# Patient Record
Sex: MALE | Race: WHITE | NOT HISPANIC OR LATINO | Employment: OTHER | URBAN - METROPOLITAN AREA
[De-identification: names, ages, dates, MRNs, and addresses within clinical notes are randomized per-mention and may not be internally consistent; named-entity substitution may affect disease eponyms.]

---

## 2017-02-08 ENCOUNTER — GENERIC CONVERSION - ENCOUNTER (OUTPATIENT)
Dept: OTHER | Facility: OTHER | Age: 82
End: 2017-02-08

## 2017-02-15 ENCOUNTER — GENERIC CONVERSION - ENCOUNTER (OUTPATIENT)
Dept: OTHER | Facility: OTHER | Age: 82
End: 2017-02-15

## 2017-02-21 ENCOUNTER — ALLSCRIPTS OFFICE VISIT (OUTPATIENT)
Dept: OTHER | Facility: OTHER | Age: 82
End: 2017-02-21

## 2017-02-21 DIAGNOSIS — M62.81 MUSCLE WEAKNESS (GENERALIZED): ICD-10-CM

## 2017-02-22 ENCOUNTER — GENERIC CONVERSION - ENCOUNTER (OUTPATIENT)
Dept: OTHER | Facility: OTHER | Age: 82
End: 2017-02-22

## 2017-02-27 ENCOUNTER — APPOINTMENT (OUTPATIENT)
Dept: PHYSICAL THERAPY | Facility: REHABILITATION | Age: 82
End: 2017-02-27
Payer: MEDICARE

## 2017-02-27 DIAGNOSIS — M62.81 MUSCLE WEAKNESS (GENERALIZED): ICD-10-CM

## 2017-02-27 PROCEDURE — G8990 OTHER PT/OT CURRENT STATUS: HCPCS

## 2017-02-27 PROCEDURE — G8991 OTHER PT/OT GOAL STATUS: HCPCS

## 2017-02-27 PROCEDURE — 97163 PT EVAL HIGH COMPLEX 45 MIN: CPT

## 2017-03-01 ENCOUNTER — APPOINTMENT (OUTPATIENT)
Dept: PHYSICAL THERAPY | Facility: REHABILITATION | Age: 82
End: 2017-03-01
Payer: MEDICARE

## 2017-03-01 PROCEDURE — 97110 THERAPEUTIC EXERCISES: CPT

## 2017-03-02 ENCOUNTER — GENERIC CONVERSION - ENCOUNTER (OUTPATIENT)
Dept: OTHER | Facility: OTHER | Age: 82
End: 2017-03-02

## 2017-03-06 ENCOUNTER — APPOINTMENT (OUTPATIENT)
Dept: PHYSICAL THERAPY | Facility: REHABILITATION | Age: 82
End: 2017-03-06
Payer: MEDICARE

## 2017-03-06 PROCEDURE — 97110 THERAPEUTIC EXERCISES: CPT

## 2017-03-09 ENCOUNTER — APPOINTMENT (OUTPATIENT)
Dept: PHYSICAL THERAPY | Facility: REHABILITATION | Age: 82
End: 2017-03-09
Payer: MEDICARE

## 2017-03-09 PROCEDURE — 97110 THERAPEUTIC EXERCISES: CPT

## 2017-03-13 ENCOUNTER — APPOINTMENT (OUTPATIENT)
Dept: PHYSICAL THERAPY | Facility: REHABILITATION | Age: 82
End: 2017-03-13
Payer: MEDICARE

## 2017-03-13 PROCEDURE — 97110 THERAPEUTIC EXERCISES: CPT

## 2017-03-14 ENCOUNTER — APPOINTMENT (OUTPATIENT)
Dept: PHYSICAL THERAPY | Facility: REHABILITATION | Age: 82
End: 2017-03-14
Payer: MEDICARE

## 2017-03-16 ENCOUNTER — APPOINTMENT (OUTPATIENT)
Dept: PHYSICAL THERAPY | Facility: REHABILITATION | Age: 82
End: 2017-03-16
Payer: MEDICARE

## 2017-03-16 PROCEDURE — 97110 THERAPEUTIC EXERCISES: CPT

## 2017-03-17 ENCOUNTER — GENERIC CONVERSION - ENCOUNTER (OUTPATIENT)
Dept: OTHER | Facility: OTHER | Age: 82
End: 2017-03-17

## 2017-03-20 ENCOUNTER — APPOINTMENT (OUTPATIENT)
Dept: PHYSICAL THERAPY | Facility: REHABILITATION | Age: 82
End: 2017-03-20
Payer: MEDICARE

## 2017-03-20 PROCEDURE — 97110 THERAPEUTIC EXERCISES: CPT

## 2017-03-23 ENCOUNTER — APPOINTMENT (OUTPATIENT)
Dept: PHYSICAL THERAPY | Facility: REHABILITATION | Age: 82
End: 2017-03-23
Payer: MEDICARE

## 2017-03-23 PROCEDURE — 97110 THERAPEUTIC EXERCISES: CPT

## 2017-03-27 ENCOUNTER — APPOINTMENT (OUTPATIENT)
Dept: PHYSICAL THERAPY | Facility: REHABILITATION | Age: 82
End: 2017-03-27
Payer: MEDICARE

## 2017-03-27 ENCOUNTER — GENERIC CONVERSION - ENCOUNTER (OUTPATIENT)
Dept: OTHER | Facility: OTHER | Age: 82
End: 2017-03-27

## 2017-03-27 PROCEDURE — 97140 MANUAL THERAPY 1/> REGIONS: CPT

## 2017-03-27 PROCEDURE — 97112 NEUROMUSCULAR REEDUCATION: CPT

## 2017-03-27 PROCEDURE — G8990 OTHER PT/OT CURRENT STATUS: HCPCS

## 2017-03-27 PROCEDURE — G8991 OTHER PT/OT GOAL STATUS: HCPCS

## 2017-03-27 PROCEDURE — 97110 THERAPEUTIC EXERCISES: CPT

## 2017-03-30 ENCOUNTER — APPOINTMENT (OUTPATIENT)
Dept: PHYSICAL THERAPY | Facility: REHABILITATION | Age: 82
End: 2017-03-30
Payer: MEDICARE

## 2017-03-30 PROCEDURE — 97110 THERAPEUTIC EXERCISES: CPT

## 2017-03-30 PROCEDURE — 97112 NEUROMUSCULAR REEDUCATION: CPT

## 2017-04-03 ENCOUNTER — APPOINTMENT (OUTPATIENT)
Dept: PHYSICAL THERAPY | Facility: REHABILITATION | Age: 82
End: 2017-04-03
Payer: MEDICARE

## 2017-04-03 PROCEDURE — 97112 NEUROMUSCULAR REEDUCATION: CPT

## 2017-04-03 PROCEDURE — 97110 THERAPEUTIC EXERCISES: CPT

## 2017-04-06 ENCOUNTER — APPOINTMENT (OUTPATIENT)
Dept: PHYSICAL THERAPY | Facility: REHABILITATION | Age: 82
End: 2017-04-06
Payer: MEDICARE

## 2017-04-06 PROCEDURE — 97112 NEUROMUSCULAR REEDUCATION: CPT

## 2017-04-06 PROCEDURE — 97110 THERAPEUTIC EXERCISES: CPT

## 2017-04-10 ENCOUNTER — APPOINTMENT (OUTPATIENT)
Dept: PHYSICAL THERAPY | Facility: REHABILITATION | Age: 82
End: 2017-04-10
Payer: MEDICARE

## 2017-04-10 PROCEDURE — 97110 THERAPEUTIC EXERCISES: CPT

## 2017-04-10 PROCEDURE — 97112 NEUROMUSCULAR REEDUCATION: CPT

## 2017-04-13 ENCOUNTER — APPOINTMENT (OUTPATIENT)
Dept: PHYSICAL THERAPY | Facility: REHABILITATION | Age: 82
End: 2017-04-13
Payer: MEDICARE

## 2017-04-13 PROCEDURE — 97110 THERAPEUTIC EXERCISES: CPT

## 2017-04-13 PROCEDURE — 97112 NEUROMUSCULAR REEDUCATION: CPT

## 2017-04-17 ENCOUNTER — APPOINTMENT (OUTPATIENT)
Dept: PHYSICAL THERAPY | Facility: REHABILITATION | Age: 82
End: 2017-04-17
Payer: MEDICARE

## 2017-04-17 PROCEDURE — 97112 NEUROMUSCULAR REEDUCATION: CPT

## 2017-04-17 PROCEDURE — 97110 THERAPEUTIC EXERCISES: CPT

## 2017-04-20 ENCOUNTER — APPOINTMENT (OUTPATIENT)
Dept: PHYSICAL THERAPY | Facility: REHABILITATION | Age: 82
End: 2017-04-20
Payer: MEDICARE

## 2017-04-20 PROCEDURE — 97110 THERAPEUTIC EXERCISES: CPT

## 2017-04-20 PROCEDURE — 97112 NEUROMUSCULAR REEDUCATION: CPT

## 2017-04-25 ENCOUNTER — APPOINTMENT (OUTPATIENT)
Dept: PHYSICAL THERAPY | Facility: REHABILITATION | Age: 82
End: 2017-04-25
Payer: MEDICARE

## 2017-04-25 PROCEDURE — 97140 MANUAL THERAPY 1/> REGIONS: CPT

## 2017-04-25 PROCEDURE — G8992 OTHER PT/OT  D/C STATUS: HCPCS

## 2017-04-25 PROCEDURE — G8991 OTHER PT/OT GOAL STATUS: HCPCS

## 2017-04-25 PROCEDURE — 97110 THERAPEUTIC EXERCISES: CPT

## 2017-04-26 ENCOUNTER — APPOINTMENT (OUTPATIENT)
Dept: PHYSICAL THERAPY | Facility: REHABILITATION | Age: 82
End: 2017-04-26
Payer: MEDICARE

## 2017-05-31 ENCOUNTER — GENERIC CONVERSION - ENCOUNTER (OUTPATIENT)
Dept: OTHER | Facility: OTHER | Age: 82
End: 2017-05-31

## 2017-06-01 ENCOUNTER — ALLSCRIPTS OFFICE VISIT (OUTPATIENT)
Dept: OTHER | Facility: OTHER | Age: 82
End: 2017-06-01

## 2017-06-01 ENCOUNTER — HOSPITAL ENCOUNTER (OUTPATIENT)
Dept: RADIOLOGY | Facility: HOSPITAL | Age: 82
Discharge: HOME | End: 2017-06-01
Payer: MEDICARE

## 2017-06-01 DIAGNOSIS — I10 ESSENTIAL (PRIMARY) HYPERTENSION: ICD-10-CM

## 2017-06-01 DIAGNOSIS — M54.2 CERVICALGIA: ICD-10-CM

## 2017-06-01 DIAGNOSIS — E78.5 HYPERLIPIDEMIA: ICD-10-CM

## 2017-06-01 PROCEDURE — 72052 X-RAY EXAM NECK SPINE 6/>VWS: CPT

## 2017-06-16 ENCOUNTER — GENERIC CONVERSION - ENCOUNTER (OUTPATIENT)
Dept: OTHER | Facility: OTHER | Age: 82
End: 2017-06-16

## 2017-06-19 ENCOUNTER — GENERIC CONVERSION - ENCOUNTER (OUTPATIENT)
Dept: OTHER | Facility: OTHER | Age: 82
End: 2017-06-19

## 2017-08-10 ENCOUNTER — GENERIC CONVERSION - ENCOUNTER (OUTPATIENT)
Dept: OTHER | Facility: OTHER | Age: 82
End: 2017-08-10

## 2017-08-29 ENCOUNTER — GENERIC CONVERSION - ENCOUNTER (OUTPATIENT)
Dept: OTHER | Facility: OTHER | Age: 82
End: 2017-08-29

## 2017-09-01 ENCOUNTER — GENERIC CONVERSION - ENCOUNTER (OUTPATIENT)
Dept: OTHER | Facility: OTHER | Age: 82
End: 2017-09-01

## 2017-09-26 ENCOUNTER — GENERIC CONVERSION - ENCOUNTER (OUTPATIENT)
Dept: OTHER | Facility: OTHER | Age: 82
End: 2017-09-26

## 2017-11-06 ENCOUNTER — GENERIC CONVERSION - ENCOUNTER (OUTPATIENT)
Dept: OTHER | Facility: OTHER | Age: 82
End: 2017-11-06

## 2017-11-07 ENCOUNTER — GENERIC CONVERSION - ENCOUNTER (OUTPATIENT)
Dept: OTHER | Facility: OTHER | Age: 82
End: 2017-11-07

## 2017-11-09 ENCOUNTER — ALLSCRIPTS OFFICE VISIT (OUTPATIENT)
Dept: OTHER | Facility: OTHER | Age: 82
End: 2017-11-09

## 2017-11-09 DIAGNOSIS — M10.9 GOUT: ICD-10-CM

## 2017-11-09 DIAGNOSIS — R29.898 OTHER SYMPTOMS AND SIGNS INVOLVING THE MUSCULOSKELETAL SYSTEM: ICD-10-CM

## 2017-11-09 DIAGNOSIS — I10 ESSENTIAL (PRIMARY) HYPERTENSION: ICD-10-CM

## 2017-11-09 DIAGNOSIS — M25.512 PAIN IN LEFT SHOULDER: ICD-10-CM

## 2017-11-10 ENCOUNTER — GENERIC CONVERSION - ENCOUNTER (OUTPATIENT)
Dept: OTHER | Facility: OTHER | Age: 82
End: 2017-11-10

## 2017-11-10 NOTE — PROGRESS NOTES
Assessment    1  Skin lesion (709 9) (L98 9)   2  Aortic stenosis (424 1) (I35 0)   3  Chronic kidney disease (585 9) (N18 9)   4  CLL (chronic lymphocytic leukemia) (204 10) (C91 10)   5  Squamous cell carcinoma of skin (173 92) (C44 92)    Plan  Gout    · (1) PTH N-TERMINAL (INTACT); Status:Active - Retrospective Authorization; Requestedfor:09Nov2017;    · (1) URIC ACID; Status:Active - Retrospective Authorization; Requested LCM:[id number];   Hypertension    · (1) CBC/PLT/DIFF; Status:Active - Retrospective Authorization; Requested for:09Nov2017;   · (1) COMPREHENSIVE METABOLIC PANEL; Status:Active - Retrospective Authorization; Requested for:09Nov2017;    · (1) PT WITH INR; Status:Active - Retrospective Authorization; Requested UVS:[id number];   Skin lesion    · 2 - Becky ALANIZ, Giuliana Goss  ([facility]) Co-Management  *  Status:Hold For - Scheduling,Retrospective Authorization  Requested for: [id number]  Care Summary provided  : Yes    Discussion/Summary    There is blood pressure is well controlled he has an irregular rhythm there is no murmur that I hear he has got +1 edema of removing a sure he does have a 2 centimeter raised exophytic fissured lesion on his left upper back and a smaller maybe half a centimeter raised lesion on his right upper shoulder  At this point we are going to set about for complete set of lab studies copies of which will be sent to his other physicians will try to refer him to a plastic surgeon as soon as possible for definitive treatment of this I will put arrange to have the plastic surgeon get part in correspondence about the skin lesion for his use  Chief Complaint  Patient has not been feeling well  History of Present Illness  HPI: Dear is here today for visit he has some skin lesions he is concerned about  He continues under the care of his oncologist for CLL, nephrologist for CKD, and Dr Paresh oCrtés his cardiologist for a variety of cardiac illness   He was actually seen in the office of [facility] were 1 of those lesions on his back was biopsied and found to be a squamous cell carcinoma  Unfortunately on appointment was made for him to have excision but this will not be done until apparently mid January the lesion Mr  asked to seems to be enlarging is more uncomfortable and it seems like it would be reasonable to have this taking care of sooner  He is tired does not have the usual energy that he has      Active Problems    1  Anticoagulant long-term use (V58 61) (Z79 01)   2  Aortic stenosis (424 1) (I35 0)   3  Aortic Valve Replacement   4  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 (780 79) (R53 1)   5  Atherosclerotic heart disease of native coronary artery without angina pectoris (414 01) (I25 10)   6  Bilateral leg edema (782 3) (R60 0)   7  Carcinoma of prostate (185) (C61)   8  Chronic kidney disease (585 9) (N18 9)   9  Chronic obstructive pulmonary disease (496) (J44 9)   10  CLL (chronic lymphocytic leukemia) (204 10) (C91 10)   11  Generalized muscle weakness (728 87) (M62 81)   12  Gout (274 9) (M10 9)   13  Hyperlipidemia (272 4) (E78 5)   14  Hypertension (401 9) (I10)   15  Nasal congestion (478 19) (R09 81)   16  Neck pain (723 1) (M54 2)   17  Peripheral vascular disease (443 9) (I73 9)    Social History     · Former smoker (W27 31) (N56 986)    Current Meds   1  Allopurinol 300 MG Oral Tablet; Take 1 tablet by mouth  daily; Therapy: 03UTG0277 to (Evaluate:09Bpx4675)  Requested for: 22Phl6790; Last Rx:67Nog5106 Ordered   2  Amiodarone HCl - 200 MG Oral Tablet; TAKE 1 TABLET BY MOUTH DAILY; Therapy: 29YDS2258 to (Evaluate:01Msm4963)  Requested for: 99Cpl8308; Last Rx:97Ovj0889 Ordered   3  Aspir-81 81 MG Oral Tablet Delayed Release; TAKE 1 TABLET DAILY; Therapy: 09XVL9950 to (Evaluate:17Aug2014) Recorded   4  Atorvastatin Calcium 40 MG Oral Tablet; Therapy: ([id number]) to Recorded   5  Daily Multiple Vitamins Oral Tablet;  Therapy: (Recorded:09Nov2017) to Recorded   6  Furosemide 40 MG Oral Tablet; TAKE ONE TABLET BY MOUTH TWICE A DAY; Therapy: 39NAU8787 to (Evaluate:35Qgr1260)  Requested for: 24Oct2017; Last Rx:24Oct2017 Ordered   7  Metoprolol Tartrate 50 MG Oral Tablet; TAKE ONE tablet(s) twice daily; Therapy: 26PZD4051 to (Evaluate:11Jun2018)  Requested for: 52CRB9844; Last Rx:16Jun2017 Ordered   8  Terazosin HCl - 5 MG Oral Capsule; TAKE ONE CAPSULE BY MOUTH AT BEDTIME; Therapy: 87XZI1813 to (Evaluate:55Hmp4698)  Requested for: 30ENL7834; Last Rx:06Nov2017 Ordered   9  Vitamin D3 5000 UNIT Oral Tablet; Therapy: (75 196 772) to Recorded   10  Warfarin Sodium 5 MG Oral Tablet; TAKE 1 TABLET DAILY; Therapy: 77AYT3214 to (Evaluate:03Vfc8891)  Requested for: 21Nov2016; Last  Rx:21Nov2016 Ordered    Allergies  1  Indocin CAPS    Vitals   Recorded: 94LOR2111 10:51AM Recorded: 25QXV7768 10:09AM   Heart Rate  92   Pulse Quality Regular    Systolic 343    Diastolic 80    Height  5 ft 9 in   Weight  193 lb    BMI Calculated  28 5   BSA Calculated  2 03   O2 Saturation  90       Physical Exam   Constitutional  General appearance: No acute distress, well appearing and well nourished  Eyes  Conjunctiva and lids: No swelling, erythema, or discharge  Pulmonary  Respiratory effort: No increased work of breathing or signs of respiratory distress  Auscultation of lungs: Clear to auscultation, equal breath sounds bilaterally, no wheezes, no rales, no rhonci  Cardiovascular  Auscultation of heart: Normal rate and rhythm, normal S1 and S2, without murmurs  Examination of extremities for edema and/or varicosities: Abnormal    Skin  Skin and subcutaneous tissue: Abnormal    Psychiatric  Orientation to person, place and time: Normal    Mood and affect: Normal          Future Appointments    Date/Time Provider Specialty Site   02/14/2018 09:00 AM CATA Booth   Internal Medicine [facility] IM       Signatures   Electronically signed by : Melodee Gowers, M D ; Nov  9 2017 10:57AM EST                       (Author)

## 2017-11-15 ENCOUNTER — APPOINTMENT (OUTPATIENT)
Dept: PHYSICAL THERAPY | Facility: REHABILITATION | Age: 82
End: 2017-11-15
Payer: MEDICARE

## 2017-11-15 ENCOUNTER — GENERIC CONVERSION - ENCOUNTER (OUTPATIENT)
Dept: OTHER | Facility: OTHER | Age: 82
End: 2017-11-15

## 2017-11-15 DIAGNOSIS — R29.898 OTHER SYMPTOMS AND SIGNS INVOLVING THE MUSCULOSKELETAL SYSTEM: ICD-10-CM

## 2017-11-15 PROCEDURE — 97163 PT EVAL HIGH COMPLEX 45 MIN: CPT

## 2017-11-15 PROCEDURE — G8990 OTHER PT/OT CURRENT STATUS: HCPCS

## 2017-11-15 PROCEDURE — G8991 OTHER PT/OT GOAL STATUS: HCPCS

## 2017-11-17 ENCOUNTER — APPOINTMENT (OUTPATIENT)
Dept: PHYSICAL THERAPY | Facility: REHABILITATION | Age: 82
End: 2017-11-17
Payer: MEDICARE

## 2017-11-17 PROCEDURE — 97112 NEUROMUSCULAR REEDUCATION: CPT

## 2017-11-17 PROCEDURE — 97110 THERAPEUTIC EXERCISES: CPT

## 2017-11-20 ENCOUNTER — APPOINTMENT (OUTPATIENT)
Dept: PHYSICAL THERAPY | Facility: REHABILITATION | Age: 82
End: 2017-11-20
Payer: MEDICARE

## 2017-11-20 PROCEDURE — 97112 NEUROMUSCULAR REEDUCATION: CPT

## 2017-11-20 PROCEDURE — 97110 THERAPEUTIC EXERCISES: CPT

## 2017-11-21 ENCOUNTER — GENERIC CONVERSION - ENCOUNTER (OUTPATIENT)
Dept: INTERNAL MEDICINE CLINIC | Facility: CLINIC | Age: 82
End: 2017-11-21

## 2017-11-21 ENCOUNTER — APPOINTMENT (OUTPATIENT)
Dept: PHYSICAL THERAPY | Facility: REHABILITATION | Age: 82
End: 2017-11-21
Payer: MEDICARE

## 2017-11-22 ENCOUNTER — GENERIC CONVERSION - ENCOUNTER (OUTPATIENT)
Dept: INTERNAL MEDICINE CLINIC | Facility: CLINIC | Age: 82
End: 2017-11-22

## 2017-11-27 ENCOUNTER — APPOINTMENT (OUTPATIENT)
Dept: PHYSICAL THERAPY | Facility: REHABILITATION | Age: 82
End: 2017-11-27
Payer: MEDICARE

## 2017-11-27 PROCEDURE — 97112 NEUROMUSCULAR REEDUCATION: CPT

## 2017-11-27 PROCEDURE — 97110 THERAPEUTIC EXERCISES: CPT

## 2017-11-29 ENCOUNTER — APPOINTMENT (OUTPATIENT)
Dept: PHYSICAL THERAPY | Facility: REHABILITATION | Age: 82
End: 2017-11-29
Payer: MEDICARE

## 2017-11-29 ENCOUNTER — GENERIC CONVERSION - ENCOUNTER (OUTPATIENT)
Dept: INTERNAL MEDICINE CLINIC | Facility: CLINIC | Age: 82
End: 2017-11-29

## 2017-11-29 ENCOUNTER — ALLSCRIPTS OFFICE VISIT (OUTPATIENT)
Dept: OTHER | Facility: OTHER | Age: 82
End: 2017-11-29

## 2017-11-29 ENCOUNTER — APPOINTMENT (OUTPATIENT)
Dept: RADIOLOGY | Facility: CLINIC | Age: 82
End: 2017-11-29
Payer: MEDICARE

## 2017-11-29 DIAGNOSIS — M25.512 PAIN IN LEFT SHOULDER: ICD-10-CM

## 2017-11-29 PROCEDURE — 97110 THERAPEUTIC EXERCISES: CPT

## 2017-11-29 PROCEDURE — 97112 NEUROMUSCULAR REEDUCATION: CPT

## 2017-11-29 PROCEDURE — 73030 X-RAY EXAM OF SHOULDER: CPT

## 2017-12-04 ENCOUNTER — APPOINTMENT (OUTPATIENT)
Dept: PHYSICAL THERAPY | Facility: REHABILITATION | Age: 82
End: 2017-12-04
Payer: MEDICARE

## 2017-12-04 PROCEDURE — 97110 THERAPEUTIC EXERCISES: CPT

## 2017-12-06 ENCOUNTER — GENERIC CONVERSION - ENCOUNTER (OUTPATIENT)
Dept: INTERNAL MEDICINE CLINIC | Facility: CLINIC | Age: 82
End: 2017-12-06

## 2017-12-07 ENCOUNTER — GENERIC CONVERSION - ENCOUNTER (OUTPATIENT)
Dept: INTERNAL MEDICINE CLINIC | Facility: CLINIC | Age: 82
End: 2017-12-07

## 2017-12-07 ENCOUNTER — APPOINTMENT (OUTPATIENT)
Dept: PHYSICAL THERAPY | Facility: REHABILITATION | Age: 82
End: 2017-12-07
Payer: MEDICARE

## 2017-12-07 PROCEDURE — 97110 THERAPEUTIC EXERCISES: CPT

## 2017-12-07 PROCEDURE — G8990 OTHER PT/OT CURRENT STATUS: HCPCS

## 2017-12-07 PROCEDURE — 97164 PT RE-EVAL EST PLAN CARE: CPT

## 2017-12-07 PROCEDURE — G8991 OTHER PT/OT GOAL STATUS: HCPCS

## 2017-12-11 ENCOUNTER — APPOINTMENT (OUTPATIENT)
Dept: PHYSICAL THERAPY | Facility: REHABILITATION | Age: 82
End: 2017-12-11
Payer: MEDICARE

## 2017-12-11 PROCEDURE — 97112 NEUROMUSCULAR REEDUCATION: CPT

## 2017-12-11 PROCEDURE — 97110 THERAPEUTIC EXERCISES: CPT

## 2017-12-14 ENCOUNTER — GENERIC CONVERSION - ENCOUNTER (OUTPATIENT)
Dept: INTERNAL MEDICINE CLINIC | Facility: CLINIC | Age: 82
End: 2017-12-14

## 2017-12-28 ENCOUNTER — GENERIC CONVERSION - ENCOUNTER (OUTPATIENT)
Dept: INTERNAL MEDICINE CLINIC | Facility: CLINIC | Age: 82
End: 2017-12-28

## 2017-12-29 ENCOUNTER — APPOINTMENT (OUTPATIENT)
Dept: PHYSICAL THERAPY | Facility: REHABILITATION | Age: 82
End: 2017-12-29
Payer: MEDICARE

## 2017-12-29 PROCEDURE — 97530 THERAPEUTIC ACTIVITIES: CPT

## 2017-12-29 PROCEDURE — 97110 THERAPEUTIC EXERCISES: CPT

## 2017-12-29 PROCEDURE — 97112 NEUROMUSCULAR REEDUCATION: CPT

## 2018-01-12 ENCOUNTER — GENERIC CONVERSION - ENCOUNTER (OUTPATIENT)
Dept: OTHER | Facility: OTHER | Age: 83
End: 2018-01-12

## 2018-01-12 VITALS
HEIGHT: 69 IN | HEART RATE: 92 BPM | SYSTOLIC BLOOD PRESSURE: 120 MMHG | WEIGHT: 193 LBS | BODY MASS INDEX: 28.58 KG/M2 | DIASTOLIC BLOOD PRESSURE: 80 MMHG | OXYGEN SATURATION: 90 %

## 2018-01-13 VITALS — DIASTOLIC BLOOD PRESSURE: 70 MMHG | SYSTOLIC BLOOD PRESSURE: 118 MMHG

## 2018-01-13 VITALS
HEART RATE: 81 BPM | HEIGHT: 69 IN | DIASTOLIC BLOOD PRESSURE: 45 MMHG | OXYGEN SATURATION: 93 % | WEIGHT: 193 LBS | SYSTOLIC BLOOD PRESSURE: 128 MMHG | BODY MASS INDEX: 28.58 KG/M2

## 2018-01-17 NOTE — PROGRESS NOTES
Assessment    1  Anticoagulant long-term use (V58 61) (Z79 01)   2  Diabetes mellitus (250 00) (E11 9)   3  CLL (chronic lymphocytic leukemia) (204 10) (C91 10)   4  Chronic kidney disease (585 9) (N18 9)   5  Carcinoma of prostate (185) (C61)   6  Gout (274 9) (M10 9)   7  Aortic Valve Replacement    Plan  Health Maintenance    · From  Zolpidem Tartrate 5 MG Oral Tablet TAKE 1 TABLET AT BEDTIME AS NEEDED To  Zolpidem Tartrate 10 MG Oral Tablet TAKE 1 TABLET AT BEDTIME AS NEEDED  Hyperlipidemia    · Simvastatin 10 MG Oral Tablet  Hypertension    · Terazosin HCl - 5 MG Oral Capsule; Take 1 capsule by mouth at  bedtime    Discussion/Summary  Discussion Summary:   Area looks great his blood pressures well controlled as carotids are quiet is a short ejection her pacemaker is palpable left anterior part of the chest no significant edema his affect is good and in his skin is warm and dry  No changes are made in his medication except instead of Lasix 40 twice a day we'll try 60 mg once in the morning  We talked to him about his sleeping medications is not due for lab studies  He did get appropriate immunizations  We'll see him back at his request in 3 months  Chief Complaint  Chief Complaint Free Text Note Form: 3 month follow up  History of Present Illness  HPI: Ubaldo Negrete is here today for a visit he actually looks quite well he has an appointment to see Dr Talisha Villagomez his nephrologist in the near future  He continues see Dr Abimael Guido his cardiologist he was monitoring his Coumadin   He had a recent visit with Dr Dandy Reyes his oncologist for his CLL and everything was stable he's up-to-date with his lab studies his only complaint is insomnia he takes 5 mg of Ambien occasionally at night and sometimes his we'll get him to sleep for a few hours and he awakens again he would like to change his Lasix dose from 40 mg twice a day to a 60 mg dose once a day I think at this point that certainly is permissible denies chest pain shortness of breath and palpitations and actually looks quite well his gout is been quiesced and      Active Problems    1  Anticoagulant long-term use (V58 61) (Z79 01)   2  Aortic stenosis (424 1) (I35 0)   3  Atherosclerotic heart disease of native coronary artery without angina pectoris (414 01) (I25 10)   4  Bilateral leg edema (782 3) (R60 0)   5  Carcinoma of prostate (185) (C61)   6  Chronic kidney disease (585 9) (N18 9)   7  Chronic obstructive pulmonary disease (496) (J44 9)   8  CLL (chronic lymphocytic leukemia) (204 10) (C91 10)   9  Diabetes mellitus (250 00) (E11 9)   10  Flu vaccine need (V04 81) (Z23)   11  Gout (274 9) (M10 9)   12  Hyperlipidemia (272 4) (E78 5)   13  Hypertension (401 9) (I10)   14  Need for vaccination with 13-polyvalent pneumococcal conjugate vaccine (V03 82) (Z23)   15  Peripheral vascular disease (443 9) (I73 9)    Past Medical History    1  History of Abnormal blood chemistry (790 6) (R79 9)   2  History of Diarrhea Chronic   3  History of Proctitis (569 49) (K62 89)   4  History of Prostate Cancer (V10 46)    Surgical History    1  History of Catheter Ablation   2  History of PTCA Prox Left Anterior Descending Artery Assessment    Social History    · Former smoker (R11 77) (S84 510)    Current Meds   1  Advair Diskus 500-50 MCG/DOSE Inhalation Aerosol Powder Breath Activated; INHALE 1 PUFF   TWICE DAILY; Therapy: 84SEE9024 to (Sharon Mendiola)  Requested for: 44QYK7399; Last Rx:28Oct2015   Ordered   2  Allopurinol 300 MG Oral Tablet; Take 1 tablet by mouth  daily; Therapy: 38BPL1248 to (Evaluate:10Mar2016)  Requested for: 65KTT7534; Last Rx:43Aqn5157   Ordered   3  Aspir-81 81 MG Oral Tablet Delayed Release; TAKE 1 TABLET DAILY; Therapy: 93PSZ0588 to (Evaluate:17Aug2014) Recorded   4  Cilostazol 50 MG Oral Tablet; TAKE 1 TABLET TWICE DAILY; Therapy: 23OEA7059 to (Sharon Mendiola)  Requested for: 17NPJ0298; Last Rx:02Mar2015   Ordered   5  Furosemide 40 MG Oral Tablet; TAKE 1 TABLET TWICE DAILY; Therapy: 79URY4752 to (Evaluate:28Jan2016)  Requested for: 60CYH3676; Last Rx:56Emj4490   Ordered   6  Losartan Potassium 25 MG Oral Tablet; TAKE 1 TABLET DAILY AS DIRECTED; Therapy: 20CYO2935 to (Evaluate:59Kgg2564) Recorded   7  Metoprolol Tartrate 50 MG Oral Tablet; TAKE 1 TABLET TWICE DAILY; Therapy: 92GSJ0229 to (Riley Mercer)  Requested for: 14JJF8348; Last Rx:01Jun2015   Ordered   8  Mirtazapine 7 5 MG Oral Tablet; One tablet po daily; Therapy: 43QOV2665 to (Last Rx:28Oct2015)  Requested for: 28Oct2015 Ordered   9  PredniSONE 10 MG Oral Tablet; take 1 tablet by mouth  daily as directed; Therapy: 40OPY7327 to (Evaluate:10Mar2016)  Requested for: 85IID0563; Last Rx:34Ffg4958   Ordered   10  Simvastatin 10 MG Oral Tablet; Take 1 tablet by mouth  daily; Therapy: 70TTR3196 to (Oumou Valera)  Requested for: 90TKH5149; Last Rx:55Wvl8775    Ordered   11  Simvastatin 10 MG Oral Tablet; Take one tablet daily; Therapy: 23QCS1803 to (Evaluate:73Gfr5437)  Requested for: 98RGF2242; Last OF:67YZW6022    Ordered   12  Terazosin HCl - 5 MG Oral Capsule; Take 1 capsule by mouth at  bedtime; Therapy: 00NWC2557 to (Evaluate:10Mar2016)  Requested for: 33PUS6193; Last Rx:22Ebu2685    Ordered   13  Warfarin Sodium 5 MG Oral Tablet; TAKE 1 TABLET DAILY; Therapy: 97RLO5748 to (Evaluate:57Hym0568)  Requested for: 49RYA5049; Last Rx:08Jan2016    Ordered    Allergies    1  Indocin CAPS    Vitals  Vital Signs [Data Includes: Current Encounter]    Recorded: T659900 09:36AM Recorded: 59XAY2889 09:00AM   Heart Rate  89   Pulse Quality Normal    Systolic 579    Diastolic 52    Height  5 ft 9 in   Weight  198 lb    BMI Calculated  29 24   BSA Calculated  2 05   O2 Saturation  98   Pain Scale  0     Physical Exam    Constitutional   General appearance: No acute distress, well appearing and well nourished      Eyes   Conjunctiva and lids: No swelling, erythema, or discharge  Pulmonary   Respiratory effort: No increased work of breathing or signs of respiratory distress  Auscultation of lungs: Clear to auscultation, equal breath sounds bilaterally, no wheezes, no rales, no rhonci  Cardiovascular   Palpation of heart: Abnormal     Auscultation of heart: Abnormal     Examination of extremities for edema and/or varicosities: Normal     Carotid pulses: Normal     Skin   Skin and subcutaneous tissue: Normal without rashes or lesions      Psychiatric   Orientation to person, place and time: Normal     Mood and affect: Normal          Signatures   Electronically signed by : DANAE Rollins D ; Feb 2 2016  9:43AM EST                       (Author)

## 2018-01-17 NOTE — CONSULTS
Therapy  Rehabilitation Services Referral:   Patient Status: routine  Diagnosis: *L RTC arthropathy, please eval and treat and include strengthening, ROM, scapular stabilization, home exercise program    Rehabilitation Services: evaluate and treat patient as needed and initiate a home exercise program    Frequency: 1-3 times per week, for 4-6 weeks  Please send progress report  I hereby certify that the services indicated above are medically necessary        Future Appointments    Signatures   Electronically signed by : Ashley Brewer MD; Nov 29 2017  3:10PM EST                       (Author)    Electronically signed by : CATA Stewart ; Nov 29 2017  3:28PM EST                       (Author)    Electronically signed by : CATA Stewart ; Nov 29 2017  4:12PM EST                       (Author)

## 2018-01-22 VITALS — DIASTOLIC BLOOD PRESSURE: 50 MMHG | SYSTOLIC BLOOD PRESSURE: 118 MMHG

## 2018-01-22 VITALS
WEIGHT: 192 LBS | BODY MASS INDEX: 28.44 KG/M2 | HEART RATE: 88 BPM | DIASTOLIC BLOOD PRESSURE: 65 MMHG | SYSTOLIC BLOOD PRESSURE: 131 MMHG | HEIGHT: 69 IN

## 2018-01-23 DIAGNOSIS — M25.512 PAIN IN LEFT SHOULDER: ICD-10-CM

## 2018-01-23 DIAGNOSIS — R29.898 OTHER SYMPTOMS AND SIGNS INVOLVING THE MUSCULOSKELETAL SYSTEM: ICD-10-CM

## 2018-01-24 VITALS
WEIGHT: 194 LBS | HEART RATE: 88 BPM | HEIGHT: 69 IN | DIASTOLIC BLOOD PRESSURE: 56 MMHG | SYSTOLIC BLOOD PRESSURE: 123 MMHG | BODY MASS INDEX: 28.73 KG/M2

## 2018-01-25 ENCOUNTER — EVALUATION (OUTPATIENT)
Dept: PHYSICAL THERAPY | Facility: REHABILITATION | Age: 83
End: 2018-01-25
Payer: MEDICARE

## 2018-01-25 DIAGNOSIS — M25.512 CHRONIC LEFT SHOULDER PAIN: ICD-10-CM

## 2018-01-25 DIAGNOSIS — R29.898 OTHER SYMPTOMS AND SIGNS INVOLVING THE MUSCULOSKELETAL SYSTEM: Primary | ICD-10-CM

## 2018-01-25 DIAGNOSIS — G89.29 CHRONIC LEFT SHOULDER PAIN: ICD-10-CM

## 2018-01-25 DIAGNOSIS — M25.512 PAIN IN LEFT SHOULDER: ICD-10-CM

## 2018-01-25 PROCEDURE — G8979 MOBILITY GOAL STATUS: HCPCS | Performed by: PHYSICAL THERAPIST

## 2018-01-25 PROCEDURE — 97162 PT EVAL MOD COMPLEX 30 MIN: CPT | Performed by: PHYSICAL THERAPIST

## 2018-01-25 PROCEDURE — G8978 MOBILITY CURRENT STATUS: HCPCS | Performed by: PHYSICAL THERAPIST

## 2018-01-25 PROCEDURE — 97110 THERAPEUTIC EXERCISES: CPT | Performed by: PHYSICAL THERAPIST

## 2018-01-25 RX ORDER — ALLOPURINOL 300 MG/1
300 TABLET ORAL DAILY
COMMUNITY
End: 2018-03-06 | Stop reason: SDUPTHER

## 2018-01-25 RX ORDER — ATORVASTATIN CALCIUM 40 MG/1
40 TABLET, FILM COATED ORAL DAILY
COMMUNITY
End: 2018-08-17 | Stop reason: SDUPTHER

## 2018-01-25 RX ORDER — METOPROLOL TARTRATE 50 MG/1
50 TABLET, FILM COATED ORAL EVERY 12 HOURS SCHEDULED
COMMUNITY
End: 2018-06-19 | Stop reason: SDUPTHER

## 2018-01-25 RX ORDER — FUROSEMIDE 40 MG/1
40 TABLET ORAL 2 TIMES DAILY
COMMUNITY
End: 2018-03-10 | Stop reason: SDUPTHER

## 2018-01-25 RX ORDER — DIPHENOXYLATE HYDROCHLORIDE AND ATROPINE SULFATE 2.5; .025 MG/1; MG/1
1 TABLET ORAL DAILY
COMMUNITY
End: 2019-09-04 | Stop reason: SDUPTHER

## 2018-01-25 RX ORDER — WARFARIN SODIUM 2.5 MG/1
2.5 TABLET ORAL
COMMUNITY
End: 2018-08-17 | Stop reason: SDUPTHER

## 2018-01-25 RX ORDER — TERAZOSIN 5 MG/1
5 CAPSULE ORAL
COMMUNITY
End: 2018-02-14 | Stop reason: SDUPTHER

## 2018-01-25 RX ORDER — MELATONIN
5000 DAILY
COMMUNITY

## 2018-01-25 NOTE — PROGRESS NOTES
PT Evaluation     Today's date: 2018  Patient name: Nancy Gastelum  : 1933  MRN: [id number]  Referring provider: Dann Ulloa MD  Dx:   Encounter Diagnoses   Name Primary?  Other symptoms and signs involving the musculoskeletal system Yes    Pain in left shoulder                   Assessment  Impairments: abnormal gait, abnormal or restricted ROM, impaired balance, impaired physical strength and pain with function  Functional limitations: ambulation, stair climbing, reaching overhead  Assessment details: Patient is a 80year old male that presents for gait/balance training and left shoulder pain  Patient presents with decreased strength, decreased ROM, postural abnormalities, decreased balance, and gait deviations  Patient has difficulty with has difficulty with ambulation, negotiation of stairs, lifting, adls, lifting, and reaching overhead  Patient would benefit from skilled physical therapy services to address impairments to maximize function  Understanding of Dx/Px/POC: good   Prognosis: fair  Prognosis details: Patient's prognosis is fair secondary to co-morbidities that may limit his  Progression in therapy  Goals  Short term:   1  Patient in shoulder is decreased by 50% in 4 weeks to maximize function  2  Patient will achieve shoulder active ROM within functional limits on left to maximize function in 4 weeks  3  Patient will improve 5x sit to stand to 15 seconds to reduce risk of falling in 4 weeks  Long term:  1  Patient will improve FOTO by 12 points to 63/100 for gait in 4 weeks to maximize function  2  Patient will improve FOTO by 20 points to 64/100 in 4 weeks for shoulder to maximize function  3  Patient will report no difficulty with overhead activities in 4 weeks to maximize function  4  Patient will report no difficulty with stair climbing in 4 weeks to maximize function      Plan  Patient would benefit from: skilled PT  Planned modality interventions: cryotherapy  Planned therapy interventions: balance, gait training, home exercise program, neuromuscular re-education, patient education, postural training, strengthening, therapeutic activities and therapeutic exercise  Frequency: 2x week  Duration in visits: 8  Duration in weeks: 4  Treatment plan discussed with: patient  Plan details: Plan to RE patient in 4 weeks  Subjective Evaluation    History of Present Illness  Mechanism of injury: Patient presents with left shoulder pain with insidious onset for about 6 weeks  Patient also presents for gait, balance, and lower extremity strengthening  Patient reports a history of left shoulder pain for many years, but his pain would go away after about 4 days  Patient reports 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  Patient reports difficulty with his balance, negotiation of stairs, prolonged ambulation, squatting, hunting, caring for his home such as cleaning, and upper extremity weakness  Quality of life: good    Pain  Current pain ratin  At best pain ratin  At worst pain ratin  Location: left shoulder  Relieving factors: rest  Aggravating factors: overhead activity and lifting  Progression: improved    Social Support  Lives with: alone    Hand dominance: right      Diagnostic Tests  X-ray: normal  Treatments  Previous treatment: physical therapy  Current treatment: physical therapy  Patient Goals  Patient goals for therapy: increased strength, independence with ADLs/IADLs, return to sport/leisure activities, improved balance, increased motion and decreased pain          Objective     Static Posture     Head  Forward  Shoulders  Rounded      Tenderness     Left Shoulder   No tenderness     Right Shoulder  No tenderness     Neurological Testing     Sensation     Shoulder   Left Shoulder   Intact: light touch    Right Shoulder   Intact: light touch    Active Range of Motion   Left Shoulder   Flexion: 45 degrees with pain  Abduction: 50 degrees with pain    Right Shoulder   Flexion: 135 degrees   Abduction: 125 degrees     Passive Range of Motion   Left Shoulder   Normal passive range of motion    Right Shoulder   Normal passive range of motion    Strength/Myotome Testing     Left Shoulder     Planes of Motion   Flexion: 2+   Abduction: 2+     Right Shoulder     Planes of Motion   Flexion: 3+   Abduction: 3+     Left Hip   Planes of Motion   Flexion: 4    Right Hip   Planes of Motion   Flexion: 4    Left Knee   Flexion: 4  Extension: 4    Right Knee   Flexion: 4-  Extension: 3+    Tests     Left Shoulder   Positive external rotation lag sign, Hawkin's, lift-off and sulcus sign  Negative Neer's  Ambulation     Ambulation: Level Surfaces   Ambulation without assistive device: independent    Observational Gait   Left stance time and right stance time within functional limits  Decreased walking speed and stride length  Left foot contact pattern: heel to toe  Right foot contact pattern: heel to toe  Left arm swing: within functional limits  Right arm swing: within functional limits    Quality of Movement During Gait   Trunk    Trunk (Left): Positive left lateral lean over stance limb  Trunk (Right): Positive lateral lean over stance limb       Additional Quality of Movement During Gait Details  Hip ER leann with gait    Functional Assessment     Single Leg Stance   Left: 3 seconds  Right: 5 seconds    Comments  5x sit to stand: 19 2 seconds  TU seconds  Tandem stance: 10 seconds leann      Precautions: CHF, kidney failure, PVD    Daily Treatment Diary     Manual              none                                                                     Exercise Diary              treadmill 6 min            UBE (for/rev) 8 min            Supine flexion with wand 10x            Sit to stand NV            Shoulder isometrics at wall NV            Shoulder ladder NV            Step up NV            side stepping NV Perturbations (shoulder)  NV                                                                                                                                                               Modalities                                                               Flowsheet Rows    Flowsheet Row Most Recent Value   PT G-Codes   Current Score  51   Projected Score  57   FOTO information reviewed  Yes   Assessment Type  Evaluation   G code set  Mobility: Walking & Moving Around   Mobility: Walking and Moving Around Current Status ()  CK   Mobility: Walking and Moving Around Goal Status ()  CJ

## 2018-01-30 ENCOUNTER — OFFICE VISIT (OUTPATIENT)
Dept: PHYSICAL THERAPY | Facility: REHABILITATION | Age: 83
End: 2018-01-30
Payer: MEDICARE

## 2018-01-30 DIAGNOSIS — G89.29 CHRONIC LEFT SHOULDER PAIN: ICD-10-CM

## 2018-01-30 DIAGNOSIS — M25.512 LEFT SHOULDER PAIN, UNSPECIFIED CHRONICITY: ICD-10-CM

## 2018-01-30 DIAGNOSIS — R29.898 OTHER SYMPTOMS AND SIGNS INVOLVING THE MUSCULOSKELETAL SYSTEM: Primary | ICD-10-CM

## 2018-01-30 DIAGNOSIS — M25.512 CHRONIC LEFT SHOULDER PAIN: ICD-10-CM

## 2018-01-30 PROCEDURE — 97110 THERAPEUTIC EXERCISES: CPT

## 2018-01-30 PROCEDURE — 97112 NEUROMUSCULAR REEDUCATION: CPT

## 2018-01-30 NOTE — PROGRESS NOTES
Daily Note     Today's date: 2018  Patient name: Wade Hardy  : 1933  MRN: [id number]  Referring provider: Gonzalo Blair MD  Dx:   Encounter Diagnoses   Name Primary?  Other symptoms and signs involving the musculoskeletal system Yes    Left shoulder pain, unspecified chronicity     Chronic left shoulder pain                   Subjective: Pt reports "my shoulder gets better every day", notes ability to sleep on shoulder now without problems but is still limited in reaching activities  Pt notes balance is "not good", denies any falls but reports multiple instances of "close calls" in which he has been able to catch himself  Objective: See treatment diary below    Precautions: CHF, kidney failure, PVD     Daily Treatment Diary      Manual                     none                                                                                                                             Exercise Diary                        treadmill 6 min 6 min                   UBE (for/rev) 8 min 8 min                   Supine flexion with wand 10x  x10                   Sit to stand NV x10                   Shoulder isometrics at wall- all direct NV  5"  x10                   Shoulder ladder NV  5"x5                   Step up-fwd NV 0riser x10                   side stepping NV  x3                   Perturbations (shoulder)  NV                                                                                                                                                                                                                                                                                                   Modalities                                                                                                          Assessment: Pt has diff with AROM of L UE secondary to pain and weakness, is able to better tolerate AAROM with less sx's    Pt does require cueing to facilitate proper technique and dosage of TE  Pt denies pain throughout and demo's no instances of LOB  Tolerated treatment well  Patient demonstrated fatigue post treatment and would benefit from continued PT      Plan: Progress treatment as tolerated

## 2018-02-02 ENCOUNTER — OFFICE VISIT (OUTPATIENT)
Dept: PHYSICAL THERAPY | Facility: REHABILITATION | Age: 83
End: 2018-02-02
Payer: MEDICARE

## 2018-02-02 DIAGNOSIS — G89.29 CHRONIC LEFT SHOULDER PAIN: ICD-10-CM

## 2018-02-02 DIAGNOSIS — M25.512 LEFT SHOULDER PAIN, UNSPECIFIED CHRONICITY: ICD-10-CM

## 2018-02-02 DIAGNOSIS — R29.898 OTHER SYMPTOMS AND SIGNS INVOLVING THE MUSCULOSKELETAL SYSTEM: Primary | ICD-10-CM

## 2018-02-02 DIAGNOSIS — M25.512 CHRONIC LEFT SHOULDER PAIN: ICD-10-CM

## 2018-02-02 PROCEDURE — 97112 NEUROMUSCULAR REEDUCATION: CPT

## 2018-02-02 PROCEDURE — 97110 THERAPEUTIC EXERCISES: CPT

## 2018-02-02 NOTE — PROGRESS NOTES
Daily Note     Today's date: 2018  Patient name: Kevin Urias  : 1933  MRN: [id number]  Referring provider: Dorothey Boeck, MD  Dx:   Encounter Diagnoses   Name Primary?  Other symptoms and signs involving the musculoskeletal system Yes    Left shoulder pain, unspecified chronicity     Chronic left shoulder pain                   Subjective: Patient reported his shoulder remains getting better everyday, however it is still not good  "My balance is really not good, but lately I haven't come close to falling  I do a lot of small tripping "      Objective: See treatment diary below  Precautions: CHF, kidney failure, PVD     Daily Treatment Diary      Manual                   none                                                                                                                             Exercise Diary                        treadmill 6 min 6 min  6 min                 UBE (for/rev) 8 min 8 min  8 min                 Supine flexion with wand 10x  x10  x10                 Sit to stand NV x10  x10/x3                 Shoulder isometrics at wall- all direct NV  5"  x10  5" x15                 Shoulder ladder NV  5"x5  5"                 Step up-fwd NV 0riser x10  0 riser 15x b/l                 side stepping NV  x3  x3                 Perturbations (shoulder)  NV    15" x2 w/ cane                                                                                                                                                                                                                                                                                               Modalities                                                                                                    Assessment: Narrow ANDREA when ambulating  Encouraged to keep feet apart to increase stability  Frequent rest needed between sets for LE TE  No increase in pain with most UE TE this visit   Increased reps for chart and added shoulder perturbations w/ cane  Tolerated treatment well  Patient demonstrated fatigue post treatment and would benefit from continued PT      Plan: Continue per plan of care

## 2018-02-05 ENCOUNTER — OFFICE VISIT (OUTPATIENT)
Dept: PHYSICAL THERAPY | Facility: REHABILITATION | Age: 83
End: 2018-02-05
Payer: MEDICARE

## 2018-02-05 DIAGNOSIS — R29.898 OTHER SYMPTOMS AND SIGNS INVOLVING THE MUSCULOSKELETAL SYSTEM: ICD-10-CM

## 2018-02-05 DIAGNOSIS — G89.29 CHRONIC LEFT SHOULDER PAIN: Primary | ICD-10-CM

## 2018-02-05 DIAGNOSIS — M25.512 CHRONIC LEFT SHOULDER PAIN: Primary | ICD-10-CM

## 2018-02-05 PROCEDURE — 97110 THERAPEUTIC EXERCISES: CPT

## 2018-02-05 PROCEDURE — 97112 NEUROMUSCULAR REEDUCATION: CPT

## 2018-02-05 NOTE — PROGRESS NOTES
Daily Note     Today's date: 2018  Patient name: Leandra Reynolds  : 1933  MRN: [id number]  Referring provider: Rebeka Eng MD  Dx:   Encounter Diagnoses   Name Primary?  Chronic left shoulder pain Yes    Other symptoms and signs involving the musculoskeletal system        Start Time: 1115  Stop Time: 1200  Total time in clinic (min): 45 minutes    Subjective: Patient reports no new c/o pain or symptoms  Pt continues to report improvements in balance and ambulation  Objective: See treatment diary below  Precautions: CHF, kidney failure, PVD     Daily Treatment Diary      Manual                 none                                                                                                                             Exercise Diary                        treadmill 6 min 6 min  6 min 6 min                UBE (for/rev) 8 min 8 min  8 min  8 min               Supine flexion with wand 10x  x10  x10  10x               Sit to stand NV x10  x10/x3  10x               Shoulder isometrics at wall- all direct NV  5"  x10  5" x15  15x5"               Shoulder ladder NV  5"x5  5"  5x5"               Step up-fwd NV 0riser x10  0 riser 15x b/l  0 Riser 15x ea               side stepping NV  x3  x3  3x               Perturbations (shoulder)  NV    15" x2 w/ cane  2x15" w/ cane                                                                                                                                                                                                                                                                                             Modalities                                                                                                    Assessment: Pt tolerated treatment well  Patient demonstrated fatigue post treatment and would benefit from continued PT   Pt continues to show moderate fatigue with current exercise program  Pt needed frequent rest breaks during TE  Pt will benefit from further skilled PT to increase strength, flexibility and function  Plan: Continue per plan of care

## 2018-02-08 ENCOUNTER — OFFICE VISIT (OUTPATIENT)
Dept: PHYSICAL THERAPY | Facility: REHABILITATION | Age: 83
End: 2018-02-08
Payer: MEDICARE

## 2018-02-08 DIAGNOSIS — G89.29 CHRONIC LEFT SHOULDER PAIN: Primary | ICD-10-CM

## 2018-02-08 DIAGNOSIS — R29.898 OTHER SYMPTOMS AND SIGNS INVOLVING THE MUSCULOSKELETAL SYSTEM: ICD-10-CM

## 2018-02-08 DIAGNOSIS — M25.512 CHRONIC LEFT SHOULDER PAIN: Primary | ICD-10-CM

## 2018-02-08 DIAGNOSIS — M25.512 LEFT SHOULDER PAIN, UNSPECIFIED CHRONICITY: ICD-10-CM

## 2018-02-08 PROCEDURE — 97112 NEUROMUSCULAR REEDUCATION: CPT

## 2018-02-08 PROCEDURE — 97110 THERAPEUTIC EXERCISES: CPT

## 2018-02-08 NOTE — PROGRESS NOTES
Daily Note     Today's date: 2018  Patient name: Mary Valiente  : 1933  MRN: [id number]  Referring provider: Ramírez Durant MD  Dx:   Encounter Diagnoses   Name Primary?  Chronic left shoulder pain Yes    Other symptoms and signs involving the musculoskeletal system     Left shoulder pain, unspecified chronicity                   Subjective: Patient denies pain at rest, notes pain and difficulty with performing active motion  Objective: See treatment diary below  Precautions: CHF, kidney failure, PVD     Daily Treatment Diary      Manual               none                                                                                                                             Exercise Diary                     treadmill 6 min 6 min  6 min 6 min   6 min             UBE (for/rev) 8 min 8 min  8 min  8 min  8 min             Supine flexion with wand 10x  x10  x10  10x 5"  x15             Sit to stand NV x10  x10/x3  10x x10/x3             Shoulder isometrics at wall- all direct NV  5"  x10  5" x15  15x5" 5"  x15             Shoulder ladder NV  5"x5  5"  5x5" 5"x5             Step up-fwd NV 0riser x10  0 riser 15x b/l  0 Riser 15x ea  1 [id number]              side stepping NV  x3  x3  3x  x3             Perturbations (shoulder)  NV    15" x2 w/ cane  2x15" w/ cane  15"x2                                                                                                                                                                                                                                                                                           Modalities                                                                                                    Assessment: Pt was able to reach finger ladder without assistance from contralateral arm this date  Progressed TE as noted to patient tolerance   Pt demo's improvement in strength overall and would continue to benefit from skilled physical therapy  Plan: Continue per plan of care  Pt was treated via unsupervised time from 2:44 - 2:50 pm and was 1:1 with treating clinician for rest of session

## 2018-02-09 ENCOUNTER — APPOINTMENT (OUTPATIENT)
Dept: PHYSICAL THERAPY | Facility: REHABILITATION | Age: 83
End: 2018-02-09
Payer: MEDICARE

## 2018-02-12 ENCOUNTER — OFFICE VISIT (OUTPATIENT)
Dept: PHYSICAL THERAPY | Facility: REHABILITATION | Age: 83
End: 2018-02-12
Payer: MEDICARE

## 2018-02-12 DIAGNOSIS — G89.29 CHRONIC LEFT SHOULDER PAIN: Primary | ICD-10-CM

## 2018-02-12 DIAGNOSIS — M25.512 LEFT SHOULDER PAIN, UNSPECIFIED CHRONICITY: ICD-10-CM

## 2018-02-12 DIAGNOSIS — M25.512 CHRONIC LEFT SHOULDER PAIN: Primary | ICD-10-CM

## 2018-02-12 DIAGNOSIS — R29.898 OTHER SYMPTOMS AND SIGNS INVOLVING THE MUSCULOSKELETAL SYSTEM: ICD-10-CM

## 2018-02-12 PROCEDURE — 97530 THERAPEUTIC ACTIVITIES: CPT | Performed by: PHYSICAL THERAPIST

## 2018-02-12 PROCEDURE — 97110 THERAPEUTIC EXERCISES: CPT | Performed by: PHYSICAL THERAPIST

## 2018-02-12 NOTE — PROGRESS NOTES
Daily Note     Today's date: 2018  Patient name: Wade Hardy  : 1933  MRN: [id number]  Referring provider: Gonzalo Blair MD  Dx:   Encounter Diagnoses   Name Primary?  Chronic left shoulder pain Yes    Other symptoms and signs involving the musculoskeletal system     Left shoulder pain, unspecified chronicity                   Subjective: Patient notes improvement with shoulder ladder as he can reach it without assist from contralateral arm  Patient reports that he will be cleaning his home this afternoon  Patient notes less pain with abduction isometrics compared to previous sessions         Objective: See treatment diary below  Precautions: CHF, kidney failure, PVD     Daily Treatment Diary      Manual             none                                                                                                                             Exercise Diary                   treadmill 6 min 6 min  6 min 6 min   6 min  8 min 1 3 mph           UBE (for/rev) 8 min 8 min  8 min  8 min  8 min  10 min           Standing flexion with wand 10x  x10  x10  10x 5"  x15  4x, 2x           Sit to stand NV x10  x10/x3  10x x10/x3  10x           Shoulder isometrics at wall- all direct (no add) NV  5"  x10  5" x15  15x5" 5"  x15  5" 15x each           Shoulder ladder NV  5"x5  5"  5x5" 5"x5  5x L           Step up-fwd NV 0riser x10  0 riser 15x b/l  0 Riser 15x ea  1 [id number]   stairs 15 steps 2x           side stepping NV  x3  x3  3x  x3  2 laps           Perturbations (shoulder) 90 deg flex and scapular plane elbow 90 deg NV    15" x2 w/ cane  2x15" w/ cane  15"x2  2x 30" each                                                                                                                                                                                                                                                                                         Modalities                                                                                                        Assessment: Tolerated treatment well  Patient would benefit from continued PT  Patient continues to improve with endurance as seen with increased times on cardiovascular exercise  Patient also improves with left shoulder strength and endurance  He is able to progress supine shoulder flexion with cane to standing  He fatigues quickly  Plan: Progress treatment as tolerated  Continue to promote RTC strengthening and overhead strengthening and endurance         One on one with Juju Oneill PTA from 11:50 am-12:05 pm

## 2018-02-14 ENCOUNTER — OFFICE VISIT (OUTPATIENT)
Dept: INTERNAL MEDICINE CLINIC | Facility: CLINIC | Age: 83
End: 2018-02-14
Payer: MEDICARE

## 2018-02-14 VITALS — BODY MASS INDEX: 29.77 KG/M2 | HEIGHT: 69 IN | OXYGEN SATURATION: 98 % | HEART RATE: 92 BPM | WEIGHT: 201 LBS

## 2018-02-14 DIAGNOSIS — J44.9 CHRONIC OBSTRUCTIVE PULMONARY DISEASE, UNSPECIFIED COPD TYPE (HCC): ICD-10-CM

## 2018-02-14 DIAGNOSIS — N19 RENAL FAILURE, UNSPECIFIED CHRONICITY: ICD-10-CM

## 2018-02-14 DIAGNOSIS — I25.810 CORONARY ARTERY DISEASE INVOLVING CORONARY BYPASS GRAFT OF NATIVE HEART WITHOUT ANGINA PECTORIS: ICD-10-CM

## 2018-02-14 DIAGNOSIS — C61 PROSTATE CA (HCC): Primary | ICD-10-CM

## 2018-02-14 DIAGNOSIS — I73.9 PVD (PERIPHERAL VASCULAR DISEASE) (HCC): ICD-10-CM

## 2018-02-14 PROCEDURE — 99214 OFFICE O/P EST MOD 30 MIN: CPT | Performed by: INTERNAL MEDICINE

## 2018-02-14 RX ORDER — TERAZOSIN 5 MG/1
5 CAPSULE ORAL
Qty: 90 CAPSULE | Refills: 3 | Status: SHIPPED | OUTPATIENT
Start: 2018-02-14 | End: 2018-08-13 | Stop reason: SDUPTHER

## 2018-02-14 NOTE — PROGRESS NOTES
Gisele Acuña is here today for a visit see Cy Bauer had minute was decided that he would not undergo shoulder surgery  He has an appointment to see his nephrologist Dr Renard Avelar in the near future  At this point he has decided against Terryl Sell A ICD  He has had occasional on modest nose bleeds  He also is being been progressively concerned about reduced hearing and his inability to participate in conversations  He has had a little bit of rectal bleeding he did have a colonoscopy in October of 2012 and a polypectomy and he believes that he has seen a colon and rectal person after that for some an erectile problems  Remains under the care of Dr Erlin Wang was following his prostate cancer which I believe was treated with radiation  He has had some other problems with his bowels that he attributes to irritable bowel but it may actually be sees a radiation affect  He has exertional leg pain despite multiple stents in his iliac and popliteal arteries  His appetite is Re is good his breathing is reasonable he has had no chest pain  On physical examination he is pleasant sharp and in no distress his pressure is 120/60 pulse is 70 and quite regular he has a very faint ejection murmur at the cardiac base almost imperceptible his Ball breath sounds are distant there are few scattered rales extremities show trace of edema both tympanic a canals are free of debris and the membranes are intact  At this point we are going to refer him to an otolaryngologist for a hearing evaluation and consideration of hearing amplification  At this point his rectal problems are not sufficiently troubling that he wishes to pursue further evaluation  We are going to order CBC chemistries a TSH and a ferritin with his next lab studies copies will be sent to Dr Nellie Barahona  We also talked about his progressive hand weakness and has difficulty and racking the slides of his 1911    We will see him back in 3 or 4 months for evaluation

## 2018-02-15 ENCOUNTER — OFFICE VISIT (OUTPATIENT)
Dept: PHYSICAL THERAPY | Facility: REHABILITATION | Age: 83
End: 2018-02-15
Payer: MEDICARE

## 2018-02-15 DIAGNOSIS — M25.512 CHRONIC LEFT SHOULDER PAIN: Primary | ICD-10-CM

## 2018-02-15 DIAGNOSIS — G89.29 CHRONIC LEFT SHOULDER PAIN: Primary | ICD-10-CM

## 2018-02-15 DIAGNOSIS — R29.898 OTHER SYMPTOMS AND SIGNS INVOLVING THE MUSCULOSKELETAL SYSTEM: ICD-10-CM

## 2018-02-15 PROCEDURE — 97530 THERAPEUTIC ACTIVITIES: CPT

## 2018-02-15 PROCEDURE — 97112 NEUROMUSCULAR REEDUCATION: CPT

## 2018-02-15 PROCEDURE — 97110 THERAPEUTIC EXERCISES: CPT

## 2018-02-15 NOTE — PROGRESS NOTES
Daily Note     Today's date: 2/15/2018  Patient name: Santhosh Hyde  : 1933  MRN: [id number]  Referring provider: Gisell Davila MD  Dx:   Encounter Diagnoses   Name Primary?  Chronic left shoulder pain Yes    Other symptoms and signs involving the musculoskeletal system        Start Time: 1355  Stop Time: 1450  Total time in clinic (min): 55 minutes    Subjective: Pt presents to PT with no new C/o pain or symptoms  Pt states his shoulder is still giving him trouble         Objective: See treatment diary below  Precautions: CHF, kidney failure, PVD     Daily Treatment Diary      Manual  1/25  1/30  2/2  2/5  2/8  2/12  2/15         none                                                                                                                             Exercise Diary        2/5   2/8  2/12  2/15         treadmill 6 min 6 min  6 min 6 min   6 min  8 min 1 3 mph  6 min 1 3 mph          UBE (for/rev) 8 min 8 min  8 min  8 min  8 min  10 min  12 min         Standing flexion with wand 10x  x10  x10  10x 5"  x15  4x, 2x  8x, 2x         Sit to stand NV x10  x10/x3  10x x10/x3  10x  8x, 9x         Shoulder isometrics at wall- all direct (no add) NV  5"  x10  5" x15  15x5" 5"  x15  5" 15x each  5" 15x each         Shoulder ladder NV  5"x5  5"  5x5" 5"x5  5x L  5x L         Step up-fwd NV 0riser x10  0 riser 15x b/l  0 Riser 15x ea  1 [id number]   stairs 15 steps 2x  stairs 15 steps 2x         side stepping NV  x3  x3  3x  x3  2 laps  x4 laps         Perturbations (shoulder) 90 deg flex and scapular plane elbow 90 deg NV    15" x2 w/ cane  2x15" w/ cane  15"x2  2x 30" each  2x30" ea                                                                                                                                                                                                                                                                                       Modalities                                                                                                   Assessment: Tolerated treatment well  Patient would benefit from continued PT  Pt continues to show moderate fatigue with current exercise program  Pt performed increased reps on select exercises as noted with no signs of increased pain  Pt will benefit from further skilled PT to increase strength, flexibility and function  Continue to progress as able  Plan: Progress treatment as tolerated  Continue to promote RTC strengthening and overhead strengthening and endurance

## 2018-02-20 ENCOUNTER — OFFICE VISIT (OUTPATIENT)
Dept: PHYSICAL THERAPY | Facility: REHABILITATION | Age: 83
End: 2018-02-20
Payer: MEDICARE

## 2018-02-20 DIAGNOSIS — M25.512 LEFT SHOULDER PAIN, UNSPECIFIED CHRONICITY: ICD-10-CM

## 2018-02-20 DIAGNOSIS — G89.29 CHRONIC LEFT SHOULDER PAIN: Primary | ICD-10-CM

## 2018-02-20 DIAGNOSIS — M25.512 CHRONIC LEFT SHOULDER PAIN: Primary | ICD-10-CM

## 2018-02-20 DIAGNOSIS — R29.898 OTHER SYMPTOMS AND SIGNS INVOLVING THE MUSCULOSKELETAL SYSTEM: ICD-10-CM

## 2018-02-20 PROCEDURE — 97530 THERAPEUTIC ACTIVITIES: CPT | Performed by: PHYSICAL THERAPIST

## 2018-02-20 PROCEDURE — 97110 THERAPEUTIC EXERCISES: CPT | Performed by: PHYSICAL THERAPIST

## 2018-02-20 PROCEDURE — 97112 NEUROMUSCULAR REEDUCATION: CPT | Performed by: PHYSICAL THERAPIST

## 2018-02-20 NOTE — PROGRESS NOTES
Daily Note     Today's date: 2018  Patient name: Jenifer Morel  : 1933  MRN: [id number]  Referring provider: Mauri Arguello MD  Dx:   Encounter Diagnosis     ICD-10-CM    1  Chronic left shoulder pain M25 512     G89 [id number]    2  Other symptoms and signs involving the musculoskeletal system R29 898    3  Left shoulder pain, unspecified chronicity M25 512                   Subjective: Patient reports that he is doing okay this visit  He feels his legs have been fatiguing more quickly  Patient reports no significant change in shoulder pain or function this visit        Objective: See treatment diary below  Precautions: CHF, kidney failure, PVD     Daily Treatment Diary      Manual  1/25  1/30  2/2  2/5  2/8  2/12  2/15  2/20       none                                                                                                                             Exercise Diary        2/5   2/8  2/12  2/15  2/20       treadmill 6 min 6 min  6 min 6 min   6 min  8 min 1 3 mph  6 min 1 3 mph   6 min 1 3 mph       UBE (for/rev) 8 min 8 min  8 min  8 min  8 min  10 min  12 min  10 min       Standing flexion with wand 10x  x10  x10  10x 5"  x15  4x, 2x  8x, 2x  20x seated       Sit to stand NV x10  x10/x3  10x x10/x3  10x  8x, 9x  10x 2x 2x       Shoulder isometrics at wall- all direct (no add) NV  5"  x10  5" x15  15x5" 5"  x15  5" 15x each  5" 15x each  np       Shoulder ladder NV  5"x5  5"  5x5" 5"x5  5x L  5x L  5x L       Step up-fwd NV 0riser x10  0 riser 15x b/l  0 Riser 15x ea  1 [id number]   stairs 15 steps 2x  stairs 15 steps 2x  stairs 4x 10 stairs       side stepping NV  x3  x3  3x  x3  2 laps  x4 laps  4 laps       Perturbations (shoulder) 90 deg flex and scapular plane elbow 90 deg NV    15" x2 w/ cane  2x15" w/ cane  15"x2  2x 30" each  2x30" ea  2x 30" each        wall slide shoulder                12x L                                                                                                                                                                                                                                                           Modalities                                                                                                       Assessment: Tolerated treatment well  Patient demonstrated fatigue post treatment  Patient is progressed with good tolerance with AAROM wall slide  Patient is most challenged by IR and ER with perturbations secondary to RTC weakness  Plan: Progress treatment as tolerated  Patient will be re-evaluated NV

## 2018-02-22 ENCOUNTER — EVALUATION (OUTPATIENT)
Dept: PHYSICAL THERAPY | Facility: REHABILITATION | Age: 83
End: 2018-02-22
Payer: MEDICARE

## 2018-02-22 DIAGNOSIS — G89.29 CHRONIC LEFT SHOULDER PAIN: Primary | ICD-10-CM

## 2018-02-22 DIAGNOSIS — R29.898 OTHER SYMPTOMS AND SIGNS INVOLVING THE MUSCULOSKELETAL SYSTEM: ICD-10-CM

## 2018-02-22 DIAGNOSIS — M25.512 CHRONIC LEFT SHOULDER PAIN: Primary | ICD-10-CM

## 2018-02-22 DIAGNOSIS — M25.512 LEFT SHOULDER PAIN, UNSPECIFIED CHRONICITY: ICD-10-CM

## 2018-02-22 PROCEDURE — 97140 MANUAL THERAPY 1/> REGIONS: CPT | Performed by: PHYSICAL THERAPIST

## 2018-02-22 PROCEDURE — 97110 THERAPEUTIC EXERCISES: CPT | Performed by: PHYSICAL THERAPIST

## 2018-02-22 PROCEDURE — G8978 MOBILITY CURRENT STATUS: HCPCS | Performed by: PHYSICAL THERAPIST

## 2018-02-22 PROCEDURE — G8979 MOBILITY GOAL STATUS: HCPCS | Performed by: PHYSICAL THERAPIST

## 2018-02-22 NOTE — PROGRESS NOTES
PT Evaluation     Today's date: 2018  Patient name: Adalgisa Washingotn  : 1933  MRN: [id number]  Referring provider: Aiden Maddox MD  Dx:   Encounter Diagnoses   Name Primary?  Chronic left shoulder pain Yes    Other symptoms and signs involving the musculoskeletal system     Left shoulder pain, unspecified chronicity                   Assessment  Impairments: abnormal gait, abnormal or restricted ROM, impaired balance, impaired physical strength and pain with function  Functional limitations: ambulation, stair climbing, reaching overhead  Assessment details: Patient is a 80y o  year old male that presents for left shoulder pain and gait/balance training  Patient reports 50-60% improvement with skilled physical therapy services for his shoulder  Patient feels he continues to have "good days and bad days" with her walking  Patient's FOTO improved by 5 points to 49/100 for his shoulder and is down 6 points to 45/100 for his walking  Patient reports improvement with his pain, ability to reach overhead, and ability to perform ADLS  Patient reports continued difficulty with ADLS, overhead activities, pain, ambulation, and negotiation of stairs  Patient has made good progress towards goals established for physical therapy including strength, balance, and ROM goals  Patient would benefit from continued skilled physical therapy services for continued gait training, LE strengthening, shoulder and scapular strengthening, and balance training to maximize function  Understanding of Dx/Px/POC: good   Prognosis: fair  Prognosis details: Patient's prognosis is fair secondary to co-morbidities that may limit his  Progression in therapy  Goals  Short term:   1  Patient in shoulder is decreased by 50% in 4 weeks to maximize function - MET  2  Patient will achieve shoulder active ROM within functional limits on left to maximize function in 4 weeks  -PARTIALLY MET  3   Patient will improve 5x sit to stand to 15 seconds to reduce risk of falling in 4 weeks  - PARTIALLY MET    Long term:  1  Patient will improve FOTO by 12 points to 63/100 for gait in 4 weeks to maximize function - NOT MET  2  Patient will improve FOTO by 20 points to 64/100 in 4 weeks for shoulder to maximize function - PARTIALLY MET  3  Patient will report no difficulty with overhead activities in 4 weeks to maximize function - PARTIALLY MET  4  Patient will report no difficulty with stair climbing in 4 weeks to maximize function - NOT MET    Plan  Patient would benefit from: skilled PT  Planned modality interventions: cryotherapy  Planned therapy interventions: balance, gait training, home exercise program, neuromuscular re-education, patient education, postural training, strengthening, therapeutic activities and therapeutic exercise  Frequency: 2x week  Duration in visits: 8  Duration in weeks: 4  Treatment plan discussed with: patient  Plan details: Plan to RE patient in 4 weeks  Subjective Evaluation    History of Present Illness  Mechanism of injury: Patient presents with left shoulder pain with insidious onset for about 6 weeks  Patient also presents for gait, balance, and lower extremity strengthening  Patient reports a history of left shoulder pain for many years, but his pain would go away after about 4 days  Patient reports 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  Patient reports difficulty with his balance, negotiation of stairs, prolonged ambulation, squatting, hunting, caring for his home such as cleaning, and upper extremity weakness      Quality of life: good    Pain  Current pain ratin  At best pain ratin  At worst pain ratin  Location: left shoulder  Relieving factors: rest  Aggravating factors: overhead activity and lifting  Progression: improved    Social Support  Lives with: alone    Hand dominance: right      Diagnostic Tests  X-ray: normal  Treatments  Previous treatment: physical therapy  Current treatment: physical therapy  Patient Goals  Patient goals for therapy: increased strength, independence with ADLs/IADLs, return to sport/leisure activities, improved balance, increased motion and decreased pain          Objective     Static Posture     Head  Forward  Shoulders  Rounded  Tenderness     Left Shoulder   No tenderness     Right Shoulder  No tenderness     Neurological Testing     Sensation     Shoulder   Left Shoulder   Intact: light touch    Right Shoulder   Intact: light touch    Active Range of Motion   Left Shoulder   Flexion: 60 degrees with pain  Abduction: 65 degrees with pain    Right Shoulder   Flexion: 135 degrees   Abduction: 125 degrees     Passive Range of Motion   Left Shoulder   Normal passive range of motion    Right Shoulder   Normal passive range of motion    Strength/Myotome Testing     Left Shoulder     Planes of Motion   Flexion: 2+   Abduction: 2+   External rotation at 90°: 4-   Internal rotation at 90°: 4-     Right Shoulder     Planes of Motion   Flexion: 3+   Abduction: 3+   External rotation at 90°: 4+   Internal rotation at 90°: 4+     Left Elbow   Flexion: 4    Right Elbow   Flexion: 4+    Left Hip   Planes of Motion   Flexion: 4+    Right Hip   Planes of Motion   Flexion: 4    Left Knee   Flexion: 4+  Extension: 4    Right Knee   Flexion: 4  Extension: 4    Tests     Left Shoulder   Positive external rotation lag sign, Hawkin's, lift-off and sulcus sign  Negative Neer's  Ambulation     Ambulation: Level Surfaces   Ambulation without assistive device: independent    Observational Gait   Left stance time and right stance time within functional limits  Decreased walking speed and stride length     Left foot contact pattern: heel to toe  Right foot contact pattern: heel to toe  Left arm swing: within functional limits  Right arm swing: within functional limits    Quality of Movement During Gait   Trunk    Trunk (Left): Positive left lateral lean over stance limb  Trunk (Right): Positive lateral lean over stance limb       Additional Quality of Movement During Gait Details  Hip ER leann with gait    Functional Assessment     Single Leg Stance   Left: 5 seconds  Right: 3 seconds    Comments  5x sit to stand: 16 07 seconds (19 2 seconds IE)  TUG:10 15 seconds  (12 seconds at IE)  Tandem stance: 30 seconds leann      Precautions: CHF, kidney failure, PVD    Daily Treatment Diary      Manual  1/25  1/30  2/2  2/5  2/8  2/12  2/15  2/20  2/22     Measurements taken                  20                                                                                                           Exercise Diary        2/5   2/8  2/12  2/15  2/20  2/22     treadmill 6 min 6 min  6 min 6 min   6 min  8 min 1 3 mph  6 min 1 3 mph   6 min 1 3 mph  6 min 1 3 mph     UBE (for/rev) 8 min 8 min  8 min  8 min  8 min  10 min  12 min  10 min  12 min     Standing flexion with wand 10x  x10  x10  10x 5"  x15  4x, 2x  8x, 2x  20x seated  np     Sit to stand NV x10  x10/x3  10x x10/x3  10x  8x, 9x  10x 2x 2x  10x     Shoulder isometrics at wall- all direct (no add) NV  5"  x10  5" x15  15x5" 5"  x15  5" 15x each  5" 15x each  np  np     Shoulder ladder NV  5"x5  5"  5x5" 5"x5  5x L  5x L  5x L  np     Step up-fwd NV 0riser x10  0 riser 15x b/l  0 Riser 15x ea  1 [id number]   stairs 15 steps 2x  stairs 15 steps 2x  stairs 4x 10 stairs  np     side stepping NV  x3  x3  3x  x3  2 laps  x4 laps  4 laps  np     Perturbations (shoulder) 90 deg flex and scapular plane elbow 90 deg NV    15" x2 w/ cane  2x15" w/ cane  15"x2  2x 30" each  2x30" ea  2x 30" each  np      wall slide shoulder                12x L  np                                                                                                                                                                                                                                                           Modalities                                                                                                           Flowsheet Rows    Flowsheet Row Most Recent Value   PT/OT G-Codes   Current Score  45   Projected Score  63   FOTO information reviewed  Yes   Assessment Type  Re-evaluation   G code set  Mobility: Walking & Moving Around   Mobility: Walking and Moving Around Current Status ()  CK   Mobility: Walking and Moving Around Goal Status ()  CJ

## 2018-02-26 ENCOUNTER — OFFICE VISIT (OUTPATIENT)
Dept: PHYSICAL THERAPY | Facility: REHABILITATION | Age: 83
End: 2018-02-26
Payer: MEDICARE

## 2018-02-26 DIAGNOSIS — G89.29 CHRONIC LEFT SHOULDER PAIN: Primary | ICD-10-CM

## 2018-02-26 DIAGNOSIS — M25.512 CHRONIC LEFT SHOULDER PAIN: Primary | ICD-10-CM

## 2018-02-26 DIAGNOSIS — R29.898 OTHER SYMPTOMS AND SIGNS INVOLVING THE MUSCULOSKELETAL SYSTEM: ICD-10-CM

## 2018-02-26 PROCEDURE — 97110 THERAPEUTIC EXERCISES: CPT

## 2018-02-26 PROCEDURE — 97112 NEUROMUSCULAR REEDUCATION: CPT

## 2018-02-26 PROCEDURE — 97530 THERAPEUTIC ACTIVITIES: CPT

## 2018-02-26 NOTE — PROGRESS NOTES
Daily Note     Today's date: 2018  Patient name: Perla Lin  : 1933  MRN: [id number]  Referring provider: Cira Gagnon MD  Dx:   Encounter Diagnosis     ICD-10-CM    1  Chronic left shoulder pain M25 512     G89 29    2  Other symptoms and signs involving the musculoskeletal system R29 898        Start Time: 1015  Stop Time: 1100  Total time in clinic (min): 45 minutes    Subjective: Patient reports that he is worn out, states he can tell fatigue sets in quickly  Objective: See treatment diary below  Precautions: CHF, kidney failure, PVD    Daily Treatment Diary      Manual   2/5  2/8  2/12  2/15  2/20  2/22  2/26      Measurements taken            20  NP                                                                                                Exercise Diary  2/5   2/8  2/12  2/15  2/20  2/22  2/26      treadmill 6 min   6 min  8 min 1 3 mph  6 min 1 3 mph   6 min 1 3 mph  6 min 1 3 mph  6' 1 3 mph      UBE (for/rev)  8 min  8 min  10 min  12 min  10 min  12 min  8'      Standing flexion with wand  10x 5"  x15  4x, 2x  8x, 2x  20x seated  np  25x seated       Sit to stand  10x x10/x3  10x  8x, 9x  10x 2x 2x  10x  9x, 6x      Shoulder isometrics at wall- all direct (no add)  15x5" 5"  x15  5" 15x each  5" 15x each  np  np  15x5" each      Shoulder ladder  5x5" 5"x5  5x L  5x L  5x L  np  5x5" L      Stairs  0 Riser 15x ea  1 [id number]   stairs 15 steps 2x  stairs 15 steps 2x  stairs 4x 10 stairs  np  stairs 4x 10 stairs      side stepping  3x  x3  2 laps  x4 laps  4 laps  np  5 laps      Perturbations (shoulder) 90 deg flex and scapular plane elbow 90 deg  2x15" w/ cane  15"x2  2x 30" each  2x30" ea  2x 30" each  np  2x 30" each       wall slide shoulder          12x L  np  10x L                                                                                               Assessment: Tolerated treatment well  Patient demonstrated fatigue post treatment    Pt continues to show increased fatigue post exercise  Pt continues to work towards goals of increased LUE and LE strength and function  Pt will benefit from further skilled PT to increase strength, flexibility and function  Plan: Progress treatment as tolerated

## 2018-03-01 ENCOUNTER — OFFICE VISIT (OUTPATIENT)
Dept: PHYSICAL THERAPY | Facility: REHABILITATION | Age: 83
End: 2018-03-01
Payer: MEDICARE

## 2018-03-01 DIAGNOSIS — R29.898 OTHER SYMPTOMS AND SIGNS INVOLVING THE MUSCULOSKELETAL SYSTEM: ICD-10-CM

## 2018-03-01 DIAGNOSIS — G89.29 CHRONIC LEFT SHOULDER PAIN: Primary | ICD-10-CM

## 2018-03-01 DIAGNOSIS — M25.512 CHRONIC LEFT SHOULDER PAIN: Primary | ICD-10-CM

## 2018-03-01 DIAGNOSIS — M25.512 LEFT SHOULDER PAIN, UNSPECIFIED CHRONICITY: ICD-10-CM

## 2018-03-01 PROCEDURE — 97530 THERAPEUTIC ACTIVITIES: CPT | Performed by: PHYSICAL THERAPIST

## 2018-03-01 PROCEDURE — 97110 THERAPEUTIC EXERCISES: CPT | Performed by: PHYSICAL THERAPIST

## 2018-03-01 NOTE — PROGRESS NOTES
Daily Note     Today's date: 3/1/2018  Patient name: Roseann Carroll  : 1933  MRN: [id number]  Referring provider: Damir Lin MD  Dx:   Encounter Diagnosis     ICD-10-CM    1  Chronic left shoulder pain M25 512     G89 29    2  Other symptoms and signs involving the musculoskeletal system R29 898    3  Left shoulder pain, unspecified chronicity M25 512                   Subjective: Patient reports that he is doing okay today  He expressed concern about his  strength  Issued blue putty for HEP for  strengthening  Reviewed some ideas for different           Objective: See treatment diary below  Precautions: CHF, kidney failure, PVD     Daily Treatment Diary      Manual   2/5  2/8  2/12  2/15  2/20  2/22  2/26  3/1       Measurements taken            20  NP                                                                                                               Exercise Diary  2/5   2/8  2/12  2/15  2/20  2/22  2/26  3/1       treadmill 6 min   6 min  8 min 1 3 mph  6 min 1 3 mph   6 min 1 3 mph  6 min 1 3 mph  6' 1 3 mph  7 min 1 3 mph       UBE (for/rev)  8 min  8 min  10 min  12 min  10 min  12 min  8'  12 min       Standing flexion with wand  10x 5"  x15  4x, 2x  8x, 2x  20x seated  np  25x seated   15x seated       Sit to stand  10x x10/x3  10x  8x, 9x  10x 2x 2x  10x  9x, 6x  11x       Shoulder isometrics at wall- all direct (no add)  15x5" 5"  x15  5" 15x each  5" 15x each  np  np  15x5" each  np       Shoulder ladder  5x5" 5"x5  5x L  5x L  5x L  np  5x5" L  5x L       Stairs  0 Riser 15x ea  1 [id number]   stairs 15 steps 2x  stairs 15 steps 2x  stairs 4x 10 stairs  np  stairs 4x 10 stairs  np       side stepping  3x  x3  2 laps  x4 laps  4 laps  np  5 laps  6 laps       Perturbations (shoulder) 90 deg flex and scapular plane elbow 90 deg  2x15" w/ cane  15"x2  2x 30" each  2x30" ea  2x 30" each  np  2x 30" each  np        wall slide shoulder          12x L  np  10x L  14x L                                                                                                                 Assessment: Tolerated treatment well  Patient would benefit from continued PT  Patient tolerates progressions well this visit  Therex not performed secondary to patient fatigue and request to terminate session  Patient would benefit from continued RTC strengthening  Plan: Progress treatment as tolerated  Continue to progress overhead strengthening, RTC strengthening, and endurance

## 2018-03-05 ENCOUNTER — OFFICE VISIT (OUTPATIENT)
Dept: PHYSICAL THERAPY | Facility: REHABILITATION | Age: 83
End: 2018-03-05
Payer: MEDICARE

## 2018-03-05 DIAGNOSIS — G89.29 CHRONIC LEFT SHOULDER PAIN: Primary | ICD-10-CM

## 2018-03-05 DIAGNOSIS — M25.512 CHRONIC LEFT SHOULDER PAIN: Primary | ICD-10-CM

## 2018-03-05 PROCEDURE — 97530 THERAPEUTIC ACTIVITIES: CPT

## 2018-03-05 PROCEDURE — 97110 THERAPEUTIC EXERCISES: CPT

## 2018-03-05 NOTE — PROGRESS NOTES
Daily Note     Today's date: 3/5/2018  Patient name: Jason Stack  : 1933  MRN: [id number]  Referring provider: Cece Lemos MD  Dx:   Encounter Diagnosis     ICD-10-CM    1  Chronic left shoulder pain M25 512     G89 29        Start Time: 1215  Stop Time: 1300  Total time in clinic (min): 45 minutes    Subjective: Patient reports that he is doing okay today  No new C/O pain or symptoms           Objective: See treatment diary below  Precautions: CHF, kidney failure, PVD     Daily Treatment Diary      Manual   2/5  2/8  2/12  2/15  2/20  2/22  2/26  3/1  3     Measurements taken            20  NP                                                                                                               Exercise Diary  2/5   2/8  2/12  2/15  2/20  2/22  2/26  3/1  3     treadmill 6 min   6 min  8 min 1 3 mph  6 min 1 3 mph   6 min 1 3 mph  6 min 1 3 mph  6' 1 3 mph  7 min 1 3 mph 7 min 1 3 mph     UBE (for/rev)  8 min  8 min  10 min  12 min  10 min  12 min  8'  12 min  12'     Standing flexion with wand  10x 5"  x15  4x, 2x  8x, 2x  20x seated  np  25x seated   15x seated  15x seated     Sit to stand  10x x10/x3  10x  8x, 9x  10x 2x 2x  10x  9x, 6x  11x  10x     Shoulder isometrics at wall- all direct (no add)  15x5" 5"  x15  5" 15x each  5" 15x each  np  np  15x5" each  np  NP     Shoulder ladder  5x5" 5"x5  5x L  5x L  5x L  np  5x5" L  5x L  5x L     Stairs  0 Riser 15x ea  1 [id number]   stairs 15 steps 2x  stairs 15 steps 2x  stairs 4x 10 stairs  np  stairs 4x 10 stairs  np  NP     side stepping  3x  x3  2 laps  x4 laps  4 laps  np  5 laps  6 laps  6 laps     Perturbations (shoulder) 90 deg flex and scapular plane elbow 90 deg  2x15" w/ cane  15"x2  2x 30" each  2x30" ea  2x 30" each  np  2x 30" each  np  NP      wall slide shoulder          12x L  np  10x L  14x L  15x L                                                                                                             Assessment: Tolerated treatment well  Patient would benefit from continued PT  Pt continues to show moderate fatigue with current exercise program  Pt unable to perform entire exercise program  Pt shows increased atrophy with Shoulder exercises  Pt will benefit from further skilled PT to increase strength, flexibility and function  Continue to progress as able  Plan: Progress treatment as tolerated  Continue to progress overhead strengthening, RTC strengthening, and endurance

## 2018-03-06 DIAGNOSIS — M10.9 GOUT, UNSPECIFIED CAUSE, UNSPECIFIED CHRONICITY, UNSPECIFIED SITE: Primary | ICD-10-CM

## 2018-03-08 ENCOUNTER — OFFICE VISIT (OUTPATIENT)
Dept: PHYSICAL THERAPY | Facility: REHABILITATION | Age: 83
End: 2018-03-08
Payer: MEDICARE

## 2018-03-08 DIAGNOSIS — G89.29 CHRONIC LEFT SHOULDER PAIN: Primary | ICD-10-CM

## 2018-03-08 DIAGNOSIS — M25.512 LEFT SHOULDER PAIN, UNSPECIFIED CHRONICITY: ICD-10-CM

## 2018-03-08 DIAGNOSIS — M25.512 CHRONIC LEFT SHOULDER PAIN: Primary | ICD-10-CM

## 2018-03-08 DIAGNOSIS — R29.898 OTHER SYMPTOMS AND SIGNS INVOLVING THE MUSCULOSKELETAL SYSTEM: ICD-10-CM

## 2018-03-08 PROCEDURE — 97150 GROUP THERAPEUTIC PROCEDURES: CPT | Performed by: PHYSICAL THERAPIST

## 2018-03-08 PROCEDURE — 97530 THERAPEUTIC ACTIVITIES: CPT | Performed by: PHYSICAL THERAPIST

## 2018-03-08 PROCEDURE — 97110 THERAPEUTIC EXERCISES: CPT | Performed by: PHYSICAL THERAPIST

## 2018-03-08 NOTE — PROGRESS NOTES
Daily Note     Today's date: 3/8/2018  Patient name: Kevin Urias  : 1933  MRN: [id number]  Referring provider: Dorothey Boeck, MD  Dx:   Encounter Diagnosis     ICD-10-CM    1  Chronic left shoulder pain M25 512     G89 29    2  Other symptoms and signs involving the musculoskeletal system R29 898    3  Left shoulder pain, unspecified chronicity M25 512                   Subjective: Patient reports he is doing okay this visit  He says he has been performing stairs as an exercise as part of his home program 5-6x day        Objective: See treatment diary below  Precautions: CHF, kidney failure, PVD     Daily Treatment Diary      Manual   2/5  2/8  2/12  2/15  2/20  2/22  2/26  3/1  3/5     Measurements taken            20  NP                                                                                                               Exercise Diary  2/5   2/8  2/12  2/15  2/20  2/22  2/26  3/1  3/5  3/8   treadmill 6 min   6 min  8 min 1 3 mph  6 min 1 3 mph   6 min 1 3 mph  6 min 1 3 mph  6' 1 3 mph  7 min 1 3 mph 7 min 1 3 mph  4 min 1 3 mph   UBE (for/rev)  8 min  8 min  10 min  12 min  10 min  12 min  8'  12 min  12'  12 min   Standing flexion with wand  10x 5"  x15  4x, 2x  8x, 2x  20x seated  np  25x seated   15x seated  15x seated  20x seated   Sit to stand  10x x10/x3  10x  8x, 9x  10x 2x 2x  10x  9x, 6x  11x  10x  5x   Shoulder isometrics at wall- all direct (no add)  15x5" 5"  x15  5" 15x each  5" 15x each  np  np  15x5" each  np  NP  np   Shoulder ladder  5x5" 5"x5  5x L  5x L  5x L  np  5x5" L  5x L  5x L  6x L   Stairs  0 Riser 15x ea  1 [id number]   stairs 15 steps 2x  stairs 15 steps 2x  stairs 4x 10 stairs  np  stairs 4x 10 stairs  np  NP  np   side stepping  3x  x3  2 laps  x4 laps  4 laps  np  5 laps  6 laps  6 laps  6 laps   Perturbations (shoulder) 90 deg flex and scapular plane elbow 90 deg  2x15" w/ cane  15"x2  2x 30" each  2x30" ea  2x 30" each  np  2x 30" each  np  NP  3x 30" L    wall slide shoulder          12x L  np  10x L  14x L  15x L  np                                                                                                      Assessment: Tolerated treatment fair  Patient would benefit from continued PT  Patient fatigues in his LE rapidly this visit  He is not able to perform sit to stand as many times as last visit, or walk as long  Patient has been improving with his control and strength with IR and ER portion or perturbations compared to pervous       Plan: Progress treatment as tolerated  Add back therex as able      Group therapy from 10:30 am-10:45 am, un-supervised therex from 10:45 am-10:52 am, one on one remainder of session

## 2018-03-10 DIAGNOSIS — I10 ESSENTIAL HYPERTENSION: Primary | ICD-10-CM

## 2018-03-11 RX ORDER — ALLOPURINOL 300 MG/1
TABLET ORAL
Qty: 90 TABLET | Refills: 2 | Status: SHIPPED | OUTPATIENT
Start: 2018-03-11 | End: 2018-03-15 | Stop reason: SDUPTHER

## 2018-03-11 RX ORDER — FUROSEMIDE 40 MG/1
TABLET ORAL
Qty: 60 TABLET | Refills: 3 | Status: SHIPPED | OUTPATIENT
Start: 2018-03-11 | End: 2018-07-08 | Stop reason: SDUPTHER

## 2018-03-12 ENCOUNTER — OFFICE VISIT (OUTPATIENT)
Dept: PHYSICAL THERAPY | Facility: REHABILITATION | Age: 83
End: 2018-03-12
Payer: MEDICARE

## 2018-03-12 DIAGNOSIS — R29.898 OTHER SYMPTOMS AND SIGNS INVOLVING THE MUSCULOSKELETAL SYSTEM: ICD-10-CM

## 2018-03-12 DIAGNOSIS — M25.512 CHRONIC LEFT SHOULDER PAIN: Primary | ICD-10-CM

## 2018-03-12 DIAGNOSIS — G89.29 CHRONIC LEFT SHOULDER PAIN: Primary | ICD-10-CM

## 2018-03-12 PROCEDURE — 97530 THERAPEUTIC ACTIVITIES: CPT

## 2018-03-12 PROCEDURE — 97112 NEUROMUSCULAR REEDUCATION: CPT

## 2018-03-12 PROCEDURE — 97110 THERAPEUTIC EXERCISES: CPT

## 2018-03-12 NOTE — PROGRESS NOTES
Daily Note     Today's date: 3/12/2018  Patient name: Rubia Nielsen  : 1933  MRN: [id number]  Referring provider: Braydon Lanier MD  Dx:   Encounter Diagnosis     ICD-10-CM    1  Chronic left shoulder pain M25 512     G89 29    2  Other symptoms and signs involving the musculoskeletal system R29 898                 Subjective: Pt reports no change in symptoms since LV and has been going up his steps at home 6-7x per day  Objective: See treatment diary below    Exercise Diary  3/12  2/8  2/12  2/15  2/20  2/22  2/26  3/1  3/5  3/8   treadmill 5 min  @ 1 3 mph  6 min  8 min 1 3 mph  6 min 1 3 mph   6 min 1 3 mph  6 min 1 3 mph  6' 1 3 mph  7 min 1 3 mph 7 min 1 3 mph  4 min 1 3 mph   UBE (for/rev) 12 min  6' ea  8 min  10 min  12 min  10 min  12 min  8'  12 min  12'  12 min   Wand flexion 5"x20 5"  x15  4x, 2x  8x, 2x  20x seated  np  25x seated   15x seated  15x seated  20x seated   Sit to stand 5x2 x10/x3  10x  8x, 9x  10x 2x 2x  10x  9x, 6x  11x  10x  5x   Shoulder iso at wall- all direct (no add) 5"x15 ea 5"  x15  5" 15x each  5" 15x each  np  np  15x5" each  np  NP  np   Shoulder ladder 5"x6 L 5"x5  5x L  5x L  5x L  np  5x5" L  5x L  5x L  6x L   Stairs HEP  1 riser  x15   stairs 15 steps 2x  stairs 15 steps 2x  stairs 4x 10 stairs  np  stairs 4x 10 stairs  np  NP  np   side stepping 6 laps  x3  2 laps  x4 laps  4 laps  np  5 laps  6 laps  6 laps  6 laps   Perturbations (shoulder) 90 deg flex and scapular plane elbow 90 deg 30"x2 ea  15"x2  2x 30" each  2x30" ea  2x 30" each  np  2x 30" each  np  NP  3x 30" L    wall slide shoulder -        12x L  np  10x L  14x L  15x L  np                            Assessment: Tolerated treatment well  Patient demonstrated fatigue post treatment and would benefit from continued PT  Pt req verbal cuing and rest breaks for STS exercise  Notable flexion weakness during dynamic stab at 90* flexion  Plan: Continue per plan of care  Progress treatment as tolerated  Mina Ohm, PTA

## 2018-03-15 DIAGNOSIS — M10.9 GOUT, UNSPECIFIED CAUSE, UNSPECIFIED CHRONICITY, UNSPECIFIED SITE: ICD-10-CM

## 2018-03-18 RX ORDER — ALLOPURINOL 300 MG/1
300 TABLET ORAL DAILY
Qty: 90 TABLET | Refills: 0 | Status: SHIPPED | OUTPATIENT
Start: 2018-03-18 | End: 2018-07-11 | Stop reason: SDUPTHER

## 2018-03-19 ENCOUNTER — OFFICE VISIT (OUTPATIENT)
Dept: PHYSICAL THERAPY | Facility: REHABILITATION | Age: 83
End: 2018-03-19
Payer: MEDICARE

## 2018-03-19 DIAGNOSIS — G89.29 CHRONIC LEFT SHOULDER PAIN: Primary | ICD-10-CM

## 2018-03-19 DIAGNOSIS — M25.512 LEFT SHOULDER PAIN, UNSPECIFIED CHRONICITY: ICD-10-CM

## 2018-03-19 DIAGNOSIS — R29.898 OTHER SYMPTOMS AND SIGNS INVOLVING THE MUSCULOSKELETAL SYSTEM: ICD-10-CM

## 2018-03-19 DIAGNOSIS — M25.512 CHRONIC LEFT SHOULDER PAIN: Primary | ICD-10-CM

## 2018-03-19 PROCEDURE — 97112 NEUROMUSCULAR REEDUCATION: CPT | Performed by: PHYSICAL THERAPIST

## 2018-03-19 PROCEDURE — 97110 THERAPEUTIC EXERCISES: CPT | Performed by: PHYSICAL THERAPIST

## 2018-03-19 NOTE — PROGRESS NOTES
Daily Note     Today's date: 3/19/2018  Patient name: Philip Murphy  : 1933  MRN: [id number]  Referring provider: Adrianna Babb MD  Dx:   Encounter Diagnosis     ICD-10-CM    1  Chronic left shoulder pain M25 512     G89 29    2  Other symptoms and signs involving the musculoskeletal system R29 898    3  Left shoulder pain, unspecified chronicity M25 512                   Subjective: Patient reports he is doing okay this visit  He notes he continues to drop small items at home such as pieces of paper  Patient notes improvement with his ability to reach a light switch  He has been performing stairs as part of his HEP      Objective: See treatment diary below    Precautions: CHF, kidney failure, PVD   Objective: See treatment diary below     Exercise Diary  3/12  3/19  2/12  2/15  2/20  2/22  2/26  3/1  3/5  3/8   treadmill 5 min  @ 1 3 mph  5 min  8 min 1 3 mph  6 min 1 3 mph   6 min 1 3 mph  6 min 1 3 mph  6' 1 3 mph  7 min 1 3 mph 7 min 1 3 mph  4 min 1 3 mph   UBE (for/rev) 12 min  6' ea  12 min  10 min  12 min  10 min  12 min  8'  12 min  12'  12 min   Wand flexion 5"x20 5"  x20  4x, 2x  8x, 2x  20x seated  np  25x seated   15x seated  15x seated  20x seated   Sit to stand 5x2 x10/x8  10x  8x, 9x  10x 2x 2x  10x  9x, 6x  11x  10x  5x   Shoulder iso at wall- all direct (no add) 5"x15 ea np  5" 15x each  5" 15x each  np  np  15x5" each  np  NP  np   Shoulder ladder 5"x6 L 5"x8  5x L  5x L  5x L  np  5x5" L  5x L  5x L  6x L   Stairs HEP HEP  stairs 15 steps 2x  stairs 15 steps 2x  stairs 4x 10 stairs  np  stairs 4x 10 stairs  np  NP  np   side stepping 6 laps  6 laps  2 laps  x4 laps  4 laps  np  5 laps  6 laps  6 laps  6 laps   Perturbations (shoulder) 90 deg flex and scapular plane elbow 90 deg 30"x2 ea  3x 30" scapular plane only  2x 30" each  2x30" ea  2x 30" each  np  2x 30" each  np  NP  3x 30" L    wall slide shoulder -  np      12x L  np  10x L  14x L  15x L  np                        Modalities                                                                                                         Assessment: Tolerated treatment well  Patient would benefit from continued PT  Patient continues to improve with his ability to perform perturbations demonstrating improved strength and neuromuscular control  Patient Continue to fatigue with current treatment  Plan: Progress treatment as tolerated          Unsupervised therex from 10:18 am-10:29 am, one on one remainder of session

## 2018-03-22 ENCOUNTER — APPOINTMENT (OUTPATIENT)
Dept: PHYSICAL THERAPY | Facility: REHABILITATION | Age: 83
End: 2018-03-22
Payer: MEDICARE

## 2018-03-23 ENCOUNTER — EVALUATION (OUTPATIENT)
Dept: PHYSICAL THERAPY | Facility: REHABILITATION | Age: 83
End: 2018-03-23
Payer: MEDICARE

## 2018-03-23 ENCOUNTER — APPOINTMENT (OUTPATIENT)
Dept: PHYSICAL THERAPY | Facility: REHABILITATION | Age: 83
End: 2018-03-23
Payer: MEDICARE

## 2018-03-23 DIAGNOSIS — M25.512 LEFT SHOULDER PAIN, UNSPECIFIED CHRONICITY: ICD-10-CM

## 2018-03-23 DIAGNOSIS — M25.512 CHRONIC LEFT SHOULDER PAIN: Primary | ICD-10-CM

## 2018-03-23 DIAGNOSIS — R29.898 OTHER SYMPTOMS AND SIGNS INVOLVING THE MUSCULOSKELETAL SYSTEM: ICD-10-CM

## 2018-03-23 DIAGNOSIS — G89.29 CHRONIC LEFT SHOULDER PAIN: Primary | ICD-10-CM

## 2018-03-23 PROCEDURE — 97140 MANUAL THERAPY 1/> REGIONS: CPT | Performed by: PHYSICAL THERAPIST

## 2018-03-23 PROCEDURE — 97110 THERAPEUTIC EXERCISES: CPT | Performed by: PHYSICAL THERAPIST

## 2018-03-23 PROCEDURE — G8979 MOBILITY GOAL STATUS: HCPCS | Performed by: PHYSICAL THERAPIST

## 2018-03-23 PROCEDURE — G8980 MOBILITY D/C STATUS: HCPCS | Performed by: PHYSICAL THERAPIST

## 2018-03-23 NOTE — PROGRESS NOTES
PT Discharge    Today's date: 3/23/2018  Patient name: Mariam Wallace  : 1933  MRN: [id number]  Referring provider: Isaac Mccormick MD  Dx:   Encounter Diagnoses   Name Primary?  Chronic left shoulder pain Yes    Other symptoms and signs involving the musculoskeletal system     Left shoulder pain, unspecified chronicity        Start Time: 1305  Stop Time: 1345  Total time in clinic (min): 40 minutes    Assessment  Impairments: abnormal gait, abnormal or restricted ROM, impaired balance, impaired physical strength and pain with function  Functional limitations: ambulation, stair climbing, reaching overhead  Assessment details: Patient is a 80y o  year old male that presents for left shoulder pain and gait/balance training  Patient reports 50-60% improvement with skilled physical therapy services for his shoulder  Patient feels he continues to have "good days and bad days" with his walking  Patient's FOTO improved by 4 points to 48/100 for his shoulder and 2 points to 53/100 for his walking  Patient reports improvement with his pain, ability to reach overhead, and ability to perform ADLS  Patient reports continued difficulty with ADLS, overhead activities, pain, ambulation, and negotiation of stairs  Patient has reached maximal level of function with skilled physical therapy services  Patient would benefit from continued gait training, LE strengthening, shoulder and scapular strengthening, and balance training to maximize function  Patient will be discharged to [facility] at this time  He plans to continue therex as part of fitness program at [place]  Understanding of Dx/Px/POC: good   Prognosis: fair  Prognosis details: Patient's prognosis is fair secondary to co-morbidities that may limit his  Progression in therapy  Goals  Short term:   1  Patient in shoulder is decreased by 50% in 4 weeks to maximize function - MET  2   Patient will achieve shoulder active ROM within functional limits on left to maximize function in 4 weeks  -PARTIALLY MET  3  Patient will improve 5x sit to stand to 15 seconds to reduce risk of falling in 4 weeks  - PARTIALLY MET    Long term:  1  Patient will improve FOTO by 12 points to 63/100 for gait in 4 weeks to maximize function - PARTIALLY MET  2  Patient will improve FOTO by 20 points to 64/100 in 4 weeks for shoulder to maximize function - PARTIALLY MET  3  Patient will report no difficulty with overhead activities in 4 weeks to maximize function - PARTIALLY MET  4  Patient will report no difficulty with stair climbing in 4 weeks to maximize function - NOT MET    Plan  Patient would benefit from: skilled PT  Planned modality interventions: cryotherapy  Planned therapy interventions: balance, gait training, home exercise program, neuromuscular re-education, patient education, postural training, strengthening, therapeutic activities and therapeutic exercise  Treatment plan discussed with: patient  Plan details: Plan to discharge to [facility] at this time  Patient plans to continue therex as part of fitness program at [facility]  Subjective Evaluation    History of Present Illness  Mechanism of injury: Patient presents with left shoulder pain with insidious onset for about 6 weeks  Patient also presents for gait, balance, and lower extremity strengthening  Patient reports a history of left shoulder pain for many years, but his pain would go away after about 4 days  Patient reports 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  Patient reports difficulty with his balance, negotiation of stairs, prolonged ambulation, squatting, hunting, caring for his home such as cleaning, and upper extremity weakness      Quality of life: good    Pain  Current pain ratin  At best pain ratin  At worst pain ratin  Location: left shoulder  Relieving factors: rest  Aggravating factors: overhead activity and lifting  Progression: improved    Social Support  Lives with: alone    Hand dominance: right      Diagnostic Tests  X-ray: normal  Treatments  Previous treatment: physical therapy  Current treatment: physical therapy  Patient Goals  Patient goals for therapy: increased strength, independence with ADLs/IADLs, return to sport/leisure activities, improved balance, increased motion and decreased pain          Objective     Static Posture     Head  Forward  Shoulders  Rounded  Tenderness     Left Shoulder   No tenderness     Right Shoulder  No tenderness     Neurological Testing     Sensation     Shoulder   Left Shoulder   Intact: light touch    Right Shoulder   Intact: light touch    Active Range of Motion   Left Shoulder   Flexion: 60 degrees with pain  Abduction: 65 degrees with pain    Right Shoulder   Flexion: 135 degrees   Abduction: 125 degrees     Passive Range of Motion   Left Shoulder   Normal passive range of motion    Right Shoulder   Normal passive range of motion    Strength/Myotome Testing     Left Shoulder     Planes of Motion   Flexion: 2+   Abduction: 2+   External rotation at 90°: 4-   Internal rotation at 90°: 4-     Right Shoulder     Planes of Motion   Flexion: 3+   Abduction: 3+   External rotation at 90°: 4+   Internal rotation at 90°: 4+     Left Elbow   Flexion: 4    Right Elbow   Flexion: 4+    Left Hip   Planes of Motion   Flexion: 4+    Right Hip   Planes of Motion   Flexion: 4    Left Knee   Flexion: 4+  Extension: 4    Right Knee   Flexion: 4  Extension: 4    Tests     Left Shoulder   Positive external rotation lag sign, Hawkin's, lift-off and sulcus sign  Negative Neer's  Ambulation     Ambulation: Level Surfaces   Ambulation without assistive device: independent    Observational Gait   Left stance time and right stance time within functional limits  Decreased walking speed and stride length     Left foot contact pattern: heel to toe  Right foot contact pattern: heel to toe  Left arm swing: within functional limits  Right arm swing: within functional limits    Quality of Movement During Gait   Trunk    Trunk (Left): Positive left lateral lean over stance limb  Trunk (Right): Positive lateral lean over stance limb       Additional Quality of Movement During Gait Details  Hip ER leann with gait    Functional Assessment     Single Leg Stance   Left: 5 seconds  Right: 3 seconds    Comments  5x sit to stand: 17 5 seconds (19 2 seconds IE)  TUG:10 2 seconds  (12 seconds at IE)  Tandem stance: 30 seconds leann      Precautions: CHF, kidney failure, PVD    Objective: See treatment diary below     Manual   2/5  2/8  2/12  2/15  2/20  2/22  2/26  3/1  3/5  3/23   Measurements taken            20  NP      15 min                                                                                                      Exercise Diary  3/12  3/19  3/23  2/15  2/20  2/22  2/26  3/1  3/5  3/8   treadmill 5 min  @ 1 3 mph  5 min  6 min 1 3 mph  6 min 1 3 mph   6 min 1 3 mph  6 min 1 3 mph  6' 1 3 mph  7 min 1 3 mph 7 min 1 3 mph  4 min 1 3 mph   UBE (for/rev) 12 min  6' ea  12 min  6 min  12 min  10 min  12 min  8'  12 min  12'  12 min   Wand flexion 5"x20 5"  x20  4x, 2x np  8x, 2x  20x seated  np  25x seated   15x seated  15x seated  20x seated   Sit to stand 5x2 x10/x8  5x  8x, 9x  10x 2x 2x  10x  9x, 6x  11x  10x  5x   Shoulder iso at wall- all direct (no add) 5"x15 ea np  5" 15x each np  5" 15x each  np  np  15x5" each  np  NP  np   Shoulder ladder 5"x6 L 5"x8  5x Lnp  5x L  5x L  np  5x5" L  5x L  5x L  6x L   Stairs HEP HEP  stairs 15 steps 2x np  stairs 15 steps 2x  stairs 4x 10 stairs  np  stairs 4x 10 stairs  np  NP  np   side stepping 6 laps  6 laps  2 laps np  x4 laps  4 laps  np  5 laps  6 laps  6 laps  6 laps   Perturbations (shoulder) 90 deg flex and scapular plane elbow 90 deg 30"x2 ea  3x 30" scapular plane only  2x 30" each np  2x30" ea  2x 30" each  np  2x 30" each  np  NP  3x 30" L    wall slide shoulder -  np  np    12x L  np  10x L  14x L  15x L  np                                 Modalities                                                                                                         Flowsheet Rows    Flowsheet Row Most Recent Value   PT/OT G-Codes   Current Score  53   Projected Score  63   FOTO information reviewed  Yes   Assessment Type  Discharge   G code set  Mobility: Walking & Moving Around   Mobility: Walking and Moving Around Goal Status ()  CJ   Mobility: Walking and Moving Around Discharge Status ()  CK

## 2018-03-27 ENCOUNTER — APPOINTMENT (OUTPATIENT)
Dept: PHYSICAL THERAPY | Facility: REHABILITATION | Age: 83
End: 2018-03-27
Payer: MEDICARE

## 2018-03-29 ENCOUNTER — APPOINTMENT (OUTPATIENT)
Dept: PHYSICAL THERAPY | Facility: REHABILITATION | Age: 83
End: 2018-03-29
Payer: MEDICARE

## 2018-05-01 ENCOUNTER — OFFICE VISIT (OUTPATIENT)
Dept: UROLOGY | Facility: MEDICAL CENTER | Age: 83
End: 2018-05-01
Payer: MEDICARE

## 2018-05-01 VITALS
DIASTOLIC BLOOD PRESSURE: 82 MMHG | SYSTOLIC BLOOD PRESSURE: 142 MMHG | BODY MASS INDEX: 27.7 KG/M2 | HEIGHT: 69 IN | WEIGHT: 187 LBS

## 2018-05-01 DIAGNOSIS — N18.30 CHRONIC RENAL FAILURE IN PEDIATRIC PATIENT, STAGE 3 (MODERATE) (HCC): ICD-10-CM

## 2018-05-01 DIAGNOSIS — N39.498 OTHER URINARY INCONTINENCE: ICD-10-CM

## 2018-05-01 DIAGNOSIS — Z85.46 HISTORY OF PROSTATE CANCER: Primary | ICD-10-CM

## 2018-05-01 DIAGNOSIS — R39.12 WEAK URINARY STREAM: ICD-10-CM

## 2018-05-01 LAB
SL AMB  POCT GLUCOSE, UA: NEGATIVE
SL AMB LEUKOCYTE ESTERASE,UA: NEGATIVE
SL AMB POCT BILIRUBIN,UA: NEGATIVE
SL AMB POCT BLOOD,UA: ABNORMAL
SL AMB POCT CLARITY,UA: CLEAR
SL AMB POCT COLOR,UA: YELLOW
SL AMB POCT KETONES,UA: NEGATIVE
SL AMB POCT NITRITE,UA: NEGATIVE
SL AMB POCT PH,UA: 7
SL AMB POCT SPECIFIC GRAVITY,UA: 1.01
SL AMB POCT URINE PROTEIN: ABNORMAL
SL AMB POCT UROBILINOGEN: 0.2

## 2018-05-01 PROCEDURE — 81003 URINALYSIS AUTO W/O SCOPE: CPT | Performed by: UROLOGY

## 2018-05-01 PROCEDURE — 99215 OFFICE O/P EST HI 40 MIN: CPT | Performed by: UROLOGY

## 2018-05-01 RX ORDER — AMIODARONE HYDROCHLORIDE 200 MG/1
TABLET ORAL DAILY
COMMUNITY
Start: 2018-03-06 | End: 2018-07-11 | Stop reason: SDUPTHER

## 2018-05-01 RX ORDER — BIOTIN 1 MG
TABLET ORAL DAILY
COMMUNITY
End: 2018-07-27

## 2018-05-01 RX ORDER — METOLAZONE 2.5 MG/1
2.5 TABLET ORAL
COMMUNITY
Start: 2018-03-09 | End: 2018-07-27

## 2018-05-01 NOTE — PROGRESS NOTES
IPSS Questionnaire (AUA-7): Over the past month    1)  How often have you had a sensation of not emptying your bladder completely after you finish urinating? 3 - About half the time   2)  How often have you had to urinate again less than two hours after you finished urinating? 4 - More than half the time   3)  How often have you found you stopped and started again several times when you urinated? 5 - Almost always   4) How difficult have you found it to postpone urination? 1 - Less than 1 time in 5   5) How often have you had a weak urinary stream?  5 - Almost always   6) How often have you had to push or strain to begin urination? 0 - Not at all   7) How many times did you most typically get up to urinate from the time you went to bed until the time you got up in the morning? 2 - 2 times   Total Score:  20     QOL: Mixed

## 2018-05-01 NOTE — LETTER
May 1, 2018     Javid Martinez MD  [id number] [place] [address]    Patient: Mayuri Stovall   YOB: 1933   Date of Visit: 5/1/2018       Dear Dr Ani Spencer: Thank you for referring Roro Hammonds to me for evaluation  Below are my notes for this consultation  If you have questions, please do not hesitate to call me  I look forward to following your patient along with you  Sincerely,        Kavya Phan MD        CC: No Recipients  Kavya Phan MD  5/1/2018  2:48 PM  Sign at close encounter  Assessment/Plan:    Assessment:  1  Weak stream and intermittent flow  2  History of prostate cancer    Plan:  1  The patient will live with his urinary symptoms without a workup right now  2  Return in 1 year, PSA and office visit and    No problem-specific Assessment & Plan notes found for this encounter  Diagnoses and all orders for this visit:    Other urinary incontinence  -     POCT urine dip auto non-scope  -     PSA, Total Screen; Future    Other orders  -     Cholecalciferol (VITAMIN D3) 1000 units CAPS; Take by mouth daily  -     metolazone (ZAROXOLYN) 2 5 mg tablet; 2 5 mg Mondays and Fridays  -     amiodarone 200 mg tablet; daily          Subjective:      Patient ID: Mirian Ricci is a 80 y o  male  HPI  History of malignant neoplasm of the prostate: The patient was diagnosed with prostate cancer in 2003  He was on active surveillance until 2008 when the disease progressed and he received radiation therapy  He notes weak stream and dribbling which are a problem  He denies other significant urinary symptoms  Denies gross hematuria, urinary tract infections or incontinence  He is taking terazosin primarily for BP and for his symptoms  The patient's urinary symptoms may be due to a bulbar stricture from his previous radiation or a hypotonic bladder or some combination of both    We discussed a cystoscopy to make a diagnosis and then potential treatments if he has a stricture  Currently the patient declines workup is content to live with his urinary situation is it is  I concur in this decision  PSA in July 2017 was 0 20  Chronic Reanl Failure:  Managed by Dr Nohelia Russo  Creatinine in the low 2's  A recent right upper quadrant ultrasound to assess the patient's gallbladder showed no hydronephrosis in the patient's right kidney  The left kidney was not imaged  I will leave it to Dr Reji Kelly to decide if renal imaging is called for now  Chronic lymphocytic leukemia:  Managed by Dr Renay Mcgee at [address] [place] [address]  The following portions of the patient's history were reviewed and updated as appropriate: allergies, current medications, past family history, past medical history, past social history, past surgical history and problem list     Review of Systems   Constitutional: Negative for activity change and fatigue  Respiratory: Negative for shortness of breath and wheezing  Cardiovascular: Negative for chest pain  Warfarin for irregular heart beat  Pacemaker  Gastrointestinal: Negative for abdominal pain  Genitourinary: Negative for difficulty urinating, dysuria, frequency, hematuria and urgency  CRF Stage 3   Musculoskeletal: Negative for back pain and gait problem  Skin: Negative  Allergic/Immunologic: Negative  Neurological: Negative  Hematological:        CLL followed but not treated now  Psychiatric/Behavioral: Negative  Objective:      /82 (BP Location: Left arm, Patient Position: Sitting, Cuff Size: Standard)   Ht 5' 9" (1 753 m)   Wt 84 8 kg (187 lb)   BMI 27 62 kg/m²           Physical Exam   Constitutional: He is oriented to person, place, and time  He appears well-developed and well-nourished  HENT:   Head: Normocephalic and atraumatic  Neck: Normal range of motion  Neck supple     Pulmonary/Chest: Effort normal    Genitourinary: Rectum normal    Genitourinary Comments: Atrophic Musculoskeletal: Normal range of motion  Neurological: He is alert and oriented to person, place, and time  He has normal reflexes  Skin: Skin is warm and dry  Psychiatric: He has a normal mood and affect   His behavior is normal  Judgment and thought content normal

## 2018-05-24 ENCOUNTER — OFFICE VISIT (OUTPATIENT)
Dept: INTERNAL MEDICINE CLINIC | Facility: CLINIC | Age: 83
End: 2018-05-24
Payer: MEDICARE

## 2018-05-24 VITALS
OXYGEN SATURATION: 94 % | SYSTOLIC BLOOD PRESSURE: 144 MMHG | HEIGHT: 69 IN | HEART RATE: 70 BPM | WEIGHT: 183 LBS | DIASTOLIC BLOOD PRESSURE: 70 MMHG | BODY MASS INDEX: 27.11 KG/M2

## 2018-05-24 DIAGNOSIS — N40.0 BENIGN PROSTATIC HYPERPLASIA, UNSPECIFIED WHETHER LOWER URINARY TRACT SYMPTOMS PRESENT: ICD-10-CM

## 2018-05-24 DIAGNOSIS — Z11.59 NEED FOR HEPATITIS B SCREENING TEST: ICD-10-CM

## 2018-05-24 DIAGNOSIS — Z23 NEED FOR TDAP VACCINATION: ICD-10-CM

## 2018-05-24 DIAGNOSIS — Z11.59 ENCOUNTER FOR HEPATITIS C SCREENING TEST FOR LOW RISK PATIENT: ICD-10-CM

## 2018-05-24 DIAGNOSIS — E11.10 UNCONTROLLED TYPE 2 DIABETES MELLITUS WITH KETOACIDOSIS WITHOUT COMA, WITHOUT LONG-TERM CURRENT USE OF INSULIN (HCC): ICD-10-CM

## 2018-05-24 DIAGNOSIS — I71.4 ABDOMINAL AORTIC ANEURYSM (AAA) WITHOUT RUPTURE (HCC): ICD-10-CM

## 2018-05-24 DIAGNOSIS — E08.3213 DIABETES MELLITUS DUE TO UNDERLYING CONDITION WITH BOTH EYES AFFECTED BY MILD NONPROLIFERATIVE RETINOPATHY AND MACULAR EDEMA, WITHOUT LONG-TERM CURRENT USE OF INSULIN (HCC): ICD-10-CM

## 2018-05-24 DIAGNOSIS — I10 HYPERTENSION, UNSPECIFIED TYPE: Primary | ICD-10-CM

## 2018-05-24 LAB
SL AMB POCT GLUCOSE BLD: 148
SL AMB POCT HEMOGLOBIN AIC: 6.1

## 2018-05-24 PROCEDURE — 90471 IMMUNIZATION ADMIN: CPT | Performed by: INTERNAL MEDICINE

## 2018-05-24 PROCEDURE — 83036 HEMOGLOBIN GLYCOSYLATED A1C: CPT | Performed by: INTERNAL MEDICINE

## 2018-05-24 PROCEDURE — 82948 REAGENT STRIP/BLOOD GLUCOSE: CPT | Performed by: INTERNAL MEDICINE

## 2018-05-24 PROCEDURE — 90715 TDAP VACCINE 7 YRS/> IM: CPT | Performed by: INTERNAL MEDICINE

## 2018-05-24 PROCEDURE — 99214 OFFICE O/P EST MOD 30 MIN: CPT | Performed by: INTERNAL MEDICINE

## 2018-05-24 NOTE — PROGRESS NOTES
Assessment/Plan:    No problem-specific Assessment & Plan notes found for this encounter  Diagnoses and all orders for this visit:    Hypertension, unspecified type  -     Comprehensive metabolic panel; Future  -     CBC and differential; Future  -     TSH, 3rd generation; Future    Uncontrolled type 2 diabetes mellitus with ketoacidosis without coma, without long-term current use of insulin (HCC)  -     Comprehensive metabolic panel; Future  -     POCT hemoglobin A1c  -     POCT blood glucose    Abdominal aortic aneurysm (AAA) without rupture ([facility] [address] )  -     Protime-INR; Future    Benign prostatic hyperplasia, unspecified whether lower urinary tract symptoms present  -     PSA Total, Diagnostic; Future  -     oxybutynin (DITROPAN-XL) 5 mg 24 hr tablet; Take 1 tablet (5 mg total) by mouth daily    Need for hepatitis B screening test  -     Hepatitis B surface antigen; Future    Encounter for hepatitis C screening test for low risk patient  -     Hepatitis C antibody; Future    Need for Tdap vaccination  -     TDAP VACCINE GREATER THAN OR EQUAL TO 6YO IM    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, without long-term current use of insulin (HCC)   -     POCT blood glucose        Subjective:      Patient ID: Gianni Flores is a 80 y o  male  HPI   Troy Bose returns to the office for a visit  He remains a very complex patient with multiple comorbidities  He remains under the care of [facility] for his cardiac difficulties  He has been under the care of Dr Myrle Gilford for his prostate cancer  This has been treated since 2003 and has been treated with radiation therapy  He is under the cot care also of Dr Jose L Lenz his oncologist who is following his CLL and also Dr Reynold Prado his nephrologist   He has had a colonoscopy in 2012  And also percutaneous interventions for peripheral vascular disease   Since I saw him the last time in February he has noticed some weight loss and a diminution in his appetite and much more fatigued  He has been an avid pistol shoot for many years and has not been able to participate in this activity for many weeks  He saw Dr Jeffrey Gamboa recently because of nocturia Dr Jeffrey Gamboa discussed the various therapeutic and diagnostic possibilities  He was given a prescription apparently for some Ditropan which she is use with some relief he has also noticed an intermittent bulge in the left side of the groin  His gout has been quiescent    The following portions of the patient's history were reviewed and updated as appropriate: past family history, past medical history, past surgical history and problem list     Review of Systems      Objective:      /70   Pulse 70 Comment: regular  Ht 5' 9" (1 753 m)   Wt 83 kg (183 lb)   SpO2 94%   BMI 27 02 kg/m²          Physical Exam  physical examination daniel Reyna is now thin but of appears well  His pressure is 144/70 his pulse was 70 a shin regular  Venous pressure is normal the carotids are unremarkable lungs show brisk distant breath sounds and some scattered rales there is a pacemaker generator palpated in the left anterior part of the chest there is perhaps a very faint ejection murmur at the cardiac base the abdomen is soft no palpable organ enlargement or masses extremities show some venous stasis dermatitis and a trace of edema I palpated both groins carefully there is some small shoddy and lymph glands there do not appear clinically significant with the patient standing there is a slightly dilated right inguinal canal and a very small hernia discernible on the left we reviewed some recent lab studies that show some elevations in the ALT and AST  He also had an ultrasound of the abdomen done a few weeks ago which showed good showed gallstones    The patient will be sent for CBC chemistries TSH protime and hepatitis B and C serologies we will give him a further prescription for Ditropan at his request will see him back in 3 months he is referred back to his other multiple physicians for continued care

## 2018-05-25 RX ORDER — OXYBUTYNIN CHLORIDE 5 MG/1
5 TABLET, EXTENDED RELEASE ORAL DAILY
Qty: 30 TABLET | Refills: 5 | Status: SHIPPED | OUTPATIENT
Start: 2018-05-25 | End: 2018-07-27

## 2018-05-31 DIAGNOSIS — E03.9 HYPOTHYROIDISM, UNSPECIFIED TYPE: Primary | ICD-10-CM

## 2018-05-31 NOTE — PROGRESS NOTES
[facility] takes care of INR, I will mail script for a repeat TSH as advised by Dr Gibson Santos

## 2018-06-11 ENCOUNTER — TELEPHONE (OUTPATIENT)
Dept: UROLOGY | Facility: MEDICAL CENTER | Age: 83
End: 2018-06-11

## 2018-06-19 DIAGNOSIS — I10 HYPERTENSION, UNSPECIFIED TYPE: Primary | ICD-10-CM

## 2018-06-20 RX ORDER — METOPROLOL TARTRATE 50 MG/1
TABLET, FILM COATED ORAL
Qty: 180 TABLET | Refills: 3 | Status: SHIPPED | OUTPATIENT
Start: 2018-06-20 | End: 2018-08-23 | Stop reason: SDUPTHER

## 2018-07-08 DIAGNOSIS — I10 ESSENTIAL HYPERTENSION: ICD-10-CM

## 2018-07-09 RX ORDER — FUROSEMIDE 40 MG/1
TABLET ORAL
Qty: 60 TABLET | Refills: 3 | Status: SHIPPED | OUTPATIENT
Start: 2018-07-09 | End: 2018-07-27

## 2018-07-10 ENCOUNTER — OFFICE VISIT (OUTPATIENT)
Dept: INTERNAL MEDICINE CLINIC | Facility: CLINIC | Age: 83
End: 2018-07-10
Payer: MEDICARE

## 2018-07-10 ENCOUNTER — TELEPHONE (OUTPATIENT)
Dept: INTERNAL MEDICINE CLINIC | Facility: CLINIC | Age: 83
End: 2018-07-10

## 2018-07-10 DIAGNOSIS — S41.102A ARM WOUND, LEFT, INITIAL ENCOUNTER: Primary | ICD-10-CM

## 2018-07-10 DIAGNOSIS — S81.801A WOUND OF RIGHT LOWER EXTREMITY, INITIAL ENCOUNTER: ICD-10-CM

## 2018-07-10 PROCEDURE — 99213 OFFICE O/P EST LOW 20 MIN: CPT | Performed by: INTERNAL MEDICINE

## 2018-07-10 NOTE — PROGRESS NOTES
Assessment/Plan:    No problem-specific Assessment & Plan notes found for this encounter  Problem List Items Addressed This Visit     None      Visit Diagnoses     Arm wound, left, initial encounter    -  Primary    Relevant Orders    Ambulatory referral to Wound Care    Wound of right lower extremity, initial encounter        Relevant Orders    Ambulatory referral to Wound Care            Subjective:      Patient ID: Ceci Rivera is a 80 y o  male  Of lab studies were do and he was ne  HPI  Heidi Raygoza is here today primarily for a dressing change  A few days ago he was getting into his house and he slipped fell backwards scraped a bunch of his body parts against the concrete and of test done eventually went to [facility] where he was evaluated very thoroughly and underwent plain film radiographs of appropriate appendages and also CT scanning of the head variety o a an avulsion of type abrasions were appropriately dressed  He had his son changes dressings at home and is in the office today for re-evaluation f tests done and his abrasions The following portions of the patient's history were reviewed and updated as appropriate: allergies, current medications, past family history, past medical history, past social history, past surgical history and problem list     Review of Systems      Objective: There were no vitals taken for this visit  Physical Exam  Heidi Raygoza is awake alert a sharp and actually very affable today his pressure is 136/54 he has a regular rhythm he has some rhonchi in his lungs cardiac examination reveals short ejection murmur there i clean and unremarkable all of his room wounds were addressed s a trace of pedal edema  We did undress all of the skin lesions and inspect them carefully the main 1 that I could see would be a problem would be an avulsion type skin abrasion of his left hand    He has also an avulsion type of skin injury to his lower [place] right leg which appears clean and unremarkable  All of his wounds were cleansed and redressed will make arrangements for him to be followed at [facility]    I did talk to Carole Dahl about getting a medical alert which I think would be a good thing for him no changes are made in his medications

## 2018-07-10 NOTE — TELEPHONE ENCOUNTER
Pt called asking if you can call him back regarding a nursing service for his wound  He was here this morning and said this was discussed  He can be reached at [phone]

## 2018-07-11 DIAGNOSIS — M10.9 GOUT, UNSPECIFIED CAUSE, UNSPECIFIED CHRONICITY, UNSPECIFIED SITE: ICD-10-CM

## 2018-07-11 DIAGNOSIS — I49.9 CARDIAC ARRHYTHMIA, UNSPECIFIED CARDIAC ARRHYTHMIA TYPE: Primary | ICD-10-CM

## 2018-07-11 RX ORDER — AMIODARONE HYDROCHLORIDE 200 MG/1
TABLET ORAL
Qty: 90 TABLET | Refills: 1 | Status: SHIPPED | OUTPATIENT
Start: 2018-07-11 | End: 2019-01-18 | Stop reason: SDUPTHER

## 2018-07-11 RX ORDER — ALLOPURINOL 300 MG/1
TABLET ORAL
Qty: 90 TABLET | Refills: 90 | Status: SHIPPED | OUTPATIENT
Start: 2018-07-11

## 2018-07-12 ENCOUNTER — OFFICE VISIT (OUTPATIENT)
Dept: INTERNAL MEDICINE CLINIC | Facility: CLINIC | Age: 83
End: 2018-07-12

## 2018-07-12 DIAGNOSIS — S41.102S ARM WOUND, LEFT, SEQUELA: Primary | ICD-10-CM

## 2018-07-12 NOTE — PROGRESS NOTES
Patient is scheduled to see wound care on Monday  The dressing on the left arm needs to be redressed do to oozing wounds  I removed the bandages and cleaned the area with sterile water  I placed a fresh 4x4 xeroform petroleum dressing to the left hand and placed the same type of dressing to the lower arm  The hand and lower arm was wrapped with conforming bandages  The patient does not wish to have his right leg dressing changed  He is ok waiting until Monday for his wound care appt, mentioned his son will be in town over the weekend and if anything he can change the dressings

## 2018-07-16 ENCOUNTER — OFFICE VISIT (OUTPATIENT)
Dept: INTERNAL MEDICINE CLINIC | Facility: CLINIC | Age: 83
End: 2018-07-16
Payer: MEDICARE

## 2018-07-16 VITALS
DIASTOLIC BLOOD PRESSURE: 70 MMHG | WEIGHT: 191 LBS | HEIGHT: 69 IN | BODY MASS INDEX: 28.29 KG/M2 | SYSTOLIC BLOOD PRESSURE: 130 MMHG

## 2018-07-16 DIAGNOSIS — R60.9 EDEMA, UNSPECIFIED TYPE: Primary | ICD-10-CM

## 2018-07-16 PROCEDURE — 99213 OFFICE O/P EST LOW 20 MIN: CPT | Performed by: INTERNAL MEDICINE

## 2018-07-16 RX ORDER — SPIRONOLACTONE 25 MG/1
TABLET ORAL
Qty: 30 TABLET | Refills: 0 | Status: SHIPPED | OUTPATIENT
Start: 2018-07-16 | End: 2018-08-17 | Stop reason: SDUPTHER

## 2018-07-16 NOTE — PROGRESS NOTES
Assessment/Plan:    No problem-specific Assessment & Plan notes found for this encounter  Diagnoses and all orders for this visit:    Edema, unspecified type  -     spironolactone (ALDACTONE) 25 mg tablet; TAKE 0 5 TABLET DAILY  -     Basic metabolic panel; Future        Subjective:      Patient ID: Amber Chamorro is a 80 y o  male  HPI   Marely Way called me at home last night at the tell me that his weight had gone up several lb his legs were more edematous and he had had his wound is redressed at the wound care place  He has been using large amounts of Lasix recently and has some Zaroxolyn 2 5 mg that he takes twice a week as needed but has run out of this  He is not short of breath his no chest pain but is concerned about his weight gain and progressive leg edema  The following portions of the patient's history were reviewed and updated as appropriate: allergies, current medications, past family history, past medical history, past surgical history and problem list     Review of Systems      Objective:      /70   Ht 5' 9" (1 753 m)   Wt 86 6 kg (191 lb)   BMI 28 21 kg/m²          Physical Exam  his pressure is 130/70 his pulse is approximately 70 and regular he has distant breath sounds some faint wheezes but no rales his heart sounds are distant and muffled but I hear no major abnormalities he has got a dressing around the right lower leg and he has got some 2+ edema in that leg  The left leg has +1 edema  At this point I put a call in to Dr Hank jung patient is a nephrologist discuss the situation with the he has had an echocardiogram that shows an ejection fraction of 30% and perhaps we should consider adding a little bit of lid spironolactone to the regimen    I will confer with the patient after I speak with Dr de leon

## 2018-07-26 ENCOUNTER — TELEPHONE (OUTPATIENT)
Dept: INTERNAL MEDICINE CLINIC | Facility: CLINIC | Age: 83
End: 2018-07-26

## 2018-07-26 NOTE — TELEPHONE ENCOUNTER
Pt called asking if you or Dr Laly Haley can please give him a call back but would not tell me why  His phone number is [phone]

## 2018-07-27 ENCOUNTER — TELEPHONE (OUTPATIENT)
Dept: INTERNAL MEDICINE CLINIC | Facility: CLINIC | Age: 83
End: 2018-07-27

## 2018-07-27 NOTE — TELEPHONE ENCOUNTER
[facility] from  [facility] office called  They wanted to check on the pre op clearance and make sure you received labs and EKG paperwork   To reach [facility] by phone [phone] or fax [phone]

## 2018-07-27 NOTE — TELEPHONE ENCOUNTER
I called pramod and made her aware that we do have the forms, Dr Frances Gasca has been out of the office and will return Monday

## 2018-07-31 ENCOUNTER — ANTICOAG VISIT (OUTPATIENT)
Dept: INTERNAL MEDICINE CLINIC | Facility: CLINIC | Age: 83
End: 2018-07-31

## 2018-08-10 ENCOUNTER — LAB REQUISITION (OUTPATIENT)
Dept: LAB | Facility: HOSPITAL | Age: 83
End: 2018-08-10
Payer: MEDICARE

## 2018-08-10 DIAGNOSIS — D49.2 NEOPLASM OF UNSPECIFIED BEHAVIOR OF BONE, SOFT TISSUE, AND SKIN: ICD-10-CM

## 2018-08-10 PROCEDURE — 88305 TISSUE EXAM BY PATHOLOGIST: CPT | Performed by: PATHOLOGY

## 2018-08-13 DIAGNOSIS — C61 PROSTATE CA (HCC): ICD-10-CM

## 2018-08-13 RX ORDER — TERAZOSIN 5 MG/1
5 CAPSULE ORAL
Qty: 90 CAPSULE | Refills: 1 | Status: SHIPPED | OUTPATIENT
Start: 2018-08-13 | End: 2018-11-02

## 2018-08-17 DIAGNOSIS — R60.9 EDEMA, UNSPECIFIED TYPE: ICD-10-CM

## 2018-08-17 DIAGNOSIS — E78.5 HYPERLIPIDEMIA, UNSPECIFIED HYPERLIPIDEMIA TYPE: Primary | ICD-10-CM

## 2018-08-17 DIAGNOSIS — I48.91 ATRIAL FIBRILLATION, UNSPECIFIED TYPE (HCC): ICD-10-CM

## 2018-08-17 RX ORDER — ATORVASTATIN CALCIUM 40 MG/1
40 TABLET, FILM COATED ORAL DAILY
Qty: 90 TABLET | Refills: 3 | Status: SHIPPED | OUTPATIENT
Start: 2018-08-17 | End: 2018-08-20 | Stop reason: SDUPTHER

## 2018-08-17 RX ORDER — FUROSEMIDE 40 MG/1
40 TABLET ORAL DAILY
Qty: 90 TABLET | Refills: 3 | Status: SHIPPED | OUTPATIENT
Start: 2018-08-17 | End: 2018-08-20 | Stop reason: SDUPTHER

## 2018-08-17 RX ORDER — WARFARIN SODIUM 2.5 MG/1
2.5 TABLET ORAL
Qty: 90 TABLET | Refills: 3 | Status: SHIPPED | OUTPATIENT
Start: 2018-08-17 | End: 2018-08-20 | Stop reason: SDUPTHER

## 2018-08-17 RX ORDER — SPIRONOLACTONE 25 MG/1
25 TABLET ORAL DAILY
Qty: 45 TABLET | Refills: 3 | Status: SHIPPED | OUTPATIENT
Start: 2018-08-17 | End: 2018-08-20 | Stop reason: SDUPTHER

## 2018-08-20 ENCOUNTER — TELEPHONE (OUTPATIENT)
Dept: INTERNAL MEDICINE CLINIC | Facility: CLINIC | Age: 83
End: 2018-08-20

## 2018-08-20 DIAGNOSIS — E78.5 HYPERLIPIDEMIA, UNSPECIFIED HYPERLIPIDEMIA TYPE: ICD-10-CM

## 2018-08-20 DIAGNOSIS — R60.9 EDEMA, UNSPECIFIED TYPE: ICD-10-CM

## 2018-08-20 DIAGNOSIS — I48.91 ATRIAL FIBRILLATION, UNSPECIFIED TYPE (HCC): ICD-10-CM

## 2018-08-20 RX ORDER — WARFARIN SODIUM 2.5 MG/1
2.5 TABLET ORAL
Qty: 90 TABLET | Refills: 3 | Status: SHIPPED | OUTPATIENT
Start: 2018-08-20

## 2018-08-20 RX ORDER — FUROSEMIDE 40 MG/1
40 TABLET ORAL DAILY
Qty: 90 TABLET | Refills: 3 | Status: SHIPPED | OUTPATIENT
Start: 2018-08-20 | End: 2018-08-27 | Stop reason: SDUPTHER

## 2018-08-20 RX ORDER — ATORVASTATIN CALCIUM 40 MG/1
40 TABLET, FILM COATED ORAL DAILY
Qty: 90 TABLET | Refills: 3 | Status: SHIPPED | OUTPATIENT
Start: 2018-08-20

## 2018-08-20 RX ORDER — SPIRONOLACTONE 25 MG/1
25 TABLET ORAL DAILY
Qty: 45 TABLET | Refills: 3 | Status: SHIPPED | OUTPATIENT
Start: 2018-08-20

## 2018-08-20 NOTE — TELEPHONE ENCOUNTER
[facility]  REF # [id number]  Possible drug interaction with warfarin (COUMADIN) 2 5 mg and amiodarone 200 mg  RX sent fax to address issue    ty

## 2018-08-23 DIAGNOSIS — I10 HYPERTENSION, UNSPECIFIED TYPE: ICD-10-CM

## 2018-08-23 RX ORDER — METOPROLOL TARTRATE 50 MG/1
50 TABLET, FILM COATED ORAL 2 TIMES DAILY
Qty: 180 TABLET | Refills: 1 | Status: SHIPPED | OUTPATIENT
Start: 2018-08-23 | End: 2018-12-25 | Stop reason: SDUPTHER

## 2018-08-27 DIAGNOSIS — R60.9 EDEMA, UNSPECIFIED TYPE: ICD-10-CM

## 2018-08-27 RX ORDER — FUROSEMIDE 40 MG/1
40 TABLET ORAL DAILY
Qty: 30 TABLET | Refills: 0 | Status: SHIPPED | OUTPATIENT
Start: 2018-08-27 | End: 2018-12-27 | Stop reason: SDUPTHER

## 2018-08-31 DIAGNOSIS — R60.9 EDEMA, UNSPECIFIED TYPE: ICD-10-CM

## 2018-09-11 ENCOUNTER — OFFICE VISIT (OUTPATIENT)
Dept: INTERNAL MEDICINE CLINIC | Facility: CLINIC | Age: 83
End: 2018-09-11
Payer: MEDICARE

## 2018-09-11 VITALS
BODY MASS INDEX: 27.05 KG/M2 | OXYGEN SATURATION: 96 % | HEART RATE: 87 BPM | HEIGHT: 69 IN | WEIGHT: 182.6 LBS | TEMPERATURE: 97.2 F

## 2018-09-11 DIAGNOSIS — J30.89 ALLERGIC RHINITIS DUE TO OTHER ALLERGIC TRIGGER, UNSPECIFIED SEASONALITY: ICD-10-CM

## 2018-09-11 DIAGNOSIS — I10 ESSENTIAL HYPERTENSION: ICD-10-CM

## 2018-09-11 DIAGNOSIS — I48.91 ATRIAL FIBRILLATION, UNSPECIFIED TYPE (HCC): Primary | ICD-10-CM

## 2018-09-11 PROCEDURE — 99213 OFFICE O/P EST LOW 20 MIN: CPT | Performed by: INTERNAL MEDICINE

## 2018-09-11 RX ORDER — FLUTICASONE PROPIONATE 50 MCG
1 SPRAY, SUSPENSION (ML) NASAL DAILY
Qty: 16 G | Refills: 3 | Status: SHIPPED | OUTPATIENT
Start: 2018-09-11 | End: 2019-01-18 | Stop reason: SDUPTHER

## 2018-09-11 RX ORDER — FLUTICASONE PROPIONATE 50 MCG
1 SPRAY, SUSPENSION (ML) NASAL DAILY
COMMUNITY
End: 2018-09-11 | Stop reason: SDUPTHER

## 2018-09-11 NOTE — PROGRESS NOTES
Assessment/Plan:    No problem-specific Assessment & Plan notes found for this encounter  Diagnoses and all orders for this visit:    Atrial fibrillation, unspecified type ([address] )  -     Protime-INR; Future    Essential hypertension  -     CBC and differential; Future  -     Basic metabolic panel; Future    Other orders  -     fluticasone (FLONASE) 50 mcg/act nasal spray; 1 spray into each nostril daily        Subjective:      Patient ID: Claudia Garcia is a 80 y o  male  HPI     Patricia Hall is here today for visit  Since he was here the last time he has noticed that occasionally he gets dizzy and a little bit lightheaded  He also believes that his leg edema is improved  He still has exertional breathlessness his generally tired and run down and feels that over the last year so he has generally lost ground he has an appointment to see his cardiologist at [facility]  He continues under the care of Dr Addi jung his nephrologist and also Dr Panda Prado  his oncologist    He has noted a small lump in the left side of his groin that comes and goes  He had 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  The following portions of the patient's history were reviewed and updated as appropriate: allergies, current medications, past family history, past medical history, past social history, past surgical history and problem list     Review of Systems  and general progressive musculoskeletal debility of system is positive for fatigue ability shortness of breath    Objective:      Pulse 87   Temp (!) 97 2 °F (36 2 °C) (Tympanic)   Ht 5' 9" (1 753 m)   Wt 82 8 kg (182 lb 9 6 oz)   SpO2 96%   BMI 26 97 kg/m²          Physical Exam  very appears well and in no distress his blood pressure today was 118/60 his weight today was 182 lb it had previously been 190    His neck veins are flat breath sounds are distant there are no adventitious sounds cardiac examination reveals muffled heart sounds he appears to be in irregular rhythm and a short ejection murmur at the cardiac base his ache extensive cutaneous purpura he does have some leg edema to the mid calf however I would rate it is +1 only at this point we did examine his groin I think he may have a small asymptomatic left inguinal hernia  Since the patient was started on spironolactone a he has diuresed quite a bit his blood pressure is lower as a consequence  Will send him today for CBC chemistries in a prothrombin time  Depending upon what his labs look like we may decide to reduce his Lasix a bit further    I look forward to having him evaluated by Dr Elodia Holstein his nephrologist and remind him to keep these appointments

## 2018-09-12 ENCOUNTER — TELEPHONE (OUTPATIENT)
Dept: INTERNAL MEDICINE CLINIC | Facility: CLINIC | Age: 83
End: 2018-09-12

## 2018-09-17 ENCOUNTER — TELEPHONE (OUTPATIENT)
Dept: INTERNAL MEDICINE CLINIC | Facility: CLINIC | Age: 83
End: 2018-09-17

## 2018-09-17 NOTE — TELEPHONE ENCOUNTER
Pro time a bit high  Check coumadin dose  Stop coumadin one day    Check with me tomorrow to adjust coumadin dose

## 2018-11-02 ENCOUNTER — OFFICE VISIT (OUTPATIENT)
Dept: UROLOGY | Facility: MEDICAL CENTER | Age: 83
End: 2018-11-02
Payer: MEDICARE

## 2018-11-02 VITALS
SYSTOLIC BLOOD PRESSURE: 122 MMHG | WEIGHT: 179.8 LBS | HEIGHT: 68 IN | BODY MASS INDEX: 27.25 KG/M2 | DIASTOLIC BLOOD PRESSURE: 66 MMHG

## 2018-11-02 DIAGNOSIS — Z85.46 HISTORY OF PROSTATE CANCER: ICD-10-CM

## 2018-11-02 DIAGNOSIS — N18.9 CHRONIC RENAL FAILURE IN PEDIATRIC PATIENT, UNSPECIFIED STAGE: ICD-10-CM

## 2018-11-02 DIAGNOSIS — R35.0 URINARY FREQUENCY: Primary | ICD-10-CM

## 2018-11-02 DIAGNOSIS — N40.0 BENIGN PROSTATIC HYPERPLASIA, UNSPECIFIED WHETHER LOWER URINARY TRACT SYMPTOMS PRESENT: Primary | ICD-10-CM

## 2018-11-02 LAB — POST-VOID RESIDUAL VOLUME, ML POC: 91 ML

## 2018-11-02 PROCEDURE — 81003 URINALYSIS AUTO W/O SCOPE: CPT | Performed by: UROLOGY

## 2018-11-02 PROCEDURE — 99214 OFFICE O/P EST MOD 30 MIN: CPT | Performed by: UROLOGY

## 2018-11-02 PROCEDURE — 51798 US URINE CAPACITY MEASURE: CPT | Performed by: UROLOGY

## 2018-11-02 RX ORDER — TAMSULOSIN HYDROCHLORIDE 0.4 MG/1
0.4 CAPSULE ORAL DAILY
COMMUNITY
Start: 2018-10-17

## 2018-11-02 NOTE — PROGRESS NOTES
Assessment/Plan:    History of prostate cancer   Currently asymptomatic  The patient will return for his annual evaluation in January  Urinary frequency    The patient's symptoms have abated somewhat  He is content to live with the status quo for now  In light of his previous treatment for prostate cancer, he may very well have a stricture as the underlying cause of his problem  Chronic renal failure in pediatric patient    Managed by Nephrology  Diagnoses and all orders for this visit:    Urinary frequency    History of prostate cancer    Chronic renal failure in pediatric patient, unspecified stage    Other orders  -     tamsulosin (FLOMAX) 0 4 mg; daily          Subjective:      Patient ID: Andrew Grissom is a 80 y o  male  HPI   Difficulty Voiding:  He notes nocturia x 4+ and unable to get back to sleep, weak stream   He denies other significant urinary symptoms  He denies gross hematuria, urinary tract infections or incontinence  He is taking tamsulosin for his symptoms  Sx were much worse last week for no apparent reason  He removed proved in the last few days, and consider cancelling this visit  At this point he is content to live with his symptoms as they are  Pt has lost sensation of the passage of urine  Also notes penis withdrawal into prepubic fat  The patient has had radiation therapy for prostate cancer in the past, and may well have a stricture  PVR=91 cc  AUA SYMPTOM SCORE      Most Recent Value   AUA SYMPTOM SCORE   How often have you had a sensation of not emptying your bladder completely after you finished urinating? 3   How often have you had to urinate again less than two hours after you finished urinating? 4   How often have you found you stopped and started again several times when you urinate? 5   How often have you found it difficult to postpone urination?   2   How often have you had a weak urinary stream?  5   How often have you had to push or strain to begin urination? 1   How many times did you most typically get up to urinate from the time you went to bed at night until the time you got up in the morning? 4   Quality of Life: If you were to spend the rest of your life with your urinary condition just the way it is now, how would you feel about that?  3   AUA SYMPTOM SCORE  24            Prostate cancer: The patient was diagnosed with [place] 6 prostate cancer in 9 of 12 cores in 2008  Initial diagnosis in 2003 indicated low volume disease and he was not be at the time  He received radiation therapy  He has no signs or symptoms of recurrent disease  PSAs have been quite low since then  His most recent PSA on May 29, 2018 was 0 18 which is his usual level  The patient will return in January for his regular prostate cancer checkup  Chronic Renal Failure:  Creatinine in 1 9-2 2 range managed by Dr Dariusz Myers  The following portions of the patient's history were reviewed and updated as appropriate: allergies, current medications, past family history, past medical history, past social history, past surgical history and problem list     Review of Systems    Constitutional: Negative for activity change and fatigue  Respiratory: Negative for shortness of breath and wheezing  Cardiovascular: Negative for chest pain  Warfarin for irregular heart beat  Pacemaker  Gastrointestinal: Negative for abdominal pain  Genitourinary: Negative for difficulty urinating, dysuria, frequency, hematuria and urgency  CRF Stage 3   Musculoskeletal: Negative for back pain and gait problem  Skin: Negative  Allergic/Immunologic: Negative  Neurological: Negative  Hematological:        CLL followed but not treated now  Psychiatric/Behavioral: Negative      Objective:      /66 (BP Location: Left arm, Patient Position: Sitting, Cuff Size: Standard)   Ht 5' 8" (1 727 m)   Wt 81 6 kg (179 lb 12 8 oz)   BMI 27 34 kg/m² Physical Exam   Constitutional: He is oriented to person, place, and time  He appears well-developed and well-nourished  HENT:   Head: Normocephalic and atraumatic  Eyes: EOM are normal    Neck: Normal range of motion  Neck supple  Pulmonary/Chest: Effort normal    Abdominal: Soft  Bowel sounds are normal    Bilat inguinal hernias, L>>R  Genitourinary: Rectum normal    Genitourinary Comments: The prostate is atrophic, firm, smooth, non-tender  Penis and testes are palpably in structurally normal    Musculoskeletal: Normal range of motion  Neurological: He is alert and oriented to person, place, and time  Skin: Skin is warm and dry  Psychiatric: He has a normal mood and affect   His behavior is normal  Judgment and thought content normal

## 2018-11-02 NOTE — LETTER
November 3, 2018     Lacy Gutierrez MD  [id number]  [address]    Patient: Jessie Stovall   YOB: 1933   Date of Visit: 11/2/2018       Dear Dr Fay Galindo: Thank you for referring Mariposa Ruthefrord to me for evaluation  Below are my notes for this consultation  If you have questions, please do not hesitate to call me  I look forward to following your patient along with you  Sincerely,        Jeana Hart MD        CC: No Recipients  Jeana Hart MD  11/3/2018 10:02 AM  Sign at close encounter  Assessment/Plan:    History of prostate cancer   Currently asymptomatic  The patient will return for his annual evaluation in January  Urinary frequency    The patient's symptoms have abated somewhat  He is content to live with the status quo for now  In light of his previous treatment for prostate cancer, he may very well have a stricture as the underlying cause of his problem  Chronic renal failure in pediatric patient    Managed by Nephrology  Diagnoses and all orders for this visit:    Urinary frequency    History of prostate cancer    Chronic renal failure in pediatric patient, unspecified stage    Other orders  -     tamsulosin (FLOMAX) 0 4 mg; daily          Subjective:      Patient ID: Amanda Joiner is a 80 y o  male  HPI   Difficulty Voiding:  He notes nocturia x 4+ and unable to get back to sleep, weak stream   He denies other significant urinary symptoms  He denies gross hematuria, urinary tract infections or incontinence  He is taking tamsulosin for his symptoms  Sx were much worse last week for no apparent reason  He removed proved in the last few days, and consider cancelling this visit  At this point he is content to live with his symptoms as they are  Pt has lost sensation of the passage of urine  Also notes penis withdrawal into prepubic fat        The patient has had radiation therapy for prostate cancer in the past, and may well have a stricture  PVR=91 cc  AUA SYMPTOM SCORE      Most Recent Value   AUA SYMPTOM SCORE   How often have you had a sensation of not emptying your bladder completely after you finished urinating? 3   How often have you had to urinate again less than two hours after you finished urinating? 4   How often have you found you stopped and started again several times when you urinate? 5   How often have you found it difficult to postpone urination? 2   How often have you had a weak urinary stream?  5   How often have you had to push or strain to begin urination? 1   How many times did you most typically get up to urinate from the time you went to bed at night until the time you got up in the morning? 4   Quality of Life: If you were to spend the rest of your life with your urinary condition just the way it is now, how would you feel about that?  3   AUA SYMPTOM SCORE  24            Prostate cancer: The patient was diagnosed with Lisa 6 prostate cancer in 9 of 12 cores in 2008  Initial diagnosis in 2003 indicated low volume disease and he was not be at the time  He received radiation therapy  He has no signs or symptoms of recurrent disease  PSAs have been quite low since then  His most recent PSA on May 29, 2018 was 0 18 which is his usual level  The patient will return in January for his regular prostate cancer checkup  Chronic Renal Failure:  Creatinine in 1 9-2 2 range managed by Dr Laura Avalos  The following portions of the patient's history were reviewed and updated as appropriate: allergies, current medications, past family history, past medical history, past social history, past surgical history and problem list     Review of Systems    Constitutional: Negative for activity change and fatigue  Respiratory: Negative for shortness of breath and wheezing  Cardiovascular: Negative for chest pain  Warfarin for irregular heart beat  Pacemaker     Gastrointestinal: Negative for abdominal pain  Genitourinary: Negative for difficulty urinating, dysuria, frequency, hematuria and urgency  CRF Stage 3   Musculoskeletal: Negative for back pain and gait problem  Skin: Negative  Allergic/Immunologic: Negative  Neurological: Negative  Hematological:        CLL followed but not treated now  Psychiatric/Behavioral: Negative  Objective:      /66 (BP Location: Left arm, Patient Position: Sitting, Cuff Size: Standard)   Ht 5' 8" (1 727 m)   Wt 81 6 kg (179 lb 12 8 oz)   BMI 27 34 kg/m²           Physical Exam   Constitutional: He is oriented to person, place, and time  He appears well-developed and well-nourished  HENT:   Head: Normocephalic and atraumatic  Eyes: EOM are normal    Neck: Normal range of motion  Neck supple  Pulmonary/Chest: Effort normal    Abdominal: Soft  Bowel sounds are normal    Bilat inguinal hernias, L>>R  Genitourinary: Rectum normal    Genitourinary Comments: The prostate is atrophic, firm, smooth, non-tender  Penis and testes are palpably in structurally normal    Musculoskeletal: Normal range of motion  Neurological: He is alert and oriented to person, place, and time  Skin: Skin is warm and dry  Psychiatric: He has a normal mood and affect   His behavior is normal  Judgment and thought content normal

## 2018-11-03 PROBLEM — R35.0 URINARY FREQUENCY: Status: ACTIVE | Noted: 2018-11-03

## 2018-11-03 PROBLEM — Z85.46 HISTORY OF PROSTATE CANCER: Status: ACTIVE | Noted: 2018-11-03

## 2018-11-03 PROBLEM — N18.9 CHRONIC RENAL FAILURE IN PEDIATRIC PATIENT: Status: ACTIVE | Noted: 2018-11-03

## 2018-11-03 NOTE — ASSESSMENT & PLAN NOTE
The patient's symptoms have abated somewhat  He is content to live with the status quo for now  In light of his previous treatment for prostate cancer, he may very well have a stricture as the underlying cause of his problem

## 2018-12-10 ENCOUNTER — OFFICE VISIT (OUTPATIENT)
Dept: INTERNAL MEDICINE CLINIC | Facility: CLINIC | Age: 83
End: 2018-12-10
Payer: MEDICARE

## 2018-12-10 VITALS — TEMPERATURE: 97.7 F | DIASTOLIC BLOOD PRESSURE: 70 MMHG | SYSTOLIC BLOOD PRESSURE: 120 MMHG

## 2018-12-10 DIAGNOSIS — S81.811A SKIN TEAR OF LOWER LEG WITHOUT COMPLICATION, RIGHT, INITIAL ENCOUNTER: ICD-10-CM

## 2018-12-10 DIAGNOSIS — Z23 FLU VACCINE NEED: Primary | ICD-10-CM

## 2018-12-10 DIAGNOSIS — R60.9 PERIPHERAL EDEMA: ICD-10-CM

## 2018-12-10 PROCEDURE — 99213 OFFICE O/P EST LOW 20 MIN: CPT | Performed by: INTERNAL MEDICINE

## 2018-12-10 PROCEDURE — G0008 ADMIN INFLUENZA VIRUS VAC: HCPCS

## 2018-12-10 PROCEDURE — 90662 IIV NO PRSV INCREASED AG IM: CPT

## 2018-12-10 NOTE — PROGRESS NOTES
Assessment/Plan:    No problem-specific Assessment & Plan notes found for this encounter  Problem List Items Addressed This Visit     None      Visit Diagnoses     Flu vaccine need    -  Primary    Relevant Orders    PREFERRED: influenza vaccine, [id number], high-dose, PF 0 5 mL, for patients 65 yr+ (FLUZONE HIGH-DOSE)    Skin tear of lower leg without complication, right, initial encounter        Peripheral edema                Subjective:      Patient ID: Antelmo Sandoval is a 80 y o  male  HPI  for evaluationThe following portions of the patient's history were reviewed and updated as appropriate: allergies, current medications, past family history, past medical history, past social history, past surgical history and problem list     Review of Systems      Objective:      /70   Temp 97 7 °F (36 5 °C) (Tympanic)          Physical Exam  his blood pressure is 120/70 he has a trace of peripheral edema which is unchanged he has a perhaps 1/2 by 3 cm of very superficial avulsion type skin tear on his right lower leg with the leg in dependent position is still is using a little bit of a clear material he clearly does not look infected    We will treat this locally I did offer him visit with this wound care nurse and he declined will see him back in the office in 48 hr the patient has been notified to get in touch with me if he develops fever chills more pain redness or pus I will place a Telfa pad on it in a compression dressing and tell him to keep his leg elevated as much as possible flu shot will be given

## 2018-12-13 ENCOUNTER — OFFICE VISIT (OUTPATIENT)
Dept: INTERNAL MEDICINE CLINIC | Facility: CLINIC | Age: 83
End: 2018-12-13
Payer: MEDICARE

## 2018-12-13 VITALS
WEIGHT: 196 LBS | OXYGEN SATURATION: 95 % | TEMPERATURE: 97 F | HEART RATE: 92 BPM | BODY MASS INDEX: 29.7 KG/M2 | HEIGHT: 68 IN

## 2018-12-13 DIAGNOSIS — S81.801S: Primary | ICD-10-CM

## 2018-12-13 DIAGNOSIS — S60.812A ABRASION OF LEFT WRIST, INITIAL ENCOUNTER: ICD-10-CM

## 2018-12-13 PROCEDURE — 99213 OFFICE O/P EST LOW 20 MIN: CPT | Performed by: INTERNAL MEDICINE

## 2018-12-13 RX ORDER — CEPHALEXIN 500 MG/1
500 CAPSULE ORAL EVERY 6 HOURS SCHEDULED
Qty: 20 CAPSULE | Refills: 0 | Status: SHIPPED | OUTPATIENT
Start: 2018-12-13 | End: 2018-12-18

## 2018-12-13 NOTE — PROGRESS NOTES
Assessment/Plan:    No problem-specific Assessment & Plan notes found for this encounter  There are no diagnoses linked to this encounter  Subjective: And also Dr Frederick Chavez who told him that he had a torn rotator cuff   this dictating system is impossible  I did inspect both of his wounds and Mckay Castellano had a general conversation about his overall health  Mckay Castellano feels that over the last several months he has declined and feels that he only has a year to at most to live he saw Dr Alhaji Young who felt that he had hernias   Patient ID: Gela Acosta is a 80 y o  male  HPI    Mckay Castellano returns to the office for reexamination  The following portions of the patient's history were reviewed and updated as appropriate: allergies, current medications, past family history, past medical history, past social history, past surgical history and problem list     Review of Systems      Objective:      Pulse 92   Temp (!) 97 °F (36 1 °C) (Tympanic)   Ht 5' 8" (1 727 m)   Wt 88 9 kg (196 lb)   SpO2 95%   BMI 29 80 kg/m²          Physical Exam  Mckay murillo blo is clean and unremarkable wrist which left od pressure was 140/55 he has an avulsion type of abrasion on the dorsum of his Physical Exam  Mckay Castellano is blo is clean and unremarkable wrist which left od pressure was 140/55 he has an avulsion type of abrasion on the dorsum of his the wound on his right foot is clean and starting to dry up there is a little bit of erythema around the wound    In view of his CLL and general debility were going to empirically skin him a 5 day course of Keflex I he will return to the office in a few days I asked him if he wanted to be referred to wound care and he declined he would like to see an otolaryngologist because of reduced hearing

## 2018-12-18 ENCOUNTER — OFFICE VISIT (OUTPATIENT)
Dept: INTERNAL MEDICINE CLINIC | Facility: CLINIC | Age: 83
End: 2018-12-18
Payer: MEDICARE

## 2018-12-18 VITALS
DIASTOLIC BLOOD PRESSURE: 55 MMHG | BODY MASS INDEX: 30.35 KG/M2 | HEART RATE: 95 BPM | TEMPERATURE: 96.9 F | SYSTOLIC BLOOD PRESSURE: 130 MMHG | WEIGHT: 199.6 LBS | OXYGEN SATURATION: 94 %

## 2018-12-18 DIAGNOSIS — Z00.00 MEDICARE ANNUAL WELLNESS VISIT, SUBSEQUENT: ICD-10-CM

## 2018-12-18 DIAGNOSIS — E08.3213 DIABETES MELLITUS DUE TO UNDERLYING CONDITION WITH BOTH EYES AFFECTED BY MILD NONPROLIFERATIVE RETINOPATHY AND MACULAR EDEMA, WITHOUT LONG-TERM CURRENT USE OF INSULIN (HCC): Primary | ICD-10-CM

## 2018-12-18 LAB — SL AMB POCT HEMOGLOBIN AIC: 5.7

## 2018-12-18 PROCEDURE — 99213 OFFICE O/P EST LOW 20 MIN: CPT | Performed by: INTERNAL MEDICINE

## 2018-12-18 PROCEDURE — G0439 PPPS, SUBSEQ VISIT: HCPCS | Performed by: INTERNAL MEDICINE

## 2018-12-18 PROCEDURE — 83036 HEMOGLOBIN GLYCOSYLATED A1C: CPT | Performed by: INTERNAL MEDICINE

## 2018-12-18 NOTE — PROGRESS NOTES
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, without long-term current use of insulin ([address] )   -     POCT hemoglobin A1c          Subjective:      Patient ID: Patrick No is a 80 y o  male  HPI  Jaziel Renee returns to the office today for check of his wounds  He actually looks much better he avulsion lesion on his left wrist is dry healing over looks unremarkable the other lesion 1 his left lower leg which is probably 3 x 4 cm is now dry of the surrounding erythema is significantly reduced and it looks quite benign continue with the local care and hopefully this should continue to heal uneventfully    I do not think we need to see him back for this but I told Jaziel Renee to call if there are any problems or any concerns    The following portions of the patient's history were reviewed and updated as appropriate: allergies, current medications, past family history, past medical history, past social history, past surgical history and problem list     Review of Systems      Objective:      /55   Pulse 95   Temp (!) 96 9 °F (36 1 °C) (Tympanic)   Wt 90 5 kg (199 lb 9 6 oz)   SpO2 94%   BMI 30 35 kg/m²          Physical Exam  see above  Jaziel Renee is blood pressure was 130/55 he has an irregular rhythm short ejection murmur at the cardiac base and a trace of edema

## 2018-12-18 NOTE — PROGRESS NOTES
Assessment and Plan:    Problem List Items Addressed This Visit     None      Visit Diagnoses     Medicare annual wellness visit, subsequent    -  Primary        Health Maintenance Due   Topic Date Due    Medicare Annual Wellness Visit (AWV)  1933    Pneumococcal PPSV23/PCV13 65+ Years / High and Highest Risk (2 of 2 - PPSV23) 12/14/2016    HEMOGLOBIN A1C  11/24/2018         HPI:  Roseann Carroll is a 80 y o  male here for his Subsequent Wellness Visit      Patient Active Problem List   Diagnosis    Chronic renal failure in pediatric patient    History of prostate cancer    Urinary frequency     Past Medical History:   Diagnosis Date    AAA [facility]ab[facility] aortic aneurysm) [facility]     Abnormal blood chemistry     last assessed 12/12/2013    Allergic rhinitis     last assessed 11/21/2016    Anemia     Ankle edema     Arthritis     Atrophy of prostate     Chronic diarrhea     COPD (chronic obstructive pulmonary disease) ([facility]     Coronary artery disease     stents, pacemaker    Elevated PSA     GERD (gastroesophageal reflux disease)     Gout     [facility] failure ([facility]     Heart murmur     aortic stenosis    Hyperlipidemia     Hypertension     Irregular heart beat     a fib    Joint pain     Kidney failure     stage 3    Leukemia ([facility]     Malignant neoplasm prostate ([facility]     Myocardial infarction ([facility] )     Nocturia     Osteopenia     Pleural effusion     PVD (peripheral vascular disease) ([facility]     Radiation gastroenteritis     Skin cancer     Sore muscles     Type 2 diabetes mellitus ([facility] )      Past Surgical History:   Procedure Laterality Date    CARDIAC PACEMAKER PLACEMENT  2014    CARDIAC VALVE REPLACEMENT      CORONARY ANGIOPLASTY      prox left anterior descending artery assessment    OTHER SURGICAL HISTORY      catheter ablation    PROSTATE BIOPSY Bilateral 2003, 2008    VASCULAR SURGERY      iliac stent     Family History   Problem Relation Age of Onset    Cancer Mother  Cancer Father      History   Smoking Status    Former Smoker   Smokeless Tobacco    Never Used     History   Alcohol Use No      History   Drug Use No       Current Outpatient Prescriptions   Medication Sig Dispense Refill    allopurinol (ZYLOPRIM) 300 mg tablet TAKE 1 TABLET BY MOUTH  DAILY 90 tablet 90    amiodarone 200 mg tablet TAKE 1 TABLET BY MOUTH  DAILY 90 tablet 1    ASPIRIN PO Take 81 mg by mouth daily        atorvastatin (LIPITOR) 40 mg tablet Take 1 tablet (40 mg total) by mouth daily 90 tablet 3    cephalexin (KEFLEX) 500 mg capsule Take 1 capsule (500 mg total) by mouth every 6 (six) hours for 5 days 20 capsule 0    cholecalciferol (VITAMIN D3) 1,000 units tablet Take 5,000 Units by mouth daily      fluticasone (FLONASE) 50 mcg/act nasal spray 1 spray into each nostril daily 16 g 3    furosemide (LASIX) 40 mg tablet Take 1 tablet (40 mg total) by mouth daily 30 tablet 0    metoprolol tartrate (LOPRESSOR) 50 mg tablet Take 1 tablet (50 mg total) by mouth 2 (two) times a day 180 tablet 1    multivitamin (THERAGRAN) TABS Take 1 tablet by mouth daily      spironolactone (ALDACTONE) 25 mg tablet Take 1 tablet (25 mg total) by mouth daily TAKE 0 5 TABLET DAILY 45 tablet 3    tamsulosin (FLOMAX) 0 4 mg daily      warfarin (COUMADIN) 2 5 mg tablet Take 1 tablet (2 5 mg total) by mouth daily 90 tablet 3     No current facility-administered medications for this visit        Allergies   Allergen Reactions    Indomethacin      Immunization History   Administered Date(s) Administered    Influenza 09/30/2014, 12/10/2018    Influenza Split High Dose Preservative Free IM 1933, 09/30/2014, 11/02/2015, 10/19/2016, 09/01/2017    Influenza, high dose seasonal 0 5 mL 12/10/2018    Pneumococcal Conjugate 13-Valent 09/30/2015, 10/19/2016    Tdap 05/24/2018       Patient Care Team:  Lacy Gutierrez MD as PCP - General  Faye Casas MD    Medicare Screening Tests and Risk Assessments:  Tyron Jama is here for his Subsequent Wellness visit  Health Risk Assessment:  Patient rates overall health as good  Patient feels that their physical health rating is Same  Eyesight was rated as Same  Hearing was rated as Slightly worse  Patient feels that their emotional and mental health rating is Same  Pain experienced by patient in the last 7 days has been Some  Patient's pain rating has been 2/10  Patient states that he has experienced no weight loss or gain in last 6 months  Emotional/Mental Health:  Patient has been feeling nervous/anxious  PHQ-9 Depression Screening:    Frequency of the following problems over the past two weeks:      1  Little interest or pleasure in doing things: 1 - several days      2  Feeling down, depressed, or hopeless: 1 - several days  PHQ-2 Score: 2          Broken Bones/Falls: Fall Risk Assessment:    In the past year, patient has experienced: History of falling in past year     Number of falls: 2 or more  Patient does not feel he is unsteady standing  Patient is not taking medication that can cause feelings of lightheadedness or tiredness  Patient often has no need to rush to the toilet  Chronic conditions that may contribute to falls diabetes  Bladder/Bowel:  Patient has leaked urine accidently in the last six months  Patient reports loss of bowel control  Immunizations:  Patient has had a flu vaccination within the last year  Patient has not received a shingles shot  Home Safety:  Patient has trouble with stairs inside or outside of their home  Patient currently reports that there are no safety hazards present in home, working smoke alarms, working carbon monoxide detectors        Preventative Screenings:   prostate cancer screen performed, colon cancer screen completed, no cholesterol screen completed, no glaucoma eye exam completed    Nutrition:  Current diet: Regular with servings of the following:    Medications:  Patient is currently taking over-the-counter supplements  Patient is able to manage medications  Lifestyle Choices:  Patient reports no tobacco use  Patient has smoked or used tobacco in the past   Patient has stopped his tobacco use  Patient reports alcohol use  Patient drives a vehicle  Patient wears seat belt  Activities of Daily Living:  Can get out of bed by his or her self, able to dress self, able to make own meals, able to do own shopping, able to bathe self, can do own laundry/housekeeping, can manage own money, pay bills and track expenses    Previous Hospitalizations:  No hospitalization or ED visit in past 12 months        Advanced Directives:  Patient has decided on a power of   Patient has spoken to designated power of   Patient has completed advanced directive

## 2018-12-21 ENCOUNTER — LAB REQUISITION (OUTPATIENT)
Dept: LAB | Facility: HOSPITAL | Age: 83
End: 2018-12-21
Payer: MEDICARE

## 2018-12-21 DIAGNOSIS — D49.2 NEOPLASM OF UNSPECIFIED BEHAVIOR OF BONE, SOFT TISSUE, AND SKIN: ICD-10-CM

## 2018-12-21 PROCEDURE — 88305 TISSUE EXAM BY PATHOLOGIST: CPT | Performed by: PATHOLOGY

## 2018-12-25 DIAGNOSIS — I10 HYPERTENSION, UNSPECIFIED TYPE: ICD-10-CM

## 2018-12-26 RX ORDER — METOPROLOL TARTRATE 50 MG/1
TABLET, FILM COATED ORAL
Qty: 180 TABLET | Refills: 1 | Status: SHIPPED | OUTPATIENT
Start: 2018-12-26 | End: 2019-08-24 | Stop reason: SDUPTHER

## 2018-12-27 DIAGNOSIS — R60.9 EDEMA, UNSPECIFIED TYPE: ICD-10-CM

## 2018-12-27 RX ORDER — FUROSEMIDE 40 MG/1
40 TABLET ORAL DAILY
Qty: 30 TABLET | Refills: 0 | Status: SHIPPED | OUTPATIENT
Start: 2018-12-27 | End: 2018-12-28 | Stop reason: SDUPTHER

## 2018-12-28 DIAGNOSIS — R60.9 EDEMA, UNSPECIFIED TYPE: ICD-10-CM

## 2018-12-29 RX ORDER — FUROSEMIDE 40 MG/1
40 TABLET ORAL 2 TIMES DAILY
Qty: 60 TABLET | Refills: 3 | Status: SHIPPED | OUTPATIENT
Start: 2018-12-29 | End: 2019-01-22 | Stop reason: SDUPTHER

## 2018-12-31 PROCEDURE — 88305 TISSUE EXAM BY PATHOLOGIST: CPT | Performed by: PATHOLOGY

## 2019-01-02 ENCOUNTER — LAB REQUISITION (OUTPATIENT)
Dept: LAB | Facility: HOSPITAL | Age: 84
End: 2019-01-02
Payer: MEDICARE

## 2019-01-02 DIAGNOSIS — D49.2 NEOPLASM OF UNSPECIFIED BEHAVIOR OF BONE, SOFT TISSUE, AND SKIN: ICD-10-CM

## 2019-01-18 DIAGNOSIS — I49.9 CARDIAC ARRHYTHMIA, UNSPECIFIED CARDIAC ARRHYTHMIA TYPE: ICD-10-CM

## 2019-01-18 DIAGNOSIS — J30.89 ALLERGIC RHINITIS DUE TO OTHER ALLERGIC TRIGGER, UNSPECIFIED SEASONALITY: ICD-10-CM

## 2019-01-18 RX ORDER — FLUTICASONE PROPIONATE 50 MCG
SPRAY, SUSPENSION (ML) NASAL
Qty: 32 G | Refills: 3 | Status: SHIPPED | OUTPATIENT
Start: 2019-01-18

## 2019-01-18 RX ORDER — AMIODARONE HYDROCHLORIDE 200 MG/1
TABLET ORAL
Qty: 90 TABLET | Refills: 1 | Status: SHIPPED | OUTPATIENT
Start: 2019-01-18 | End: 2019-08-24 | Stop reason: SDUPTHER

## 2019-01-22 DIAGNOSIS — R60.9 EDEMA, UNSPECIFIED TYPE: ICD-10-CM

## 2019-01-22 RX ORDER — FUROSEMIDE 40 MG/1
40 TABLET ORAL 2 TIMES DAILY
Qty: 180 TABLET | Refills: 3 | Status: SHIPPED | OUTPATIENT
Start: 2019-01-22 | End: 2019-07-01 | Stop reason: SDUPTHER

## 2019-02-13 ENCOUNTER — TELEPHONE (OUTPATIENT)
Dept: INTERNAL MEDICINE CLINIC | Facility: CLINIC | Age: 84
End: 2019-02-13

## 2019-02-13 DIAGNOSIS — S89.91XS: Primary | ICD-10-CM

## 2019-02-13 NOTE — TELEPHONE ENCOUNTER
Pt asked for assistance to schedule consult with Wound care specialist Bertha Andrews  This group suggested by Dr Penelope Mccann, [facility] [address]   Call [phone]

## 2019-03-06 ENCOUNTER — OFFICE VISIT (OUTPATIENT)
Dept: INTERNAL MEDICINE CLINIC | Facility: CLINIC | Age: 84
End: 2019-03-06
Payer: MEDICARE

## 2019-03-06 VITALS
HEART RATE: 67 BPM | OXYGEN SATURATION: 96 % | WEIGHT: 188.2 LBS | HEIGHT: 68 IN | BODY MASS INDEX: 28.52 KG/M2 | SYSTOLIC BLOOD PRESSURE: 130 MMHG | TEMPERATURE: 97.3 F | DIASTOLIC BLOOD PRESSURE: 50 MMHG

## 2019-03-06 DIAGNOSIS — I48.91 ATRIAL FIBRILLATION, UNSPECIFIED TYPE (HCC): ICD-10-CM

## 2019-03-06 DIAGNOSIS — K62.5 RECTAL BLEEDING: ICD-10-CM

## 2019-03-06 DIAGNOSIS — I10 HYPERTENSION, UNSPECIFIED TYPE: Primary | ICD-10-CM

## 2019-03-06 PROCEDURE — 99215 OFFICE O/P EST HI 40 MIN: CPT | Performed by: INTERNAL MEDICINE

## 2019-03-06 RX ORDER — METOLAZONE 2.5 MG/1
TABLET ORAL
Qty: 15 TABLET | Refills: 3 | Status: SHIPPED | OUTPATIENT
Start: 2019-03-06 | End: 2019-03-21 | Stop reason: SDUPTHER

## 2019-03-06 NOTE — PROGRESS NOTES
Assessment/Plan:    No problem-specific Assessment & Plan notes found for this encounter  Problem List Items Addressed This Visit     None      Visit Diagnoses     Hypertension, unspecified type    -  Primary    Relevant Orders    Comprehensive metabolic panel    CBC and differential    Atrial fibrillation, unspecified type [facility])        Relevant Orders    Protime-INR    Rectal bleeding        Relevant Orders    Ambulatory referral to Colorectal Surgery            Subjective:      Patient ID: Leandra Reynolds is a 80 y o  male  HPI  Je for CLL rry actually called me at home a couple of nights ago when requested an appointment he has had a bunch of problems  He has had some intermittent rectal bleeding sometimes more or less usually it is in small amounts sometimes the blood is bright sometimes is a little darker  He was in the [facility] did in January records are appended with the an Axe a ecchymosis of his arm a DVT study was negative  He continues under the care of Dr Gabriella Fong for CLL and Dr Ynoatan Merlos for chronic kidney disease he has not seen his cardiologist Dr Agustin Diez  and somwe time  Dara Soulier He sees Dr Patricia Miller his podiatrist his legs have been heavy his walking has become progressively difficult he is retaining fluid he short of breath does not have much stamina he has a lot of pain in the shoulder    He feels is a time short and does not expect to be living much longer  Review of Systems      Objective:      /50   Pulse 67   Temp (!) 97 3 °F (36 3 °C) (Tympanic)   Ht 5' 8" (1 727 m)   Wt 85 4 kg (188 lb 3 2 oz)   SpO2 96%   BMI 28 62 kg/m²          Physical Exam  Talisha Ryan is become quite frail recently walks with difficulty with a single-point cane his blood pressure is 130/50 his venous pressure is actually normal he has a wheezing and some rales throughout all lung fields pacemaker is palpable in the left anterior part of the chest he has a short ejection murmur interestingly enough is rhythm is regular at this time he does have a 2D almost 3+ edema to his knee he has me to inspect is here because the ear was stuffed and there were no abnormalities in the right tympanic canal on nor drum is a long visit we went over his multiple problems at length and reviewed all the records that were available as well as recent lab studies I am a little concerned that his ALT and AST are going up as well as his be bilirubin and I wonder whether he is developing some hepatic congestion on the basis of heart failure his last potassium list was 5 1 he is on 12 5 milligrams of spironolactone and I do not think we can safely increase this at this time    He is also getting 60 milligrams of Lasix in the morning 40 later on in the day at this point we are going to add Zaroxolyn 2 5 milligrams to his regimen Monday Wednesday and Friday we are going to recur refer him to a colon and rectal specialist because of the rectal bleeding which hopefully is something that can be treated locally he certainly may have radiation proctitis from his prostate cancer will set him today for CBC chemistries and prothrombin time and also recommend that he see his cardiologist for evaluation I would agree that he is very fragile

## 2019-03-08 ENCOUNTER — TELEPHONE (OUTPATIENT)
Dept: OTHER | Facility: OTHER | Age: 84
End: 2019-03-08

## 2019-03-21 ENCOUNTER — OFFICE VISIT (OUTPATIENT)
Dept: INTERNAL MEDICINE CLINIC | Facility: CLINIC | Age: 84
End: 2019-03-21
Payer: MEDICARE

## 2019-03-21 VITALS
HEART RATE: 87 BPM | SYSTOLIC BLOOD PRESSURE: 120 MMHG | HEIGHT: 68 IN | WEIGHT: 178 LBS | TEMPERATURE: 97 F | BODY MASS INDEX: 26.98 KG/M2 | OXYGEN SATURATION: 96 % | DIASTOLIC BLOOD PRESSURE: 70 MMHG

## 2019-03-21 DIAGNOSIS — E03.9 HYPOTHYROIDISM, UNSPECIFIED TYPE: ICD-10-CM

## 2019-03-21 DIAGNOSIS — L97.212 NON-PRESSURE CHRONIC ULCER OF RIGHT CALF WITH FAT LAYER EXPOSED (HCC): ICD-10-CM

## 2019-03-21 DIAGNOSIS — M25.511 RIGHT SHOULDER PAIN, UNSPECIFIED CHRONICITY: ICD-10-CM

## 2019-03-21 DIAGNOSIS — I10 HYPERTENSION, UNSPECIFIED TYPE: Primary | ICD-10-CM

## 2019-03-21 DIAGNOSIS — E08.3213 DIABETES MELLITUS DUE TO UNDERLYING CONDITION WITH BOTH EYES AFFECTED BY MILD NONPROLIFERATIVE RETINOPATHY AND MACULAR EDEMA, WITHOUT LONG-TERM CURRENT USE OF INSULIN (HCC): ICD-10-CM

## 2019-03-21 DIAGNOSIS — I50.9 ACUTE ON CHRONIC CONGESTIVE HEART FAILURE, UNSPECIFIED HEART FAILURE TYPE (HCC): ICD-10-CM

## 2019-03-21 PROCEDURE — 99214 OFFICE O/P EST MOD 30 MIN: CPT | Performed by: INTERNAL MEDICINE

## 2019-03-21 RX ORDER — METOLAZONE 2.5 MG/1
TABLET ORAL
Qty: 15 TABLET | Refills: 3 | Status: SHIPPED | OUTPATIENT
Start: 2019-03-21 | End: 2019-04-01 | Stop reason: SDUPTHER

## 2019-03-21 NOTE — PROGRESS NOTES
Assessment/Plan:    No problem-specific Assessment & Plan notes found for this encounter  Problem List Items Addressed This Visit     None      Visit Diagnoses     Hypertension, unspecified type    -  Primary    Relevant Orders    Comprehensive metabolic panel    CBC and differential    Hypothyroidism, unspecified type        Relevant Orders    TSH, 3rd generation    Right shoulder pain, unspecified chronicity                Subjective:      Patient ID: Andrew Grissom is a 80 y o  male  HPI     Rodríguez Kraus is here today for visit  Continues to be tired and fatigue very easily  He has had trouble with nose bleeds  His hearing is deteriorating  He believes he does not have much more time to live  He has been up urinating more times now than before usually in small amounts  He has an appointment to see his cardiologist and nephrologist in near future  He saw Dr Dontae Barrientos his podiatrist who suggest that he see Dr Linda Chowdary seen about his venous ulcerations    Continues to have shoulder pain which restrict his motion and makes his aide activities of daily living much more difficult    The following portions of the patient's history were reviewed and updated as appropriate: allergies, current medications, past family history, past medical history, past social history, past surgical history and problem list     Review of Systems      Objective:      /70   Pulse 87   Temp (!) 97 °F (36 1 °C) (Tympanic)   Ht 5' 8" (1 727 m)   Wt 80 7 kg (178 lb)   SpO2 96%   BMI 27 06 kg/m²          Physical Exam  since his last visit his weight is declined from 188-178 his blood pressure is approximately 120/70 his pulse is regular pacemaker is palpable in the left anterior part of the chest his lungs are now clear the marked leg edema that we noticed previously has significantly improved and I would say he is only +1 to the mid calf does have a couple of very superficial clean venous ulcerations ulcerations that do we put a little bit I think the Zaroxolyn has helped his cardiovascular status  At this point will order CBC CMP and TSH the main thing that bothers him now as the shoulders and will refer him to an orthopedist for possible injection therapy    He does not wish to proceed further with his venous ulcerations because they do not bother him and perhaps continued use of the metolazone will help improve this will see him back in a few weeks visit with his other physicians are encouraged

## 2019-03-28 ENCOUNTER — TELEPHONE (OUTPATIENT)
Dept: OTHER | Facility: OTHER | Age: 84
End: 2019-03-28

## 2019-03-29 ENCOUNTER — TELEPHONE (OUTPATIENT)
Dept: INTERNAL MEDICINE CLINIC | Facility: CLINIC | Age: 84
End: 2019-03-29

## 2019-04-01 DIAGNOSIS — I10 HYPERTENSION, UNSPECIFIED TYPE: ICD-10-CM

## 2019-04-01 RX ORDER — METOLAZONE 2.5 MG/1
TABLET ORAL
Qty: 15 TABLET | Refills: 3 | Status: SHIPPED | OUTPATIENT
Start: 2019-04-01 | End: 2019-07-01 | Stop reason: SDUPTHER

## 2019-04-09 ENCOUNTER — APPOINTMENT (OUTPATIENT)
Dept: RADIOLOGY | Facility: CLINIC | Age: 84
End: 2019-04-09
Payer: MEDICARE

## 2019-04-09 ENCOUNTER — OFFICE VISIT (OUTPATIENT)
Dept: OBGYN CLINIC | Facility: MEDICAL CENTER | Age: 84
End: 2019-04-09
Payer: MEDICARE

## 2019-04-09 VITALS
BODY MASS INDEX: 27.28 KG/M2 | DIASTOLIC BLOOD PRESSURE: 62 MMHG | SYSTOLIC BLOOD PRESSURE: 112 MMHG | WEIGHT: 180 LBS | HEIGHT: 68 IN | HEART RATE: 86 BPM

## 2019-04-09 DIAGNOSIS — M25.511 ACUTE PAIN OF RIGHT SHOULDER: Primary | ICD-10-CM

## 2019-04-09 DIAGNOSIS — M25.511 RIGHT SHOULDER PAIN, UNSPECIFIED CHRONICITY: ICD-10-CM

## 2019-04-09 PROCEDURE — 99214 OFFICE O/P EST MOD 30 MIN: CPT | Performed by: ORTHOPAEDIC SURGERY

## 2019-04-09 PROCEDURE — 73030 X-RAY EXAM OF SHOULDER: CPT

## 2019-04-15 ENCOUNTER — EVALUATION (OUTPATIENT)
Dept: PHYSICAL THERAPY | Facility: REHABILITATION | Age: 84
End: 2019-04-15
Payer: MEDICARE

## 2019-04-15 DIAGNOSIS — M25.511 ACUTE PAIN OF RIGHT SHOULDER: ICD-10-CM

## 2019-04-15 PROCEDURE — 97110 THERAPEUTIC EXERCISES: CPT | Performed by: PHYSICAL THERAPIST

## 2019-04-15 PROCEDURE — 97162 PT EVAL MOD COMPLEX 30 MIN: CPT | Performed by: PHYSICAL THERAPIST

## 2019-04-18 ENCOUNTER — OFFICE VISIT (OUTPATIENT)
Dept: PHYSICAL THERAPY | Facility: REHABILITATION | Age: 84
End: 2019-04-18
Payer: MEDICARE

## 2019-04-18 DIAGNOSIS — M25.511 ACUTE PAIN OF RIGHT SHOULDER: Primary | ICD-10-CM

## 2019-04-18 PROCEDURE — 97110 THERAPEUTIC EXERCISES: CPT

## 2019-04-22 ENCOUNTER — OFFICE VISIT (OUTPATIENT)
Dept: PHYSICAL THERAPY | Facility: REHABILITATION | Age: 84
End: 2019-04-22
Payer: MEDICARE

## 2019-04-22 DIAGNOSIS — M25.511 ACUTE PAIN OF RIGHT SHOULDER: Primary | ICD-10-CM

## 2019-04-22 PROCEDURE — 97110 THERAPEUTIC EXERCISES: CPT

## 2019-04-26 ENCOUNTER — OFFICE VISIT (OUTPATIENT)
Dept: PHYSICAL THERAPY | Facility: REHABILITATION | Age: 84
End: 2019-04-26
Payer: MEDICARE

## 2019-04-26 DIAGNOSIS — M25.511 ACUTE PAIN OF RIGHT SHOULDER: Primary | ICD-10-CM

## 2019-04-26 PROCEDURE — 97110 THERAPEUTIC EXERCISES: CPT

## 2019-04-26 PROCEDURE — 97112 NEUROMUSCULAR REEDUCATION: CPT

## 2019-04-30 ENCOUNTER — OFFICE VISIT (OUTPATIENT)
Dept: PHYSICAL THERAPY | Facility: REHABILITATION | Age: 84
End: 2019-04-30
Payer: MEDICARE

## 2019-04-30 DIAGNOSIS — M25.511 ACUTE PAIN OF RIGHT SHOULDER: Primary | ICD-10-CM

## 2019-04-30 PROCEDURE — 97110 THERAPEUTIC EXERCISES: CPT | Performed by: PHYSICAL THERAPIST

## 2019-05-03 ENCOUNTER — OFFICE VISIT (OUTPATIENT)
Dept: PHYSICAL THERAPY | Facility: REHABILITATION | Age: 84
End: 2019-05-03
Payer: MEDICARE

## 2019-05-03 DIAGNOSIS — M25.511 ACUTE PAIN OF RIGHT SHOULDER: Primary | ICD-10-CM

## 2019-05-03 PROCEDURE — 97110 THERAPEUTIC EXERCISES: CPT

## 2019-05-03 PROCEDURE — 97112 NEUROMUSCULAR REEDUCATION: CPT

## 2019-05-07 ENCOUNTER — OFFICE VISIT (OUTPATIENT)
Dept: PHYSICAL THERAPY | Facility: REHABILITATION | Age: 84
End: 2019-05-07
Payer: MEDICARE

## 2019-05-07 DIAGNOSIS — M25.511 ACUTE PAIN OF RIGHT SHOULDER: Primary | ICD-10-CM

## 2019-05-07 PROCEDURE — 97110 THERAPEUTIC EXERCISES: CPT

## 2019-05-07 PROCEDURE — 97112 NEUROMUSCULAR REEDUCATION: CPT

## 2019-05-09 ENCOUNTER — OFFICE VISIT (OUTPATIENT)
Dept: UROLOGY | Facility: MEDICAL CENTER | Age: 84
End: 2019-05-09
Payer: MEDICARE

## 2019-05-09 VITALS
BODY MASS INDEX: 25.31 KG/M2 | WEIGHT: 167 LBS | SYSTOLIC BLOOD PRESSURE: 118 MMHG | DIASTOLIC BLOOD PRESSURE: 70 MMHG | HEART RATE: 78 BPM | HEIGHT: 68 IN

## 2019-05-09 DIAGNOSIS — Z85.46 HISTORY OF PROSTATE CANCER: Primary | ICD-10-CM

## 2019-05-09 PROCEDURE — 99214 OFFICE O/P EST MOD 30 MIN: CPT | Performed by: UROLOGY

## 2019-05-10 ENCOUNTER — OFFICE VISIT (OUTPATIENT)
Dept: PHYSICAL THERAPY | Facility: REHABILITATION | Age: 84
End: 2019-05-10
Payer: MEDICARE

## 2019-05-10 DIAGNOSIS — M25.511 ACUTE PAIN OF RIGHT SHOULDER: Primary | ICD-10-CM

## 2019-05-10 PROCEDURE — 97110 THERAPEUTIC EXERCISES: CPT | Performed by: PHYSICAL THERAPIST

## 2019-05-14 ENCOUNTER — EVALUATION (OUTPATIENT)
Dept: PHYSICAL THERAPY | Facility: REHABILITATION | Age: 84
End: 2019-05-14
Payer: MEDICARE

## 2019-05-14 DIAGNOSIS — M25.511 ACUTE PAIN OF RIGHT SHOULDER: Primary | ICD-10-CM

## 2019-05-14 PROCEDURE — 97110 THERAPEUTIC EXERCISES: CPT | Performed by: PHYSICAL THERAPIST

## 2019-05-14 PROCEDURE — 97140 MANUAL THERAPY 1/> REGIONS: CPT | Performed by: PHYSICAL THERAPIST

## 2019-05-17 ENCOUNTER — APPOINTMENT (OUTPATIENT)
Dept: PHYSICAL THERAPY | Facility: REHABILITATION | Age: 84
End: 2019-05-17
Payer: MEDICARE

## 2019-05-24 PROCEDURE — 88305 TISSUE EXAM BY PATHOLOGIST: CPT | Performed by: PATHOLOGY

## 2019-05-25 ENCOUNTER — LAB REQUISITION (OUTPATIENT)
Dept: LAB | Facility: HOSPITAL | Age: 84
End: 2019-05-25
Payer: MEDICARE

## 2019-05-25 DIAGNOSIS — D49.2 NEOPLASM OF UNSPECIFIED BEHAVIOR OF BONE, SOFT TISSUE, AND SKIN: ICD-10-CM

## 2019-06-04 ENCOUNTER — OFFICE VISIT (OUTPATIENT)
Dept: OBGYN CLINIC | Facility: MEDICAL CENTER | Age: 84
End: 2019-06-04
Payer: MEDICARE

## 2019-06-04 VITALS
DIASTOLIC BLOOD PRESSURE: 80 MMHG | SYSTOLIC BLOOD PRESSURE: 100 MMHG | HEART RATE: 85 BPM | HEIGHT: 68 IN | BODY MASS INDEX: 27.28 KG/M2 | WEIGHT: 180 LBS

## 2019-06-04 DIAGNOSIS — M25.511 ACUTE PAIN OF RIGHT SHOULDER: Primary | ICD-10-CM

## 2019-06-04 PROCEDURE — 99213 OFFICE O/P EST LOW 20 MIN: CPT | Performed by: ORTHOPAEDIC SURGERY

## 2019-06-26 ENCOUNTER — OFFICE VISIT (OUTPATIENT)
Dept: PODIATRY | Facility: CLINIC | Age: 84
End: 2019-06-26
Payer: MEDICARE

## 2019-06-26 VITALS
SYSTOLIC BLOOD PRESSURE: 101 MMHG | DIASTOLIC BLOOD PRESSURE: 88 MMHG | HEART RATE: 88 BPM | BODY MASS INDEX: 25.46 KG/M2 | HEIGHT: 68 IN | WEIGHT: 168 LBS

## 2019-06-26 DIAGNOSIS — I73.9 PERIPHERAL VASCULAR DISEASE, UNSPECIFIED (HCC): Primary | ICD-10-CM

## 2019-06-26 PROCEDURE — 11719 TRIM NAIL(S) ANY NUMBER: CPT | Performed by: PODIATRIST

## 2019-06-28 PROCEDURE — 88305 TISSUE EXAM BY PATHOLOGIST: CPT | Performed by: PATHOLOGY

## 2019-06-29 ENCOUNTER — LAB REQUISITION (OUTPATIENT)
Dept: LAB | Facility: HOSPITAL | Age: 84
End: 2019-06-29
Payer: MEDICARE

## 2019-06-29 DIAGNOSIS — D49.2 NEOPLASM OF UNSPECIFIED BEHAVIOR OF BONE, SOFT TISSUE, AND SKIN: ICD-10-CM

## 2019-07-01 ENCOUNTER — OFFICE VISIT (OUTPATIENT)
Dept: INTERNAL MEDICINE CLINIC | Facility: CLINIC | Age: 84
End: 2019-07-01
Payer: MEDICARE

## 2019-07-01 VITALS
HEIGHT: 68 IN | OXYGEN SATURATION: 92 % | DIASTOLIC BLOOD PRESSURE: 50 MMHG | HEART RATE: 85 BPM | WEIGHT: 164 LBS | SYSTOLIC BLOOD PRESSURE: 105 MMHG | TEMPERATURE: 96.3 F | BODY MASS INDEX: 24.86 KG/M2

## 2019-07-01 DIAGNOSIS — J44.9 CHRONIC OBSTRUCTIVE PULMONARY DISEASE, UNSPECIFIED COPD TYPE (HCC): ICD-10-CM

## 2019-07-01 DIAGNOSIS — I10 HYPERTENSION, UNSPECIFIED TYPE: ICD-10-CM

## 2019-07-01 DIAGNOSIS — R60.9 EDEMA, UNSPECIFIED TYPE: ICD-10-CM

## 2019-07-01 DIAGNOSIS — N18.9 CHRONIC RENAL FAILURE IN PEDIATRIC PATIENT, UNSPECIFIED STAGE: Primary | ICD-10-CM

## 2019-07-01 PROCEDURE — 99214 OFFICE O/P EST MOD 30 MIN: CPT | Performed by: INTERNAL MEDICINE

## 2019-07-01 RX ORDER — METOLAZONE 2.5 MG/1
TABLET ORAL
Qty: 15 TABLET | Refills: 3 | Status: SHIPPED | OUTPATIENT
Start: 2019-07-01

## 2019-07-01 RX ORDER — FUROSEMIDE 40 MG/1
40 TABLET ORAL DAILY
Qty: 180 TABLET | Refills: 3
Start: 2019-07-01

## 2019-07-01 NOTE — PROGRESS NOTES
Assessment/Plan:    No problem-specific Assessment & Plan notes found for this encounter  Problem List Items Addressed This Visit        Genitourinary    Chronic renal failure in pediatric patient - Primary    Relevant Orders    Comprehensive metabolic panel    CBC and differential            Subjective:      Patient ID: Aurelio Muñoz is a 80 y o  male  HPI Adri Moreno is here for a visit  He is a retired marine a couple complaints he has had some trouble with his years he feels like there is some water in his years and he has difficulty discriminating speech s here today for visit     Who has had combat experience and in addition he was a competitive shooter for many years using hearing protection a variable use  He states he has been taking 1/2 Lasix tablets every day and Zaroxolyn that we were he was given he has been taking half a pill twice a day he is a little lightheaded a little dizzy and does not have as much energy as he would like  He continues under the care of a cardiologist and also also the nephrologists    The following portions of the patient's history were reviewed and updated as appropriate: allergies, current medications, past family history, past medical history, past social history, past surgical history and problem list     Review of Systems      Objective:      /50   Pulse 85   Temp (!) 96 3 °F (35 7 °C) (Tympanic)   Ht 5' 8" (1 727 m)   Wt 74 4 kg (164 lb)   SpO2 92%   BMI 24 94 kg/m²          Physical Exam  his blood pressure today was 105/50 taken by me I no longer hear rales in his lungs but he does have some chronic wheezes cardiac examination is unremarkable he has basically no edema the both tympanic canals and membranes are unremarkable  I think he is been a little bit over diuresed    We will have him reduce his Lasix from 60 mg a day to 40 and have him take the Zaroxolyn only half a tablet Monday Wednesday and Friday we did give him written instructions in this regard after weaker to will get a CBC and some chemistries  Will refer him to an otolaryngologist for hearing evaluation will see him back in a short interval he continues to be under surveillance for CLL      Current Outpatient Medications:     allopurinol (ZYLOPRIM) 300 mg tablet, TAKE 1 TABLET BY MOUTH  DAILY, Disp: 90 tablet, Rfl: 90    amiodarone 200 mg tablet, TAKE 1 TABLET BY MOUTH  DAILY, Disp: 90 tablet, Rfl: 1    ASPIRIN PO, Take 81 mg by mouth daily  , Disp: , Rfl:     atorvastatin (LIPITOR) 40 mg tablet, Take 1 tablet (40 mg total) by mouth daily, Disp: 90 tablet, Rfl: 3    cholecalciferol (VITAMIN D3) 1,000 units tablet, Take 5,000 Units by mouth daily, Disp: , Rfl:     fluticasone (FLONASE) 50 mcg/act nasal spray, USE 1 SPRAY IN EACH NOSTRIL DAILY, Disp: 32 g, Rfl: 3    furosemide (LASIX) 40 mg tablet, Take 1 tablet (40 mg total) by mouth daily, Disp: 180 tablet, Rfl: 3    metolazone (ZAROXOLYN) 2 5 mg tablet, Take 1/2 by mouth Monday, Wednesday, and Friday , Disp: 15 tablet, Rfl: 3    metoprolol tartrate (LOPRESSOR) 50 mg tablet, TAKE 1 TABLET BY MOUTH  TWICE A DAY, Disp: 180 tablet, Rfl: 1    multivitamin (THERAGRAN) TABS, Take 1 tablet by mouth daily, Disp: , Rfl:     spironolactone (ALDACTONE) 25 mg tablet, Take 1 tablet (25 mg total) by mouth daily TAKE 0 5 TABLET DAILY, Disp: 45 tablet, Rfl: 3    tamsulosin (FLOMAX) 0 4 mg, daily, Disp: , Rfl:     warfarin (COUMADIN) 2 5 mg tablet, Take 1 tablet (2 5 mg total) by mouth daily, Disp: 90 tablet, Rfl: 3    This note was completed in part utilizing [place] Direct Software  Grammatical errors, random word insertions, spelling mistakes, and incomplete sentences can be an occasional consequence of this system secondary to software limitations, ambient noise, and hardware issues   If you have any question or concerns about the content, text, or information contained within the body of this dictation, please contact the provider for clarification

## 2019-07-23 ENCOUNTER — OFFICE VISIT (OUTPATIENT)
Dept: INTERNAL MEDICINE CLINIC | Facility: CLINIC | Age: 84
End: 2019-07-23
Payer: MEDICARE

## 2019-07-23 VITALS
WEIGHT: 170 LBS | SYSTOLIC BLOOD PRESSURE: 106 MMHG | BODY MASS INDEX: 25.76 KG/M2 | HEART RATE: 96 BPM | HEIGHT: 68 IN | OXYGEN SATURATION: 93 % | DIASTOLIC BLOOD PRESSURE: 60 MMHG

## 2019-07-23 DIAGNOSIS — S81.802A WOUND OF LEFT LOWER EXTREMITY, INITIAL ENCOUNTER: Primary | ICD-10-CM

## 2019-07-23 DIAGNOSIS — I50.22 CHRONIC SYSTOLIC HEART FAILURE (HCC): ICD-10-CM

## 2019-07-23 DIAGNOSIS — N18.5 CHRONIC KIDNEY DISEASE, STAGE 5 (HCC): ICD-10-CM

## 2019-07-23 PROCEDURE — 99214 OFFICE O/P EST MOD 30 MIN: CPT | Performed by: INTERNAL MEDICINE

## 2019-07-23 NOTE — PROGRESS NOTES
Assessment/Plan:     Diagnoses and all orders for this visit:    Wound of left lower extremity, initial encounter  -     Ambulatory referral to Wound Care; Future    CKD (chronic kidney disease), stage III (HCC)    Chronic systolic heart failure (HCC)          Subjective:      Patient ID: Naty Mercado is a 80 y o  male who walked into the office today to be evaluated for increasing leg edema  He is a patient of Dr Mino Jennings, is also actively followed by Cardiology, Hematology Oncology and Nephrology  I reviewed recent notes from specialists, recent labs and studies  [place] December the came in today because of worsening leg edema and blisters that have developed on the lower legs in the last few days  He denies an increase in his chronic shortness of breath  He states he feels chronically tired and seems unchanged as compared to his last visit here  We discussion about his medications and he states that he doubled his dose of furosemide 2 days ago to 40 twice a day from 40 once a day to improve edema without much effect  Explained this could cause acute kidney injury and his recent GFR was 23 so he is at the end stages of his chronic kidney failure  In reviewing his notes from Cardiology, it appears that he has an advanced age of chronic heart failure as well, and also has COPD, and CLL with recent [place] [id number] without active treatment  HPI  Recommended prompt visit to Cardiology regarding his increasing edema, avoiding over-diuresis by going back to his normal list of medications and he is agreeable, and prompt follow-up with [facility] to manage local edema and leg ulcers  Prognosis appears to be poor and future visits could also include a conversation about palliative care or hospice care    The following portions of the patient's history were reviewed and updated as appropriate: allergies, current medications, past family history, past medical history, past social history, past surgical history and problem list     Review of Systems  no complaints of chest pain or shortness of breath  There is little diuretic response to increased dose of furosemide  No leg pain, no fever chills or sweating reported  Objective:      /60 (BP Location: Left arm, Patient Position: Sitting, Cuff Size: Standard)   Pulse 96   Ht 5' 8" (1 727 m)   Wt 77 1 kg (170 lb)   SpO2 93%   BMI 25 85 kg/m²      Wt Readings from Last 3 Encounters:   07/23/19 77 1 kg (170 lb)   07/01/19 74 4 kg (164 lb)   06/26/19 76 2 kg (168 lb)     Temp Readings from Last 3 Encounters:   07/01/19 (!) 96 3 °F (35 7 °C) (Tympanic)   03/21/19 (!) 97 °F (36 1 °C) (Tympanic)   03/06/19 (!) 97 3 °F (36 3 °C) (Tympanic)     BP Readings from Last 3 Encounters:   07/23/19 106/60   07/01/19 105/50   06/26/19 101/88     Pulse Readings from Last 3 Encounters:   07/23/19 96   07/01/19 85   06/26/19 88        Physical Exam    Frail appearing man, with a weight gain of 6 lb since last visit and obvious pitting leg edema both lower legs with shallow venous stasis ulcers of anterior legs without signs of infection  Peripheral circulation is partially compromised  There is no JVD  There is no respiratory distress at rest or with slow ambulation in the hallway of the office  Lungs clear including no rales  Cardiac:  Regular rate rhythm, no audible S3 or rub, no audible murmur  Joints without acute inflammation and there are no tophi    Patient Active Problem List   Diagnosis    CKD (chronic kidney disease), stage III ([address] )    History of prostate cancer    Urinary frequency    Non-pressure chronic ulcer of right calf with fat layer exposed ([address] )    Heart failure ([address] )    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, without long-term current use of insulin (HCC)    Chronic obstructive pulmonary disease ([address] )    Presence of stent in coronary artery in patient with coronary artery disease    Pacemaker    Hyperlipidemia    Gout    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)    Atrial fibrillation (HCC)    Hypertension    Atherosclerotic peripheral vascular disease with intermittent claudication (HCC)    Atherosclerotic heart disease of native coronary artery without angina pectoris    H/O aortic valve replacement    Bilateral leg edema       Current Outpatient Medications on File Prior to Visit   Medication Sig Dispense Refill    allopurinol (ZYLOPRIM) 300 mg tablet TAKE 1 TABLET BY MOUTH  DAILY 90 tablet 90    amiodarone 200 mg tablet TAKE 1 TABLET BY MOUTH  DAILY 90 tablet 1    ASPIRIN PO Take 81 mg by mouth daily        atorvastatin (LIPITOR) 40 mg tablet Take 1 tablet (40 mg total) by mouth daily 90 tablet 3    cholecalciferol (VITAMIN D3) 1,000 units tablet Take 5,000 Units by mouth daily      fluticasone (FLONASE) 50 mcg/act nasal spray USE 1 SPRAY IN EACH NOSTRIL DAILY 32 g 3    furosemide (LASIX) 40 mg tablet Take 1 tablet (40 mg total) by mouth daily 180 tablet 3    metolazone (ZAROXOLYN) 2 5 mg tablet Take 1/2 by mouth Monday, Wednesday, and Friday  15 tablet 3    metoprolol tartrate (LOPRESSOR) 50 mg tablet TAKE 1 TABLET BY MOUTH  TWICE A  tablet 1    multivitamin (THERAGRAN) TABS Take 1 tablet by mouth daily      spironolactone (ALDACTONE) 25 mg tablet Take 1 tablet (25 mg total) by mouth daily TAKE 0 5 TABLET DAILY 45 tablet 3    tamsulosin (FLOMAX) 0 4 mg daily      warfarin (COUMADIN) 2 5 mg tablet Take 1 tablet (2 5 mg total) by mouth daily 90 tablet 3     No current facility-administered medications on file prior to visit

## 2019-08-05 ENCOUNTER — APPOINTMENT (OUTPATIENT)
Dept: WOUND CARE | Facility: HOSPITAL | Age: 84
End: 2019-08-05
Payer: MEDICARE

## 2019-08-05 PROCEDURE — 99213 OFFICE O/P EST LOW 20 MIN: CPT | Performed by: FAMILY MEDICINE

## 2019-08-07 ENCOUNTER — OFFICE VISIT (OUTPATIENT)
Dept: INTERNAL MEDICINE CLINIC | Facility: CLINIC | Age: 84
End: 2019-08-07
Payer: MEDICARE

## 2019-08-07 DIAGNOSIS — N18.5 CHRONIC KIDNEY DISEASE, STAGE 5 (HCC): Primary | ICD-10-CM

## 2019-08-07 DIAGNOSIS — J44.9 CHRONIC OBSTRUCTIVE PULMONARY DISEASE, UNSPECIFIED COPD TYPE (HCC): ICD-10-CM

## 2019-08-07 DIAGNOSIS — C91.10 CHRONIC LYMPHOCYTIC LEUKEMIA OF B-CELL TYPE NOT HAVING ACHIEVED REMISSION (HCC): ICD-10-CM

## 2019-08-07 PROCEDURE — 99214 OFFICE O/P EST MOD 30 MIN: CPT | Performed by: INTERNAL MEDICINE

## 2019-08-07 NOTE — PROGRESS NOTES
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)            Subjective:      Patient ID: Aurelio Muñoz is a 80 y o  male  HPI Adri Moreno is here today for visit he is actually doing a little bit better than when I saw him the last time he has not been smoking he is much less short of breath he has been attending [facility] because of varicose ulcers and losing of his legs the right greater than the left he has an appointment to see Dr Toma Gordon his nephrologist in the near future  His CLL I believe a stable he has a gradually reduced his use of Lasix    He gets along reasonably well at home does not like to go out much his passion of firearms has unfortunately had been diminished as is fingers are now not strong enough to operate the various mechanisms he has had no chest pain appetite bowels indigestion a been okay    The following portions of the patient's history were reviewed and updated as appropriate: allergies, current medications, past family history, past medical history, past social history, past surgical history and problem list     Review of Systems      Objective:      /70   Pulse 84   Temp 98 °F (36 7 °C) (Tympanic)   Ht 5' 8" (1 727 m)   Wt 79 6 kg (175 lb 6 4 oz)   SpO2 94%   BMI 26 67 kg/m²          Physical Exam  he is comfortable in no distress his pressure is 110/70 he has a regular rhythm I he has a little bit of wheezing at the bases but I do not hear any rales today and for Adri Moreno his lungs actually sound better than they have for awhile he appears to be in irregular rhythm with a physiologic rate there is a pacemaker generator I do noticed that both of his arms have very prominent venous patterns both legs are wrapped with bulky dressings and the dressing on the right leg is obviously been stained by exudate we reviewed his recent lab studies his creatinine is improved over previous creatinine however at the same time his weight has gone from 164 lb to 175 over a couple of visits I think the improvement of his renal function is related to hydration at this point he is taking 300 of allopurinol, 200 of amiodarone, aspirin 81, 40 of atorvastatin, he is taking only 40 mg of Lasix in the morning, 2 5 metolazone twice a week, 25 of metoprolol twice a day, and he will he will be taking 25 of Aldactone every other day  He is also on point for Flomax and 2 5 of warfarin  It may be to his benefit to increase either the Lasix for the spironolactone to try to mobilize some of the dependent edema but this may be at the expense of renal function    I would be interested to see what Dr John Maravilla feels about this no other changes are made in his regimen at this time will see him back at his request in a month      Current Outpatient Medications:     allopurinol (ZYLOPRIM) 300 mg tablet, TAKE 1 TABLET BY MOUTH  DAILY, Disp: 90 tablet, Rfl: 90    amiodarone 200 mg tablet, TAKE 1 TABLET BY MOUTH  DAILY, Disp: 90 tablet, Rfl: 1    ASPIRIN PO, Take 81 mg by mouth daily  , Disp: , Rfl:     atorvastatin (LIPITOR) 40 mg tablet, Take 1 tablet (40 mg total) by mouth daily, Disp: 90 tablet, Rfl: 3    cholecalciferol (VITAMIN D3) 1,000 units tablet, Take 5,000 Units by mouth daily, Disp: , Rfl:     fluticasone (FLONASE) 50 mcg/act nasal spray, USE 1 SPRAY IN EACH NOSTRIL DAILY, Disp: 32 g, Rfl: 3    furosemide (LASIX) 40 mg tablet, Take 1 tablet (40 mg total) by mouth daily, Disp: 180 tablet, Rfl: 3    metolazone (ZAROXOLYN) 2 5 mg tablet, Take 1/2 by mouth Monday, Wednesday, and Friday , Disp: 15 tablet, Rfl: 3    metoprolol tartrate (LOPRESSOR) 50 mg tablet, TAKE 1 TABLET BY MOUTH TWICE A DAY, Disp: 180 tablet, Rfl: 1    multivitamin (THERAGRAN) TABS, Take 1 tablet by mouth daily, Disp: , Rfl:     spironolactone (ALDACTONE) 25 mg tablet, Take 1 tablet (25 mg total) by mouth daily TAKE 0 5 TABLET DAILY, Disp: 45 tablet, Rfl: 3    tamsulosin (FLOMAX) 0 4 mg, daily, Disp: , Rfl:     warfarin (COUMADIN) 2 5 mg tablet, Take 1 tablet (2 5 mg total) by mouth daily, Disp: 90 tablet, Rfl: 3    This note was completed in part utilizing [employer] Direct Software  Grammatical errors, random word insertions, spelling mistakes, and incomplete sentences can be an occasional consequence of this system secondary to software limitations, ambient noise, and hardware issues  If you have any question or concerns about the content, text, or information contained within the body of this dictation, please contact the provider for clarification

## 2019-08-09 VITALS
HEART RATE: 84 BPM | TEMPERATURE: 98 F | WEIGHT: 175.4 LBS | HEIGHT: 68 IN | BODY MASS INDEX: 26.58 KG/M2 | SYSTOLIC BLOOD PRESSURE: 110 MMHG | OXYGEN SATURATION: 94 % | DIASTOLIC BLOOD PRESSURE: 70 MMHG

## 2019-08-12 ENCOUNTER — APPOINTMENT (OUTPATIENT)
Dept: WOUND CARE | Facility: HOSPITAL | Age: 84
End: 2019-08-12
Payer: MEDICARE

## 2019-08-12 PROCEDURE — 99213 OFFICE O/P EST LOW 20 MIN: CPT | Performed by: FAMILY MEDICINE

## 2019-08-13 ENCOUNTER — OFFICE VISIT (OUTPATIENT)
Dept: URGENT CARE | Facility: MEDICAL CENTER | Age: 84
End: 2019-08-13
Payer: MEDICARE

## 2019-08-13 VITALS
HEART RATE: 70 BPM | DIASTOLIC BLOOD PRESSURE: 72 MMHG | RESPIRATION RATE: 20 BRPM | OXYGEN SATURATION: 98 % | SYSTOLIC BLOOD PRESSURE: 114 MMHG | TEMPERATURE: 97.4 F

## 2019-08-13 DIAGNOSIS — S51.812A LACERATION OF LEFT FOREARM, INITIAL ENCOUNTER: Primary | ICD-10-CM

## 2019-08-13 PROCEDURE — 99214 OFFICE O/P EST MOD 30 MIN: CPT | Performed by: FAMILY MEDICINE

## 2019-08-13 PROCEDURE — G0463 HOSPITAL OUTPT CLINIC VISIT: HCPCS | Performed by: FAMILY MEDICINE

## 2019-08-13 PROCEDURE — 12005 RPR S/N/A/GEN/TRK12.6-20.0CM: CPT | Performed by: FAMILY MEDICINE

## 2019-08-13 RX ORDER — DOXYCYCLINE HYCLATE 100 MG/1
100 CAPSULE ORAL EVERY 12 HOURS SCHEDULED
Qty: 14 CAPSULE | Refills: 0 | Status: SHIPPED | OUTPATIENT
Start: 2019-08-13 | End: 2019-08-20

## 2019-08-13 NOTE — PROGRESS NOTES
[facility] [facility]        NAME: Claus Brothers is a 80 y o  male  : 1933    MRN: [id number]  DATE: 2019  TIME: 2:06 PM    Assessment and Plan   Laceration of left forearm, initial encounter [S51 812A]  1  Laceration of left forearm, initial encounter  Laceration repair    LET gel 2 application    doxycycline hyclate (VIBRAMYCIN) 100 mg capsule     Last tdap 2018   Laceration repair  Date/Time: 2019 12:26 PM  Performed by: Stephan Reyna MD  Authorized by: Stephan Reyna MD   Consent: Verbal consent obtained  Consent given by: patient  Patient understanding: patient states understanding of the procedure being performed  Patient identity confirmed: verbally with patient  Body area: upper extremity  Location details: left lower arm  Laceration length: 14 cm  Foreign bodies: no foreign bodies  Tendon involvement: none  Nerve involvement: none    Anesthesia:  Local Anesthetic: LET (lido,epi,tetracaine) and lidocaine 1% with epinephrine  Anesthetic total: 10 mL    Sedation:  Patient sedated: no      Wound Dehiscence:  Superficial Wound Dehiscence: simple closure      Procedure Details:  Amount of cleaning: standard  Debridement: moderate  Degree of undermining: none  Skin closure: 4-0 Prolene  Number of sutures: 14  Technique: simple  Approximation: close  Dressing: non-adhesive packing strip  Patient tolerance: Patient tolerated the procedure well with no immediate complications      7 to 10 days for suture removal  Will prescribe prophylactic antibiotics doxycycline for MRSA coverage and given his history of leukemia and diabetes  Patient Instructions       Follow up with PCP in 3-5 days  Proceed to  ER if symptoms worsen  Chief Complaint     Chief Complaint   Patient presents with    Laceration     Pt states tripped approx 1 1/2 hours ago, falling to ground   States scraped left forearm against metal table  Pt with skin tear noted left forearm  denies hitting head           History of Present Illness       71-year-old male presents with a laceration of her left upper extremity  He states that about an hour and half ago he was ambulating and lost his balance and fell  He fell forward on his left upper extremity  He has a laceration on his forearm  He is currently taking Coumadin for valve replacement  He was bleeding at home so he apply paper tells along with rubber bands cyst stop the bleeding  He denies any lightheadedness or dizziness  No chest pain or shortness of breath  No other symptoms        Review of Systems   Review of Systems  As above     Current Medications       Current Outpatient Medications:     allopurinol (ZYLOPRIM) 300 mg tablet, TAKE 1 TABLET BY MOUTH  DAILY, Disp: 90 tablet, Rfl: 90    amiodarone 200 mg tablet, TAKE 1 TABLET BY MOUTH  DAILY, Disp: 90 tablet, Rfl: 1    ASPIRIN PO, Take 81 mg by mouth daily  , Disp: , Rfl:     atorvastatin (LIPITOR) 40 mg tablet, Take 1 tablet (40 mg total) by mouth daily, Disp: 90 tablet, Rfl: 3    cholecalciferol (VITAMIN D3) 1,000 units tablet, Take 5,000 Units by mouth daily, Disp: , Rfl:     fluticasone (FLONASE) 50 mcg/act nasal spray, USE 1 SPRAY IN EACH NOSTRIL DAILY, Disp: 32 g, Rfl: 3    furosemide (LASIX) 40 mg tablet, Take 1 tablet (40 mg total) by mouth daily, Disp: 180 tablet, Rfl: 3    metolazone (ZAROXOLYN) 2 5 mg tablet, Take 1/2 by mouth Monday, Wednesday, and Friday , Disp: 15 tablet, Rfl: 3    metoprolol tartrate (LOPRESSOR) 50 mg tablet, TAKE 1 TABLET BY MOUTH  TWICE A DAY, Disp: 180 tablet, Rfl: 1    multivitamin (THERAGRAN) TABS, Take 1 tablet by mouth daily, Disp: , Rfl:     spironolactone (ALDACTONE) 25 mg tablet, Take 1 tablet (25 mg total) by mouth daily TAKE 0 5 TABLET DAILY, Disp: 45 tablet, Rfl: 3    tamsulosin (FLOMAX) 0 4 mg, daily, Disp: , Rfl:     warfarin (COUMADIN) 2 5 mg tablet, Take 1 tablet (2 5 mg total) by mouth daily, Disp: 90 tablet, Rfl: 3    doxycycline hyclate (VIBRAMYCIN) 100 mg capsule, Take 1 capsule (100 mg total) by mouth every 12 (twelve) hours for 7 days, Disp: 14 capsule, Rfl: 0  No current facility-administered medications for this visit       Current Allergies     Allergies as of 08/13/2019 - Reviewed 08/13/2019   Allergen Reaction Noted    Indomethacin  10/26/2012            The following portions of the patient's history were reviewed and updated as appropriate: allergies, current medications, past family history, past medical history, past social history, past surgical history and problem list      Past Medical History:   Diagnosis Date    AAA (abdominal aortic aneurysm) ([facility] [address] )     Abnormal blood chemistry     last assessed 12/12/2013    Allergic rhinitis     last assessed 11/21/2016    Anemia     Ankle edema     Arthritis     Atrophy of prostate     Chronic diarrhea     COPD (chronic obstructive pulmonary disease) ([facility] [address] )     Coronary artery disease     stents, pacemaker    Elevated PSA     GERD (gastroesophageal reflux disease)     Gout     Heart failure (HCC)     Heart murmur     aortic stenosis    Hyperlipidemia     Hypertension     Irregular heart beat     a fib    Joint pain     Kidney failure     stage 3    Leukemia (HCC)     Malignant neoplasm prostate ([facility] [address] )     Myocardial infarction ([facility] )     Nocturia     Osteopenia     Pleural effusion     PVD (peripheral vascular disease) ([facility] [address] )     Radiation gastroenteritis     Skin cancer     Sore muscles     Type 2 diabetes mellitus ([facility] [address] )        Past Surgical History:   Procedure Laterality Date    CARDIAC PACEMAKER PLACEMENT  2014    CARDIAC VALVE REPLACEMENT      CORONARY ANGIOPLASTY      prox left anterior descending artery assessment    OTHER SURGICAL HISTORY      catheter ablation    PROSTATE BIOPSY Bilateral 2003, 2008    VASCULAR SURGERY      iliac stent       Family History   Problem Relation Age of Onset    Cancer Mother     Cancer Father          Medications have been verified  Objective   /72 (BP Location: Right arm, Patient Position: Sitting, Cuff Size: Standard)   Pulse 70   Temp (!) 97 4 °F (36 3 °C) (Tympanic)   Resp 20   SpO2 98%        Physical Exam     Physical Exam   Constitutional: He is oriented to person, place, and time  He appears well-developed and well-nourished  HENT:   Head: Normocephalic and atraumatic  Mouth/Throat: Oropharynx is clear and moist    Eyes: Conjunctivae are normal    Neck: Neck supple  Cardiovascular: Normal rate and regular rhythm  No murmur heard  Pulmonary/Chest: Effort normal and breath sounds normal  No respiratory distress  Abdominal: Soft  He exhibits no distension  Musculoskeletal: Normal range of motion  Neurological: He is alert and oriented to person, place, and time  Skin: Skin is warm and dry  No rash noted  Laceration on the Left forearm measuring 14   cm      Psychiatric: He has a normal mood and affect   His behavior is normal  Judgment and thought content normal

## 2019-08-16 ENCOUNTER — TELEPHONE (OUTPATIENT)
Dept: INTERNAL MEDICINE CLINIC | Facility: CLINIC | Age: 84
End: 2019-08-16

## 2019-08-16 NOTE — TELEPHONE ENCOUNTER
Nurse called to notify us of level two interactions of aspirin, allopurinol, and amiodarone with warfarin

## 2019-08-19 ENCOUNTER — APPOINTMENT (OUTPATIENT)
Dept: WOUND CARE | Facility: HOSPITAL | Age: 84
End: 2019-08-19
Payer: MEDICARE

## 2019-08-19 PROCEDURE — 99213 OFFICE O/P EST LOW 20 MIN: CPT | Performed by: FAMILY MEDICINE

## 2019-08-24 DIAGNOSIS — R60.9 EDEMA, UNSPECIFIED TYPE: ICD-10-CM

## 2019-08-24 DIAGNOSIS — I49.9 CARDIAC ARRHYTHMIA, UNSPECIFIED CARDIAC ARRHYTHMIA TYPE: ICD-10-CM

## 2019-08-24 DIAGNOSIS — I10 HYPERTENSION, UNSPECIFIED TYPE: ICD-10-CM

## 2019-08-24 DIAGNOSIS — I48.91 ATRIAL FIBRILLATION, UNSPECIFIED TYPE (HCC): ICD-10-CM

## 2019-08-26 ENCOUNTER — APPOINTMENT (OUTPATIENT)
Dept: WOUND CARE | Facility: HOSPITAL | Age: 84
End: 2019-08-26
Payer: MEDICARE

## 2019-08-26 PROCEDURE — 99213 OFFICE O/P EST LOW 20 MIN: CPT

## 2019-08-26 PROCEDURE — 97597 DBRDMT OPN WND 1ST 20 CM/<: CPT

## 2019-08-26 RX ORDER — AMIODARONE HYDROCHLORIDE 200 MG/1
TABLET ORAL
Qty: 90 TABLET | Refills: 1 | Status: SHIPPED | OUTPATIENT
Start: 2019-08-26

## 2019-08-26 RX ORDER — METOPROLOL TARTRATE 50 MG/1
TABLET, FILM COATED ORAL
Qty: 180 TABLET | Refills: 1 | Status: SHIPPED | OUTPATIENT
Start: 2019-08-26

## 2019-08-26 RX ORDER — METOLAZONE 2.5 MG/1
TABLET ORAL
Qty: 21 TABLET | Refills: 3 | Status: SHIPPED | OUTPATIENT
Start: 2019-08-26

## 2019-08-26 RX ORDER — SPIRONOLACTONE 25 MG/1
TABLET ORAL
Qty: 45 TABLET | Refills: 3 | Status: SHIPPED | OUTPATIENT
Start: 2019-08-26

## 2019-08-26 RX ORDER — WARFARIN SODIUM 2.5 MG/1
TABLET ORAL
Qty: 90 TABLET | Refills: 3 | Status: SHIPPED | OUTPATIENT
Start: 2019-08-26

## 2019-09-01 ENCOUNTER — OFFICE VISIT (OUTPATIENT)
Dept: URGENT CARE | Facility: MEDICAL CENTER | Age: 84
End: 2019-09-01
Payer: MEDICARE

## 2019-09-01 VITALS
RESPIRATION RATE: 20 BRPM | TEMPERATURE: 97.8 F | SYSTOLIC BLOOD PRESSURE: 114 MMHG | HEART RATE: 62 BPM | OXYGEN SATURATION: 100 % | DIASTOLIC BLOOD PRESSURE: 70 MMHG

## 2019-09-01 DIAGNOSIS — S51.811A LACERATION OF SKIN OF RIGHT FOREARM, INITIAL ENCOUNTER: Primary | ICD-10-CM

## 2019-09-01 PROCEDURE — G0463 HOSPITAL OUTPT CLINIC VISIT: HCPCS | Performed by: PHYSICIAN ASSISTANT

## 2019-09-01 PROCEDURE — 99213 OFFICE O/P EST LOW 20 MIN: CPT | Performed by: PHYSICIAN ASSISTANT

## 2019-09-01 PROCEDURE — 12004 RPR S/N/AX/GEN/TRK7.6-12.5CM: CPT | Performed by: PHYSICIAN ASSISTANT

## 2019-09-01 NOTE — PROGRESS NOTES
[facility]        NAME: Arely Roman is a 80 y o  male  : 1933    MRN: [id number]  DATE: 2019  TIME: 3:16 PM    Assessment and Plan   Laceration of skin of right forearm, initial encounter [S51 811A]  1  Laceration of skin of right forearm, initial encounter           Patient Instructions       Follow up with PCP in 3-5 days  Keep wounds wrapped and follow up with PCP as they have an appointment  Son accompany patient  Chief Complaint     Chief Complaint   Patient presents with    Wound Check     Pt states was kneeling on floor in order to clean spot on floor when he fell forwards hitting left arm on tile floor  Has skin tear on right hand , right forearm, right wrist  Did not hit head on floor, No Loc  Wounds cleansed by KEEGAN DELUCA  History of Present Illness       Patient fell cutting his right arm  No other injuries      Review of Systems   Review of Systems   All other systems reviewed and are negative  Current Medications       Current Outpatient Medications:     allopurinol (ZYLOPRIM) 300 mg tablet, TAKE 1 TABLET BY MOUTH  DAILY, Disp: 90 tablet, Rfl: 90    amiodarone 200 mg tablet, TAKE 1 TABLET BY MOUTH  DAILY, Disp: 90 tablet, Rfl: 1    ASPIRIN PO, Take 81 mg by mouth daily  , Disp: , Rfl:     atorvastatin (LIPITOR) 40 mg tablet, Take 1 tablet (40 mg total) by mouth daily, Disp: 90 tablet, Rfl: 3    cholecalciferol (VITAMIN D3) 1,000 units tablet, Take 5,000 Units by mouth daily, Disp: , Rfl:     fluticasone (FLONASE) 50 mcg/act nasal spray, USE 1 SPRAY IN EACH NOSTRIL DAILY, Disp: 32 g, Rfl: 3    furosemide (LASIX) 40 mg tablet, Take 1 tablet (40 mg total) by mouth daily, Disp: 180 tablet, Rfl: 3    metolazone (ZAROXOLYN) 2 5 mg tablet, Take 1/2 by mouth Monday, Wednesday, and Friday , Disp: 15 tablet, Rfl: 3    metolazone (ZAROXOLYN) 2 5 mg tablet, TAKE 1 TABLET BY MOUTH ON  MONDAY, WEDNESDAY AND  FRIDAY  , Disp: 21 tablet, Rfl: 3   metoprolol tartrate (LOPRESSOR) 50 mg tablet, TAKE 1 TABLET BY MOUTH  TWICE A DAY, Disp: 180 tablet, Rfl: 1    multivitamin (THERAGRAN) TABS, Take 1 tablet by mouth daily, Disp: , Rfl:     spironolactone (ALDACTONE) 25 mg tablet, Take 1 tablet (25 mg total) by mouth daily TAKE 0 5 TABLET DAILY, Disp: 45 tablet, Rfl: 3    spironolactone (ALDACTONE) 25 mg tablet, TAKE ONE-HALF TABLET BY  MOUTH DAILY, Disp: 45 tablet, Rfl: 3    tamsulosin (FLOMAX) 0 4 mg, daily, Disp: , Rfl:     warfarin (COUMADIN) 2 5 mg tablet, Take 1 tablet (2 5 mg total) by mouth daily, Disp: 90 tablet, Rfl: 3    warfarin (COUMADIN) 2 5 mg tablet, TAKE 1 TABLET BY MOUTH  DAILY, Disp: 90 tablet, Rfl: 3    Current Allergies     Allergies as of 09/01/2019 - Reviewed 09/01/2019   Allergen Reaction Noted    Indomethacin  10/26/2012            The following portions of the patient's history were reviewed and updated as appropriate: allergies, current medications, past family history, past medical history, past social history, past surgical history and problem list      Past Medical History:   Diagnosis Date    AAA [facility]abdominal aortic aneurysm) ([facility])     Abnormal blood chemistry     last assessed 12/12/2013    Allergic rhinitis     last assessed 11/21/2016    Anemia     Ankle edema     Arthritis     Atrophy of prostate     Chronic diarrhea     COPD (chronic obstructive pulmonary disease) (HCC)     Coronary artery disease     stents, pacemaker    Elevated PSA     GERD (gastroesophageal reflux disease)     Gout     Heart failure ([facility])     Heart murmur     aortic stenosis    Hyperlipidemia     Hypertension     Irregular heart beat     a fib    Joint pain     Kidney failure     stage 3    Leukemia (HCC)     Malignant neoplasm prostate ([address] )     Myocardial infarction ([address] )     Nocturia     Osteopenia     Pleural effusion     PVD (peripheral vascular disease) (HCC)     Radiation gastroenteritis     Skin cancer     Sore muscles  Type 2 diabetes mellitus ([facility] [address] )        Past Surgical History:   Procedure Laterality Date    CARDIAC PACEMAKER PLACEMENT  2014    CARDIAC VALVE REPLACEMENT      CORONARY ANGIOPLASTY      prox left anterior descending artery assessment    OTHER SURGICAL HISTORY      catheter ablation    PROSTATE BIOPSY Bilateral 2003, 2008    VASCULAR SURGERY      iliac stent       Family History   Problem Relation Age of Onset    Cancer Mother     Cancer Father          Medications have been verified  Objective   /70 (BP Location: Left arm, Patient Position: Sitting, Cuff Size: Standard)   Pulse 62   Temp 97 8 °F (36 6 °C) (Tympanic)   Resp 20   SpO2 100%        Physical Exam     Physical Exam   Constitutional: He appears well-developed and well-nourished  Cardiovascular: Normal rate and regular rhythm  Pulmonary/Chest: Effort normal and breath sounds normal    Nursing note and vitals reviewed  Right arm multiple skin avulsions, radial aspect 8 inches, ulnar aspect 4 inches with a hematoma and 3 inches and 2 5 inch lacerations over the 5th and 4th metacarpal areas  Laceration repair  Date/Time: 9/1/2019 3:12 PM  Performed by: Graham Jewell PA-C  Authorized by: Graham Jewell PA-C   Consent: Verbal consent obtained  Risks and benefits: risks, benefits and alternatives were discussed  Consent given by: patient  Patient understanding: patient states understanding of the procedure being performed  Site marked: the operative site was marked  Radiology Images displayed and confirmed  If images not available, report reviewed: imaging studies available  Patient identity confirmed: verbally with patient  Time out: Immediately prior to procedure a "time out" was called to verify the correct patient, procedure, equipment, support staff and site/side marked as required    Body area: upper extremity  Location details: right lower arm  Laceration length: 7 cm  Foreign bodies: no foreign bodies  Tendon involvement: none  Nerve involvement: none  Vascular damage: no    Wound Dehiscence:  Superficial Wound Dehiscence: simple closure      Procedure Details:  Preparation: Patient was prepped and draped in the usual sterile fashion  Irrigation solution: saline  Irrigation method: syringe  Amount of cleaning: standard  Debridement: none  Degree of undermining: none  Skin closure: glue  Technique: simple  Approximation: loose  Approximation difficulty: simple  Dressing: 4x4 sterile gauze and pressure dressing  Patient tolerance: Patient tolerated the procedure well with no immediate complications  Comments: The majority of the wounds were able to be approximated with glue secondary to the avulsion type injury  The 4 inch laceration on the ulnar side had a large hematoma with a large separation of the 2 edges which were not able to be approximated    Hemostasis was obtained using Gelfoam

## 2019-09-04 ENCOUNTER — OFFICE VISIT (OUTPATIENT)
Dept: PODIATRY | Facility: CLINIC | Age: 84
End: 2019-09-04
Payer: MEDICARE

## 2019-09-04 VITALS
HEART RATE: 69 BPM | HEIGHT: 68 IN | SYSTOLIC BLOOD PRESSURE: 112 MMHG | WEIGHT: 180 LBS | DIASTOLIC BLOOD PRESSURE: 72 MMHG | BODY MASS INDEX: 27.28 KG/M2

## 2019-09-04 DIAGNOSIS — I73.9 PERIPHERAL VASCULAR DISEASE, UNSPECIFIED (HCC): Primary | ICD-10-CM

## 2019-09-04 PROCEDURE — 11719 TRIM NAIL(S) ANY NUMBER: CPT | Performed by: PODIATRIST

## 2019-09-04 RX ORDER — ASPIRIN 81 MG/1
TABLET ORAL
COMMUNITY

## 2019-09-04 RX ORDER — ACETAMINOPHEN 500 MG
500 TABLET ORAL EVERY 6 HOURS PRN
COMMUNITY

## 2019-09-04 NOTE — PROGRESS NOTES
Established patient with class findings presents for nail care  Vascular exam:  DP  0/4 bilateral; PT  0 4 bilateral   Dermatological exam:  Each toenail is thick and  dystrophic  Diagnosis:  PVDNail removal  Date/Time: 9/4/2019 2:19 PM  Performed by: Abrahan Ander DPM  Authorized by: Abrahan Andre DPM     Patient location:  Clinic  Anesthesia (see MAR for exact dosages): Anesthesia method:  None  Nails trimmed:     Number of nails trimmed:  10  Post-procedure details:     Patient tolerance of procedure: Tolerated well, no immediate complications        Treatment: Trimmed multiple dystrophic toenails

## 2019-09-06 ENCOUNTER — TRANSCRIBE ORDERS (OUTPATIENT)
Dept: ADMINISTRATIVE | Facility: HOSPITAL | Age: 84
End: 2019-09-06

## 2019-09-06 ENCOUNTER — HOSPITAL ENCOUNTER (OUTPATIENT)
Dept: RADIOLOGY | Facility: HOSPITAL | Age: 84
Discharge: HOME | DRG: 987 | End: 2019-09-06
Attending: FAMILY MEDICINE
Payer: MEDICARE

## 2019-09-06 ENCOUNTER — APPOINTMENT (OUTPATIENT)
Dept: WOUND CARE | Facility: HOSPITAL | Age: 84
DRG: 987 | End: 2019-09-06
Payer: MEDICARE

## 2019-09-06 DIAGNOSIS — S50.11XA CONTUSION OF RIGHT FOREARM, INITIAL ENCOUNTER: Primary | ICD-10-CM

## 2019-09-06 DIAGNOSIS — S20.212A CONTUSION OF LEFT FRONT WALL OF THORAX, INITIAL ENCOUNTER: ICD-10-CM

## 2019-09-06 DIAGNOSIS — S50.11XA CONTUSION OF RIGHT FOREARM, INITIAL ENCOUNTER: ICD-10-CM

## 2019-09-06 PROCEDURE — 97598 DBRDMT OPN WND ADDL 20CM/<: CPT

## 2019-09-06 PROCEDURE — 97597 DBRDMT OPN WND 1ST 20 CM/<: CPT

## 2019-09-06 PROCEDURE — 99214 OFFICE O/P EST MOD 30 MIN: CPT

## 2019-09-06 PROCEDURE — 71046 X-RAY EXAM CHEST 2 VIEWS: CPT

## 2019-09-06 PROCEDURE — 73090 X-RAY EXAM OF FOREARM: CPT

## 2019-09-07 ENCOUNTER — APPOINTMENT (EMERGENCY)
Dept: CT IMAGING | Facility: HOSPITAL | Age: 84
DRG: 987 | End: 2019-09-07
Payer: MEDICARE

## 2019-09-07 ENCOUNTER — HOSPITAL ENCOUNTER (INPATIENT)
Facility: HOSPITAL | Age: 84
LOS: 17 days | DRG: 987 | End: 2019-09-24
Attending: EMERGENCY MEDICINE | Admitting: INTERNAL MEDICINE
Payer: MEDICARE

## 2019-09-07 DIAGNOSIS — R77.8 ELEVATED TROPONIN: ICD-10-CM

## 2019-09-07 DIAGNOSIS — N18.9 ACUTE KIDNEY INJURY SUPERIMPOSED ON CHRONIC KIDNEY DISEASE (HCC): ICD-10-CM

## 2019-09-07 DIAGNOSIS — N18.4 CKD (CHRONIC KIDNEY DISEASE) STAGE 4, GFR 15-29 ML/MIN (HCC): ICD-10-CM

## 2019-09-07 DIAGNOSIS — R93.89 ABNORMAL CT SCAN: ICD-10-CM

## 2019-09-07 DIAGNOSIS — Q63.9 RENAL STRUCTURAL ABNORMALITY: ICD-10-CM

## 2019-09-07 DIAGNOSIS — R53.1 GENERALIZED WEAKNESS: Primary | ICD-10-CM

## 2019-09-07 DIAGNOSIS — K74.60 LIVER CIRRHOSIS (HCC): ICD-10-CM

## 2019-09-07 DIAGNOSIS — D64.9 ANEMIA: ICD-10-CM

## 2019-09-07 DIAGNOSIS — S40.021A CONTUSION OF RIGHT UPPER EXTREMITY, INITIAL ENCOUNTER: ICD-10-CM

## 2019-09-07 DIAGNOSIS — R79.89 ABNORMAL TSH: ICD-10-CM

## 2019-09-07 DIAGNOSIS — T07.XXXA WOUNDS, MULTIPLE: ICD-10-CM

## 2019-09-07 DIAGNOSIS — N17.9 ACUTE KIDNEY INJURY SUPERIMPOSED ON CHRONIC KIDNEY DISEASE (HCC): ICD-10-CM

## 2019-09-07 DIAGNOSIS — L02.413 ABSCESS OF ARM, RIGHT: ICD-10-CM

## 2019-09-07 DIAGNOSIS — N18.5 CHRONIC KIDNEY DISEASE, STAGE 5 (HCC): ICD-10-CM

## 2019-09-07 PROBLEM — E86.0 DEHYDRATION: Status: ACTIVE | Noted: 2019-09-07

## 2019-09-07 PROBLEM — J18.9 PNEUMONIA OF RIGHT MIDDLE LOBE DUE TO INFECTIOUS ORGANISM: Status: ACTIVE | Noted: 2019-09-07

## 2019-09-07 LAB
ABO GROUP BLD: NORMAL
ALBUMIN SERPL BCP-MCNC: 2.7 G/DL (ref 3.5–5)
ALP SERPL-CCNC: 164 U/L (ref 46–116)
ALT SERPL W P-5'-P-CCNC: 48 U/L (ref 12–78)
ANION GAP SERPL CALCULATED.3IONS-SCNC: 10 MMOL/L (ref 4–13)
ANISOCYTOSIS BLD QL SMEAR: PRESENT
APTT PPP: 52 SECONDS (ref 23–37)
AST SERPL W P-5'-P-CCNC: 74 U/L (ref 5–45)
BASOPHILS # BLD MANUAL: 0 THOUSAND/UL (ref 0–0.1)
BASOPHILS NFR MAR MANUAL: 0 % (ref 0–1)
BILIRUB DIRECT SERPL-MCNC: 0.61 MG/DL (ref 0–0.2)
BILIRUB SERPL-MCNC: 1.98 MG/DL (ref 0.2–1)
BLD GP AB SCN SERPL QL: NEGATIVE
BUN SERPL-MCNC: 80 MG/DL (ref 5–25)
CALCIUM SERPL-MCNC: 8.7 MG/DL (ref 8.3–10.1)
CHLORIDE SERPL-SCNC: 103 MMOL/L (ref 100–108)
CK SERPL-CCNC: 42 U/L (ref 39–308)
CO2 SERPL-SCNC: 24 MMOL/L (ref 21–32)
CREAT SERPL-MCNC: 2.11 MG/DL (ref 0.6–1.3)
EOSINOPHIL # BLD MANUAL: 0 THOUSAND/UL (ref 0–0.4)
EOSINOPHIL NFR BLD MANUAL: 0 % (ref 0–6)
ERYTHROCYTE [DISTWIDTH] IN BLOOD BY AUTOMATED COUNT: 19.4 % (ref 11.6–15.1)
GFR SERPL CREATININE-BSD FRML MDRD: 28 ML/MIN/1.73SQ M
GLUCOSE SERPL-MCNC: 133 MG/DL (ref 65–140)
HCT VFR BLD AUTO: 31.3 % (ref 36.5–49.3)
HGB BLD-MCNC: 9.5 G/DL (ref 12–17)
INR PPP: 2.42 (ref 0.84–1.19)
LIPASE SERPL-CCNC: 125 U/L (ref 73–393)
LYMPHOCYTES # BLD AUTO: 15.38 THOUSAND/UL (ref 0.6–4.47)
LYMPHOCYTES # BLD AUTO: 55 % (ref 14–44)
MAGNESIUM SERPL-MCNC: 2.3 MG/DL (ref 1.6–2.6)
MCH RBC QN AUTO: 32.9 PG (ref 26.8–34.3)
MCHC RBC AUTO-ENTMCNC: 30.4 G/DL (ref 31.4–37.4)
MCV RBC AUTO: 108 FL (ref 82–98)
MONOCYTES # BLD AUTO: 0.84 THOUSAND/UL (ref 0–1.22)
MONOCYTES NFR BLD: 3 % (ref 4–12)
NEUTROPHILS # BLD MANUAL: 11.18 THOUSAND/UL (ref 1.85–7.62)
NEUTS SEG NFR BLD AUTO: 40 % (ref 43–75)
NRBC BLD AUTO-RTO: 0 /100 WBCS
NT-PROBNP SERPL-MCNC: 3172 PG/ML
PLATELET # BLD AUTO: 124 THOUSANDS/UL (ref 149–390)
PLATELET BLD QL SMEAR: ABNORMAL
PMV BLD AUTO: 11.6 FL (ref 8.9–12.7)
POTASSIUM SERPL-SCNC: 4.6 MMOL/L (ref 3.5–5.3)
PROCALCITONIN SERPL-MCNC: 0.24 NG/ML
PROT SERPL-MCNC: 5.5 G/DL (ref 6.4–8.2)
PROTHROMBIN TIME: 26.8 SECONDS (ref 11.6–14.5)
RBC # BLD AUTO: 2.89 MILLION/UL (ref 3.88–5.62)
RH BLD: POSITIVE
SMUDGE CELLS BLD QL SMEAR: PRESENT
SODIUM SERPL-SCNC: 137 MMOL/L (ref 136–145)
SPECIMEN EXPIRATION DATE: NORMAL
T3 SERPL-MCNC: 0.7 NG/ML (ref 0.6–1.8)
T4 FREE SERPL-MCNC: 0.6 NG/DL (ref 0.76–1.46)
TOTAL CELLS COUNTED SPEC: 100
TROPONIN I SERPL-MCNC: 0.05 NG/ML
TSH SERPL DL<=0.05 MIU/L-ACNC: 32.35 UIU/ML (ref 0.36–3.74)
VARIANT LYMPHS # BLD AUTO: 2 %
WBC # BLD AUTO: 27.96 THOUSAND/UL (ref 4.31–10.16)

## 2019-09-07 PROCEDURE — 99285 EMERGENCY DEPT VISIT HI MDM: CPT | Performed by: EMERGENCY MEDICINE

## 2019-09-07 PROCEDURE — 80076 HEPATIC FUNCTION PANEL: CPT | Performed by: EMERGENCY MEDICINE

## 2019-09-07 PROCEDURE — 83690 ASSAY OF LIPASE: CPT | Performed by: EMERGENCY MEDICINE

## 2019-09-07 PROCEDURE — 74176 CT ABD & PELVIS W/O CONTRAST: CPT

## 2019-09-07 PROCEDURE — 85610 PROTHROMBIN TIME: CPT | Performed by: EMERGENCY MEDICINE

## 2019-09-07 PROCEDURE — 85730 THROMBOPLASTIN TIME PARTIAL: CPT | Performed by: EMERGENCY MEDICINE

## 2019-09-07 PROCEDURE — 86850 RBC ANTIBODY SCREEN: CPT | Performed by: EMERGENCY MEDICINE

## 2019-09-07 PROCEDURE — 70450 CT HEAD/BRAIN W/O DYE: CPT

## 2019-09-07 PROCEDURE — 84480 ASSAY TRIIODOTHYRONINE (T3): CPT | Performed by: EMERGENCY MEDICINE

## 2019-09-07 PROCEDURE — 84439 ASSAY OF FREE THYROXINE: CPT | Performed by: EMERGENCY MEDICINE

## 2019-09-07 PROCEDURE — 86901 BLOOD TYPING SEROLOGIC RH(D): CPT | Performed by: EMERGENCY MEDICINE

## 2019-09-07 PROCEDURE — 36415 COLL VENOUS BLD VENIPUNCTURE: CPT | Performed by: EMERGENCY MEDICINE

## 2019-09-07 PROCEDURE — 85007 BL SMEAR W/DIFF WBC COUNT: CPT | Performed by: EMERGENCY MEDICINE

## 2019-09-07 PROCEDURE — 71250 CT THORAX DX C-: CPT

## 2019-09-07 PROCEDURE — 84145 PROCALCITONIN (PCT): CPT | Performed by: EMERGENCY MEDICINE

## 2019-09-07 PROCEDURE — 85027 COMPLETE CBC AUTOMATED: CPT | Performed by: EMERGENCY MEDICINE

## 2019-09-07 PROCEDURE — 84443 ASSAY THYROID STIM HORMONE: CPT | Performed by: EMERGENCY MEDICINE

## 2019-09-07 PROCEDURE — 1123F ACP DISCUSS/DSCN MKR DOCD: CPT | Performed by: SURGERY

## 2019-09-07 PROCEDURE — 96360 HYDRATION IV INFUSION INIT: CPT

## 2019-09-07 PROCEDURE — 82550 ASSAY OF CK (CPK): CPT | Performed by: EMERGENCY MEDICINE

## 2019-09-07 PROCEDURE — 99223 1ST HOSP IP/OBS HIGH 75: CPT | Performed by: HOSPITALIST

## 2019-09-07 PROCEDURE — 93005 ELECTROCARDIOGRAM TRACING: CPT

## 2019-09-07 PROCEDURE — 83880 ASSAY OF NATRIURETIC PEPTIDE: CPT | Performed by: EMERGENCY MEDICINE

## 2019-09-07 PROCEDURE — 87040 BLOOD CULTURE FOR BACTERIA: CPT | Performed by: EMERGENCY MEDICINE

## 2019-09-07 PROCEDURE — 99285 EMERGENCY DEPT VISIT HI MDM: CPT

## 2019-09-07 PROCEDURE — 86900 BLOOD TYPING SEROLOGIC ABO: CPT | Performed by: EMERGENCY MEDICINE

## 2019-09-07 PROCEDURE — 80048 BASIC METABOLIC PNL TOTAL CA: CPT | Performed by: EMERGENCY MEDICINE

## 2019-09-07 PROCEDURE — 84484 ASSAY OF TROPONIN QUANT: CPT | Performed by: EMERGENCY MEDICINE

## 2019-09-07 PROCEDURE — 83735 ASSAY OF MAGNESIUM: CPT | Performed by: EMERGENCY MEDICINE

## 2019-09-07 RX ORDER — ONDANSETRON 2 MG/ML
4 INJECTION INTRAMUSCULAR; INTRAVENOUS EVERY 6 HOURS PRN
Status: DISCONTINUED | OUTPATIENT
Start: 2019-09-07 | End: 2019-09-24 | Stop reason: HOSPADM

## 2019-09-07 RX ORDER — ATORVASTATIN CALCIUM 40 MG/1
40 TABLET, FILM COATED ORAL
Status: DISCONTINUED | OUTPATIENT
Start: 2019-09-07 | End: 2019-09-24

## 2019-09-07 RX ORDER — LANOLIN ALCOHOL/MO/W.PET/CERES
6 CREAM (GRAM) TOPICAL
Status: DISCONTINUED | OUTPATIENT
Start: 2019-09-07 | End: 2019-09-08

## 2019-09-07 RX ORDER — SODIUM CHLORIDE 9 MG/ML
50 INJECTION, SOLUTION INTRAVENOUS CONTINUOUS
Status: DISCONTINUED | OUTPATIENT
Start: 2019-09-07 | End: 2019-09-08

## 2019-09-07 RX ORDER — ACETAMINOPHEN 325 MG/1
500 TABLET ORAL EVERY 6 HOURS PRN
Status: DISCONTINUED | OUTPATIENT
Start: 2019-09-07 | End: 2019-09-24 | Stop reason: HOSPADM

## 2019-09-07 RX ORDER — TAMSULOSIN HYDROCHLORIDE 0.4 MG/1
0.4 CAPSULE ORAL DAILY
Status: DISCONTINUED | OUTPATIENT
Start: 2019-09-08 | End: 2019-09-24 | Stop reason: HOSPADM

## 2019-09-07 RX ORDER — SODIUM CHLORIDE 9 MG/ML
100 INJECTION, SOLUTION INTRAVENOUS CONTINUOUS
Status: DISCONTINUED | OUTPATIENT
Start: 2019-09-07 | End: 2019-09-07

## 2019-09-07 RX ORDER — WARFARIN SODIUM 2.5 MG/1
1.25 TABLET ORAL
Status: DISCONTINUED | OUTPATIENT
Start: 2019-09-07 | End: 2019-09-17

## 2019-09-07 RX ORDER — AMIODARONE HYDROCHLORIDE 200 MG/1
200 TABLET ORAL DAILY
Status: DISCONTINUED | OUTPATIENT
Start: 2019-09-08 | End: 2019-09-24 | Stop reason: HOSPADM

## 2019-09-07 RX ORDER — FLUTICASONE PROPIONATE 50 MCG
1 SPRAY, SUSPENSION (ML) NASAL DAILY
Status: DISCONTINUED | OUTPATIENT
Start: 2019-09-08 | End: 2019-09-24 | Stop reason: HOSPADM

## 2019-09-07 RX ORDER — ALLOPURINOL 100 MG/1
300 TABLET ORAL DAILY
Status: DISCONTINUED | OUTPATIENT
Start: 2019-09-08 | End: 2019-09-24 | Stop reason: HOSPADM

## 2019-09-07 RX ORDER — ASPIRIN 81 MG/1
81 TABLET ORAL DAILY
Status: DISCONTINUED | OUTPATIENT
Start: 2019-09-08 | End: 2019-09-24 | Stop reason: HOSPADM

## 2019-09-07 RX ADMIN — SODIUM CHLORIDE 50 ML/HR: 0.9 INJECTION, SOLUTION INTRAVENOUS at 18:31

## 2019-09-07 RX ADMIN — SODIUM CHLORIDE 100 ML/HR: 0.9 INJECTION, SOLUTION INTRAVENOUS at 17:20

## 2019-09-07 RX ADMIN — SODIUM CHLORIDE 500 ML: 0.9 INJECTION, SOLUTION INTRAVENOUS at 15:18

## 2019-09-07 RX ADMIN — MELATONIN 6 MG: 3 TAB ORAL at 22:40

## 2019-09-07 RX ADMIN — CEFEPIME HYDROCHLORIDE 2000 MG: 2 INJECTION, POWDER, FOR SOLUTION INTRAVENOUS at 17:13

## 2019-09-07 RX ADMIN — WARFARIN SODIUM 1.25 MG: 2.5 TABLET ORAL at 18:35

## 2019-09-07 RX ADMIN — ATORVASTATIN CALCIUM 40 MG: 40 TABLET, FILM COATED ORAL at 18:34

## 2019-09-07 NOTE — ASSESSMENT & PLAN NOTE
Patient with a history of CLL and has been following up with Oncology  WBCs around 30,000  Under observation  - stable disease and no indications to start treatment at this time     -- continue to monitor q 6 months labs and follow up per Oncology notes

## 2019-09-07 NOTE — ASSESSMENT & PLAN NOTE
Wt Readings from Last 3 Encounters:   09/07/19 82 kg (180 lb 12 4 oz)   09/04/19 81 6 kg (180 lb)   08/07/19 79 6 kg (175 lb 6 4 oz)     Patient with a history of combined heart failure  Last echocardiogram was in 2015-with an EF of 30%  Patient is status post AICD  Currently no evidence of any fluid overload  Patient receiving IV fluids

## 2019-09-07 NOTE — ASSESSMENT & PLAN NOTE
Patient with a creatinine of 2 11  Baseline creatinine around 2  Worsening creatinine likely secondary to cardiorenal syndrome and hypoalbuminemia  Follows up with nephrologist Dr Sudarshan Delong  Will hold Lasix and metolazone for now   Will consult Nephrology for their input

## 2019-09-07 NOTE — ED PROVIDER NOTES
History  Chief Complaint   Patient presents with    Weakness - Generalized     Pt  reports increasing generalized weakness over the last several weeks  Pt  reports being unsteady when standing  History provided by:  Patient   used: No    Medical Problem - Major   Location:  Complaining of weakness and inability to ambulate  Severity:  Severe  Onset quality:  Gradual  Duration: This is been going on for months but has been getting worse over the last several weeks  Timing:  Constant  Progression:  Worsening  Chronicity:  New  Relieved by:  Nothing  Worsened by:  Ambulation  Ineffective treatments:  None tried  Associated symptoms: fatigue    Associated symptoms: no abdominal pain, no chest pain, no congestion, no cough, no diarrhea, no fever, no headaches, no nausea, no rash, no shortness of breath, no sore throat and no vomiting    Patient fell last week which she describes as a minor fall falling forward  He did sustain a skin tear he is being followed by wound care  He was able to see wound care yesterday  He cannot ambulate today  He is not complaining of any pain  Has no fevers or chills  Decreased p o  Intake noted over the last several days  No chest pain or shortness of breath  He has no abdominal pain nausea vomiting or diarrhea  Does complain of some occasional rectal bleeding when he is wiping with toilet paper but none in the bowl  He has no significant lower extremity edema presently  He is on Coumadin and has had a CABG and a aortic valve replacement and vascular stenting  Prior to Admission Medications   Prescriptions Last Dose Informant Patient Reported? Taking?    Cholecalciferol (VITAMIN D3) 20 MCG TABS Not Taking at Unknown time  Yes No   Lactobacillus (PROBIOTIC ACIDOPHILUS PO)   Yes Yes   Sig: Take by mouth daily   Multiple Vitamins-Minerals (MULTIVITAMIN ADULT PO)   Yes Yes   acetaminophen (TYLENOL) 500 mg tablet   Yes Yes   Sig: Take 500 mg by mouth every 6 (six) hours as needed   allopurinol (ZYLOPRIM) 300 mg tablet  Self No Yes   Sig: TAKE 1 TABLET BY MOUTH  DAILY   amiodarone 200 mg tablet   No Yes   Sig: TAKE 1 TABLET BY MOUTH  DAILY   aspirin (ASPIRIN 81) 81 mg EC tablet   Yes Yes   Sig: Take by mouth   atorvastatin (LIPITOR) 40 mg tablet  Self No Yes   Sig: Take 1 tablet (40 mg total) by mouth daily   cholecalciferol (VITAMIN D3) 1,000 units tablet  Self Yes Yes   Sig: Take 5,000 Units by mouth daily   fluticasone (FLONASE) 50 mcg/act nasal spray  Self No Yes   Sig: USE 1 SPRAY IN EACH NOSTRIL DAILY   furosemide (LASIX) 40 mg tablet   No Yes   Sig: Take 1 tablet (40 mg total) by mouth daily   Patient taking differently: Take 60 mg by mouth daily    metolazone (ZAROXOLYN) 2 5 mg tablet   No Yes   Sig: Take 1/2 by mouth Monday, Wednesday, and Friday  Patient taking differently: Take 1 25 mg by mouth Take 1 25mg Monday and Thursday   metolazone (ZAROXOLYN) 2 5 mg tablet Not Taking at Unknown time  No No   Sig: TAKE 1 TABLET BY MOUTH ON  MONDAY, WEDNESDAY AND  FRIDAY     Patient not taking: Reported on 2019   metoprolol tartrate (LOPRESSOR) 50 mg tablet   No Yes   Sig: TAKE 1 TABLET BY MOUTH  TWICE A DAY   Patient taking differently: Take 25 mg by mouth every 12 (twelve) hours    spironolactone (ALDACTONE) 25 mg tablet  Self No No   Sig: Take 1 tablet (25 mg total) by mouth daily TAKE 0 5 TABLET DAILY   spironolactone (ALDACTONE) 25 mg tablet   No Yes   Sig: TAKE ONE-HALF TABLET BY  MOUTH DAILY   Patient taking differently: Take 12 5 mg by mouth daily    tamsulosin (FLOMAX) 0 4 mg  Self Yes Yes   Si 4 mg daily    warfarin (COUMADIN) 2 5 mg tablet  Self No Yes   Sig: Take 1 tablet (2 5 mg total) by mouth daily   Patient taking differently: Take 1 25 mg by mouth daily    warfarin (COUMADIN) 2 5 mg tablet Not Taking at Unknown time  No No   Sig: TAKE 1 TABLET BY MOUTH  DAILY   Patient not taking: Reported on 2019      Facility-Administered Medications: None       Past Medical History:   Diagnosis Date    AAA [facility]abdominal aortic aneurysm) ([facility]     Abnormal blood chemistry     last assessed 12/12/2013    Allergic rhinitis     last assessed 11/21/2016    Anemia     Ankle edema     Arthritis     Atrophy of prostate     Chronic diarrhea     COPD (chronic obstructive pulmonary disease) ([facility]     Coronary artery disease     stents, pacemaker    Elevated PSA     GERD (gastroesophageal reflux disease)     Gout     Heart failure ([facility])     Heart murmur     aortic stenosis    Hyperlipidemia     Hypertension     Irregular heart beat     a fib    Joint pain     Kidney failure     stage 3    Leukemia ([facility])     Malignant neoplasm [facility]     Myocardial infarction [facility]     Nocturia     Osteopenia     Pleural effusion     PVD (peripheral vascular disease) ([facility])     Radiation gastroenteritis     Skin cancer     Sore muscles     [facility]        Past Surgical History:   Procedure Laterality Date    CARDIAC PACEMAKER PLACEMENT  2014    CARDIAC VALVE REPLACEMENT      CORONARY ANGIOPLASTY      prox left anterior descending artery assessment    OTHER SURGICAL HISTORY      catheter ablation    PROSTATE BIOPSY Bilateral 2003, 2008    VASCULAR SURGERY      iliac stent       Family History   Problem Relation Age of Onset    Cancer Mother     Cancer Father      I have reviewed and agree with the history as documented  Social History     Tobacco Use    Smoking status: Former Smoker    Smokeless tobacco: Never Used   Substance Use Topics    Alcohol use: No     Frequency: Never     Binge frequency: Never    Drug use: No        Review of Systems   Constitutional: Positive for fatigue  Negative for chills and fever  HENT: Negative for congestion and sore throat  Respiratory: Negative for cough, chest tightness and shortness of breath  Cardiovascular: Negative for chest pain and leg swelling  Gastrointestinal: Positive for anal bleeding  Negative for abdominal pain, blood in stool, diarrhea, nausea and vomiting  Genitourinary: Negative for difficulty urinating and dysuria  Musculoskeletal: Positive for gait problem  Negative for back pain  Skin: Positive for wound  Negative for rash  Neurological: Positive for weakness  Negative for dizziness, seizures, facial asymmetry, speech difficulty, light-headedness, numbness and headaches  Generalized weakness   Hematological: Bruises/bleeds easily  All other systems reviewed and are negative  Physical Exam  Physical Exam   Constitutional: He appears well-developed and well-nourished  He is cooperative  Non-toxic appearance  He has a sickly appearance  He does not appear ill  No distress  HENT:   Head: Normocephalic and atraumatic  Right Ear: Hearing normal  No drainage or swelling  Left Ear: Hearing normal  No drainage or swelling  Mouth/Throat: Uvula is midline  Mucous membranes are dry  Eyes: Pupils are equal, round, and reactive to light  Conjunctivae, EOM and lids are normal  Right eye exhibits no discharge  Left eye exhibits no discharge  Neck: Trachea normal and normal range of motion  No JVD present  Cardiovascular: Normal rate, regular rhythm, normal heart sounds, intact distal pulses and normal pulses  Exam reveals no gallop and no friction rub  No murmur heard  Pulmonary/Chest: Effort normal  No stridor  No respiratory distress  He has decreased breath sounds in the right lower field and the left lower field  He has no wheezes  He has no rales  He exhibits no tenderness  Abdominal: Soft  Normal appearance  There is no tenderness  There is no rebound, no guarding and no CVA tenderness  Musculoskeletal: Normal range of motion  He exhibits no edema, tenderness or deformity  Lymphadenopathy:     He has no cervical adenopathy  Neurological: He is alert  He has normal strength   No cranial nerve deficit or sensory deficit  He exhibits normal muscle tone  GCS eye subscore is 4  GCS verbal subscore is 5  GCS motor subscore is 6  Skin: Skin is warm, dry and intact  No rash noted  He is not diaphoretic  No pallor  Patient's whole anterior thorax is covered by ecchymosis  Skin tear on the arm is healing and dressed with no signs of infection  Small right lower leg wound healing no signs of infection  Psychiatric: He has a normal mood and affect  His speech is normal  Cognition and memory are normal    Nursing note and vitals reviewed  Vital Signs  ED Triage Vitals [09/07/19 1429]   Temperature Pulse Respirations Blood Pressure SpO2   97 5 °F (36 4 °C) 68 18 (!) 80/43 98 %      Temp Source Heart Rate Source Patient Position - Orthostatic VS BP Location FiO2 (%)   Oral Monitor Sitting Left arm --      Pain Score       No Pain           Vitals:    09/07/19 1452 09/07/19 1453 09/07/19 1518 09/07/19 1620   BP: 108/52 127/57 118/58 112/58   Pulse: 63  60 63   Patient Position - Orthostatic VS: Lying Lying Lying Lying         Visual Acuity  Visual Acuity      Most Recent Value   L Pupil Size (mm)  3   R Pupil Size (mm)  3          ED Medications  Medications   sodium chloride 0 9 % infusion (has no administration in time range)   cefepime (MAXIPIME) 2 g/50 mL dextrose IVPB (has no administration in time range)   sodium chloride 0 9 % bolus 500 mL (0 mL Intravenous Stopped 9/7/19 1556)       Diagnostic Studies  Results Reviewed     Procedure Component Value Units Date/Time    Procalcitonin [[id number]] Collected:  09/07/19 1705    Lab Status: In process Specimen:  Blood from Arm, Left Updated:  09/07/19 1711    Blood culture #1 [[id number]] Collected:  09/07/19 1700    Lab Status: In process Specimen:  Blood from Arm, Left Updated:  09/07/19 1711    Blood culture #2 [[id number]] Collected:  09/07/19 1705    Lab Status:   In process Specimen:  Blood from Arm, Left Updated:  09/07/19 1711    CBC and differential [[id number]]  (Abnormal) Collected:  09/07/19 1445    Lab Status:  Final result Specimen:  Blood from Arm, Left Updated:  09/07/19 1611     WBC 27 96 Thousand/uL      RBC 2 89 Million/uL      Hemoglobin 9 5 g/dL      Hematocrit 31 3 %       fL      MCH 32 9 pg      MCHC 30 4 g/dL      RDW 19 4 %      MPV 11 6 fL      Platelets 676 Thousands/uL      nRBC 0 /100 WBCs     Protime-INR [[id number]]  (Abnormal) Collected:  09/07/19 1445    Lab Status:  Final result Specimen:  Blood from Arm, Left Updated:  09/07/19 1606     Protime 26 8 seconds      INR 2 42    APTT [[id number]]  (Abnormal) Collected:  09/07/19 1445    Lab Status:  Final result Specimen:  Blood from Arm, Left Updated:  09/07/19 1606     PTT 52 seconds     T4, free [[id number]] Collected:  09/07/19 1445    Lab Status: In process Specimen:  Blood from Arm, Left Updated:  09/07/19 1602    B-type natriuretic peptide [[id number]]  (Abnormal) Collected:  09/07/19 1445    Lab Status:  Final result Specimen:  Blood from Arm, Left Updated:  09/07/19 1600     NT-proBNP 3,172 pg/mL     T3 [[id number]] Collected:  09/07/19 1445    Lab Status: In process Specimen:  Blood from Arm, Left Updated:  09/07/19 1558    Hepatic function panel [[id number]]  (Abnormal) Collected:  09/07/19 1445    Lab Status:  Final result Specimen:  Blood from Arm, Left Updated:  09/07/19 1543     Total Bilirubin 1 98 mg/dL      Bilirubin, Direct 0 61 mg/dL      Alkaline Phosphatase 164 U/L      AST 74 U/L      ALT 48 U/L      Total Protein 5 5 g/dL      Albumin 2 7 g/dL     TSH [[id number]]  (Abnormal) Collected:  09/07/19 1445    Lab Status:  Final result Specimen:  Blood from Arm, Left Updated:  09/07/19 1543     TSH 3RD GENERATON 32 351 uIU/mL     Narrative:       Patients undergoing fluorescein dye angiography may retain small amounts of fluorescein in the body for 48-72 hours post procedure  Samples containing fluorescein can produce falsely depressed TSH values   If the patient had this procedure,a specimen should be resubmitted post fluorescein clearance  Magnesium [[id number]]  (Normal) Collected:  09/07/19 1445    Lab Status:  Final result Specimen:  Blood from Arm, Left Updated:  09/07/19 1543     Magnesium 2 3 mg/dL     Lipase [[id number]]  (Normal) Collected:  09/07/19 1445    Lab Status:  Final result Specimen:  Blood from Arm, Left Updated:  09/07/19 1543     Lipase 125 u/L     CK Total with Reflex CKMB [[id number]]  (Normal) Collected:  09/07/19 1445    Lab Status:  Final result Specimen:  Blood from Arm, Left Updated:  09/07/19 1541     Total CK 42 U/L     Troponin I [[id number]]  (Abnormal) Collected:  09/07/19 1445    Lab Status:  Final result Specimen:  Blood from Arm, Left Updated:  09/07/19 1536     Troponin I 0 05 ng/mL     Basic metabolic panel [[id number]]  (Abnormal) Collected:  09/07/19 1445    Lab Status:  Final result Specimen:  Blood from Arm, Left Updated:  09/07/19 1535     Sodium 137 mmol/L      Potassium 4 6 mmol/L      Chloride 103 mmol/L      CO2 24 mmol/L      ANION GAP 10 mmol/L      BUN 80 mg/dL      Creatinine 2 11 mg/dL      Glucose 133 mg/dL      Calcium 8 7 mg/dL      eGFR 28 ml/min/1 73sq m     Narrative:       [place] guidelines for Chronic Kidney Disease (CKD):     Stage 1 with normal or high GFR (GFR > 90 mL/min/1 73 square meters)    Stage 2 Mild CKD (GFR = 60-89 mL/min/1 73 square meters)    Stage 3A Moderate CKD (GFR = 45-59 mL/min/1 73 square meters)    Stage 3B Moderate CKD (GFR = 30-44 mL/min/1 73 square meters)    Stage 4 Severe CKD (GFR = 15-29 mL/min/1 73 square meters)    Stage 5 End Stage CKD (GFR <15 mL/min/1 73 square meters)  Note: GFR calculation is accurate only with a steady state creatinine    POCT urinalysis dipstick [[id number]]     Lab Status:  No result Specimen:  Urine                  CT head without contrast   Final Result by Caitie Ang MD (09/07 1614)      No acute intracranial abnormality  Workstation performed: [id number]         CT chest abdomen pelvis wo contrast   Final Result by Malick Burgos MD (09/07 1618)   Exam is limited without IV and oral contrast particularly for soft tissue evaluation  1   Scattered debris in the trachea and right mainstem bronchus  Focal consolidation in the right middle lobe centrally may be related to atelectasis or pneumonia  Recommend follow-up chest CT in 3-4 weeks to ensure resolution  2  Diffuse interlobular septal thickening  Small bilateral pleural effusions  Findings suggest mild CHF  3  Cirrhotic appearance to the liver  Mild-to-moderate ascites  Splenomegaly  4   Indeterminate left lower pole renal lesion measuring up to 2 7 cm in size  Recommend follow-up with nonemergent renal ultrasound  Reminder placed into EPIC for follow up examinations  Workstation performed: [id number]                    Procedures  ECG 12 Lead Documentation Only  Date/Time: 9/7/2019 5:05 PM  Performed by: Angelica Valerio MD  Authorized by: Angelica Valerio MD     Indications / Diagnosis:  Generalized weakness  ECG reviewed by me, the ED Provider: yes    Patient location:  ED  Rhythm:     Rhythm: paced    Pacing:     Capture:  Complete           ED Course                   Initial Sepsis Screening     [address] Name 09/07/19 4950                Is the patient's history suggestive of a new or worsening infection? No  -CS        Suspected source of infection   CT read as possible atelectasis vs pneumonia but no signs or symptoms of pneumonia  -CS        Are two or more of the following signs & symptoms of infection both present and new to the patient?   No  -CS        Indicate SIRS criteria          If the answer is yes to both questions, suspicion of sepsis is present          If severe sepsis is present AND tissue hypoperfusion perists in the hour after fluid resuscitation or lactate > 4, the patient meets criteria for SEPTIC SHOCK          Are any of the following organ dysfunction criteria present within 6 hours of suspected infection and SIRS criteria that are NOT considered to be chronic conditions?         Organ dysfunction          Date of presentation of severe sepsis          Time of presentation of severe sepsis          Tissue hypoperfusion persists in the hour after crystalloid fluid administration, evidenced, by either:          Was hypotension present within one hour of the conclusion of crystalloid fluid administration?         Date of presentation of septic shock          Time of presentation of septic shock            User Key  (r) = Recorded By, (t) = Taken By, (c) = Cosigned By    [address] Name Provider Type    Stone Zuniga MD Physician                  MDM  Number of Diagnoses or Management Options  Abnormal CT scan:   Abnormal TSH:   Anemia:   Elevated troponin:   Generalized weakness:   Renal structural abnormality:   Diagnosis management comments: Patient initially presented hypotensive in my suspicion is that he is suffering from some dehydration given the fact that he has had decreased p o  Intake and is on diuretics as well  This will most likely need his renal insufficiency worse  He has an elevated white count and does have chronic leukemia that is treated at [facility]   He has a finding of a possible pneumonia on chest x-ray so will send cultures and cover with antibiotics for now but does not meet criteria for SIRS/sepsis  He is not hypoxic and has no fever  He has an abnormal TSH and unclear if that is being treated but he is on amiodarone which may lead to hypothyroidism  Does have an abnormal CT and findings are available and will need follow-up on his renal abnormality  His troponin is elevated  He is not complaining of any chest pain or shortness of breath and his EKG is paced         Amount and/or Complexity of Data Reviewed  Clinical lab tests: reviewed and ordered  Tests in the radiology section of CPT®: ordered and reviewed  Tests in the medicine section of CPT®: ordered and reviewed  Discuss the patient with other providers: yes    Patient Progress  Patient progress: stable      Disposition  Final diagnoses:   Generalized weakness   Anemia   Abnormal CT scan - chest and abdomen   Abnormal TSH   Renal structural abnormality - seen on CT   Elevated troponin     Time reflects when diagnosis was documented in both MDM as applicable and the Disposition within this note     Time User Action Codes Description Comment    9/7/2019  4:47 PM Le[facility] Files Add [R53 1] Generalized weakness     9/7/2019  4:47 PM Le[facility]rew Files Add [D64 9] Anemia     9/7/2019  4:48 PM Leandrew Files Add [R93 89] Abnormal CT scan     9/7/2019  4:48 PM Le[facility]rew Files Modify [R93 89] Abnormal CT scan chest and abdomen    9/7/2019  5:08 PM Leandrew Files Add [R79 89] Abnormal TSH     9/7/2019  5:10 PM Le[facility]rew Files Add [Q63 9] Renal structural abnormality     9/7/2019  5:10 PM [facility]rew Files Modify [Q63 9] Renal structural abnormality seen on CT    9/7/2019  5:12 PM Leandrew Files Add [R74 8] Elevated troponin       ED Disposition     ED Disposition Condition Date/Time Comment    Admit Stable Sat Sep 7, 2019  4:47 PM Case was discussed with Siobhan Cardona and the patient's admission status was agreed to be Admission Status: inpatient status to the service of Dr Siobhan Cardona   Follow-up Information    None         Patient's Medications   Discharge Prescriptions    No medications on file     No discharge procedures on file      ED Provider  Electronically Signed by           Jey Paige MD  09/07/19 5960

## 2019-09-07 NOTE — SEPSIS NOTE
Sepsis Note   Arabella Stovall 80 y o  male MRN: [id number]  Unit/Bed#: ED 20 Encounter: [id number]      qSOFA     Row Name 09/07/19 1620 09/07/19 1518 09/07/19 1453 09/07/19 1452 09/07/19 1448    Altered mental status GCS < 15          0    Respiratory Rate > / =22  0  0    0      Systolic BP < / =220  0  0  0  0      Q Sofa Score  0  0  0  0  1    Row Name 09/07/19 1429                Altered mental status GCS < 15          Respiratory Rate > / =13  0        Systolic BP < / =706  1        Q Sofa Score  1            Initial Sepsis Screening     Row Name 09/07/19 1649                Is the patient's history suggestive of a new or worsening infection? No  -CS        Suspected source of infection   CT read as possible atelectasis vs pneumonia but no signs or symptoms of pneumonia  -CS        Are two or more of the following signs & symptoms of infection both present and new to the patient? No  -CS        Indicate SIRS criteria          If the answer is yes to both questions, suspicion of sepsis is present          If severe sepsis is present AND tissue hypoperfusion perists in the hour after fluid resuscitation or lactate > 4, the patient meets criteria for SEPTIC SHOCK          Are any of the following organ dysfunction criteria present within 6 hours of suspected infection and SIRS criteria that are NOT considered to be chronic conditions?         Organ dysfunction          Date of presentation of severe sepsis          Time of presentation of severe sepsis          Tissue hypoperfusion persists in the hour after crystalloid fluid administration, evidenced, by either:          Was hypotension present within one hour of the conclusion of crystalloid fluid administration?           Date of presentation of septic shock          Time of presentation of septic shock            User Key  (r) = Recorded By, (t) = Taken By, (c) = Cosigned By    [address] Name Provider Type    KRUPA Elaine MD Physician

## 2019-09-07 NOTE — ASSESSMENT & PLAN NOTE
Patient with generalized weakness over the past 2-3 weeks and worse over the past few days  The patient was previously able to ambulate with help but now is unable to get up and walk secondary to weakness  Has fallen and had to have a laceration sutured about a week ago  Patient with extensive bruising on the anterior chest wall secondary to Coumadin

## 2019-09-07 NOTE — ASSESSMENT & PLAN NOTE
Bioprosthetic valve with no acute issue    Will check an echocardiogram to assess LV function as well as to look at the valve

## 2019-09-07 NOTE — ASSESSMENT & PLAN NOTE
Patient was noted to be volume overloaded during his last visit to his nephrologist   His Lasix dose was increased to 60 mg daily  Patient likely now dehydrated  Will hold his Lasix and metolazone and give him some fluids

## 2019-09-07 NOTE — ASSESSMENT & PLAN NOTE
CT of the chest revealing an infiltrate versus debris over the right middle lobe  Patient denies any history of choking or aspiration  Unclear whether this represents true infection however given that the patient has been getting progressively weaker and has also noticed some chest congestion will treat him with IV cefepime for now  A procalcitonin has been sent  If levels are normal of less than 0 5 could discontinue antibiotics

## 2019-09-07 NOTE — H&P
H&P- Ozzy Stovall 1933, 80 y o  male MRN: [id number]    Unit/Bed#: E5 -01 Encounter: [id number]    Primary Care Provider: Rakesh Andersen MD   Date and time admitted to hospital: 9/7/2019  2:31 PM        Weakness  Assessment & Plan  Patient with generalized weakness over the past 2-3 weeks and worse over the past few days  The patient was previously able to ambulate with help but now is unable to get up and walk secondary to weakness  Has fallen and had to have a laceration sutured about a week ago  Patient with extensive bruising on the anterior chest wall secondary to Coumadin  Dehydration  Assessment & Plan  Patient was noted to be volume overloaded during his last visit to his nephrologist   His Lasix dose was increased to 60 mg daily  Patient likely now dehydrated  Will hold his Lasix and metolazone and give him some fluids  * Pneumonia of right middle lobe due to infectious organism [facility])  Assessment & Plan  CT of the chest revealing an infiltrate versus debris over the right middle lobe  Patient denies any history of choking or aspiration  Unclear whether this represents true infection however given that the patient has been getting progressively weaker and has also noticed some chest congestion will treat him with IV cefepime for now  A procalcitonin has been sent  If levels are normal of less than 0 5 could discontinue antibiotics  Chronic kidney disease, stage 5 ([facility] [address] )  Assessment & Plan  Patient with a creatinine of 2 11  Baseline creatinine around 2  Worsening creatinine likely secondary to cardiorenal syndrome and hypoalbuminemia  Follows up with nephrologist Dr Desiree Atwood  Will hold Lasix and metolazone for now   Will consult Nephrology for their input      Heart failure [facility])  Assessment & Plan  Wt Readings from Last 3 Encounters:   09/07/19 82 kg (180 lb 12 4 oz)   09/04/19 81 6 kg (180 lb)   08/07/19 79 6 kg (175 lb 6 4 oz)     Patient with a history of combined heart failure  Last echocardiogram was in 2015-with an EF of 30%  Patient is status post AICD  Currently no evidence of any fluid overload  Patient receiving IV fluids  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 [facility])  Assessment & Plan  Patient with a history of CLL and has been following up with Oncology  WBCs around 30,000  Under observation  - stable disease and no indications to start treatment at this time  -- continue to monitor q 6 months labs and follow up per Oncology notes      Gout  Assessment & Plan  Elevated uric acid secondary to chronic kidney disease 5  On allopurinol    Atrial fibrillation (HCC)  Assessment & Plan  On Coumadin 1 25 mg daily  INR is therapeutic    H/O aortic valve replacement  Assessment & Plan  Bioprosthetic valve with no acute issue  Will check an echocardiogram to assess LV function as well as to look at the valve      Ambulatory dysfunction-secondary to weakness with patient having multiple falls  Will have PT and OT evaluate  If also a real concern may have to revisit use of Coumadin  Elevated TSH-he has a TSH around 30  Free T3 and free T4 sent  Not on any Synthroid at home  Patient is on amiodarone  History of Present Illness     HPI:  Amber Chamorro is a 80 y o  male with a past medical history significant for combined systolic and diastolic heart failure, bioprosthetic aortic valve replacement, atrial fibrillation on Coumadin, chronic kidney disease 5, chronic lymphocytic leukemia being monitored with observation only, and ambulatory dysfunction with history of frequent falls when he sustained a laceration of the right upper extremity a couple of weeks ago needing suturing  Patient presents with decreased p o  Intake, generalized weakness which has been progressive to the extent that he cannot get up and walk which he could do previously with assistance  Patient denies any fever, diarrhea, nausea, vomiting    He was noticed to be volume overloaded a couple of weeks ago and had his Lasix increased to 60 mg daily by his nephrologist   His baseline creatinine is around 2  Creatinine today is around 2 2  Patient denies any dysuria  Patient is on Lasix, metolazone and spironolactone for his heart failure  Patient also complained of some chest congestion over the past week but denies any fever or purulent sputum  On evaluation in the ED patient was noted to be dehydrated, CT of the chest revealed a suspicious infiltrate and debris in the right middle lobe  It is unclear whether this is real pneumonia or an incidental finding          Historical Information   Past Medical History:   Diagnosis Date    AAA (abdominal aortic aneurysm) ([facility])     Abnormal blood chemistry     last assessed 12/12/2013    Allergic rhinitis     last assessed 11/21/2016    Anemia     Ankle edema     Arthritis     Atrophy of prostate     Chronic diarrhea     COPD (chronic obstructive pulmonary disease) ([facility])     Coronary artery disease     stents, pacemaker    Elevated PSA     GERD (gastroesophageal reflux disease)     Gout     Heart failure ([facility])     Heart murmur     aortic stenosis    Hyperlipidemia     Hypertension     Irregular heart beat     a fib    Joint pain     Kidney failure     stage 3    Leukemia ([facility])     Malignant neoplasm prostate ([address] )     Myocardial infarction ([address] )     Nocturia     Osteopenia     Pleural effusion     PVD (peripheral vascular disease) ([facility])     Radiation gastroenteritis     Skin cancer     Sore muscles     Type 2 diabetes mellitus ([facility])      Patient Active Problem List   Diagnosis    Chronic kidney disease, stage 5 ([facility])    History of prostate cancer    Urinary frequency    Non-pressure chronic ulcer of right calf with fat layer exposed ([address] )    Heart failure ([address] )    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, without long-term current use of insulin (HCC)    Chronic obstructive pulmonary disease (HCC)    Presence of stent in coronary artery in patient with coronary artery disease    Pacemaker    Hyperlipidemia    Gout    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)    Atrial fibrillation (HCC)    Hypertension    Atherosclerotic peripheral vascular disease with intermittent claudication (HCC)    Atherosclerotic heart disease of native coronary artery without angina pectoris    H/O aortic valve replacement    Bilateral leg edema    Weakness    Dehydration    Pneumonia of right middle lobe due to infectious organism [facility])     Past Surgical History:   Procedure Laterality Date    CARDIAC PACEMAKER PLACEMENT  2014    CARDIAC VALVE REPLACEMENT      CORONARY ANGIOPLASTY      prox left anterior descending artery assessment    OTHER SURGICAL HISTORY      catheter ablation    PROSTATE BIOPSY Bilateral 2003, 2008    VASCULAR SURGERY      iliac stent       Social History   Social History     Substance and Sexual Activity   Alcohol Use No    Frequency: Never    Binge frequency: Never     Social History     Substance and Sexual Activity   Drug Use No     Social History     Tobacco Use   Smoking Status Former Smoker   Smokeless Tobacco Never Used       Family History:   Family History   Problem Relation Age of Onset    Cancer Mother     Cancer Father        Meds/Allergies       Current Facility-Administered Medications:     sodium chloride 0 9 % infusion, 100 mL/hr, Intravenous, Continuous, Margie Deleon MD, Last Rate: 100 mL/hr at 09/07/19 1720, 100 mL/hr at 09/07/19 1720    Allergies   Allergen Reactions    Indomethacin        Review of Systems  A detailed 12 point review of systems was conducted and is negative apart from those mentioned in the HPI          Objective   Vitals: Blood pressure (!) 98/38, pulse 66, temperature (!) 97 3 °F (36 3 °C), temperature source Temporal, resp  rate 20, weight 82 kg (180 lb 12 4 oz), SpO2 96 %  Physical Exam     General - frail and chronically ill-looking  HEENT: PERRLA, EOMI, sclera anicteric, dry mucous membranes, tongue mucosa dry without lesions  Neck: supple, no JVD, lymphadenopathy, thyromegaly  Heart: Regular rate and rhythm, S1S2 present  No murmur, rub or gallop  Lungs; Clear to auscultation bilaterally  No wheezing, crackles or rhonchi  No accessory muscle use or respiratory distress  Abdomen: soft, non-tender, non-distended, NABS  No guarding or rebound  No peritoneal sound or mass  Extremities:  Skin tear over the right arm is healing and dressed with no sign of infection  Small right lower extremity wound healing with no sign of infection  Neurologic:  Cranial nerves II-XII intact  Strength and sensation globally intact  Speech fluent and goal directed  Awake, alert and oriented x 3  Skin:  Multiple ecchymotic rash over the anterior chest wall      Lab Results: I have personally reviewed pertinent reports  Results from last 7 days   Lab Units 19  1445   WBC Thousand/uL 27 96*   HEMOGLOBIN g/dL 9 5*   HEMATOCRIT % 31 3*   PLATELETS Thousands/uL 124*   LYMPHO PCT % 55*   MONO PCT % 3*   EOS PCT % 0     Results from last 7 days   Lab Units 19  1445   POTASSIUM mmol/L 4 6   CHLORIDE mmol/L 103   CO2 mmol/L 24   BUN mg/dL 80*   CREATININE mg/dL 2 11*   CALCIUM mg/dL 8 7   ALK PHOS U/L 164*   ALT U/L 48   AST U/L 74*     Results from last 7 days   Lab Units 19  1445   INR  2 42*           Imagin   Scattered debris in the trachea and right mainstem bronchus  Focal consolidation in the right middle lobe centrally may be related to atelectasis or pneumonia  Recommend follow-up chest CT in 3-4 weeks to ensure resolution  2  Diffuse interlobular septal thickening  Small bilateral pleural effusions  Findings suggest mild CHF  3  Cirrhotic appearance to the liver  Mild-to-moderate ascites  Splenomegaly  EKG-left bundle branch block  Paced    Code Status:  Full code    Counseling / Coordination of Care  Total floor / unit time spent today 32 minutes  Greater than 50% of total time was spent with the patient and / or family counseling and / or coordination of care  Portions of the record may have been created with voice recognition software  Occasional wrong word or "sound a like" substitutions may have occurred due to the inherent limitations of voice recognition software  Read the chart carefully and recognize, using context, where substitutions have occurred

## 2019-09-08 PROBLEM — N18.4 CHRONIC KIDNEY DISEASE, STAGE 4 (SEVERE) (HCC): Status: ACTIVE | Noted: 2019-09-08

## 2019-09-08 LAB
ALBUMIN SERPL BCP-MCNC: 2.4 G/DL (ref 3.5–5)
ALP SERPL-CCNC: 159 U/L (ref 46–116)
ALT SERPL W P-5'-P-CCNC: 43 U/L (ref 12–78)
ANION GAP SERPL CALCULATED.3IONS-SCNC: 8 MMOL/L (ref 4–13)
AST SERPL W P-5'-P-CCNC: 82 U/L (ref 5–45)
ATRIAL RATE: 288 BPM
BASOPHILS # BLD AUTO: 0.26 THOUSANDS/ΜL (ref 0–0.1)
BASOPHILS NFR BLD AUTO: 1 % (ref 0–1)
BILIRUB SERPL-MCNC: 1.6 MG/DL (ref 0.2–1)
BUN SERPL-MCNC: 88 MG/DL (ref 5–25)
CALCIUM SERPL-MCNC: 8.8 MG/DL (ref 8.3–10.1)
CHLORIDE SERPL-SCNC: 107 MMOL/L (ref 100–108)
CO2 SERPL-SCNC: 23 MMOL/L (ref 21–32)
CREAT SERPL-MCNC: 2.06 MG/DL (ref 0.6–1.3)
EOSINOPHIL # BLD AUTO: 0.12 THOUSAND/ΜL (ref 0–0.61)
EOSINOPHIL NFR BLD AUTO: 1 % (ref 0–6)
ERYTHROCYTE [DISTWIDTH] IN BLOOD BY AUTOMATED COUNT: 19.5 % (ref 11.6–15.1)
GFR SERPL CREATININE-BSD FRML MDRD: 28 ML/MIN/1.73SQ M
GLUCOSE SERPL-MCNC: 102 MG/DL (ref 65–140)
HCT VFR BLD AUTO: 30 % (ref 36.5–49.3)
HGB BLD-MCNC: 9.3 G/DL (ref 12–17)
IMM GRANULOCYTES # BLD AUTO: 0.07 THOUSAND/UL (ref 0–0.2)
IMM GRANULOCYTES NFR BLD AUTO: 0 % (ref 0–2)
INR PPP: 2.44 (ref 0.84–1.19)
LYMPHOCYTES # BLD AUTO: 14.13 THOUSANDS/ΜL (ref 0.6–4.47)
LYMPHOCYTES NFR BLD AUTO: 61 % (ref 14–44)
MCH RBC QN AUTO: 33.2 PG (ref 26.8–34.3)
MCHC RBC AUTO-ENTMCNC: 31 G/DL (ref 31.4–37.4)
MCV RBC AUTO: 107 FL (ref 82–98)
MONOCYTES # BLD AUTO: 2.65 THOUSAND/ΜL (ref 0.17–1.22)
MONOCYTES NFR BLD AUTO: 11 % (ref 4–12)
NEUTROPHILS # BLD AUTO: 5.96 THOUSANDS/ΜL (ref 1.85–7.62)
NEUTS SEG NFR BLD AUTO: 26 % (ref 43–75)
NRBC BLD AUTO-RTO: 0 /100 WBCS
PLATELET # BLD AUTO: 119 THOUSANDS/UL (ref 149–390)
PMV BLD AUTO: 11.2 FL (ref 8.9–12.7)
POTASSIUM SERPL-SCNC: 4.9 MMOL/L (ref 3.5–5.3)
PROT SERPL-MCNC: 5.1 G/DL (ref 6.4–8.2)
PROTHROMBIN TIME: 27 SECONDS (ref 11.6–14.5)
QRS AXIS: 264 DEGREES
QRSD INTERVAL: 146 MS
QT INTERVAL: 520 MS
QTC INTERVAL: 565 MS
RBC # BLD AUTO: 2.8 MILLION/UL (ref 3.88–5.62)
SODIUM SERPL-SCNC: 138 MMOL/L (ref 136–145)
T WAVE AXIS: 27 DEGREES
VENTRICULAR RATE: 71 BPM
WBC # BLD AUTO: 23.19 THOUSAND/UL (ref 4.31–10.16)

## 2019-09-08 PROCEDURE — 85025 COMPLETE CBC W/AUTO DIFF WBC: CPT | Performed by: HOSPITALIST

## 2019-09-08 PROCEDURE — 99222 1ST HOSP IP/OBS MODERATE 55: CPT | Performed by: INTERNAL MEDICINE

## 2019-09-08 PROCEDURE — 80053 COMPREHEN METABOLIC PANEL: CPT | Performed by: HOSPITALIST

## 2019-09-08 PROCEDURE — 93010 ELECTROCARDIOGRAM REPORT: CPT | Performed by: INTERNAL MEDICINE

## 2019-09-08 PROCEDURE — 99233 SBSQ HOSP IP/OBS HIGH 50: CPT | Performed by: INTERNAL MEDICINE

## 2019-09-08 PROCEDURE — 85610 PROTHROMBIN TIME: CPT | Performed by: HOSPITALIST

## 2019-09-08 RX ORDER — LANOLIN ALCOHOL/MO/W.PET/CERES
9 CREAM (GRAM) TOPICAL
Status: DISCONTINUED | OUTPATIENT
Start: 2019-09-08 | End: 2019-09-24 | Stop reason: HOSPADM

## 2019-09-08 RX ORDER — LEVOTHYROXINE SODIUM 0.05 MG/1
50 TABLET ORAL
Status: DISCONTINUED | OUTPATIENT
Start: 2019-09-09 | End: 2019-09-24 | Stop reason: HOSPADM

## 2019-09-08 RX ADMIN — AMIODARONE HYDROCHLORIDE 200 MG: 200 TABLET ORAL at 09:03

## 2019-09-08 RX ADMIN — ATORVASTATIN CALCIUM 40 MG: 40 TABLET, FILM COATED ORAL at 17:37

## 2019-09-08 RX ADMIN — ALLOPURINOL 300 MG: 100 TABLET ORAL at 09:02

## 2019-09-08 RX ADMIN — ASPIRIN 81 MG: 81 TABLET, COATED ORAL at 09:03

## 2019-09-08 RX ADMIN — TAMSULOSIN HYDROCHLORIDE 0.4 MG: 0.4 CAPSULE ORAL at 09:03

## 2019-09-08 RX ADMIN — WARFARIN SODIUM 1.25 MG: 2.5 TABLET ORAL at 17:35

## 2019-09-08 NOTE — ASSESSMENT & PLAN NOTE
Wt Readings from Last 3 Encounters:   09/08/19 87 3 kg (192 lb 7 4 oz)   09/04/19 81 6 kg (180 lb)   08/07/19 79 6 kg (175 lb 6 4 oz)    Patient with history of combined chronic systolic and diastolic CHF, check echocardiogram     Patient with history of pacemaker /AICD    Resume oral Lasix tomorrow    Beta-blocker is on hold

## 2019-09-08 NOTE — QUICK NOTE
Notified by nursing that patient had some gross hematuria, patient examined at bedside, patient appears to have a very small punctate wound on the glans penis  Will continue to monitor, urine at the time of voiding is actually manish colored without any blood clots      I suspect the patient sustained a small abrasion from the urinal

## 2019-09-08 NOTE — ASSESSMENT & PLAN NOTE
Based on presentation of weakness and CT abnormality there was suspicion of possible pneumonia, patient with negative procalcitonin level, discontinue antibiotics repeat procalcitonin level tomorrow  Pneumonia has been ruled out  CT of the chest revealing an infiltrate versus debris over the right middle lobe  Patient denies any history of choking or aspiration

## 2019-09-08 NOTE — PLAN OF CARE
Problem: Potential for Falls  Goal: Patient will remain free of falls  Description  INTERVENTIONS:  - Assess patient frequently for physical needs  -  Identify cognitive and physical deficits and behaviors that affect risk of falls  -  [place] fall precautions as indicated by assessment   - Educate patient/family on patient safety including physical limitations  - Instruct patient to call for assistance with activity based on assessment  - Modify environment to reduce risk of injury  - Consider OT/PT consult to assist with strengthening/mobility  Outcome: Progressing     Problem: Prexisting or High Potential for Compromised Skin Integrity  Goal: Skin integrity is maintained or improved  Description  INTERVENTIONS:  - Identify patients at risk for skin breakdown  - Assess and monitor skin integrity  - Assess and monitor nutrition and hydration status  - Monitor labs   - Assess for incontinence   - Turn and reposition patient  - Assist with mobility/ambulation  - Relieve pressure over bony prominences  - Avoid friction and shearing  - Provide appropriate hygiene as needed including keeping skin clean and dry  - Evaluate need for skin moisturizer/barrier cream  - Collaborate with interdisciplinary team   - Patient/family teaching  - Consider wound care consult   Outcome: Progressing     Problem: Nutrition/Hydration-ADULT  Goal: Nutrient/Hydration intake appropriate for improving, restoring or maintaining nutritional needs  Description  Monitor and assess patient's nutrition/hydration status for malnutrition  Collaborate with interdisciplinary team and initiate plan and interventions as ordered  Monitor patient's weight and dietary intake as ordered or per policy  Utilize nutrition screening tool and intervene as necessary  Determine patient's food preferences and provide high-protein, high-caloric foods as appropriate       INTERVENTIONS:  - Monitor oral intake, urinary output, labs, and treatment plans  - Assess nutrition and hydration status and recommend course of action  - Evaluate amount of meals eaten  - Assist patient with eating if necessary   - Allow adequate time for meals  - Recommend/ encourage appropriate diets, oral nutritional supplements, and vitamin/mineral supplements  - Order, calculate, and assess calorie counts as needed  - Recommend, monitor, and adjust tube feedings and TPN/PPN based on assessed needs  - Assess need for intravenous fluids  - Provide specific nutrition/hydration education as appropriate  - Include patient/family/caregiver in decisions related to nutrition  Outcome: Progressing     Problem: PAIN - ADULT  Goal: Verbalizes/displays adequate comfort level or baseline comfort level  Description  Interventions:  - Encourage patient to monitor pain and request assistance  - Assess pain using appropriate pain scale  - Administer analgesics based on type and severity of pain and evaluate response  - Implement non-pharmacological measures as appropriate and evaluate response  - Consider cultural and social influences on pain and pain management  - Notify physician/advanced practitioner if interventions unsuccessful or patient reports new pain  Outcome: Progressing     Problem: INFECTION - ADULT  Goal: Absence or prevention of progression during hospitalization  Description  INTERVENTIONS:  - Assess and monitor for signs and symptoms of infection  - Monitor lab/diagnostic results  - Monitor all insertion sites, i e  indwelling lines, tubes, and drains  - Monitor endotracheal if appropriate and nasal secretions for changes in amount and color  - [place] appropriate cooling/warming therapies per order  - Administer medications as ordered  - Instruct and encourage patient and family to use good hand hygiene technique  - Identify and instruct in appropriate isolation precautions for identified infection/condition  Outcome: Progressing     Problem: DISCHARGE PLANNING  Goal: Discharge to home or other facility with appropriate resources  Description  INTERVENTIONS:  - Identify barriers to discharge w/patient and caregiver  - Arrange for needed discharge resources and transportation as appropriate  - Identify discharge learning needs (meds, wound care, etc )  - Arrange for interpretive services to assist at discharge as needed  - Refer to Case Management Department for coordinating discharge planning if the patient needs post-hospital services based on physician/advanced practitioner order or complex needs related to functional status, cognitive ability, or social support system  Outcome: Progressing     Problem: Knowledge Deficit  Goal: Patient/family/caregiver demonstrates understanding of disease process, treatment plan, medications, and discharge instructions  Description  Complete learning assessment and assess knowledge base    Interventions:  - Provide teaching at level of understanding  - Provide teaching via preferred learning methods  Outcome: Progressing     Problem: RESPIRATORY - ADULT  Goal: Achieves optimal ventilation and oxygenation  Description  INTERVENTIONS:  - Assess for changes in respiratory status  - Assess for changes in mentation and behavior  - Position to facilitate oxygenation and minimize respiratory effort  - Oxygen administered by appropriate delivery if ordered  - Initiate smoking cessation education as indicated  - Encourage broncho-pulmonary hygiene including cough, deep breathe, Incentive Spirometry  - Assess the need for suctioning and aspirate as needed  - Assess and instruct to report SOB or any respiratory difficulty  - Respiratory Therapy support as indicated  Outcome: Progressing     Problem: METABOLIC, FLUID AND ELECTROLYTES - ADULT  Goal: Fluid balance maintained  Description  INTERVENTIONS:  - Monitor labs   - Monitor I/O and WT  - Instruct patient on fluid and nutrition as appropriate  - Assess for signs & symptoms of volume excess or deficit  Outcome: Progressing Problem: SKIN/TISSUE INTEGRITY - ADULT  Goal: Skin integrity remains intact  Description  INTERVENTIONS  - Identify patients at risk for skin breakdown  - Assess and monitor skin integrity  - Assess and monitor nutrition and hydration status  - Monitor labs (i e  albumin)  - Assess for incontinence   - Turn and reposition patient  - Assist with mobility/ambulation  - Relieve pressure over bony prominences  - Avoid friction and shearing  - Provide appropriate hygiene as needed including keeping skin clean and dry  - Evaluate need for skin moisturizer/barrier cream  - Collaborate with interdisciplinary team (i e  Nutrition, Rehabilitation, etc )   - Patient/family teaching  Outcome: Progressing  Goal: Incision(s), wounds(s) or drain site(s) healing without S/S of infection  Description  INTERVENTIONS  - Assess and document risk factors for skin impairment   - Assess and document dressing, incision, wound bed, drain sites and surrounding tissue  - Consider nutrition services referral as needed  - Oral mucous membranes remain intact  - Provide patient/ family education  Outcome: Progressing     Problem: HEMATOLOGIC - ADULT  Goal: Maintains hematologic stability  Description  INTERVENTIONS  - Assess for signs and symptoms of bleeding or hemorrhage  - Monitor labs  - Administer supportive blood products/factors as ordered and appropriate  Outcome: Progressing

## 2019-09-08 NOTE — ASSESSMENT & PLAN NOTE
On Coumadin 1 25 mg daily    INR is therapeutic    Patient on amiodarone, can cause issues with thyroid disease

## 2019-09-08 NOTE — ASSESSMENT & PLAN NOTE
Patient with a history of CLL and has been following up with Oncology  IV BC is chronically elevated  Under observation  - stable disease and no indications to start treatment at this time     -- continue to monitor q 6 months labs and follow up per Oncology notes

## 2019-09-08 NOTE — ASSESSMENT & PLAN NOTE
Primary complaint is generalized weakness, recurrent falls, this is likely predominantly due to untreated hypothyroidism, TSH is significantly elevated with low free T4  Start levothyroxine 50 mcg daily, will need repeat TSH in 2 to 4 weeks  Consult PT OT    Patient with generalized weakness over the past 2-3 weeks and worse over the past few days  The patient was previously able to ambulate with help but now is unable to get up and walk secondary to weakness  Has fallen and had to have a laceration sutured about a week ago  Patient with extensive bruising on the anterior chest wall secondary to Coumadin

## 2019-09-08 NOTE — ASSESSMENT & PLAN NOTE
Patient with a creatinine of 2 11  Baseline creatinine around 2  Follows up with nephrologist Dr Joey Chang  Restart Lasix tomorrow 9/9/2019  Nephrology consult appreciated

## 2019-09-08 NOTE — CONSULTS
Consultation - Nephrology   Sylwiarhys Stovall 80 y o  male MRN: [id number]  Unit/Bed#: E5 -01 Encounter: [id number]    ASSESSMENT/PLAN:  CKD IV:  Follows with Dr Bryant with [facility] specialist   Concern for over diuresis with recent increase in OP Lasix dosing   -baseline creatinine 1 9-2 1   - creatinine is stable  - home diuretic: Lasix 60 mg po daily, metolazone 1 25 mg Monday Wednesday Friday, spironolactone 12 5 mg daily   -diuretics remain on hold for concern for volume depletion  Will continue to monitor volume status for re-initiation  -will hold off on IV fluids for now   -no urgent indication for dialysis  -avoid nephrotoxins  -I/O  Hypertension:  Blood pressure is fluctuating with periods of hypotension  -will continue to monitor, avoid further hypotension   -diuretics currently on hold  Combined systolic/diastolic heart failure:  Ejection fraction of 45% in 2015    -daily weight, I/O   -placed on 1 5 L per day fluid restriction   -diuretic currently on hold  Pneumonia:  Care per primary team   -continues on antibiotics  CLL:  Follows outpatient with Hematology/Oncology, currently not receiving treatment and is undergoing surveillance every 6 months with lab work  Anemia: Hgb is stable in the 9's  - will continue to monitor and transfuse as needed  HISTORY OF PRESENT ILLNESS:  Requesting Physician: Kristina Mayen DO  Reason for Consult: CKD IV    Miguelito Collins is a 80y o  year old male with history of CKD stage 4, hypertension, combined CHF, CLL, atrial fibrillation, and gout who was admitted to [facility] after presenting with increased generalized weakness, status post fall  A renal consultation is requested today for assistance in the management of CKD IV  The patient denies chest pain or shortness of breath  He denies nausea, vomiting, diarrhea  He states that he has been eating and drinking well  He denies the use of NSAIDs at home    He is urinating without difficulty  He admits to falling more frequently lately due to increased weakness  He denies feeling dizzy or lightheaded  The patient follows with [facility] specialist   He was last seen in mid August and per note review, his Lasix was increased at this time due to worsening volume status      PAST MEDICAL HISTORY:  Past Medical History:   Diagnosis Date    AAA [facility]abdominal aortic aneurysm) ([facility])     Abnormal blood chemistry     last assessed 12/12/2013    Allergic rhinitis     last assessed 11/21/2016    Anemia     Ankle edema     Arthritis     Atrophy of prostate     Chronic diarrhea     COPD (chronic obstructive pulmonary disease) ([facility])     Coronary artery disease     stents, pacemaker    Elevated PSA     GERD (gastroesophageal reflux disease)     Gout     Heart failure ([facility])     Heart murmur     aortic stenosis    Hyperlipidemia     Hypertension     Irregular heart beat     a fib    Joint pain     Kidney failure     stage 3    Leukemia ([facility])     Malignant neoplasm prostate ([address]     Myocardial infarction ([address]     Nocturia     Osteopenia     Pleural effusion     PVD (peripheral vascular disease) ([facility])     Radiation gastroenteritis     Skin cancer     Sore muscles     Type 2 diabetes mellitus ([address] )        PAST SURGICAL HISTORY:  Past Surgical History:   Procedure Laterality Date    CARDIAC PACEMAKER PLACEMENT  2014    CARDIAC VALVE REPLACEMENT      CORONARY ANGIOPLASTY      prox left anterior descending artery assessment    OTHER SURGICAL HISTORY      catheter ablation    PROSTATE BIOPSY Bilateral 2003, 2008    VASCULAR SURGERY      iliac stent       ALLERGIES:  Allergies   Allergen Reactions    Indomethacin        SOCIAL HISTORY:  Social History     Substance and Sexual Activity   Alcohol Use No    Frequency: Never    Binge frequency: Never     Social History     Substance and Sexual Activity   Drug Use No     Social History     Tobacco Use   Smoking Status Former Smoker   Smokeless Tobacco Never Used       FAMILY HISTORY:  Family History   Problem Relation Age of Onset   Iam Allen Cancer Mother     Cancer Father        MEDICATIONS:    Current Facility-Administered Medications:     acetaminophen (TYLENOL) tablet 488 mg, 488 mg, Oral, Q6H PRN, Robe Pitt MD    allopurinol (ZYLOPRIM) tablet 300 mg, 300 mg, Oral, Daily, Robe Pitt MD    amiodarone tablet 200 mg, 200 mg, Oral, Daily, Robe Pitt MD    aspirin (ECOTRIN LOW STRENGTH) EC tablet 81 mg, 81 mg, Oral, Daily, Robe Pitt MD    atorvastatin (LIPITOR) tablet 40 mg, 40 mg, Oral, After Lillie Page MD, 40 mg at 09/07/19 1834    cefepime (MAXIPIME) 1,000 mg in dextrose 5 % 50 mL IVPB, 1,000 mg, Intravenous, Q12H, Steffi Bowen MD    fluticasone (FLONASE) 50 mcg/act nasal spray 1 spray, 1 spray, Each Nare, Daily, Robe Pitt MD    melatonin tablet 6 mg, 6 mg, Oral, HS PRN, Nahomy Hernandez PA-C, 6 mg at 09/07/19 2240    ondansetron (ZOFRAN) injection 4 mg, 4 mg, Intravenous, Q6H PRN, Robe Pitt MD    tamsulosin (FLOMAX) capsule 0 4 mg, 0 4 mg, Oral, Daily, Robe Pitt MD    warfarin (COUMADIN) tablet 1 25 mg, 1 25 mg, Oral, Daily (warfarin), Robe Pitt MD, 1 25 mg at 09/07/19 1835    REVIEW OF SYSTEMS:  A complete review of systems was performed and found to be negative unless otherwise noted in the history of present illness  General: No fevers, chills  Cardiovascular:  - chest pain, -  leg edema  Respiratory: No cough, sputum production,  - shortness of breath  Gastrointestinal:  - nausea/vomiting,  - diarrhea,  - abdominal pain  Genitourinary: No hematuria  No foamy urine    No dysuria    PHYSICAL EXAM:  Current Weight: Weight - Scale: 87 3 kg (192 lb 7 4 oz)  First Weight: Weight - Scale: 82 kg (180 lb 12 4 oz)  Vitals:    09/07/19 1752 09/07/19 2302 09/08/19 0600 09/08/19 0755   BP: (!) 98/38 90/53  121/59   BP Location: Left arm Left arm  Right arm   Pulse: 66 60  60   Resp: 20 18  18   Temp: (!) 97 3 °F (36 3 °C) 97 9 °F (36 6 °C)  97 6 °F (36 4 °C)   TempSrc: Temporal Temporal  Tympanic   SpO2: 96% 96%  95%   Weight:   87 3 kg (192 lb 7 4 oz)        Intake/Output Summary (Last 24 hours) at 9/8/2019 0849  Last data filed at 9/8/2019 0754  Gross per 24 hour   Intake 1649 17 ml   Output 600 ml   Net 1049 17 ml     General: NAD, frail  Skin: warm, multiple ecchymotic areas in skin tears    HEENT: Moist mucous membranes, sclera anicteric, normocephalic, atraumatic  Neck: No apparent JVD appreciated  Chest:lung sounds clear B/L, on RA   CVS:Regular rate and rhythm, no murmer   Abdomen: Soft, round, non-tender, +BS  Extremities: No B/L LE edema present  Neuro: alert and oriented  Psych: appropriate mood and affect     Invasive Devices:      Lab Results:   Results from last 7 days   Lab Units 09/08/19  0622 09/07/19  1445   WBC Thousand/uL 23 19* 27 96*   HEMOGLOBIN g/dL 9 3* 9 5*   HEMATOCRIT % 30 0* 31 3*   PLATELETS Thousands/uL 119* 124*   POTASSIUM mmol/L 4 9 4 6   CHLORIDE mmol/L 107 103   CO2 mmol/L 23 24   BUN mg/dL 88* 80*   CREATININE mg/dL 2 06* 2 11*   CALCIUM mg/dL 8 8 8 7   MAGNESIUM mg/dL  --  2 3   ALK PHOS U/L 159* 164*   ALT U/L 43 48   AST U/L 82* 74*

## 2019-09-08 NOTE — ASSESSMENT & PLAN NOTE
There is concern for possible volume depletion present on admission, diuretics were held, patient given gentle IV fluid hydration, patient appears to have ongoing volume overload based on clinical exam     Will resume Lasix 60 mg daily tomorrow

## 2019-09-08 NOTE — PROGRESS NOTES
Progress Note Jose Stovall 1933, 80 y o  male MRN: [id number]    Unit/Bed#: E5 -01 Encounter: [id number]    Primary Care Provider: Shaquille Celestin MD   Date and time admitted to hospital: 9/7/2019  2:31 PM        * Weakness  Assessment & Plan  Primary complaint is generalized weakness, recurrent falls, this is likely predominantly due to untreated hypothyroidism, TSH is significantly elevated with low free T4  Start levothyroxine 50 mcg daily, will need repeat TSH in 2 to 4 weeks  Consult PT OT    Patient with generalized weakness over the past 2-3 weeks and worse over the past few days  The patient was previously able to ambulate with help but now is unable to get up and walk secondary to weakness  Has fallen and had to have a laceration sutured about a week ago  Patient with extensive bruising on the anterior chest wall secondary to Coumadin  Pneumonia of right middle lobe due to infectious organism [facility])  Assessment & Plan  Based on presentation of weakness and CT abnormality there was suspicion of possible pneumonia, patient with negative procalcitonin level, discontinue antibiotics repeat procalcitonin level tomorrow  Pneumonia has been ruled out  CT of the chest revealing an infiltrate versus debris over the right middle lobe  Patient denies any history of choking or aspiration  Dehydration  Assessment & Plan  There is concern for possible volume depletion present on admission, diuretics were held, patient given gentle IV fluid hydration, patient appears to have ongoing volume overload based on clinical exam     Will resume Lasix 60 mg daily tomorrow    Chronic kidney disease, stage 4 (severe) ([facility])  Assessment & Plan  Patient's baseline creatinine is 1 9 to 2 1    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 [facility])  Assessment & Plan  Patient with a history of CLL and has been following up with Oncology    IV BC is chronically elevated  Under observation  - stable disease and no indications to start treatment at this time  -- continue to monitor q 6 months labs and follow up per Oncology notes      Atrial fibrillation [facility])  Assessment & Plan  On Coumadin 1 25 mg daily  INR is therapeutic    Patient on amiodarone, can cause issues with thyroid disease    H/O aortic valve replacement  Assessment & Plan  Bioprosthetic valve with no acute issue  Will check an echocardiogram to assess LV function as well as to look at the valve    Heart failure [facility])  Assessment & Plan  Wt Readings from Last 3 Encounters:   09/08/19 87 3 kg (192 lb 7 4 oz)   09/04/19 81 6 kg (180 lb)   08/07/19 79 6 kg (175 lb 6 4 oz)    Patient with history of combined chronic systolic and diastolic CHF, check echocardiogram     Patient with history of pacemaker /AICD    Resume oral Lasix tomorrow    Beta-blocker is on hold        Chronic obstructive pulmonary disease ([facility] [address] )  Assessment & Plan  No evidence of acute exacerbation    Gout  Assessment & Plan  Elevated uric acid secondary to chronic kidney disease 4  On allopurinol      VTE Pharmacologic Prophylaxis:   Pharmacologic: Warfarin (Coumadin)  Mechanical VTE Prophylaxis in Place: Yes    Patient Centered Rounds: I have performed bedside rounds with nursing staff today  Discussions with Specialists or Other Care Team Provider:  Nephrology note reviewed  Education and Discussions with Family / Patient:  Plan of care discussed with the patient and his son who was present at bedside    Time Spent for Care: 1 hour  More than 50% of total time spent on counseling and coordination of care as described above      Current Length of Stay: 1 day(s)    Current Patient Status: Inpatient   Certification Statement: The patient will continue to require additional inpatient hospital stay due to Patient with weakness, ambulatory dysfunction, not medically safe for discharge, needs anticoagulation, needs PT OT will benefit from short-term rehab, recommend snf placement as soon as possible  Code Status: Level 1 - Full Code      Subjective:   No pain, primary complaint is general weakness with progressive ambulatory dysfunction over the last several weeks    Objective:     Vitals:   Temp (24hrs), Av 6 °F (36 4 °C), Min:97 3 °F (36 3 °C), Max:97 9 °F (36 6 °C)    Temp:  [97 3 °F (36 3 °C)-97 9 °F (36 6 °C)] 97 4 °F (36 3 °C)  HR:  [60-66] 63  Resp:  [18-20] 18  BP: ()/(38-59) 101/46  SpO2:  [94 %-96 %] 94 %  Body mass index is 29 26 kg/m²  Input and Output Summary (last 24 hours): Intake/Output Summary (Last 24 hours) at 2019 1733  Last data filed at 2019 1230  Gross per 24 hour   Intake 1569 17 ml   Output 900 ml   Net 669 17 ml       Physical Exam:     Physical Exam   Constitutional:   Chronically ill-appearing and frail elderly male   HENT:   Head: Normocephalic and atraumatic  Right Ear: External ear normal    Left Ear: External ear normal    Eyes: Pupils are equal, round, and reactive to light  Cardiovascular: Normal rate  Pulmonary/Chest: Effort normal and breath sounds normal  No stridor  No respiratory distress  Abdominal: Soft  Bowel sounds are normal  He exhibits no distension  There is no tenderness  Musculoskeletal: He exhibits edema  Skin: Skin is warm and dry  No rash noted  No erythema  Psychiatric: He has a normal mood and affect  His behavior is normal  Judgment and thought content normal    Nursing note and vitals reviewed        Additional Data:     Labs:    Results from last 7 days   Lab Units 19  0622   WBC Thousand/uL 23 19*   HEMOGLOBIN g/dL 9 3*   HEMATOCRIT % 30 0*   PLATELETS Thousands/uL 119*   NEUTROS PCT % 26*   LYMPHS PCT % 61*   MONOS PCT % 11   EOS PCT % 1     Results from last 7 days   Lab Units 19  0622   POTASSIUM mmol/L 4 9   CHLORIDE mmol/L 107   CO2 mmol/L 23   BUN mg/dL 88*   CREATININE mg/dL 2 06*   CALCIUM mg/dL 8 8   ALK PHOS U/L 159*   ALT U/L 43   AST U/L 82*     Results from last 7 days   Lab Units 09/08/19  0622   INR  2 44*       * I Have Reviewed All Lab Data Listed Above  * Additional Pertinent Lab Tests Reviewed:  All [place] Admission Reviewed      Recent Cultures (last 7 days):           Last 24 Hours Medication List:     Current Facility-Administered Medications:  acetaminophen 488 mg Oral Q6H PRN Hussein Shirley MD   allopurinol 300 mg Oral Daily Hussein Shirley MD   amiodarone 200 mg Oral Daily Hussein Shirley MD   aspirin 81 mg Oral Daily Hussein Shirley, MD   atorvastatin 40 mg Oral After Dinner Hussein Shirley MD   fluticasone 1 spray Each Nare Daily Hussein Shirley MD   [START ON 9/9/2019] furosemide 60 mg Oral Daily Wilma Galindo DO   [START ON 9/9/2019] levothyroxine 50 mcg Oral Early Morning Lucas Putnam DO   melatonin 6 mg Oral HS PRN Nahomy Hernandez PA-C   ondansetron 4 mg Intravenous Q6H PRN Hussein Shirley MD   tamsulosin 0 4 mg Oral Daily Hussein Shirley MD   warfarin 1 25 mg Oral Daily (warfarin) Hussein Shirley MD        Today, Patient Was Seen By: Wilma Galindo DO

## 2019-09-09 ENCOUNTER — APPOINTMENT (INPATIENT)
Dept: NON INVASIVE DIAGNOSTICS | Facility: HOSPITAL | Age: 84
DRG: 987 | End: 2019-09-09
Payer: MEDICARE

## 2019-09-09 ENCOUNTER — APPOINTMENT (INPATIENT)
Dept: ULTRASOUND IMAGING | Facility: HOSPITAL | Age: 84
DRG: 987 | End: 2019-09-09
Payer: MEDICARE

## 2019-09-09 PROBLEM — R26.2 AMBULATORY DYSFUNCTION: Status: ACTIVE | Noted: 2019-09-07

## 2019-09-09 PROBLEM — N28.89 LEFT RENAL MASS: Status: ACTIVE | Noted: 2019-09-09

## 2019-09-09 PROBLEM — I95.9 HYPOTENSION (ARTERIAL): Status: ACTIVE | Noted: 2019-09-09

## 2019-09-09 PROBLEM — I50.42 CHRONIC COMBINED SYSTOLIC AND DIASTOLIC HEART FAILURE (HCC): Status: ACTIVE | Noted: 2019-03-21

## 2019-09-09 PROBLEM — E03.9 HYPOTHYROIDISM: Status: ACTIVE | Noted: 2019-09-09

## 2019-09-09 LAB
ALBUMIN SERPL BCP-MCNC: 2.2 G/DL (ref 3.5–5)
ALP SERPL-CCNC: 155 U/L (ref 46–116)
ALT SERPL W P-5'-P-CCNC: 38 U/L (ref 12–78)
ANION GAP SERPL CALCULATED.3IONS-SCNC: 9 MMOL/L (ref 4–13)
AST SERPL W P-5'-P-CCNC: 64 U/L (ref 5–45)
BACTERIA UR QL AUTO: ABNORMAL /HPF
BASOPHILS # BLD AUTO: 0.23 THOUSANDS/ΜL (ref 0–0.1)
BASOPHILS NFR BLD AUTO: 1 % (ref 0–1)
BILIRUB SERPL-MCNC: 1.19 MG/DL (ref 0.2–1)
BILIRUB UR QL STRIP: NEGATIVE
BUN SERPL-MCNC: 89 MG/DL (ref 5–25)
CALCIUM SERPL-MCNC: 8.1 MG/DL (ref 8.3–10.1)
CHLORIDE SERPL-SCNC: 105 MMOL/L (ref 100–108)
CHLORIDE UR-SCNC: 51 MMOL/L
CLARITY UR: CLEAR
CO2 SERPL-SCNC: 23 MMOL/L (ref 21–32)
COLOR UR: YELLOW
CORTIS SERPL-MCNC: 19 UG/DL
CREAT SERPL-MCNC: 2.46 MG/DL (ref 0.6–1.3)
CREAT UR-MCNC: 90.9 MG/DL
EOSINOPHIL # BLD AUTO: 0.17 THOUSAND/ΜL (ref 0–0.61)
EOSINOPHIL NFR BLD AUTO: 1 % (ref 0–6)
ERYTHROCYTE [DISTWIDTH] IN BLOOD BY AUTOMATED COUNT: 19.7 % (ref 11.6–15.1)
GFR SERPL CREATININE-BSD FRML MDRD: 23 ML/MIN/1.73SQ M
GLUCOSE SERPL-MCNC: 108 MG/DL (ref 65–140)
GLUCOSE UR STRIP-MCNC: NEGATIVE MG/DL
HCT VFR BLD AUTO: 29.6 % (ref 36.5–49.3)
HCV AB SER QL: NORMAL
HGB BLD-MCNC: 9.2 G/DL (ref 12–17)
HGB UR QL STRIP.AUTO: ABNORMAL
HYALINE CASTS #/AREA URNS LPF: ABNORMAL /LPF
IMM GRANULOCYTES # BLD AUTO: 0.07 THOUSAND/UL (ref 0–0.2)
IMM GRANULOCYTES NFR BLD AUTO: 0 % (ref 0–2)
INR PPP: 2.62 (ref 0.84–1.19)
KETONES UR STRIP-MCNC: NEGATIVE MG/DL
LEUKOCYTE ESTERASE UR QL STRIP: NEGATIVE
LYMPHOCYTES # BLD AUTO: 13.1 THOUSANDS/ΜL (ref 0.6–4.47)
LYMPHOCYTES NFR BLD AUTO: 59 % (ref 14–44)
MAGNESIUM SERPL-MCNC: 2.3 MG/DL (ref 1.6–2.6)
MCH RBC QN AUTO: 33.3 PG (ref 26.8–34.3)
MCHC RBC AUTO-ENTMCNC: 31.1 G/DL (ref 31.4–37.4)
MCV RBC AUTO: 107 FL (ref 82–98)
MONOCYTES # BLD AUTO: 2.82 THOUSAND/ΜL (ref 0.17–1.22)
MONOCYTES NFR BLD AUTO: 13 % (ref 4–12)
NEUTROPHILS # BLD AUTO: 5.88 THOUSANDS/ΜL (ref 1.85–7.62)
NEUTS SEG NFR BLD AUTO: 26 % (ref 43–75)
NITRITE UR QL STRIP: NEGATIVE
NON-SQ EPI CELLS URNS QL MICRO: ABNORMAL /HPF
NRBC BLD AUTO-RTO: 0 /100 WBCS
PH UR STRIP.AUTO: 5 [PH]
PHOSPHATE SERPL-MCNC: 3.5 MG/DL (ref 2.3–4.1)
PLATELET # BLD AUTO: 103 THOUSANDS/UL (ref 149–390)
PMV BLD AUTO: 11.1 FL (ref 8.9–12.7)
POTASSIUM SERPL-SCNC: 4.8 MMOL/L (ref 3.5–5.3)
PROCALCITONIN SERPL-MCNC: 0.33 NG/ML
PROT SERPL-MCNC: 4.8 G/DL (ref 6.4–8.2)
PROT UR STRIP-MCNC: NEGATIVE MG/DL
PROTHROMBIN TIME: 28.6 SECONDS (ref 11.6–14.5)
RBC # BLD AUTO: 2.76 MILLION/UL (ref 3.88–5.62)
RBC #/AREA URNS AUTO: ABNORMAL /HPF
SODIUM 24H UR-SCNC: 23 MOL/L
SODIUM SERPL-SCNC: 137 MMOL/L (ref 136–145)
SP GR UR STRIP.AUTO: 1.02 (ref 1–1.03)
UROBILINOGEN UR QL STRIP.AUTO: 0.2 E.U./DL
UUN 24H UR-MCNC: 851 MG/DL
WBC # BLD AUTO: 22.27 THOUSAND/UL (ref 4.31–10.16)
WBC #/AREA URNS AUTO: ABNORMAL /HPF

## 2019-09-09 PROCEDURE — 86803 HEPATITIS C AB TEST: CPT | Performed by: INTERNAL MEDICINE

## 2019-09-09 PROCEDURE — 81001 URINALYSIS AUTO W/SCOPE: CPT | Performed by: INTERNAL MEDICINE

## 2019-09-09 PROCEDURE — 85610 PROTHROMBIN TIME: CPT | Performed by: INTERNAL MEDICINE

## 2019-09-09 PROCEDURE — G8979 MOBILITY GOAL STATUS: HCPCS

## 2019-09-09 PROCEDURE — 84540 ASSAY OF URINE/UREA-N: CPT | Performed by: INTERNAL MEDICINE

## 2019-09-09 PROCEDURE — 97163 PT EVAL HIGH COMPLEX 45 MIN: CPT

## 2019-09-09 PROCEDURE — 82533 TOTAL CORTISOL: CPT | Performed by: INTERNAL MEDICINE

## 2019-09-09 PROCEDURE — 99233 SBSQ HOSP IP/OBS HIGH 50: CPT | Performed by: INTERNAL MEDICINE

## 2019-09-09 PROCEDURE — 93306 TTE W/DOPPLER COMPLETE: CPT

## 2019-09-09 PROCEDURE — 84300 ASSAY OF URINE SODIUM: CPT | Performed by: INTERNAL MEDICINE

## 2019-09-09 PROCEDURE — 85025 COMPLETE CBC W/AUTO DIFF WBC: CPT | Performed by: INTERNAL MEDICINE

## 2019-09-09 PROCEDURE — G8978 MOBILITY CURRENT STATUS: HCPCS

## 2019-09-09 PROCEDURE — 84145 PROCALCITONIN (PCT): CPT | Performed by: INTERNAL MEDICINE

## 2019-09-09 PROCEDURE — 83735 ASSAY OF MAGNESIUM: CPT | Performed by: INTERNAL MEDICINE

## 2019-09-09 PROCEDURE — 99232 SBSQ HOSP IP/OBS MODERATE 35: CPT | Performed by: INTERNAL MEDICINE

## 2019-09-09 PROCEDURE — 80053 COMPREHEN METABOLIC PANEL: CPT | Performed by: INTERNAL MEDICINE

## 2019-09-09 PROCEDURE — 82570 ASSAY OF URINE CREATININE: CPT | Performed by: INTERNAL MEDICINE

## 2019-09-09 PROCEDURE — 84100 ASSAY OF PHOSPHORUS: CPT | Performed by: INTERNAL MEDICINE

## 2019-09-09 PROCEDURE — 82436 ASSAY OF URINE CHLORIDE: CPT | Performed by: INTERNAL MEDICINE

## 2019-09-09 PROCEDURE — 76770 US EXAM ABDO BACK WALL COMP: CPT

## 2019-09-09 RX ORDER — ALBUMIN (HUMAN) 12.5 G/50ML
25 SOLUTION INTRAVENOUS EVERY 6 HOURS
Status: COMPLETED | OUTPATIENT
Start: 2019-09-09 | End: 2019-09-09

## 2019-09-09 RX ADMIN — ATORVASTATIN CALCIUM 40 MG: 40 TABLET, FILM COATED ORAL at 18:06

## 2019-09-09 RX ADMIN — ALBUMIN (HUMAN) 25 G: 12.5 SOLUTION INTRAVENOUS at 15:32

## 2019-09-09 RX ADMIN — ALLOPURINOL 300 MG: 100 TABLET ORAL at 08:59

## 2019-09-09 RX ADMIN — WARFARIN SODIUM 1.25 MG: 2.5 TABLET ORAL at 18:05

## 2019-09-09 RX ADMIN — AMIODARONE HYDROCHLORIDE 200 MG: 200 TABLET ORAL at 08:59

## 2019-09-09 RX ADMIN — LEVOTHYROXINE SODIUM 50 MCG: 50 TABLET ORAL at 05:04

## 2019-09-09 RX ADMIN — ALBUMIN (HUMAN) 25 G: 12.5 SOLUTION INTRAVENOUS at 20:55

## 2019-09-09 RX ADMIN — ASPIRIN 81 MG: 81 TABLET, COATED ORAL at 09:00

## 2019-09-09 RX ADMIN — TAMSULOSIN HYDROCHLORIDE 0.4 MG: 0.4 CAPSULE ORAL at 09:00

## 2019-09-09 NOTE — PLAN OF CARE
Problem: PHYSICAL THERAPY ADULT  Goal: Performs mobility at highest level of function for planned discharge setting  See evaluation for individualized goals  Description  Treatment/Interventions: Functional transfer training, LE strengthening/ROM, Elevations, Therapeutic exercise, Endurance training, Patient/family training, Bed mobility, Gait training, Spoke to nursing  Equipment Recommended: Susan Finch       See flowsheet documentation for full assessment, interventions and recommendations  Note:   Prognosis: Fair  Problem List: Decreased strength, Decreased range of motion, Decreased endurance, Impaired balance, Decreased mobility, Impaired hearing, Impaired sensation, Decreased skin integrity  Assessment: Pt  86 y o male presented w/ generalized weakness over the past 2-3 weeks and worse over the past few days, more clumsy & unsteady on his feet resulting to falls & inability to get up  Pt reports s/p fall x 1-2 weeks ago, sustained arm lac requiring sutures  CT showed pneumonia & mild-mod ascites  Pt admitted for Ambulatory dysfunction w/ pneumonia & ascites  Pt referred to PT for mobility assessment & D/C planning w/ orders of up w/ assistance  PTA, pt reports being I w/o AD household distances; RW for community amb  Pt lives alone in 2 story house w/ 1+1STE & 14 steps to 2nd level bed/bath; (+)   On eval, pt demonstrate dec mobility, balance, endurance & amb  Pt require minAx1 for most functional mobility + cues for techniques  Gait deviations as above, slow & unsteady w/ dec foot clearance, dec strides & inc toeing out but no gross LOB noted  No dizziness reported t/o session  Nsg staff most recent vital signs as follows: /51 (BP Location: Left arm)   Pulse 61   Temp (!) 96 7 °F (35 9 °C) (Temporal)   Resp 20   Ht 5' 8" (1 727 m)   Wt 87 3 kg (192 lb 7 4 oz)   SpO2 95%   BMI 29 26 kg/m²   At end of session, pt remain OOB in chair w/o issues, call bell & phone in reach   Fall precautions reinforced w/ good understanding  Pt functioning below baseline hence will continue skilled PT to improve function & safety  At this time, due to above mentioned deficits, advanced age, inaccessible home, home alone & high risk for falls, pt will benefit from inpt rehab at D/C  Pt agreeable to D/C rec  CM to follow  Nsg staff to continue to mobilized pt (OOB in chair for all meals & ambulate in room/unit) as tolerated to prevent further decline in function  Nsg notified  Barriers to Discharge: Inaccessible home environment, Decreased caregiver support  Barriers to Discharge Comments: pt home alone in a 2 story house  Recommendation: Short-term skilled PT     PT - OK to Discharge: Yes(to inpt rehab when medically cleared)    See flowsheet documentation for full assessment

## 2019-09-09 NOTE — PHYSICAL THERAPY NOTE
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, without long-term current use of insulin ([facility])    Chronic obstructive pulmonary disease ([facility])    Presence of stent in coronary artery in patient with coronary artery disease    Pacemaker    Hyperlipidemia    Gout    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 ([facility])    Atrial fibrillation ([place] [address] )    Hypertension    Atherosclerotic peripheral vascular disease with intermittent claudication ([facility])    Atherosclerotic heart disease of native coronary artery without angina pectoris    H/O aortic valve replacement    Bilateral leg edema    Ambulatory dysfunction    Dehydration    Pneumonia of right middle lobe due to infectious organism ([place] [address] )    Chronic kidney disease, stage 4 (severe) ([facility])    Hypothyroidism    Hypotension (arterial)    Left renal mass       Admitting Diagnoses:   Weakness [R53 1]  Anemia [D64 9]  Abnormal CT scan [R93 89]  Elevated troponin [R74 8]  Abnormal TSH [R79 89]  Generalized weakness [R53 1]  Renal structural abnormality [Q63 9]    Past Medical History:   Diagnosis Date    AAA (abdominal aortic aneurysm) ([facility])     Abnormal blood chemistry     last assessed 12/12/2013    Allergic rhinitis     last assessed 11/21/2016    Anemia     Ankle edema     Arthritis     Atrophy of prostate     Chronic diarrhea     COPD (chronic obstructive pulmonary disease) ([facility])     Coronary artery disease     stents, pacemaker    Elevated PSA     GERD (gastroesophageal reflux disease)     Gout     Heart failure (HCC)     Heart murmur     aortic stenosis    Hyperlipidemia     Hypertension     Irregular heart beat     a fib    Joint pain     Kidney failure     stage 3    Leukemia (HCC)     Malignant neoplasm prostate ([address] )     Myocardial infarction ([address] )     Nocturia     Osteopenia     Pleural effusion     PVD (peripheral vascular disease) (HCC)     Radiation gastroenteritis     Skin cancer     Sore muscles     Type 2 diabetes mellitus ([facility] )        Past Surgical History:   Procedure Laterality Date    CARDIAC PACEMAKER PLACEMENT  2014    CARDIAC VALVE REPLACEMENT      CORONARY ANGIOPLASTY      prox left anterior descending artery assessment    OTHER SURGICAL HISTORY      catheter ablation    PROSTATE BIOPSY Bilateral 2003, 2008    VASCULAR SURGERY      iliac stent       Imaging Studies:  CT head without contrast   Final Result by Katherine Chanel MD (09/07 2188)      No acute intracranial abnormality  Workstation performed: [id number]         CT chest abdomen pelvis wo contrast   Final Result by Shiv Hi MD (09/07 6777)   Exam is limited without IV and oral contrast particularly for soft tissue evaluation  1   Scattered debris in the trachea and right mainstem bronchus  Focal consolidation in the right middle lobe centrally may be related to atelectasis or pneumonia  Recommend follow-up chest CT in 3-4 weeks to ensure resolution  2  Diffuse interlobular septal thickening  Small bilateral pleural effusions  Findings suggest mild CHF  3  Cirrhotic appearance to the liver  Mild-to-moderate ascites  Splenomegaly  4   Indeterminate left lower pole renal lesion measuring up to 2 7 cm in size  Recommend follow-up with nonemergent renal ultrasound  Reminder placed into [facility] for follow up examinations                     Workstation performed: [id number]         US retroperitoneal complete    (Results Pending)        09/09/19 7331   Note Type   Note type Eval only   Pain Assessment   Pain Score No Pain   Home Living   Type of [address] Two level;Stairs to enter with rails  (14 steps to 2nd level bed/bath; 1+1STE)   Home Equipment Walker   Prior Function   Level of [place] Independent with ADLs and functional mobility  (w/o AD household amb; RW for community amb)   Lives With [address] in the last 6 months 1 to 4  (3x)   Comments (+)    Restrictions/Precautions   Other Precautions Fall Risk;Telemetry   General   Additional Pertinent History h/o CLL; h/o falls x 1wk ago sustained arm lacerations requiring sutures   Family/Caregiver Present No   Cognition   Overall Cognitive Status WFL   Arousal/Participation Alert   Orientation Level Oriented X4   Following Commands Follows one step commands without difficulty   RUE Assessment   RUE Assessment X  (limited shoulder ROM; 4/5 elbow to distal)   LUE Assessment   LUE Assessment X  (limited shoulder ROM; 4-/5 elbow to distal)   RLE Assessment   RLE Assessment WFL  (4/5 grossly)   LLE Assessment   LLE Assessment WFL  (4/5 grossly)   Coordination   Movements are Fluid and Coordinated 1   Sensation X  (pt reports occasional tingling to BLE )   Bed Mobility   Supine to Sit Unable to assess   Additional Comments pt OOB in chair pre & post session   Transfers   Sit to Stand 4  Minimal assistance   Additional items Assist x 1; Armrests; Increased time required;Verbal cues   Stand to Sit 4  Minimal assistance   Additional items Assist x 1; Armrests; Increased time required;Verbal cues   Additional Comments cues for techniques   Ambulation/Elevation   Gait pattern Forward Flexion;Decreased foot clearance;Shuffling; Short stride; Excessively slow  (inc toeing out)   Gait Assistance 4  Minimal assist   Additional items Assist x 1;Verbal cues; Tactile cues   Assistive Device Rolling walker   Distance 40'x1   Curbs cues for techniques   Balance   Static Sitting Fair +   Static Standing Fair -  (w/ RW)   Ambulatory Poor +  (w/ RW)   Endurance Deficit   Endurance Deficit Yes   Endurance Deficit Description weakness; deconditioning   Activity Tolerance   Activity Tolerance Patient limited by fatigue   Nurse Made Aware RN Mallika   Assessment   Prognosis Fair   Problem List Decreased strength;Decreased range of motion;Decreased endurance; Impaired balance;Decreased mobility; Impaired hearing; Impaired sensation;Decreased skin integrity   Assessment Pt  86 y o male presented w/ generalized weakness over the past 2-3 weeks and worse over the past few days, more clumsy & unsteady on his feet resulting to falls & inability to get up  Pt reports s/p fall x 1-2 weeks ago, sustained arm lac requiring sutures  CT showed pneumonia & mild-mod ascites  Pt admitted for Ambulatory dysfunction w/ pneumonia & ascites  Pt referred to PT for mobility assessment & D/C planning w/ orders of up w/ assistance  PTA, pt reports being I w/o AD household distances; RW for community amb  Pt lives alone in 2 story house w/ 1+1STE & 14 steps to 2nd level bed/bath; (+)   On eval, pt demonstrate dec mobility, balance, endurance & amb  Pt require minAx1 for most functional mobility + cues for techniques  Gait deviations as above, slow & unsteady w/ dec foot clearance, dec strides & inc toeing out but no gross LOB noted  No dizziness reported t/o session  Nsg staff most recent vital signs as follows: /51 (BP Location: Left arm)   Pulse 61   Temp (!) 96 7 °F (35 9 °C) (Temporal)   Resp 20   Ht 5' 8" (1 727 m)   Wt 87 3 kg (192 lb 7 4 oz)   SpO2 95%   BMI 29 26 kg/m²   At end of session, pt remain OOB in chair w/o issues, call bell & phone in reach  Fall precautions reinforced w/ good understanding  Pt functioning below baseline hence will continue skilled PT to improve function & safety   At this time, due to above mentioned deficits, advanced age, inaccessible home, home alone & high risk for falls, pt will benefit from inpt rehab at D/C  Pt agreeable to D/C rec  CM to follow  Nsg staff to continue to mobilized pt (OOB in chair for all meals & ambulate in room/unit) as tolerated to prevent further decline in function  Nsg notified  Barriers to Discharge Inaccessible home environment;Decreased caregiver support   Barriers to Discharge Comments pt home alone in a 2 story house   Goals   Patient Goals to get stronger   STG Expiration Date 09/23/19   Short Term Goal #1 Goals to be met in 14 days; pt will be able to: 1) inc strength & balance by 1/2 grade to improve overall functional mobility & dec fall risk; 2) inc bed mobility to S for pt to be able to get in/OOB safely w/ proper techniques 100% of the time, to dec caregiver assistance & safely function at home; 3) inc transfers to S for pt to transition safely from one surface to another w/o % of the time, to dec caregiver assistance & safely function at home; 4) inc amb w/ RW approx  >80' w/ S for pt to ambulate household distances w/o any % of the time, to dec caregiver assistance & safely function at home; 5) inc barthel score to 75 to decrease overall risk for falls; 6) negotiate stairs w/ S for inc safety during stair mgt inside/outside of home & dec caregiver assistance; 7) pt/caregiver ed   Treatment Day 0   Plan   Treatment/Interventions Functional transfer training;LE strengthening/ROM; Elevations; Therapeutic exercise; Endurance training;Patient/family training;Bed mobility;Gait training;Spoke to nursing   PT Frequency Other (Comment)  (4-5x/wk)   Recommendation   Recommendation Short-term skilled PT   Equipment Recommended Walker   PT - OK to Discharge Yes  (to inpt rehab when medically cleared)   Barthel Index   Feeding 10   Bathing 0   Grooming Score 5   Dressing Score 5   Bladder Score 10   Bowels Score 10   Toilet Use Score 5   Transfers (Bed/Chair) Score 10   Mobility (Level Surface) Score 0   Stairs Score 0   Barthel Index Score 55   Hx/personal factors: co-morbidities, inaccessible home, home alone, advanced age, telemetry, use of AD, h/o of falls, fall risk and assist w/ ADL's  Examination: dec mobility, dec balance, dec endurance, dec amb, moderate fall risk  Clinical: unpredictable (ongoing medical status, abnormal lab values and moderate fall risk)  Complexity: high     Silvano Monterroso, PT

## 2019-09-09 NOTE — ASSESSMENT & PLAN NOTE
Ambulatory dysfunction in the setting of hypotension and new diagnosis hypothyroidism  PT consultation pending

## 2019-09-09 NOTE — PROGRESS NOTES
NEPHROLOGY PROGRESS NOTE   Radha Stovall 80 y o  male MRN: [id number]  Unit/Bed#: E5 -01 Encounter: [id number]  Reason for Consult: SASCHA/CKD    ASSESSMENT AND PLAN:  Patient is a 63-year-old male with CLL, AFib, CHF, hypertension, CKD stage 4 presented with generalized weakness, status post fall  We are consulted for CKD management  Sascha on CKD stage 4, follows with [facility], baseline serum creatinine around 1 9 to 2 1  -creatinine 2 0 now increased to 2 4 today  -SASCHA could be secondary to hypotension/intravascular volume depletion  - give albumin 25 g IV total two doses today   -continue to hold diuretics for now including Lasix, metolazone, Aldactone  -BMP in a m   -check bladder scan for PVR  -urinalysis this admission shows no significant hematuria or proteinuria  Shows 0 to 1 hyaline cast  -CT scan shows no hydronephrosis  -check urine sodium, urine chloride, urine urea nitrogen, urine creatinine  Left kidney isodense renal lesion 2 7 cm, check renal ultrasound  -if suspicion for complex cyst, may consider urology evaluation eventually  Hypotension, monitor with albumin challenge as above  Cirrhosis changes on CT scan with mild-to-moderate ascites   -blood pressure remains on lower end  Albumin challenge as above  Currently not in any respiratory distress  CHF, EF 45%, repeat echocardiogram results to follow  -currently holding diuretics for time being  Giving albumin challenge due to worsened renal failure  Continue to monitor intake and output, daily weight  Does not seem to be in respiratory distress although he will need overall long-term diuretics eventually  CLL, management as per Hematology as outpatient  Initially suspected pneumonia, antibiotic has been discontinued by primary team     Discussed above renal plan with primary team    SUBJECTIVE:  Patient seen and examined at bedside   No chest pain, shortness of breath, nausea, vomiting, abdominal pain    OBJECTIVE:  Current Weight: Weight - Scale: 87 3 kg (192 lb 7 4 oz)  Vitals:    09/09/19 0843   BP: (!) 88/68   Pulse:    Resp:    Temp:    SpO2:        Intake/Output Summary (Last 24 hours) at 9/9/2019 1350  Last data filed at 9/9/2019 1148  Gross per 24 hour   Intake 660 ml   Output 626 ml   Net 34 ml     Wt Readings from Last 3 Encounters:   09/08/19 87 3 kg (192 lb 7 4 oz)   09/04/19 81 6 kg (180 lb)   08/07/19 79 6 kg (175 lb 6 4 oz)     Temp Readings from Last 3 Encounters:   09/09/19 (!) 96 9 °F (36 1 °C) (Temporal)   09/01/19 97 8 °F (36 6 °C) (Tympanic)   08/13/19 (!) 97 4 °F (36 3 °C) (Tympanic)     BP Readings from Last 3 Encounters:   09/09/19 (!) 88/68   09/04/19 112/72   09/01/19 114/70     Pulse Readings from Last 3 Encounters:   09/09/19 64   09/04/19 69   09/01/19 62        Physical Examination:  General:  Lying in bed, no acute distress   Eyes:  Mild conjunctival pallor present  ENT:  External examination of ears and nose unremarkable  Neck:  Supple  Respiratory:  Bilateral air entry present  CVS:  S1, S2 present  GI:  Soft, nontender  CNS:  Active alert oriented x3  Extremities:  1+ edema in legs  Skin:  No new rash in legs    Medications:    Current Facility-Administered Medications:     acetaminophen (TYLENOL) tablet 488 mg, 488 mg, Oral, Q6H PRN, Allison Nicole MD    allopurinol (ZYLOPRIM) tablet 300 mg, 300 mg, Oral, Daily, Steffi Bowen MD, 300 mg at 09/09/19 0859    amiodarone tablet 200 mg, 200 mg, Oral, Daily, Allison Nicole MD, 200 mg at 09/09/19 0859    aspirin (ECOTRIN LOW STRENGTH) EC tablet 81 mg, 81 mg, Oral, Daily, Steffi Bowen MD, 81 mg at 09/09/19 0900    atorvastatin (LIPITOR) tablet 40 mg, 40 mg, Oral, After Frederic Forde MD, 40 mg at 09/08/19 1737    fluticasone (FLONASE) 50 mcg/act nasal spray 1 spray, 1 spray, Each Nare, Daily, Allison Nicole MD    furosemide (LASIX) tablet 60 mg, 60 mg, Oral, Daily, Vanessa Luque DO    levothyroxine tablet 50 mcg, 50 mcg, Oral, Early Morning, Cecedaniel Andrea DO, 50 mcg at 09/09/19 0504    melatonin tablet 9 mg, 9 mg, Oral, HS PRN, Cecedaniel Andrea DO    ondansetron [facility]) injection 4 mg, 4 mg, Intravenous, Q6H PRN, Heavenly Diego MD    tamsulosin (FLOMAX) capsule 0 4 mg, 0 4 mg, Oral, Daily, Steffi Bowen MD, 0 4 mg at 09/09/19 0900    warfarin (COUMADIN) tablet 1 25 mg, 1 25 mg, Oral, Daily (warfarin), Heavenly Diego MD, 1 25 mg at 09/08/19 1735    Laboratory Results:  Results from last 7 days   Lab Units 09/09/19  0512 09/08/19  0622 09/07/19  1445   WBC Thousand/uL 22 27* 23 19* 27 96*   HEMOGLOBIN g/dL 9 2* 9 3* 9 5*   HEMATOCRIT % 29 6* 30 0* 31 3*   PLATELETS Thousands/uL 103* 119* 124*   SODIUM mmol/L 137 138 137   POTASSIUM mmol/L 4 8 4 9 4 6   CHLORIDE mmol/L 105 107 103   CO2 mmol/L 23 23 24   BUN mg/dL 89* 88* 80*   CREATININE mg/dL 2 46* 2 06* 2 11*   CALCIUM mg/dL 8 1* 8 8 8 7   MAGNESIUM mg/dL 2 3  --  2 3   PHOSPHORUS mg/dL 3 5  --   --        CT head without contrast   Final Result by Payal Antoine MD (09/07 1614)      No acute intracranial abnormality  Workstation performed: [id number]         CT chest abdomen pelvis wo contrast   Final Result by Maximilian Barnes MD (09/07 9700)   Exam is limited without IV and oral contrast particularly for soft tissue evaluation  1   Scattered debris in the trachea and right mainstem bronchus  Focal consolidation in the right middle lobe centrally may be related to atelectasis or pneumonia  Recommend follow-up chest CT in 3-4 weeks to ensure resolution  2  Diffuse interlobular septal thickening  Small bilateral pleural effusions  Findings suggest mild CHF  3  Cirrhotic appearance to the liver  Mild-to-moderate ascites  Splenomegaly  4   Indeterminate left lower pole renal lesion measuring up to 2 7 cm in size  Recommend follow-up with nonemergent renal ultrasound  Reminder placed into [facility] for follow up examinations  Workstation performed: [id number]             Portions of the record may have been created with voice recognition software  Occasional wrong word or "sound a like" substitutions may have occurred due to the inherent limitations of voice recognition software  Read the chart carefully and recognize, using context, where substitutions have occurred

## 2019-09-09 NOTE — PHYSICIAN ADVISOR
Current patient class: Inpatient  The patient is currently on Hospital Day: 3 at [facility]      The patient was admitted to the hospital at [phone] on 9/7/19 for the following diagnosis:  Weakness [R53 1]  Anemia [D64 9]  Abnormal CT scan [R93 89]  Elevated troponin [R74 8]  Abnormal TSH [R79 89]  Generalized weakness [R53 1]  Renal structural abnormality [Q63 9]       There is documentation in the medical record of an expected length of stay of at least 2 midnights  The patient is therefore expected to satisfy the 2 midnight benchmark and given the 2 midnight presumption is appropriate for INPATIENT ADMISSION  Given this expectation of a satisfying stay, CMS instructs us that the patient is most often appropriate for inpatient admission under part A provided medical necessity is documented in the chart  After review of the relevant documentation, labs, vital signs and test results, the patient is appropriate for INPATIENT ADMISSION  Admission to the hospital as an inpatient is a complex decision making process which requires the practitioner to consider the patients presenting complaint, history and physical examination and all relevant testing  With this in mind, in this case, the patient was deemed appropriate for INPATIENT ADMISSION  After review of the documentation and testing available at the time of the admission I concur with this clinical determination of medical necessity  Rationale is as follows: The patient is a 80 yrs old Male who presented to the ED at 9/7/2019  2:31 PM with a chief complaint of Weakness - Generalized (Pt  reports increasing generalized weakness over the last several weeks  Pt  reports being unsteady when standing )     Patient admitted with a report of decreased oral intake, low blood pressure, generalized weakness and increasing difficulty with ambulation to the point where he has fallen    He has a history of CLL, CHF, CKD and ambulatory dysfunction  Abnormal labs include a WBC of 27 96, hemoglobin of 9 5, BNP of 3,171, BUN of 80 which has increased to 88, creatinine of 2 11 which has increased to 2 46, a procalcitonin of 0 33, and a TSH increased to 32 351  A CT of the C/A/P revealed findings suspicious for a RML pneumonia  It is noted the patient continues to receive IV fluids for his renal status; however, there is now concern for volume overload and diuretics are being considered  With the noted ongoing treatment and the need for continued IV fluids and probable IV diuresis, a two night admission class t the hospital would be considered appropriate for him          The patients vitals on arrival were ED Triage Vitals [09/07/19 1429]   Temperature Pulse Respirations Blood Pressure SpO2   97 5 °F (36 4 °C) 68 18 (!) 80/43 98 %      Temp Source Heart Rate Source Patient Position - Orthostatic VS BP Location FiO2 (%)   Oral Monitor Sitting Left arm --      Pain Score       No Pain           Past Medical History:   Diagnosis Date    [facility]     Abnormal blood chemistry     last assessed 12/12/2013    Allergic rhinitis     last assessed 11/21/2016    Anemia     Ankle edema     Arthritis     Atrophy of prostate     Chronic diarrhea     COPD (chronic obstructive pulmonary disease) (HCC)     Coronary artery disease     stents, pacemaker    Elevated PSA     GERD (gastroesophageal reflux disease)     Gout     Heart failure (HCC)     Heart murmur     aortic stenosis    Hyperlipidemia     Hypertension     Irregular heart beat     a fib    Joint pain     Kidney failure     stage 3    Leukemia (HCC)     Malignant neoplasm prostate ([address]     Myocardial infarction ([address] )     Nocturia     Osteopenia     Pleural effusion     PVD (peripheral vascular disease) (HCC)     Radiation gastroenteritis     Skin cancer     Sore muscles     Type 2 diabetes mellitus ([address] )      Past Surgical History:   Procedure Laterality Date    CARDIAC PACEMAKER PLACEMENT  2014    CARDIAC VALVE REPLACEMENT      CORONARY ANGIOPLASTY      prox left anterior descending artery assessment    OTHER SURGICAL HISTORY      catheter ablation    PROSTATE BIOPSY Bilateral 2003, 2008    VASCULAR SURGERY      iliac stent           Consults have been placed to:   IP CONSULT TO NEPHROLOGY    Vitals:    09/09/19 0115 09/09/19 0700 09/09/19 0843 09/09/19 0958   BP: 126/60 (!) 81/38 (!) 88/68    BP Location: Right arm Left arm Left arm    Pulse: 62 64     Resp: 18 20     Temp: (!) 96 6 °F (35 9 °C) (!) 96 9 °F (36 1 °C)     TempSrc: Temporal Temporal     SpO2: 94% 94%     Weight:       Height:    5' 8" (1 727 m)       Most recent labs:    Recent Labs     09/07/19  1445  09/09/19  0512   WBC 27 96*   < > 22 27*   HGB 9 5*   < > 9 2*   HCT 31 3*   < > 29 6*   *   < > 103*   K 4 6   < > 4 8   CALCIUM 8 7   < > 8 1*   BUN 80*   < > 89*   CREATININE 2 11*   < > 2 46*   LIPASE 125  --   --    INR 2 42*   < > 2 62*   TROPONINI 0 05*  --   --    CKTOTAL 42  --   --    AST 74*   < > 64*   ALT 48   < > 38   ALKPHOS 164*   < > 155*    < > = values in this interval not displayed         Scheduled Meds:  Current Facility-Administered Medications:  acetaminophen 488 mg Oral Q6H PRN Stefania Lynch MD   albumin human 25 g Intravenous Q6H Hai Kessler MD   allopurinol 300 mg Oral Daily Stefania Lynch MD   amiodarone 200 mg Oral Daily Stefania Lynch MD   aspirin 81 mg Oral Daily Stefania Lynch MD   atorvastatin 40 mg Oral After Jc Davis MD   fluticasone 1 spray Each Nare Daily Stefania Lynch MD   levothyroxine 50 mcg Oral Early Morning Cooper Valdez DO   melatonin 9 mg Oral HS PRN Cooper Valdez, DO   ondansetron 4 mg Intravenous Q6H PRN Stefania Lynch MD   tamsulosin 0 4 mg Oral Daily Stefania Lynch MD   warfarin 1 25 mg Oral Daily (warfarin) Stefania Lynch MD     Continuous Infusions:   PRN Meds:   acetaminophen   melatonin    ondansetron    Surgical procedures (if appropriate):

## 2019-09-09 NOTE — PROGRESS NOTES
Progress Note Alexys Stovall 1933, 80 y o  male MRN: [id number]    Unit/Bed#: E5 -01 Encounter: [id number]    Primary Care Provider: Tim Fagan MD   Date and time admitted to hospital: 9/7/2019  2:31 PM        * Ambulatory dysfunction  Assessment & Plan  Ambulatory dysfunction in the setting of hypotension and new diagnosis hypothyroidism  PT consultation pending  Left renal mass  Assessment & Plan  Left renal lesion  To further characterize with ultrasound    Hypotension (arterial)  Assessment & Plan  Hypotension likely in setting of volume depletion and component of hypothyroidism; metoprolol and diuretics on hold  For completeness random cortisol was checked which was within limits  Hypothyroidism  Assessment & Plan  New hypothyroidism  Started on levothyroxine  Results from last 7 days   Lab Units 09/07/19  1445   TSH 3RD GENERATON uIU/mL 32 351*   FREE T4 ng/dL 0 60*       Chronic kidney disease, stage 4 (severe) (HCC)  Assessment & Plan  ZULMA on CKD 4 secondary to hypotension/intravascular depletion  IV albumin per nephrology    Results from last 7 days   Lab Units 09/09/19  0512 09/08/19  0622 09/07/19  1445   BUN mg/dL 89* 88* 80*   CREATININE mg/dL 2 46* 2 06* 2 11*   EGFR ml/min/1 73sq m 23 28 28       Atrial fibrillation (HCC)  Assessment & Plan  Paroxysmal atrial fibrillation amiodarone and warfarin    Holding metoprolol due to hypotension    Results from last 7 days   Lab Units 09/09/19  0512 09/08/19  0622 09/07/19  1445   INR  2 62* 2 44* 2 42*       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)  Assessment & Plan  CLL with chronic leukocytosis    Chronic obstructive pulmonary disease (HCC)  Assessment & Plan  Reported history of COPD    Chronic combined systolic and diastolic heart failure (HCC)  Assessment & Plan  Wt Readings from Last 3 Encounters:   09/08/19 87 3 kg (192 lb 7 4 oz)   09/04/19 81 6 kg (180 lb)   08/07/19 79 6 kg (175 lb 6 4 oz)      Chronic combined systolic and diastolic CHF  Due to hypotension echocardiogram recheck  Holding furosemide and metoprolol given hypotension and kidney injury      VTE Pharmacologic Prophylaxis: Warfarin (Coumadin)    Patient Centered Rounds: I have performed bedside rounds with nursing staff today  Discussions with Specialists or Other Care Team Provider:   Education and Discussions with Family / Patient:     Time Spent for Care: 25 mins  More than 50% of total time spent on counseling and coordination of care as described above  Current Length of Stay: 2 day(s)  Current Patient Status: Inpatient     Certification Statement: The patient will continue to require additional inpatient hospital stay due to Weakness  Discharge Plan / Estimated Discharge Date:     Code Status: Level 1 - Full Code  ______________________________________________________________________________    Subjective:   Patient seen and examined  No new complaints  Hoping for physical therapy and out of bed  Hypotensive this morning    Objective:   Vitals: Blood pressure 130/51, pulse 61, temperature (!) 96 7 °F (35 9 °C), temperature source Temporal, resp  rate 20, height 5' 8" (1 727 m), weight 87 3 kg (192 lb 7 4 oz), SpO2 95 %      Physical Exam:   General appearance: alert, appears stated age and cooperative  Head: Normocephalic, without obvious abnormality, atraumatic  Lungs: diminished breath sounds  Heart: irregularly irregular rhythm and significant ecchymosis chest wall  Abdomen: soft, non-tender, positive bowel sounds   Back: negative, range of motion normal  Extremities: edema +2 lower extremities bilaterally  Neurologic: Grossly normal    Additional Data:   Labs:  Results from last 7 days   Lab Units 09/09/19  0512 09/08/19  0622 09/07/19  1445   WBC Thousand/uL 22 27* 23 19* 27 96*   HEMOGLOBIN g/dL 9 2* 9 3* 9 5*   HEMATOCRIT % 29 6* 30 0* 31 3*   MCV fL 107* 107* 108*   TOTAL NEUT ABS Thousand/uL  --   -- 11 18*   PLATELETS Thousands/uL 103* 119* 124*   INR  2 62* 2 44* 2 42*     Results from last 7 days   Lab Units 09/09/19  0512 09/08/19  0622 09/07/19  1445   SODIUM mmol/L 137 138 137   POTASSIUM mmol/L 4 8 4 9 4 6   CHLORIDE mmol/L 105 107 103   CO2 mmol/L 23 23 24   ANION GAP mmol/L 9 8 10   BUN mg/dL 89* 88* 80*   CREATININE mg/dL 2 46* 2 06* 2 11*   CALCIUM mg/dL 8 1* 8 8 8 7   ALBUMIN g/dL 2 2* 2 4* 2 7*   TOTAL BILIRUBIN mg/dL 1 19* 1 60* 1 98*   ALK PHOS U/L 155* 159* 164*   ALT U/L 38 43 48   AST U/L 64* 82* 74*   EGFR ml/min/1 73sq m 23 28 28   GLUCOSE RANDOM mg/dL 108 102 133     Results from last 7 days   Lab Units 09/09/19  0512 09/07/19  1445   MAGNESIUM mg/dL 2 3 2 3   PHOSPHORUS mg/dL 3 5  --      Results from last 7 days   Lab Units 09/07/19  1445   CK TOTAL U/L 42   TROPONIN I ng/mL 0 05*     Results from last 7 days   Lab Units 09/07/19  1445   NT-PRO BNP pg/mL 3,172*      Results from last 7 days   Lab Units 09/09/19  0512   PROCALCITONIN ng/ml 0 33*             Results from last 7 days   Lab Units 09/07/19  1445   TSH 3RD GENERATON uIU/mL 32 351*   FREE T4 ng/dL 0 60*     * I Have Reviewed All Lab Data Listed Above  Cultures:   Results from last 7 days   Lab Units 09/07/19  1705 09/07/19  1700   BLOOD CULTURE  No Growth at 24 hrs  No Growth at 24 hrs  Imaging:  Imaging Reports Reviewed Today Include:   Procedure: Ct Chest Abdomen Pelvis Wo Contrast  Result Date: 9/7/2019  Impression: Exam is limited without IV and oral contrast particularly for soft tissue evaluation  1   Scattered debris in the trachea and right mainstem bronchus  Focal consolidation in the right middle lobe centrally may be related to atelectasis or pneumonia  Recommend follow-up chest CT in 3-4 weeks to ensure resolution  2  Diffuse interlobular septal thickening  Small bilateral pleural effusions  Findings suggest mild CHF  3  Cirrhotic appearance to the liver  Mild-to-moderate ascites  Splenomegaly   4  Indeterminate left lower pole renal lesion measuring up to 2 7 cm in size  Recommend follow-up with nonemergent renal ultrasound  Reminder placed into [facility] for follow up examinations  Workstation performed: [id number]     Procedure: Ct Head Without Contrast  Result Date: 9/7/2019  Impression: No acute intracranial abnormality  Workstation performed: [id number]     Scheduled Meds:  Current Facility-Administered Medications:  acetaminophen 488 mg Oral Q6H PRN Jennifer Peace MD   albumin human 25 g Intravenous Q6H Myles Mayo MD   allopurinol 300 mg Oral Daily Jennifer Peace MD   amiodarone 200 mg Oral Daily Jennifer Peace MD   aspirin 81 mg Oral Daily Jennifer Peace MD   atorvastatin 40 mg Oral After Janalicia Porter MD   fluticasone 1 spray Each Nare Daily Jennifer Peace MD   levothyroxine 50 mcg Oral Early Morning Lucas Putnam DO   melatonin 9 mg Oral HS PRN Katelin Lopez, DO   ondansetron 4 mg Intravenous Q6H PRN Jennifer Peace MD   tamsulosin 0 4 mg Oral Daily Jennifer Peace MD   warfarin 1 25 mg Oral Daily (warfarin) MD Nessa Malloy DO  [facility]  Hospitalist    ** Please Note: This note has been constructed using a voice recognition system   **

## 2019-09-09 NOTE — ASSESSMENT & PLAN NOTE
ZULMA on CKD 4 secondary to hypotension/intravascular depletion    IV albumin per nephrology    Results from last 7 days   Lab Units 09/09/19  0512 09/08/19  0622 09/07/19  1445   BUN mg/dL 89* 88* 80*   CREATININE mg/dL 2 46* 2 06* 2 11*   EGFR ml/min/1 73sq m 23 28 28

## 2019-09-09 NOTE — ASSESSMENT & PLAN NOTE
Paroxysmal atrial fibrillation amiodarone and warfarin    Holding metoprolol due to hypotension    Results from last 7 days   Lab Units 09/09/19  0512 09/08/19  0622 09/07/19  1445   INR  2 62* 2 44* 2 42*

## 2019-09-09 NOTE — UTILIZATION REVIEW
Initial Clinical Review    Admission: Date/Time/Statement: Inpatient Admission Orders (From admission, onward)     Ordered        09/07/19 1649  Inpatient Admission  Once                   Orders Placed This Encounter   Procedures    Inpatient Admission     Standing Status:   Standing     Number of Occurrences:   1     Order Specific Question:   Admitting Physician     Answer:   Varsha Deleon [[id number]]     Order Specific Question:   Level of Care     Answer:   Med Surg [[id number]]     Order Specific Question:   Estimated length of stay     Answer:   More than 2 Midnights     Order Specific Question:   Certification     Answer:   I certify that inpatient services are medically necessary for this patient for a duration of greater than two midnights  See H&P and MD Progress Notes for additional information about the patient's course of treatment  ED Arrival Information     Expected Arrival Acuity Means of Arrival Escorted By Service Admission Type    - 9/7/2019 14:23 Emergent Wheelchair Family Member Hospitalist Emergency    Arrival Complaint    weakness        Chief Complaint   Patient presents with    Weakness - Generalized     Pt  reports increasing generalized weakness over the last several weeks  Pt  reports being unsteady when standing  Assessment/Plan: Weakness  Assessment & Plan  Patient with generalized weakness over the past 2-3 weeks and worse over the past few days  The patient was previously able to ambulate with help but now is unable to get up and walk secondary to weakness  Has fallen and had to have a laceration sutured about a week ago  Patient with extensive bruising on the anterior chest wall secondary to Coumadin      Dehydration  Assessment & Plan  Patient was noted to be volume overloaded during his last visit to his nephrologist   His Lasix dose was increased to 60 mg daily  Patient likely now dehydrated    Will hold his Lasix and metolazone and give him some fluids        * Pneumonia of right middle lobe due to infectious organism [facility])  Assessment & Plan  CT of the chest revealing an infiltrate versus debris over the right middle lobe  Patient denies any history of choking or aspiration  Unclear whether this represents true infection however given that the patient has been getting progressively weaker and has also noticed some chest congestion will treat him with IV cefepime for now  A procalcitonin has been sent  If levels are normal of less than 0 5 could discontinue antibiotics      Chronic kidney disease, stage 5 ([address] )  Assessment & Plan  Patient with a creatinine of 2 11  Baseline creatinine around 2  Worsening creatinine likely secondary to cardiorenal syndrome and hypoalbuminemia  Follows up with nephrologist Dr Mary Dias  Will hold Lasix and metolazone for now   Will consult Nephrology for their input      Heart failure [facility])  Patient with a history of combined heart failure  Last echocardiogram was in 2015-with an EF of 30%  Patient is status post AICD  Currently no evidence of any fluid overload  Patient receiving IV fluids               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 [facility])  Assessment & Plan  Patient with a history of CLL and has been following up with Oncology  WBCs around 30,000  Under observation  - stable disease and no indications to start treatment at this time  -- continue to monitor q 6 months labs and follow up per Oncology notes        Gout  Assessment & Plan  Elevated uric acid secondary to chronic kidney disease 5  On allopurinol     Atrial fibrillation (HCC)  Assessment & Plan  On Coumadin 1 25 mg daily  INR is therapeutic     H/O aortic valve replacement  Assessment & Plan  Bioprosthetic valve with no acute issue  Will check an echocardiogram to assess LV function as well as to look at the valve  Ambulatory dysfunction-secondary to weakness with patient having multiple falls  Will have PT and OT evaluate    If also a real concern may have to revisit use of Coumadin    Edinson Arenas is a 80 y o  male with a past medical history significant for combined systolic and diastolic heart failure, bioprosthetic aortic valve replacement, atrial fibrillation on Coumadin, chronic kidney disease 5, chronic lymphocytic leukemia being monitored with observation only, and ambulatory dysfunction with history of frequent falls when he sustained a laceration of the right upper extremity a couple of weeks ago needing suturing  Patient presents with decreased p o  Intake, generalized weakness which has been progressive to the extent that he cannot get up and walk which he could do previously with assistance  Patient denies any fever, diarrhea, nausea, vomiting  He was noticed to be volume overloaded a couple of weeks ago and had his Lasix increased to 60 mg daily by his nephrologist   His baseline creatinine is around 2  Creatinine today is around 2 2  Patient denies any dysuria  Patient is on Lasix, metolazone and spironolactone for his heart failure  Patient also complained of some chest congestion over the past week but denies any fever or purulent sputum  On evaluation in the ED patient was noted to be dehydrated, CT of the chest revealed a suspicious infiltrate and debris in the right middle lobe  It is unclear whether this is real pneumonia or an incidental finding        ED Triage Vitals [09/07/19 1429]   Temperature Pulse Respirations Blood Pressure SpO2   97 5 °F (36 4 °C) 68 18 (!) 80/43 98 %      Temp Source Heart Rate Source Patient Position - Orthostatic VS BP Location FiO2 (%)   Oral Monitor Sitting Left arm --      Pain Score       No Pain        Wt Readings from Last 1 Encounters:   09/08/19 87 3 kg (192 lb 7 4 oz)     Additional Vital Signs:   09/09/19 0843        88/68Abnormal       Lying   09/09/19 0700  96 9 °F (36 1 °C)Abnormal   64  20  81/38Abnormal   94 %  None (Room air)     09/09/19 0115  96 6 °F (35 9 °C)Abnormal   62  18  126/60  94 %  None (Room air)  Lying   09/08/19 1523  97 4 °F (36 3 °C)Abnormal   63  18  101/46Abnormal   94 %  None (Room air)  Lying   09/08/19 0755  97 6 °F (36 4 °C)  60  18  121/59  95 %  None (Room air)  Lying   09/07/19 2302  97 9 °F (36 6 °C)  60  18  90/53  96 %  None (Room air)  Lying   09/07/19 1752  97 3 °F (36 3 °C)Abnormal   66  20  98/38Abnormal   96 %  None (Room air)  Lying   09/07/19 1720    60  20  112/56  95 %  None (Room air)  Lying   09/07/19 1620    63  16  112/58  98 %  None (Room air)  Lying   09/07/19 1518    60  20  118/58  96 %  None (Room air)  Lying   09/07/19 1453        127/57      Lying   09/07/19 1452    63  19  108/52  98 %  None (Room air)  Lying   09/07/19 1429  97 5 °F (36 4 °C)  68  18  80/43Abnormal   98 %  None (Room air)  Sitting         Pertinent Labs/Diagnostic Test Results:   Results from last 7 days   Lab Units 09/09/19  0512 09/08/19 0622 09/07/19  1445   WBC Thousand/uL 22 27* 23 19* 27 96*   HEMOGLOBIN g/dL 9 2* 9 3* 9 5*   HEMATOCRIT % 29 6* 30 0* 31 3*   PLATELETS Thousands/uL 103* 119* 124*   NEUTROS ABS Thousands/µL 5 88 5 96  --          Results from last 7 days   Lab Units 09/09/19  0512 09/08/19 0622 09/07/19  1445   SODIUM mmol/L 137 138 137   POTASSIUM mmol/L 4 8 4 9 4 6   CHLORIDE mmol/L 105 107 103   CO2 mmol/L 23 23 24   ANION GAP mmol/L 9 8 10   BUN mg/dL 89* 88* 80*   CREATININE mg/dL 2 46* 2 06* 2 11*   EGFR ml/min/1 73sq m 23 28 28   CALCIUM mg/dL 8 1* 8 8 8 7   MAGNESIUM mg/dL 2 3  --  2 3   PHOSPHORUS mg/dL 3 5  --   --      Results from last 7 days   Lab Units 09/09/19  0512 09/08/19  0622 09/07/19  1445   AST U/L 64* 82* 74*   ALT U/L 38 43 48   ALK PHOS U/L 155* 159* 164*   TOTAL PROTEIN g/dL 4 8* 5 1* 5 5*   ALBUMIN g/dL 2 2* 2 4* 2 7*   TOTAL BILIRUBIN mg/dL 1 19* 1 60* 1 98*   BILIRUBIN DIRECT mg/dL  --   --  0 61*         Results from last 7 days   Lab Units 09/09/19  0512 09/08/19  0622 09/07/19  1445 GLUCOSE RANDOM mg/dL 108 102 133         Results from last 7 days   Lab Units 09/07/19  1445   CK TOTAL U/L 42     Results from last 7 days   Lab Units 09/07/19  1445   TROPONIN I ng/mL 0 05*         Results from last 7 days   Lab Units 09/09/19  0512 09/08/19  0622 09/07/19  1445   PROTIME seconds 28 6* 27 0* 26 8*   INR  2 62* 2 44* 2 42*   PTT seconds  --   --  52*     Results from last 7 days   Lab Units 09/07/19  1445   TSH 3RD GENERATON uIU/mL 32 351*     Results from last 7 days   Lab Units 09/09/19  0512 09/07/19  1705   PROCALCITONIN ng/ml 0 33* 0 24                 Results from last 7 days   Lab Units 09/07/19  1445   NT-PRO BNP pg/mL 3,172*         Results from last 7 days   Lab Units 09/09/19  0512   HEP C AB  Non-reactive     Results from last 7 days   Lab Units 09/07/19  1445   LIPASE u/L 125             Results from last 7 days   Lab Units 09/09/19  0314   CLARITY UA  Clear   COLOR UA  Yellow   SPEC GRAV UA  1 020   PH UA  5 0   GLUCOSE UA mg/dl Negative   KETONES UA mg/dl Negative   BLOOD UA  Small*   PROTEIN UA mg/dl Negative   NITRITE UA  Negative   BILIRUBIN UA  Negative   UROBILINOGEN UA E U /dl 0 2   LEUKOCYTES UA  Negative   WBC UA /hpf 0-1*   RBC UA /hpf 0-1*   BACTERIA UA /hpf None Seen   EPITHELIAL CELLS WET PREP /hpf None Seen           Results from last 7 days   Lab Units 09/07/19  1705 09/07/19  1700   BLOOD CULTURE  No Growth at 24 hrs  No Growth at 24 hrs  Results from last 7 days   Lab Units 09/07/19  1445   TOTAL COUNTED  100       EKG  ( 9/7)     LBBB  Paced  Ct  Head  ( 9/7)     No acute intracranial abnormality  Ct  Chest/abd  ( 9/7)     Scattered debris in the trachea and right mainstem bronchus  Focal consolidation in the right middle lobe centrally may be related to atelectasis or pneumonia   Recommend follow-up chest CT in 3-4 weeks to ensure resolution  2  Diffuse interlobular septal thickening  Small bilateral pleural effusions  Findings suggest mild CHF    3  Cirrhotic appearance to the liver  Mild-to-moderate ascites  Splenomegaly  4   Indeterminate left lower pole renal lesion measuring up to 2 7 cm in size  Recommend follow-up with nonemergent renal ultrasound  CXR  ( 9/8)     Small right and trace left pleural effusions are noted   No pneumothorax      ED Treatment:   Medication Administration from 09/07/2019 1423 to 09/07/2019 1734       Date/Time Order Dose Route Action Action by Comments     09/07/2019 1556 sodium chloride 0 9 % bolus 500 mL 0 mL Intravenous Stopped Damari Ward RN      09/07/2019 1518 sodium chloride 0 9 % bolus 500 mL 500 mL Intravenous New Bag Damari Ward RN      09/07/2019 1720 sodium chloride 0 9 % infusion 100 mL/hr Intravenous New Bag Damari Ward RN      09/07/2019 1713 cefepime (MAXIPIME) 2 g/50 mL dextrose IVPB 2,000 mg Intravenous New Bag Damari Ward RN         Past Medical History:   Diagnosis Date    [facility]     Abnormal blood chemistry     last assessed 12/12/2013    Allergic rhinitis     last assessed 11/21/2016    Anemia     Ankle edema     Arthritis     Atrophy of prostate     Chronic diarrhea     COPD (chronic obstructive pulmonary disease) [facility]     Coronary artery disease     stents, pacemaker    Elevated PSA     GERD (gastroesophageal reflux disease)     Gout     Heart failure (HCC)     Heart murmur     aortic stenosis    Hyperlipidemia     Hypertension     Irregular heart beat     a fib    Joint pain     Kidney failure     stage 3    Leukemia (HCC)     Malignant neoplasm prostate ([facility] )     Myocardial infarction ([facility] )     Nocturia     Osteopenia     Pleural effusion     PVD (peripheral vascular disease) (HCC)     Radiation gastroenteritis     Skin cancer     Sore muscles     Type 2 diabetes mellitus ([facility] )      Present on Admission:   Weakness   Dehydration   Pneumonia of right middle lobe due to infectious organism (HCC)   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 ([facility] )   Gout   Atrial fibrillation (HCC)   Heart failure (HCC)   Chronic kidney disease, stage 4 (severe) (HCC)   Chronic obstructive pulmonary disease (HCC)      Admitting Diagnosis: Weakness [R53 1]  Anemia [D64 9]  Abnormal CT scan [R93 89]  Elevated troponin [R74 8]  Abnormal TSH [R79 89]  Generalized weakness [R53 1]  Renal structural abnormality [Q63 9]  Age/Sex: 80 y o  male  Admission Orders:    Current Facility-Administered Medications:  acetaminophen 488 mg Oral Q6H PRN Venkat Victoria MD   allopurinol 300 mg Oral Daily Venkat Victoria MD   amiodarone 200 mg Oral Daily Venkat Victoria MD   aspirin 81 mg Oral Daily Venkat Victoria MD   atorvastatin 40 mg Oral After Dinner Venkat Victoria MD   fluticasone 1 spray Each Nare Daily Venkat Victoria MD   furosemide 60 mg Oral Daily Edi Vanegas,    levothyroxine 50 mcg Oral Early Morning Lucas Putnam, DO   melatonin 9 mg Oral HS PRN Edi Vanegas,    ondansetron 4 mg Intravenous Q6H PRN Venkat iVctoria MD   tamsulosin 0 4 mg Oral Daily Venkat Victoria MD   warfarin 1 25 mg Oral Daily (warfarin) Venkat Victoria MD       IP CONSULT TO NEPHROLOGY   Cardiac  Diet/FR  2 DE  tele    Network Utilization Review Department  Phone: [phone]; Fax [phone]  [email or web address]  ATTENTION: Please call with any questions or concerns to [phone]  and carefully listen to the prompts so that you are directed to the right person  Send all requests for admission clinical reviews, approved or denied determinations and any other requests to fax [phone]   All voicemails are confidential

## 2019-09-09 NOTE — ASSESSMENT & PLAN NOTE
Hypotension likely in setting of volume depletion and component of hypothyroidism; metoprolol and diuretics on hold  For completeness random cortisol was checked which was within limits

## 2019-09-09 NOTE — ASSESSMENT & PLAN NOTE
Wt Readings from Last 3 Encounters:   09/08/19 87 3 kg (192 lb 7 4 oz)   09/04/19 81 6 kg (180 lb)   08/07/19 79 6 kg (175 lb 6 4 oz)      Chronic combined systolic and diastolic CHF  Due to hypotension echocardiogram recheck    Holding furosemide and metoprolol given hypotension and kidney injury

## 2019-09-09 NOTE — ASSESSMENT & PLAN NOTE
New hypothyroidism  Started on levothyroxine      Results from last 7 days   Lab Units 09/07/19  1445   TSH 3RD GENERATON uIU/mL 32 351*   FREE T4 ng/dL 0 60*

## 2019-09-09 NOTE — PROGRESS NOTES
Called regarding hypotension  Patient newly diagnosed hypothyroid  Will also check random cortisol level at this time

## 2019-09-10 LAB
ANION GAP SERPL CALCULATED.3IONS-SCNC: 9 MMOL/L (ref 4–13)
BUN SERPL-MCNC: 98 MG/DL (ref 5–25)
CALCIUM SERPL-MCNC: 8.5 MG/DL (ref 8.3–10.1)
CHLORIDE SERPL-SCNC: 105 MMOL/L (ref 100–108)
CO2 SERPL-SCNC: 22 MMOL/L (ref 21–32)
CREAT SERPL-MCNC: 2.68 MG/DL (ref 0.6–1.3)
ERYTHROCYTE [DISTWIDTH] IN BLOOD BY AUTOMATED COUNT: 19.6 % (ref 11.6–15.1)
GFR SERPL CREATININE-BSD FRML MDRD: 21 ML/MIN/1.73SQ M
GLUCOSE SERPL-MCNC: 106 MG/DL (ref 65–140)
HCT VFR BLD AUTO: 28.2 % (ref 36.5–49.3)
HGB BLD-MCNC: 8.6 G/DL (ref 12–17)
INR PPP: 2.52 (ref 0.84–1.19)
MCH RBC QN AUTO: 32.7 PG (ref 26.8–34.3)
MCHC RBC AUTO-ENTMCNC: 30.5 G/DL (ref 31.4–37.4)
MCV RBC AUTO: 107 FL (ref 82–98)
PLATELET # BLD AUTO: 108 THOUSANDS/UL (ref 149–390)
PMV BLD AUTO: 11.6 FL (ref 8.9–12.7)
POTASSIUM SERPL-SCNC: 4.8 MMOL/L (ref 3.5–5.3)
PROTHROMBIN TIME: 27.7 SECONDS (ref 11.6–14.5)
RBC # BLD AUTO: 2.63 MILLION/UL (ref 3.88–5.62)
SODIUM SERPL-SCNC: 136 MMOL/L (ref 136–145)
WBC # BLD AUTO: 22.34 THOUSAND/UL (ref 4.31–10.16)

## 2019-09-10 PROCEDURE — G8987 SELF CARE CURRENT STATUS: HCPCS

## 2019-09-10 PROCEDURE — 99233 SBSQ HOSP IP/OBS HIGH 50: CPT | Performed by: INTERNAL MEDICINE

## 2019-09-10 PROCEDURE — 97166 OT EVAL MOD COMPLEX 45 MIN: CPT

## 2019-09-10 PROCEDURE — G8988 SELF CARE GOAL STATUS: HCPCS

## 2019-09-10 PROCEDURE — 80048 BASIC METABOLIC PNL TOTAL CA: CPT | Performed by: INTERNAL MEDICINE

## 2019-09-10 PROCEDURE — 85027 COMPLETE CBC AUTOMATED: CPT | Performed by: INTERNAL MEDICINE

## 2019-09-10 PROCEDURE — 85610 PROTHROMBIN TIME: CPT | Performed by: INTERNAL MEDICINE

## 2019-09-10 PROCEDURE — 97530 THERAPEUTIC ACTIVITIES: CPT

## 2019-09-10 PROCEDURE — 99232 SBSQ HOSP IP/OBS MODERATE 35: CPT | Performed by: INTERNAL MEDICINE

## 2019-09-10 PROCEDURE — 97110 THERAPEUTIC EXERCISES: CPT

## 2019-09-10 PROCEDURE — 97535 SELF CARE MNGMENT TRAINING: CPT

## 2019-09-10 PROCEDURE — 97116 GAIT TRAINING THERAPY: CPT

## 2019-09-10 RX ORDER — QUETIAPINE FUMARATE 25 MG/1
12.5 TABLET, FILM COATED ORAL
Status: DISCONTINUED | OUTPATIENT
Start: 2019-09-10 | End: 2019-09-24 | Stop reason: HOSPADM

## 2019-09-10 RX ORDER — ALBUMIN (HUMAN) 12.5 G/50ML
25 SOLUTION INTRAVENOUS EVERY 6 HOURS
Status: COMPLETED | OUTPATIENT
Start: 2019-09-10 | End: 2019-09-11

## 2019-09-10 RX ORDER — ALBUMIN (HUMAN) 12.5 G/50ML
25 SOLUTION INTRAVENOUS EVERY 6 HOURS
Status: DISCONTINUED | OUTPATIENT
Start: 2019-09-10 | End: 2019-09-10

## 2019-09-10 RX ORDER — MIDODRINE HYDROCHLORIDE 5 MG/1
5 TABLET ORAL
Status: DISCONTINUED | OUTPATIENT
Start: 2019-09-10 | End: 2019-09-12

## 2019-09-10 RX ADMIN — ATORVASTATIN CALCIUM 40 MG: 40 TABLET, FILM COATED ORAL at 17:11

## 2019-09-10 RX ADMIN — MELATONIN TAB 3 MG 9 MG: 3 TAB at 23:02

## 2019-09-10 RX ADMIN — TAMSULOSIN HYDROCHLORIDE 0.4 MG: 0.4 CAPSULE ORAL at 09:01

## 2019-09-10 RX ADMIN — MIDODRINE HYDROCHLORIDE 5 MG: 5 TABLET ORAL at 15:49

## 2019-09-10 RX ADMIN — ALBUMIN (HUMAN) 25 G: 12.5 SOLUTION INTRAVENOUS at 23:03

## 2019-09-10 RX ADMIN — ALBUMIN (HUMAN) 25 G: 12.5 SOLUTION INTRAVENOUS at 17:11

## 2019-09-10 RX ADMIN — QUETIAPINE FUMARATE 12.5 MG: 25 TABLET ORAL at 19:58

## 2019-09-10 RX ADMIN — AMIODARONE HYDROCHLORIDE 200 MG: 200 TABLET ORAL at 09:01

## 2019-09-10 RX ADMIN — WARFARIN SODIUM 1.25 MG: 2.5 TABLET ORAL at 17:11

## 2019-09-10 RX ADMIN — ALLOPURINOL 300 MG: 100 TABLET ORAL at 09:01

## 2019-09-10 RX ADMIN — ALBUMIN (HUMAN) 25 G: 12.5 SOLUTION INTRAVENOUS at 12:33

## 2019-09-10 RX ADMIN — LEVOTHYROXINE SODIUM 50 MCG: 50 TABLET ORAL at 05:42

## 2019-09-10 RX ADMIN — ASPIRIN 81 MG: 81 TABLET, COATED ORAL at 09:01

## 2019-09-10 NOTE — ASSESSMENT & PLAN NOTE
Wt Readings from Last 3 Encounters:   09/10/19 86 6 kg (191 lb)   09/04/19 81 6 kg (180 lb)   08/07/19 79 6 kg (175 lb 6 4 oz)      Chronic combined systolic and diastolic CHF  No significant changes on echocardiogram performed 9/9/2019    Holding furosemide and metoprolol given hypotension and kidney injury

## 2019-09-10 NOTE — PHYSICAL THERAPY NOTE
Physical Therapy Progress Note     09/10/19 1443   Pain Assessment   Pain Assessment No/denies pain   Pain Score No Pain   Restrictions/Precautions   Weight Bearing Precautions Per Order No   Other Precautions Telemetry;Multiple lines; Fall Risk;Bed Alarm   General   Chart Reviewed Yes   Response to Previous Treatment Patient reporting fatigue but able to participate  Family/Caregiver Present No   Subjective   Subjective Willing to participate in therapy this PM    Bed Mobility   Sit to Supine 4  Minimal assistance   Additional items Assist x 1;HOB elevated; Bedrails;Leg ; Increased time required;Verbal cues;LE management   Transfers   Sit to Stand 4  Minimal assistance   Additional items Assist x 1; Armrests; Increased time required;Verbal cues   Stand to Sit 4  Minimal assistance   Additional items Assist x 1;Bedrails; Increased time required;Verbal cues   Ambulation/Elevation   Gait pattern Decreased foot clearance; Forward Flexion; Short stride; Step to;Excessively slow; Inconsistent elevr; Shuffling   Gait Assistance 4  Minimal assist   Additional items Assist x 1;Verbal cues; Tactile cues   Assistive Device Rolling walker   Distance 45'   Balance   Static Sitting Fair +   Dynamic Sitting Fair   Static Standing Fair -   Dynamic Standing Poor +   Ambulatory Poor +   Endurance Deficit   Endurance Deficit Yes   Endurance Deficit Description fatigue/weakness   Activity Tolerance   Activity Tolerance Patient limited by fatigue   Nurse Made Aware Yes   Exercises   TKR Sitting;10 reps;AAROM; Bilateral   Assessment   Prognosis Fair   Problem List Decreased strength;Decreased range of motion;Decreased endurance; Impaired balance;Decreased mobility; Decreased cognition;Decreased safety awareness;Decreased skin integrity   Assessment Pt  seated in bedside chair upon my arrival  Pt  reporting fatigue, however agreeable to therapeutic intervention   Performance of HEP seated in bedside chair with cues provided for proper completion  Progressed with transfers being able to complete with A of therapist with cues for hand placement/technique  Progressed with an amb  trial with use of RW and Pamela of therapist with cues provided for LE sequencing  Remains limited by fatigue requiring a resting period in between gait trials  Pt  returned to room and repositioned supine in bed with alarm active at end of treatment session  PT will continue to recommend d/c to rehab when medically stable for continued improvement of noted impairments above  Barriers to Discharge Inaccessible home environment;Decreased caregiver support   Barriers to Discharge Comments Lives alone, BREE   Goals   Patient Goals To go to rehab  STG Expiration Date 09/23/19   Treatment Day 1   Plan   Treatment/Interventions Functional transfer training;LE strengthening/ROM; Therapeutic exercise; Endurance training;Bed mobility;Gait training;Spoke to nursing;Spoke to case management;OT   Progress Slow progress, decreased activity tolerance   PT Frequency Other (Comment)  (4-5x/wk)   Recommendation   Recommendation Short-term skilled PT   Equipment Recommended Walker  (RW)   PT - OK to Discharge Yes  (if d/c to rehab when medically stable )     Brooklynn Sandhu, PTA

## 2019-09-10 NOTE — PROGRESS NOTES
Progress Note Saran Stovall 1933, 80 y o  male MRN: [id number]    Unit/Bed#: E5 -01 Encounter: [id number]    Primary Care Provider: Rakesh Andersen MD   Date and time admitted to hospital: 9/7/2019  2:31 PM        * Ambulatory dysfunction  Assessment & Plan  Ambulatory dysfunction in the setting of hypotension and new diagnosis hypothyroidism  PT recommending rehab and patient agreeable  Awaiting placement    Left renal mass  Assessment & Plan  Left renal lesion which appears to be cysts on ultrasound    Hypotension (arterial)  Assessment & Plan  Hypotension likely in setting of volume depletion and component of hypothyroidism; metoprolol and diuretics on hold  For completeness random cortisol was checked which was within limits  Consider IV fluids but will defer to nephrology    Hypothyroidism  Assessment & Plan  New hypothyroidism  Started on levothyroxine  Results from last 7 days   Lab Units 09/07/19  1445   TSH 3RD GENERATON uIU/mL 32 351*   FREE T4 ng/dL 0 60*       Chronic kidney disease, stage 4 (severe) (HCC)  Assessment & Plan  ZULMA on CKD 4 secondary to hypotension/intravascular depletion  IV albumin per nephrology  Consider IV fluids    Results from last 7 days   Lab Units 09/10/19  0552 09/09/19  0512 09/08/19  0622 09/07/19  1445   BUN mg/dL 98* 89* 88* 80*   CREATININE mg/dL 2 68* 2 46* 2 06* 2 11*   EGFR ml/min/1 73sq m 21 23 28 28       H/O aortic valve replacement  Assessment & Plan  History of bioprosthetic AVR  Atrial fibrillation [facility])  Assessment & Plan  Paroxysmal atrial fibrillation amiodarone and warfarin    Holding metoprolol due to hypotension    Results from last 7 days   Lab Units 09/10/19  0552 09/09/19  0512 09/08/19  0622 09/07/19  1445   INR  2 52* 2 62* 2 44* 2 42*       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)  Assessment & Plan  CLL with chronic leukocytosis    Chronic obstructive pulmonary disease [facility])  Assessment & Plan  Reported history of COPD    Chronic combined systolic and diastolic heart failure (HCC)  Assessment & Plan  Wt Readings from Last 3 Encounters:   09/10/19 86 6 kg (191 lb)   09/04/19 81 6 kg (180 lb)   08/07/19 79 6 kg (175 lb 6 4 oz)      Chronic combined systolic and diastolic CHF  No significant changes on echocardiogram performed 9/9/2019  Holding furosemide and metoprolol given hypotension and kidney injury      VTE Pharmacologic Prophylaxis: Warfarin (Coumadin)    Patient Centered Rounds: I have performed bedside rounds with nursing staff today  Discussions with Specialists or Other Care Team Provider:  Nephrology  Education and Discussions with Family / Patient: Discussed with son at length    Time Spent for Care: 30 mins  More than 50% of total time spent on counseling and coordination of care as described above  Current Length of Stay: 3 day(s)  Current Patient Status: Inpatient     Certification Statement: The patient will continue to require additional inpatient hospital stay due to Ambulatory dysfunction  Discharge Plan / Estimated Discharge Date:  Still was renal failure  Once stable will be discharge to SNF    Code Status: Level 1 - Full Code  ______________________________________________________________________________    Subjective:   Patient seen and examined  Feeling a little better today  Complains of insomnia    Objective:   Vitals: Blood pressure 108/50, pulse 66, temperature 97 5 °F (36 4 °C), temperature source Tympanic, resp  rate 18, height 5' 8" (1 727 m), weight 86 6 kg (191 lb), SpO2 94 %      Physical Exam:   General appearance: alert, appears stated age and cooperative  Head: atraumatic  Lungs: diminished breath sounds  Heart: regular rate and rhythm  Abdomen: soft, non-tender, positive bowel sounds   Back: negative  Extremities: scattered ecchymosis  Neurologic: Grossly normal    Additional Data:   Labs:  Results from last 7 days   Lab Units 09/10/19  0552 09/09/19  0512 09/08/19  0622 09/07/19  1445   WBC Thousand/uL 22 34* 22 27* 23 19* 27 96*   HEMOGLOBIN g/dL 8 6* 9 2* 9 3* 9 5*   HEMATOCRIT % 28 2* 29 6* 30 0* 31 3*   MCV fL 107* 107* 107* 108*   TOTAL NEUT ABS Thousand/uL  --   --   --  11 18*   PLATELETS Thousands/uL 108* 103* 119* 124*   INR  2 52* 2 62* 2 44* 2 42*     Results from last 7 days   Lab Units 09/10/19  0552 09/09/19  0512 09/08/19  0622 09/07/19  1445   SODIUM mmol/L 136 137 138 137   POTASSIUM mmol/L 4 8 4 8 4 9 4 6   CHLORIDE mmol/L 105 105 107 103   CO2 mmol/L 22 23 23 24   ANION GAP mmol/L 9 9 8 10   BUN mg/dL 98* 89* 88* 80*   CREATININE mg/dL 2 68* 2 46* 2 06* 2 11*   CALCIUM mg/dL 8 5 8 1* 8 8 8 7   ALBUMIN g/dL  --  2 2* 2 4* 2 7*   TOTAL BILIRUBIN mg/dL  --  1 19* 1 60* 1 98*   ALK PHOS U/L  --  155* 159* 164*   ALT U/L  --  38 43 48   AST U/L  --  64* 82* 74*   EGFR ml/min/1 73sq m 21 23 28 28   GLUCOSE RANDOM mg/dL 106 108 102 133     Results from last 7 days   Lab Units 09/09/19  0512 09/07/19  1445   MAGNESIUM mg/dL 2 3 2 3   PHOSPHORUS mg/dL 3 5  --      Results from last 7 days   Lab Units 09/07/19  1445   CK TOTAL U/L 42   TROPONIN I ng/mL 0 05*     Results from last 7 days   Lab Units 09/07/19  1445   NT-PRO BNP pg/mL 3,172*      Results from last 7 days   Lab Units 09/09/19  0512   PROCALCITONIN ng/ml 0 33*             Results from last 7 days   Lab Units 09/07/19  1445   TSH 3RD GENERATON uIU/mL 32 351*   FREE T4 ng/dL 0 60*     * I Have Reviewed All Lab Data Listed Above  Cultures:   Results from last 7 days   Lab Units 09/07/19  1705 09/07/19  1700   BLOOD CULTURE  No Growth at 48 hrs  No Growth at 48 hrs  Imaging:  Imaging Reports Reviewed Today Include:       Procedure: Us Kidney And Bladder  Result Date: 9/10/2019  Impression: Bilateral renal cysts including 2 adjacent left lower pole cysts which were hyperdense on the CT  No hydronephrosis or perinephric collections  Unremarkable bladder  Workstation performed: [id number]     Scheduled Meds:  Current Facility-Administered Medications:  acetaminophen 488 mg Oral Q6H PRN Arlyne Schaumann, MD   albumin human 25 g Intravenous Q6H CT Juares   allopurinol 300 mg Oral Daily Arlyne Schaumann, MD   amiodarone 200 mg Oral Daily Arlyne Schaumann, MD   aspirin 81 mg Oral Daily Arlyne Schaumann, MD   atorvastatin 40 mg Oral After Yonatan Mcgregor MD   fluticasone 1 spray Each Nare Daily Arlyne Schaumann, MD   levothyroxine 50 mcg Oral Early Morning Lucas Putnam DO   melatonin 9 mg Oral HS PRN Ovidio Gamble DO   ondansetron 4 mg Intravenous Q6H PRN Arlyne Schaumann, MD   tamsulosin 0 4 mg Oral Daily Arlyne Schaumann, MD   warfarin 1 25 mg Oral Daily (warfarin) Arlyne Schaumann, MD Jacky Russel, DO  [facility] Internal Medicine  Hospitalist    ** Please Note: This note has been constructed using a voice recognition system   **

## 2019-09-10 NOTE — ASSESSMENT & PLAN NOTE
Ambulatory dysfunction in the setting of hypotension and new diagnosis hypothyroidism  PT recommending rehab and patient agreeable    Awaiting placement

## 2019-09-10 NOTE — PLAN OF CARE
Problem: OCCUPATIONAL THERAPY ADULT  Goal: Performs self-care activities at highest level of function for planned discharge setting  See evaluation for individualized goals  Description  Treatment Interventions: ADL retraining, Functional transfer training, UE strengthening/ROM, Endurance training, Cognitive reorientation, Patient/family training, Equipment evaluation/education, Compensatory technique education, Energy conservation, Activityengagement          See flowsheet documentation for full assessment, interventions and recommendations  Note:   Limitation: Decreased ADL status, Decreased Safe judgement during ADL, Decreased UE strength, Decreased sensation, Decreased cognition, Decreased endurance, Decreased high-level ADLs, Decreased self-care trans  Prognosis: Good  Assessment: Pt is a 80 y o  male seen for OT evaluation s/p admit to [facility] on 9/7/2019 w/ Ambulatory dysfunction and fall  Comorbidities affecting pt's functional performance at time of assessment include: ZULMA on CKD IV, hypotension, a-fib, pneumonia, cirrhosis  Personal factors affecting pt at time of IE include:lives alone, son has been checking on daily  Prior to admission, pt was living alone and reports independent w/ ADLs, independent w/ functional transfers and mobility w/ SPC in community, independent IADLs, driving  Upon evaluation: Pt requires MIN assist supine>sit bed mobility w/ increased time to complete, MIN assist x1 sit<>Stand w/ VCs for hand placement and positioning, MIN assist functional mobility w/ RW, MIN-MOD assist UB ADLs, MOD assist LB ADLs, MOD assist toileting 2* the following deficits impacting occupational performance: decreased strength and endurance, impaired balance, impaired active AROM b/l UEs at shoulder, impaired functional reach, decreased activity tolerance, orthostatic hypotension (no signs of dizziness)   Pt to benefit from continued skilled OT tx while in the hospital to address deficits as defined above and maximize level of functional independence w ADL's and functional mobility  Occupational Performance areas to address include: grooming, bathing/shower, toilet hygiene, dressing, health maintenance, functional mobility, clothing management, cleaning and meal prep, home safety education, fall prevention education  From OT standpoint, recommendation at time of d/c would be short term rehab        OT Discharge Recommendation: Short Term Rehab  OT - OK to Discharge: (to rehab when medically stable)

## 2019-09-10 NOTE — OCCUPATIONAL THERAPY NOTE
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 ([facility] )    Atrial fibrillation ([facility] )    H/O aortic valve replacement    Dehydration    Pneumonia of right middle lobe due to infectious organism ([facility] )    Chronic kidney disease, stage 4 (severe) ([facility])    Hypothyroidism    Hypotension (arterial)    Left renal mass    Past Medical History  Past Medical History:   Diagnosis Date    AAA (abdominal aortic aneurysm) ([facility])     Abnormal blood chemistry     last assessed 12/12/2013    Allergic rhinitis     last assessed 11/21/2016    Anemia     Ankle edema     Arthritis     Atrophy of prostate     Chronic diarrhea     COPD (chronic obstructive pulmonary disease) ([facility])     Coronary artery disease     stents, pacemaker    Elevated PSA     GERD (gastroesophageal reflux disease)     Gout     Heart failure ([facility])     Heart murmur     aortic stenosis    Hyperlipidemia     Hypertension     Irregular heart beat     a fib    Joint pain     Kidney failure     stage 3    Leukemia ([facility] )     Malignant neoplasm prostate ([facility] )     Myocardial infarction ([facility] )     Nocturia     Osteopenia     Pleural effusion     PVD (peripheral vascular disease) ([facility])     Radiation gastroenteritis     Skin cancer     Sore muscles     Type 2 diabetes mellitus ([facility] )      Past Surgical History  Past Surgical History:   Procedure Laterality Date    CARDIAC PACEMAKER PLACEMENT  2014    CARDIAC VALVE REPLACEMENT      CORONARY ANGIOPLASTY      prox left anterior descending artery assessment    OTHER SURGICAL HISTORY      catheter ablation    PROSTATE BIOPSY Bilateral 2003, 2008    VASCULAR SURGERY      iliac stent 09/10/19 1150   Note Type   Note type Eval/Treat   Restrictions/Precautions   Weight Bearing Precautions Per Order No   Other Precautions Telemetry; Fall Risk;Multiple lines   Pain Assessment   Pain Assessment No/denies pain   Pain Score No Pain   Home Living   Type of [facility] Two level;Stairs to enter with rails; Laundry in basement  (14 steps to 2nd floor bed/bath; 1+1 BREE)   Bathroom Shower/Tub Tub/shower unit  (walk in shower in basement)   Bathroom Toilet Raised   Bathroom Equipment Grab bars in shower; Shower chair; Toilet raiser   [facility]   Additional Comments pt lives alone, reports son has been checking on him daily and assisting w/ grocery shopping and reports not wanting to go out of the house   Prior Function   Level of [place] Independent with ADLs and functional mobility  (w/ no AD; SPC community)   Lives With [address] [place] Help From Family   ADL Assistance Independent   IADLs Needs assistance   Falls in the last 6 months 1 to 4  (3)   Vocational Retired   Comments pt reports increased difficulty getting in/out of chairs at home and of bed   Lifestyle   Autonomy per pt independent w/ ADLs, independent w/ functional transfers and mobility w/ no AD in home and SPC in community, independent w/ IADLs    Reciprocal Relationships son   Service to Others retired drove [place]    Intrinsic Gratification reading   ADL   Where [address] 5  Supervision/Setup   Grooming Assistance 4  Minimal Assistance   [address] 3  Moderate Assistance   LB [address] 3  Moderate Assistance   [address] 3  Moderate Assistance    [facility] 3  Moderate [address]  3  Moderate Assistance   Bed Mobility   Supine to Sit 4  Minimal assistance   Additional items Assist x 1; Increased time required;LE management;Verbal cues; Bedrails;HOB elevated   Additional Comments increased time to complete   Transfers   Sit to Stand 4  Minimal assistance   Additional items Assist x 1; Increased time required;Verbal cues;Armrests   Stand to Sit 4  Minimal assistance   Additional items Assist x 1; Increased time required;Verbal cues;Armrests   Additional Comments cues for safety, pt pushing up on RW through elbows; BP supine" 113/55, EOB: 107/52, standin/71, after activity: 89/46, after seated w/ ankle pumpin/50 reports no dizziness or lightheadness t/o   Functional Mobility   Functional Mobility 4  Minimal assistance   Additional Comments assist x1 w/ increased time    Additional items Rolling walker   Balance   Static Sitting Fair +   Dynamic Sitting Fair   Static Standing Fair -   Dynamic Standing Fair -   Ambulatory Poor +   Activity Tolerance   Activity Tolerance Patient limited by fatigue   Medical Staff Made Aware discussed OT POC w/ brianne COUGHLIN   Nurse Made Aware appropriate to see per Mallika RUIZ   RUE Assessment   RUE Assessment   (distal 3+/5, RTC injury)   RUE Overall AROM   R Shoulder Flexion 20   R Elbow Flexion WFL   R Elbow Extension WFL   R Elbow Supination WFL   R Elbow Pronation WFL   R Mass Grasp 4-/5   RUE Overall PROM   R Shoulder Flexion 90   LUE Assessment   LUE Assessment   (limited elevation, distal 3+/5)   LUE Overall AROM   L Shoulder Flexion 20  (reports limited over 1 month)   L Elbow Flexion WFL   L Elbow Extension WFL   L Elbow Pronation WFL   L Wrist Flexion WFL   LUE Overall PROM   L Shoulder Flexion 90   Hand Function   Gross Motor Coordination Impaired   Fine Motor Coordination Functional   Sensation   Light Touch No apparent deficits   Proprioception   Proprioception No apparent deficits   Vision-Basic Assessment   Current Vision No visual deficits   Vision - Complex Assessment   Ocular Range of Motion WFL   Acuity Able to read clock/calendar on wall without difficulty   Perception   Inattention/Neglect Appears intact   Cognition   Overall Cognitive Status WFL   Arousal/Participation Alert; Cooperative   Attention Within functional limits   Orientation Level Oriented X4   Memory Within functional limits   Following Commands Follows one step commands without difficulty   Comments pt engages in appropriate conversations; pt reports not wanting to leave his house at home   Assessment   Limitation Decreased ADL status; Decreased Safe judgement during ADL;Decreased UE strength;Decreased sensation;Decreased cognition;Decreased endurance;Decreased high-level ADLs; Decreased self-care trans   Prognosis Good   Assessment Pt is a 80 y o  male seen for OT evaluation s/p admit to [facility] on 9/7/2019 w/ Ambulatory dysfunction and fall  Comorbidities affecting pt's functional performance at time of assessment include: ZULAM on CKD IV, hypotension, a-fib, pneumonia, cirrhosis  Personal factors affecting pt at time of IE include:lives alone, son has been checking on daily  Prior to admission, pt was living alone and reports independent w/ ADLs, independent w/ functional transfers and mobility w/ SPC in community, independent IADLs, driving  Upon evaluation: Pt requires MIN assist supine>sit bed mobility w/ increased time to complete, MIN assist x1 sit<>Stand w/ VCs for hand placement and positioning, MIN assist functional mobility w/ RW, MIN-MOD assist UB ADLs, MOD assist LB ADLs, MOD assist toileting 2* the following deficits impacting occupational performance: decreased strength and endurance, impaired balance, impaired active AROM b/l UEs at shoulder, impaired functional reach, decreased activity tolerance, orthostatic hypotension (no signs of dizziness)  Pt to benefit from continued skilled OT tx while in the hospital to address deficits as defined above and maximize level of functional independence w ADL's and functional mobility   Occupational Performance areas to address include: grooming, bathing/shower, toilet hygiene, dressing, health maintenance, functional mobility, clothing management, cleaning and meal prep, home safety education, fall prevention education  From OT standpoint, recommendation at time of d/c would be short term rehab  Goals   Patient Goals "to get moving more and go home"   LTG Time Frame 10-14   Long Term Goal please see below goals   Plan   Treatment Interventions ADL retraining;Functional transfer training;UE strengthening/ROM; Endurance training;Cognitive reorientation;Patient/family training;Equipment evaluation/education; Compensatory technique education; Energy conservation; Activityengagement   Goal Expiration Date 09/24/19   OT Frequency 3-5x/wk   Recommendation   OT Discharge Recommendation Short Term Rehab   OT - OK to Discharge   (to rehab when medically stable)   Barthel Index   Feeding 10   Bathing 0   Grooming Score 0   Dressing Score 0   Bladder Score 10   Bowels Score 10   Toilet Use Score 5   Transfers (Bed/Chair) Score 10   Mobility (Level Surface) Score 0   Stairs Score 0   Barthel Index Score 45   Modified Deangelo Scale   Modified Deangelo Scale 4     Occupational Therapy Goals to be met in 10-14 days:  1) Pt will improve activity tolerance to G for min 30 min txment sessions to enhance ADLs  2) Pt will complete ADLs/self care w/ supervision w/ LHAE  3) Pt will complete toileting w/ mod I w/ G hygiene/thoroughness using DME PRN  4) Pt will improve functional transfers on/off all surfaces using DME PRN w/ G balance/safety including toileting w/ mod I  5) Pt will improve fx'l mobility during I/ADl/leisure tasks using DME PRN w/ g balance/safety w/ mod I  6) Pt will engage in ongoing cognitive assessment w/ G participation to A w/ safe d/c planning/recommendations  7) Pt will demonstrate G carryover of pt/caregiver education and training as appropriate w/ mod I  w/ G tolerance  8) Pt will engage in depression screen/leisure interest checklist w/ G participation to monitor s/s depression and ID 3 positive coping strategies to A w/ emotional regulation and management  9) Pt will demonstrate 100% carryover of E C  techniques w/ mod I t/o fx'l I/ADL/leisure tasks w/o cues s/p skilled education  10) Pt will demonstrate improved bed mobility to supervision to enhance ADLs  11) Pt will demonstrate 100% carryover of LHAE for LB ADLs/self care and leisure s/p skilled education w/ mod I and G participation  12) Pt will demonstrate improved standing tolerance to 3-5 minutes during functional tasks w/ no LOB to enhance ADL performance  13) Pt will demonstrate improved R UE strength by 1 MMT grade and AROM  Of shoulder flexion to 60 to enhance ADLS and functional transfers     Documentation completed by: Robert Gonzalez MS, OTR/L

## 2019-09-10 NOTE — ASSESSMENT & PLAN NOTE
ZULMA on CKD 4 secondary to hypotension/intravascular depletion  IV albumin per nephrology    Consider IV fluids    Results from last 7 days   Lab Units 09/10/19  0552 09/09/19  0512 09/08/19  0622 09/07/19  1445   BUN mg/dL 98* 89* 88* 80*   CREATININE mg/dL 2 68* 2 46* 2 06* 2 11*   EGFR ml/min/1 73sq m 21 23 28 28

## 2019-09-10 NOTE — OCCUPATIONAL THERAPY NOTE
Occupational Therapy Treatment Note:         09/10/19 1445   Restrictions/Precautions   Weight Bearing Precautions Per Order No   Other Precautions Fall Risk;Pain;Multiple lines;Telemetry; Bed Alarm; Chair Alarm   Pain Assessment   Pain Assessment 0-10   Pain Score No Pain   ADL   Where Assessed Chair   Grooming Assistance 4  Minimal Assistance   Grooming Deficit Setup;Verbal cueing;Supervision/safety; Increased time to complete;Brushing hair   LB Dressing Assistance 3  Moderate Assistance   LB Dressing Deficit Setup;Steadying; Requires assistive device for steadying;Verbal cueing;Supervision/safety; Increased time to complete; Don/doff L sock; Don/doff R sock   LB Dressing Comments Pt with limited functional reach and increased edema in BLE  Functional Standing Tolerance   Time 2 mins   Activity dynamic stand balance activity  Comments Pt reports having increased fatigue with activities instance  Bed Mobility   Sit to Supine 4  Minimal assistance   Additional items Assist x 1; Increased time required;LE management;Verbal cues; Bedrails;HOB elevated   Additional Comments increased A to bring BLE into bed  Transfers   Sit to Stand 4  Minimal assistance   Additional items Assist x 1; Armrests; Increased time required;Verbal cues   Stand to Sit 4  Minimal assistance   Additional items Assist x 1; Increased time required;Verbal cues;Armrests   Stand pivot 4  Minimal assistance   Additional items Assist x 1; Increased time required;Armrests; Verbal cues; Bedrails   Additional Comments cues for safe hand placement and footing required    Functional Mobility   Functional Mobility 4  Minimal assistance   Additional Comments x1   Additional items Rolling walker   Cognition   Overall Cognitive Status WFL   Arousal/Participation Responsive; Alert; Cooperative   Attention Within functional limits   Orientation Level Oriented X4   Memory Within functional limits   Following Commands Follows one step commands without difficulty Comments Pt able to follow reviewed information  Cognition Assessment Tools Other (Comment)  (functional observation  )   Additional Activities   Additional Activities Other (Comment)  (reviewed current plan of care)   Additional Activities Comments Pt reports having F understanding  Activity Tolerance   Activity Tolerance Patient limited by fatigue   Medical Staff Made Aware Reported all findings to nursing staff  Assessment   Assessment ,Pt was seen for skilled OT with focus on completion of self care tasks, functional mobility, review of energy conservation techs, fall prevention and review of current plan of care  See above levels of A required for all functional tasks  Pt with limited activity tolerance noted this tx session  Reviewed home set up with Pt noting no bathroom on the first floor and that he may be interested in use of bedside commode on that floor for convenience with return to home environment  Pt may benefit from further rehab with focus on achieving optimal performance levels with all functional tasks  Continue to recommend Inpatient rehab   Plan   Treatment Interventions ADL retraining;Functional transfer training; Endurance training   Goal Expiration Date 09/24/19   Treatment Day 1   OT Frequency 3-5x/wk   Recommendation   OT Discharge Recommendation Short Term Rehab   OT - OK to Discharge Yes  (when medically stable  )   Barthel Index   Feeding 10   Bathing 0   Grooming Score 0   Dressing Score 0   Bladder Score 10   Bowels Score 10   Toilet Use Score 5   Transfers (Bed/Chair) Score 10   Mobility (Level Surface) Score 0   Stairs Score 0   Barthel Index Score 45   Leticia Thomas, [address]

## 2019-09-10 NOTE — PROGRESS NOTES
NEPHROLOGY PROGRESS NOTE   Laura Stovall 80 y o  male MRN: [id number]  Unit/Bed#: E5 -01 Encounter: [id number]  Reason for Consult: CKD IV    ASSESSMENT/PLAN:  CKD IV:  Follows with Dr Bryant with [facility] specialist   Concern for over diuresis with recent increase in OP Lasix dosing   -baseline creatinine 1 9-2 1   -creatinine worsening, likely due to hemodynamics   - S/P albumin x 2 doses 09/10, will discuss with Sol Few in regards to repeating    - home diuretic: Lasix 60 mg po daily, metolazone 1 25 mg Monday Wednesday Friday, spironolactone 12 5 mg daily   -diuretics remain on hold for concern for volume depletion  Will continue to monitor volume status for re-initiation  -CT: negative for hydro    -no urgent indication for dialysis  - check bladder scans    -avoid nephrotoxins  -I/O  Left kidney lesion:  -renal ultrasound shows bilateral renal cysts including 2 adjacent left lower pole cysts  Negative for hydro      Hypertension:  Blood pressure is fluctuating with periods of hypotension  -will continue to monitor, avoid further hypotension   -diuretics currently on hold  -status post IV albumin, likely will repeat today       Combined systolic/diastolic heart failure:  Ejection fraction of 45% in 2015    -repeat echocardiogram pending   -daily weight, I/O   -placed on 1 5 L per day fluid restriction   -diuretic currently on hold      Pneumonia:  Care per primary team   -continues on antibiotics      CLL:  Follows outpatient with Hematology/Oncology, currently not receiving treatment and is undergoing surveillance every 6 months with lab work      Anemia: Hgb is stable in the 9's  - will continue to monitor and transfuse as needed  Cirrhosis:  With mild to moderate ascites  -s/p albumin   -blood pressure remains low           SUBJECTIVE:  THE PATIENT IS RESTING IN BED COMFORTABLY  HE DENIES ANY COMPLAINTS  HE DENIES CHEST PAIN OR SHORTNESS OF BREATH    HE DENIES NAUSEA, VOMITING, DIARRHEA  HE STATES HE IS URINATING WITHOUT DIFFICULTY  HE STATES THAT HE HAS A GOOD APPETITE      OBJECTIVE:  Current Weight: Weight - Scale: 86 6 kg (191 lb)  Vitals:    09/09/19 1554 09/10/19 0013 09/10/19 0600 09/10/19 0724   BP: 130/51 (!) 89/44  108/50   BP Location: Left arm Left arm  Left arm   Pulse: 61 60  66   Resp: 20 20  18   Temp: (!) 96 7 °F (35 9 °C) (!) 96 8 °F (36 °C)  97 5 °F (36 4 °C)   TempSrc: Temporal Temporal  Tympanic   SpO2: 95% 95%  94%   Weight:   86 6 kg (191 lb)    Height:           Intake/Output Summary (Last 24 hours) at 9/10/2019 1034  Last data filed at 9/10/2019 0406  Gross per 24 hour   Intake 60 ml   Output 501 ml   Net -441 ml     General: No apparent distress  Skin: warm, dry, intact, no rash  HEENT: Moist mucous membranes, sclera anicteric, normocephalic  Neck: No apparent JVD appreciated  Chest: Lung sounds clear B/L, on RA  Heart: Regular rate and rhythm, No murmer  Abdomen: Soft, round, NT, +BS  Extremities: B/L LE/UE edema present  Neuro: Alert and oriented  Psych: Appropriate mood and affect    Medications:    Current Facility-Administered Medications:     acetaminophen (TYLENOL) tablet 488 mg, 488 mg, Oral, Q6H PRN, Jennifer Peace MD    allopurinol (ZYLOPRIM) tablet 300 mg, 300 mg, Oral, Daily, Steffi Bowen MD, 300 mg at 09/10/19 0901    amiodarone tablet 200 mg, 200 mg, Oral, Daily, Steffi Bowen MD, 200 mg at 09/10/19 0901    aspirin (ECOTRIN LOW STRENGTH) EC tablet 81 mg, 81 mg, Oral, Daily, Steffi Bowen MD, 81 mg at 09/10/19 0901    atorvastatin (LIPITOR) tablet 40 mg, 40 mg, Oral, After Susan Porter MD, 40 mg at 09/09/19 1806    fluticasone (FLONASE) 50 mcg/act nasal spray 1 spray, 1 spray, Each Nare, Daily, Jennifer Peace MD    levothyroxine tablet 50 mcg, 50 mcg, Oral, Early Morning, Katelin Lopez, , 50 mcg at 09/10/19 0542    melatonin tablet 9 mg, 9 mg, Oral, HS PRN, Katelin Lopez DO    ondansetron [facility]) injection 4 mg, 4 mg, Intravenous, Q6H PRN, Maximilian Padilla MD    tamsulosin (FLOMAX) capsule 0 4 mg, 0 4 mg, Oral, Daily, Maximilian Padilla MD, 0 4 mg at 09/10/19 0901    warfarin (COUMADIN) tablet 1 25 mg, 1 25 mg, Oral, Daily (warfarin), Maximilian Padilla MD, 1 25 mg at 09/09/19 1805    Laboratory Results:  Results from last 7 days   Lab Units 09/10/19  0552 09/09/19  0512 09/08/19  0622 09/07/19  1445   WBC Thousand/uL 22 34* 22 27* 23 19* 27 96*   HEMOGLOBIN g/dL 8 6* 9 2* 9 3* 9 5*   HEMATOCRIT % 28 2* 29 6* 30 0* 31 3*   PLATELETS Thousands/uL 108* 103* 119* 124*   POTASSIUM mmol/L 4 8 4 8 4 9 4 6   CHLORIDE mmol/L 105 105 107 103   CO2 mmol/L 22 23 23 24   BUN mg/dL 98* 89* 88* 80*   CREATININE mg/dL 2 68* 2 46* 2 06* 2 11*   CALCIUM mg/dL 8 5 8 1* 8 8 8 7   MAGNESIUM mg/dL  --  2 3  --  2 3   PHOSPHORUS mg/dL  --  3 5  --   --

## 2019-09-10 NOTE — ASSESSMENT & PLAN NOTE
Hypotension likely in setting of volume depletion and component of hypothyroidism; metoprolol and diuretics on hold  For completeness random cortisol was checked which was within limits    Consider IV fluids but will defer to nephrology

## 2019-09-10 NOTE — PLAN OF CARE
Problem: OCCUPATIONAL THERAPY ADULT  Goal: Performs self-care activities at highest level of function for planned discharge setting  See evaluation for individualized goals  Description  Treatment Interventions: ADL retraining, Functional transfer training, UE strengthening/ROM, Endurance training, Cognitive reorientation, Patient/family training, Equipment evaluation/education, Compensatory technique education, Energy conservation, Activityengagement          See flowsheet documentation for full assessment, interventions and recommendations  Note:   Limitation: Decreased ADL status, Decreased Safe judgement during ADL, Decreased UE strength, Decreased sensation, Decreased cognition, Decreased endurance, Decreased high-level ADLs, Decreased self-care trans  Prognosis: Good  Assessment: ,Pt was seen for skilled OT with focus on completion of self care tasks, functional mobility, review of energy conservation techs, fall prevention and review of current plan of care  See above levels of A required for all functional tasks  Pt with limited activity tolerance noted this tx session  Reviewed home set up with Pt noting no bathroom on the first floor and that he may be interested in use of bedside commode on that floor for convenience with return to home environment  Pt may benefit from further rehab with focus on achieving optimal performance levels with all functional tasks   Continue to recommend Inpatient rehab     OT Discharge Recommendation: Short Term Rehab  OT - OK to Discharge: Yes(when medically stable  )

## 2019-09-10 NOTE — ASSESSMENT & PLAN NOTE
Paroxysmal atrial fibrillation amiodarone and warfarin    Holding metoprolol due to hypotension    Results from last 7 days   Lab Units 09/10/19  0552 09/09/19  0512 09/08/19  0622 09/07/19  1445   INR  2 52* 2 62* 2 44* 2 42*

## 2019-09-11 ENCOUNTER — APPOINTMENT (INPATIENT)
Dept: RADIOLOGY | Facility: HOSPITAL | Age: 84
DRG: 987 | End: 2019-09-11
Payer: MEDICARE

## 2019-09-11 LAB
ALBUMIN SERPL BCP-MCNC: 3.3 G/DL (ref 3.5–5)
ALP SERPL-CCNC: 131 U/L (ref 46–116)
ALT SERPL W P-5'-P-CCNC: 31 U/L (ref 12–78)
ANION GAP SERPL CALCULATED.3IONS-SCNC: 11 MMOL/L (ref 4–13)
AST SERPL W P-5'-P-CCNC: 54 U/L (ref 5–45)
BILIRUB SERPL-MCNC: 1.82 MG/DL (ref 0.2–1)
BUN SERPL-MCNC: 101 MG/DL (ref 5–25)
CALCIUM SERPL-MCNC: 8.9 MG/DL (ref 8.3–10.1)
CHLORIDE SERPL-SCNC: 101 MMOL/L (ref 100–108)
CO2 SERPL-SCNC: 22 MMOL/L (ref 21–32)
CREAT SERPL-MCNC: 2.48 MG/DL (ref 0.6–1.3)
GFR SERPL CREATININE-BSD FRML MDRD: 23 ML/MIN/1.73SQ M
GLUCOSE SERPL-MCNC: 95 MG/DL (ref 65–140)
POTASSIUM SERPL-SCNC: 4.2 MMOL/L (ref 3.5–5.3)
PROT SERPL-MCNC: 5.3 G/DL (ref 6.4–8.2)
SODIUM SERPL-SCNC: 134 MMOL/L (ref 136–145)

## 2019-09-11 PROCEDURE — 80053 COMPREHEN METABOLIC PANEL: CPT | Performed by: INTERNAL MEDICINE

## 2019-09-11 PROCEDURE — 97116 GAIT TRAINING THERAPY: CPT

## 2019-09-11 PROCEDURE — 94640 AIRWAY INHALATION TREATMENT: CPT

## 2019-09-11 PROCEDURE — 99232 SBSQ HOSP IP/OBS MODERATE 35: CPT | Performed by: INTERNAL MEDICINE

## 2019-09-11 PROCEDURE — 94760 N-INVAS EAR/PLS OXIMETRY 1: CPT

## 2019-09-11 PROCEDURE — 71046 X-RAY EXAM CHEST 2 VIEWS: CPT

## 2019-09-11 RX ORDER — ALBUMIN (HUMAN) 12.5 G/50ML
25 SOLUTION INTRAVENOUS EVERY 6 HOURS
Status: COMPLETED | OUTPATIENT
Start: 2019-09-11 | End: 2019-09-12

## 2019-09-11 RX ORDER — LEVALBUTEROL 1.25 MG/.5ML
1.25 SOLUTION, CONCENTRATE RESPIRATORY (INHALATION) EVERY 6 HOURS PRN
Status: DISCONTINUED | OUTPATIENT
Start: 2019-09-11 | End: 2019-09-19

## 2019-09-11 RX ADMIN — ALLOPURINOL 300 MG: 100 TABLET ORAL at 08:11

## 2019-09-11 RX ADMIN — AMIODARONE HYDROCHLORIDE 200 MG: 200 TABLET ORAL at 08:11

## 2019-09-11 RX ADMIN — ALBUMIN (HUMAN) 25 G: 12.5 SOLUTION INTRAVENOUS at 13:00

## 2019-09-11 RX ADMIN — QUETIAPINE FUMARATE 12.5 MG: 25 TABLET ORAL at 19:32

## 2019-09-11 RX ADMIN — ASPIRIN 81 MG: 81 TABLET, COATED ORAL at 08:11

## 2019-09-11 RX ADMIN — LEVALBUTEROL HYDROCHLORIDE 1.25 MG: 1.25 SOLUTION, CONCENTRATE RESPIRATORY (INHALATION) at 20:15

## 2019-09-11 RX ADMIN — WARFARIN SODIUM 1.25 MG: 2.5 TABLET ORAL at 16:30

## 2019-09-11 RX ADMIN — TAMSULOSIN HYDROCHLORIDE 0.4 MG: 0.4 CAPSULE ORAL at 08:11

## 2019-09-11 RX ADMIN — ALBUMIN (HUMAN) 25 G: 12.5 SOLUTION INTRAVENOUS at 19:32

## 2019-09-11 RX ADMIN — MIDODRINE HYDROCHLORIDE 5 MG: 5 TABLET ORAL at 06:21

## 2019-09-11 RX ADMIN — LEVOTHYROXINE SODIUM 50 MCG: 50 TABLET ORAL at 06:21

## 2019-09-11 RX ADMIN — MIDODRINE HYDROCHLORIDE 5 MG: 5 TABLET ORAL at 16:30

## 2019-09-11 RX ADMIN — ATORVASTATIN CALCIUM 40 MG: 40 TABLET, FILM COATED ORAL at 16:30

## 2019-09-11 NOTE — PROGRESS NOTES
Progress Note Anne Stovall 1933, 80 y o  male MRN: [id number]    Unit/Bed#: E5 -01 Encounter: [id number]    Primary Care Provider: Emma Willson MD   Date and time admitted to hospital: 9/7/2019  2:31 PM        * Ambulatory dysfunction  Assessment & Plan  Ambulatory dysfunction in the setting of hypotension and new diagnosis hypothyroidism  PT recommending rehab and patient agreeable  Awaiting placement    Left renal mass  Assessment & Plan  Left renal lesion which appears to be cysts on ultrasound    Hypotension (arterial)  Assessment & Plan  Hypotension likely in setting of volume depletion and component of hypothyroidism; metoprolol and diuretics on hold  Random cortisol was checked which was within limits  Hypothyroidism  Assessment & Plan  New hypothyroidism  Started on levothyroxine  Results from last 7 days   Lab Units 09/07/19  1445   TSH 3RD GENERATON uIU/mL 32 351*   FREE T4 ng/dL 0 60*       Chronic kidney disease, stage 4 (severe) ([facility])  Assessment & Plan  ZULMA on CKD 4 secondary to hypotension/intravascular depletion  Received IV albumin per nephrology  Slowly improving but given shortness of breath consider restarting diuretics    Results from last 7 days   Lab Units 09/11/19  0512 09/10/19  0552 09/09/19  0512 09/08/19  0622 09/07/19  1445   BUN mg/dL 101* 98* 89* 88* 80*   CREATININE mg/dL 2 48* 2 68* 2 46* 2 06* 2 11*   EGFR ml/min/1 73sq m 23 21 23 28 28       Atrial fibrillation (HCC)  Assessment & Plan  Paroxysmal atrial fibrillation amiodarone and warfarin    Holding metoprolol due to hypotension    Results from last 7 days   Lab Units 09/10/19  0552 09/09/19  0512 09/08/19  0622 09/07/19  1445   INR  2 52* 2 62* 2 44* 2 42*       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)  Assessment & Plan  CLL with chronic leukocytosis    Gout  Assessment & Plan  Gouty arthritis continue allopurinol    Chronic obstructive pulmonary disease [facility])  Assessment & Plan  Reported history of COPD  Currently short of breath may be related to volume status  Trial of xopenex    Chronic combined systolic and diastolic heart failure (HCC)  Assessment & Plan  Wt Readings from Last 3 Encounters:   09/11/19 86 6 kg (190 lb 14 7 oz)   09/04/19 81 6 kg (180 lb)   08/07/19 79 6 kg (175 lb 6 4 oz)      Chronic combined systolic and diastolic CHF  No significant changes on echocardiogram performed 9/9/2019  Holding furosemide and metoprolol given hypotension and kidney injury  Restart when okay with nephrology however will check chest x-ray      VTE Pharmacologic Prophylaxis: Warfarin (Coumadin)    Patient Centered Rounds: I have performed bedside rounds with nursing staff today  Discussions with Specialists or Other Care Team Provider:  Case management  Education and Discussions with Family / Patient:     Time Spent for Care: 25 mins  More than 50% of total time spent on counseling and coordination of care as described above  Current Length of Stay: 4 day(s)  Current Patient Status: Inpatient     Certification Statement: The patient will continue to require additional inpatient hospital stay due to Ambulatory dysfunction  Discharge Plan / Estimated Discharge Date:  Hopefully next 48 hours    Code Status: Level 1 - Full Code  ______________________________________________________________________________    Subjective:   Patient seen and examined  Feeling more short of breath today with wheezing    Objective:   Vitals: Blood pressure 109/51, pulse 60, temperature (!) 97 °F (36 1 °C), temperature source Tympanic, resp  rate 18, height 5' 8" (1 727 m), weight 86 6 kg (190 lb 14 7 oz), SpO2 92 %      Physical Exam:   General appearance: alert, appears stated age and cooperative  Head: Normocephalic, without obvious abnormality, atraumatic  Lungs: wheezes  Heart: regular rate and rhythm  Abdomen: soft, non-tender, positive bowel sounds   Back: negative, range of motion normal  Extremities: edema +2 lower extremities bilaterally  Neurologic: Grossly normal    Additional Data:   Labs:  Results from last 7 days   Lab Units 09/10/19  0552 09/09/19  0512 09/08/19 0622 09/07/19  1445   WBC Thousand/uL 22 34* 22 27* 23 19* 27 96*   HEMOGLOBIN g/dL 8 6* 9 2* 9 3* 9 5*   HEMATOCRIT % 28 2* 29 6* 30 0* 31 3*   MCV fL 107* 107* 107* 108*   TOTAL NEUT ABS Thousand/uL  --   --   --  11 18*   PLATELETS Thousands/uL 108* 103* 119* 124*   INR  2 52* 2 62* 2 44* 2 42*     Results from last 7 days   Lab Units 09/11/19  0512 09/10/19  0552 09/09/19  0512 09/08/19  0622 09/07/19  1445   SODIUM mmol/L 134* 136 137 138 137   POTASSIUM mmol/L 4 2 4 8 4 8 4 9 4 6   CHLORIDE mmol/L 101 105 105 107 103   CO2 mmol/L 22 22 23 23 24   ANION GAP mmol/L 11 9 9 8 10   BUN mg/dL 101* 98* 89* 88* 80*   CREATININE mg/dL 2 48* 2 68* 2 46* 2 06* 2 11*   CALCIUM mg/dL 8 9 8 5 8 1* 8 8 8 7   ALBUMIN g/dL 3 3*  --  2 2* 2 4* 2 7*   TOTAL BILIRUBIN mg/dL 1 82*  --  1 19* 1 60* 1 98*   ALK PHOS U/L 131*  --  155* 159* 164*   ALT U/L 31  --  38 43 48   AST U/L 54*  --  64* 82* 74*   EGFR ml/min/1 73sq m 23 21 23 28 28   GLUCOSE RANDOM mg/dL 95 106 108 102 133     Results from last 7 days   Lab Units 09/09/19  0512 09/07/19  1445   MAGNESIUM mg/dL 2 3 2 3   PHOSPHORUS mg/dL 3 5  --      Results from last 7 days   Lab Units 09/07/19  1445   CK TOTAL U/L 42   TROPONIN I ng/mL 0 05*     Results from last 7 days   Lab Units 09/07/19  1445   NT-PRO BNP pg/mL 3,172*      Results from last 7 days   Lab Units 09/09/19  0512   PROCALCITONIN ng/ml 0 33*             Results from last 7 days   Lab Units 09/07/19  1445   TSH 3RD GENERATON uIU/mL 32 351*   FREE T4 ng/dL 0 60*     * I Have Reviewed All Lab Data Listed Above  Cultures:   Results from last 7 days   Lab Units 09/07/19  1705 09/07/19  1700   BLOOD CULTURE  No Growth at 72 hrs  No Growth at 72 hrs           Imaging:  Imaging Reports Reviewed Today Include: Procedure: Us Kidney And Bladder  Result Date: 9/10/2019  Impression: Bilateral renal cysts including 2 adjacent left lower pole cysts which were hyperdense on the CT  No hydronephrosis or perinephric collections  Unremarkable bladder  Workstation performed: [id number]     Scheduled Meds:  Current Facility-Administered Medications:  acetaminophen 488 mg Oral Q6H PRN Jennifer Huerta MD   albumin human 25 g Intravenous Q6H Hai Kessler MD   allopurinol 300 mg Oral Daily Jennifer Huerta MD   amiodarone 200 mg Oral Daily Jennifer Huerta MD   aspirin 81 mg Oral Daily Jennifer Huerta MD   atorvastatin 40 mg Oral After Enrico Mann MD   fluticasone 1 spray Each Nare Daily Jennifer Huerta MD   levalbuterol 1 25 mg Nebulization Q6H PRN Michell Johnson DO   levothyroxine 50 mcg Oral Early Morning Lucas Putnam DO   melatonin 9 mg Oral HS PRN Arcadio White,    midodrine 5 mg Oral BID AC Hai Kessler MD   ondansetron 4 mg Intravenous Q6H PRN Jennifer Huerta MD   QUEtiapine 12 5 mg Oral HS Jose Francisco Parsons DO   tamsulosin 0 4 mg Oral Daily Jennifer Huerta MD   warfarin 1 25 mg Oral Daily (warfarin) MD Michell Da Silva DO  [facility] Internal Medicine  Hospitalist    ** Please Note: This note has been constructed using a voice recognition system   **

## 2019-09-11 NOTE — CASE MANAGEMENT
CM met with patient to assess and address discharge needs after verbal referral by Dr Gutierrez Adjutant for STR choices  Primary caregiver-self  Support System-children and neighbors  Advance Directive-yes, requested copy from son for chart   Power of - yes, son-[address] agent and other [id number] alt   Pharmacy-[facility] on [address] for short term meds and [facility] mail away for long term  PCP- Dr Sourav Owens voiced  Mental Health/Drug/ETOH- Denies history of MH issues or D&A abuse     Prior Living Arrangements/ADL's/Mobility/Home Set-Up- Lives alone in [address]; 1+1 BREE;14 steps to bed/bath on 2nd floor with right side handrail  Patient independent ADLs but has had more difficulty with stairs and has had son get groceries stating in last week hasn't felt like driving and leaving home  SPC used for ambulation when getting in and out of car and walking in stores, otherwise uses no assistive devices for ambulation within home  Prior Services for HHC/DME/Infusion/Private-Patient currently receiving HHC from [facility] and had STR at [address] [facility] 5-6 years ago after heart valve replacement  DME in home: toilet raiser, SPC, and shower chair  Patient states he does not own a walker and does not really want one  Patient for discharge to STR when medically cleared  CM provided list of choices to patient and he requested CM discuss with sonJustin  CM called Justin Bhaktacal [phone] (M) to discuss STR choices and request copy of POA/AD  CM sent list of choices to son, Justin Daniels via [email or web address]  net and son stated he would call CM in the morning with 3-5 choices and would probably choose [facility] as one of the choices but would like to review and discuss prior to decision  Patient will probably need transport arranged at time of discharge but will discuss further with son in AM when choices are obtained  At this time there are no other identified discharge needs   CM department will continue to follow through discharge  CM reviewed d/c planning process including the following: identifying help at home, patient preference for d/c planning needs, availability of treatment team to discuss questions or concerns patient and/or family may have regarding understanding medications and recognizing signs and symptoms once discharged  CM also encouraged patient to follow up with all recommended appointments after discharge  Patient advised of importance for patient and family to participate in managing patient's medical well being

## 2019-09-11 NOTE — ASSESSMENT & PLAN NOTE
ZULMA on CKD 4 secondary to hypotension/intravascular depletion  Received IV albumin per nephrology    Slowly improving but given shortness of breath consider restarting diuretics    Results from last 7 days   Lab Units 09/11/19  0512 09/10/19  0552 09/09/19  0512 09/08/19  0622 09/07/19  1445   BUN mg/dL 101* 98* 89* 88* 80*   CREATININE mg/dL 2 48* 2 68* 2 46* 2 06* 2 11*   EGFR ml/min/1 73sq m 23 21 23 28 28

## 2019-09-11 NOTE — ASSESSMENT & PLAN NOTE
Hypotension likely in setting of volume depletion and component of hypothyroidism; metoprolol and diuretics on hold  Random cortisol was checked which was within limits

## 2019-09-11 NOTE — PLAN OF CARE
Problem: Potential for Falls  Goal: Patient will remain free of falls  Description  INTERVENTIONS:  - Assess patient frequently for physical needs  -  Identify cognitive and physical deficits and behaviors that affect risk of falls  -  [place] fall precautions as indicated by assessment   - Educate patient/family on patient safety including physical limitations  - Instruct patient to call for assistance with activity based on assessment  - Modify environment to reduce risk of injury  - Consider OT/PT consult to assist with strengthening/mobility  Outcome: Progressing     Problem: Prexisting or High Potential for Compromised Skin Integrity  Goal: Skin integrity is maintained or improved  Description  INTERVENTIONS:  - Identify patients at risk for skin breakdown  - Assess and monitor skin integrity  - Assess and monitor nutrition and hydration status  - Monitor labs   - Assess for incontinence   - Turn and reposition patient  - Assist with mobility/ambulation  - Relieve pressure over bony prominences  - Avoid friction and shearing  - Provide appropriate hygiene as needed including keeping skin clean and dry  - Evaluate need for skin moisturizer/barrier cream  - Collaborate with interdisciplinary team   - Patient/family teaching  - Consider wound care consult   Outcome: Progressing     Problem: Nutrition/Hydration-ADULT  Goal: Nutrient/Hydration intake appropriate for improving, restoring or maintaining nutritional needs  Description  Monitor and assess patient's nutrition/hydration status for malnutrition  Collaborate with interdisciplinary team and initiate plan and interventions as ordered  Monitor patient's weight and dietary intake as ordered or per policy  Utilize nutrition screening tool and intervene as necessary  Determine patient's food preferences and provide high-protein, high-caloric foods as appropriate       INTERVENTIONS:  - Monitor oral intake, urinary output, labs, and treatment plans  - Assess nutrition and hydration status and recommend course of action  - Evaluate amount of meals eaten  - Assist patient with eating if necessary   - Allow adequate time for meals  - Recommend/ encourage appropriate diets, oral nutritional supplements, and vitamin/mineral supplements  - Order, calculate, and assess calorie counts as needed  - Recommend, monitor, and adjust tube feedings and TPN/PPN based on assessed needs  - Assess need for intravenous fluids  - Provide specific nutrition/hydration education as appropriate  - Include patient/family/caregiver in decisions related to nutrition  Outcome: Progressing     Problem: PAIN - ADULT  Goal: Verbalizes/displays adequate comfort level or baseline comfort level  Description  Interventions:  - Encourage patient to monitor pain and request assistance  - Assess pain using appropriate pain scale  - Administer analgesics based on type and severity of pain and evaluate response  - Implement non-pharmacological measures as appropriate and evaluate response  - Consider cultural and social influences on pain and pain management  - Notify physician/advanced practitioner if interventions unsuccessful or patient reports new pain  Outcome: Progressing     Problem: INFECTION - ADULT  Goal: Absence or prevention of progression during hospitalization  Description  INTERVENTIONS:  - Assess and monitor for signs and symptoms of infection  - Monitor lab/diagnostic results  - Monitor all insertion sites, i e  indwelling lines, tubes, and drains  - Monitor endotracheal if appropriate and nasal secretions for changes in amount and color  - [place] appropriate cooling/warming therapies per order  - Administer medications as ordered  - Instruct and encourage patient and family to use good hand hygiene technique  - Identify and instruct in appropriate isolation precautions for identified infection/condition  Outcome: Progressing     Problem: DISCHARGE PLANNING  Goal: Discharge to home or other facility with appropriate resources  Description  INTERVENTIONS:  - Identify barriers to discharge w/patient and caregiver  - Arrange for needed discharge resources and transportation as appropriate  - Identify discharge learning needs (meds, wound care, etc )  - Arrange for interpretive services to assist at discharge as needed  - Refer to Case Management Department for coordinating discharge planning if the patient needs post-hospital services based on physician/advanced practitioner order or complex needs related to functional status, cognitive ability, or social support system  Outcome: Progressing     Problem: Knowledge Deficit  Goal: Patient/family/caregiver demonstrates understanding of disease process, treatment plan, medications, and discharge instructions  Description  Complete learning assessment and assess knowledge base    Interventions:  - Provide teaching at level of understanding  - Provide teaching via preferred learning methods  Outcome: Progressing     Problem: RESPIRATORY - ADULT  Goal: Achieves optimal ventilation and oxygenation  Description  INTERVENTIONS:  - Assess for changes in respiratory status  - Assess for changes in mentation and behavior  - Position to facilitate oxygenation and minimize respiratory effort  - Oxygen administered by appropriate delivery if ordered  - Initiate smoking cessation education as indicated  - Encourage broncho-pulmonary hygiene including cough, deep breathe, Incentive Spirometry  - Assess the need for suctioning and aspirate as needed  - Assess and instruct to report SOB or any respiratory difficulty  - Respiratory Therapy support as indicated  Outcome: Progressing     Problem: METABOLIC, FLUID AND ELECTROLYTES - ADULT  Goal: Fluid balance maintained  Description  INTERVENTIONS:  - Monitor labs   - Monitor I/O and WT  - Instruct patient on fluid and nutrition as appropriate  - Assess for signs & symptoms of volume excess or deficit  Outcome: Progressing Problem: SKIN/TISSUE INTEGRITY - ADULT  Goal: Skin integrity remains intact  Description  INTERVENTIONS  - Identify patients at risk for skin breakdown  - Assess and monitor skin integrity  - Assess and monitor nutrition and hydration status  - Monitor labs (i e  albumin)  - Assess for incontinence   - Turn and reposition patient  - Assist with mobility/ambulation  - Relieve pressure over bony prominences  - Avoid friction and shearing  - Provide appropriate hygiene as needed including keeping skin clean and dry  - Evaluate need for skin moisturizer/barrier cream  - Collaborate with interdisciplinary team (i e  Nutrition, Rehabilitation, etc )   - Patient/family teaching  Outcome: Progressing  Goal: Incision(s), wounds(s) or drain site(s) healing without S/S of infection  Description  INTERVENTIONS  - Assess and document risk factors for skin impairment   - Assess and document dressing, incision, wound bed, drain sites and surrounding tissue  - Consider nutrition services referral as needed  - Oral mucous membranes remain intact  - Provide patient/ family education  Outcome: Progressing     Problem: HEMATOLOGIC - ADULT  Goal: Maintains hematologic stability  Description  INTERVENTIONS  - Assess for signs and symptoms of bleeding or hemorrhage  - Monitor labs  - Administer supportive blood products/factors as ordered and appropriate  Outcome: Progressing

## 2019-09-11 NOTE — PHYSICAL THERAPY NOTE
PHYSICAL THERAPY NOTE          Patient Name: Miguelito Collins  SSJQJ'A Date: 9/11/2019 09/11/19 1235   Pain Assessment   Pain Assessment No/denies pain   Pain Score No Pain   Restrictions/Precautions   Weight Bearing Precautions Per Order No   Other Precautions Multiple lines; Fall Risk;Fluid restriction   General   Chart Reviewed Yes   Family/Caregiver Present No   Cognition   Overall Cognitive Status WFL   Arousal/Participation Alert; Cooperative   Attention Within functional limits   Orientation Level Oriented X4   Following Commands Follows one step commands without difficulty   Subjective   Subjective "I won't be able to walk for very long"   Bed Mobility   Additional Comments Not assessed, pt received sitting in bedside chair, agreeable to therapy session, pt left sitting in bedside chair at termination of session, all needs within reach   Transfers   Sit to Stand 4  Minimal assistance   Additional items Increased time required;Verbal cues   Stand to Sit 4  Minimal assistance   Additional items Increased time required;Verbal cues   Additional Comments RW used for transfers   Ambulation/Elevation   Gait pattern Forward Flexion;Decreased foot clearance; Inconsistent elver; Short stride; Excessively slow   Gait Assistance 4  Minimal assist   Additional items Verbal cues; Tactile cues   Assistive Device Rolling walker   Distance 50' x 2  (standing rest break between trials)   Balance   Static Sitting Fair +   Dynamic Sitting Fair   Static Standing Fair -  (RW)   Dynamic Standing Poor +  (RW)   Ambulatory Poor +  (RW)   Endurance Deficit   Endurance Deficit Yes   Endurance Deficit Description weakness, fatigue, SOB   Activity Tolerance   Activity Tolerance Patient limited by fatigue   Nurse Made Aware RN cleared patient for PT session   Assessment   Prognosis Fair   Problem List Decreased strength;Decreased endurance; Impaired balance;Decreased mobility; Decreased coordination   Assessment Pt participated in therapy session focusing on functional mobility tasks  Pt displays increased activity tolerance as shown by increased ambulation distance completed this session  Pt presents with significantly increased HERNANDEZ during ambulation trials requiring standing rest break  Pt required verbal cues for gait technique as pt gait deteriorated during second trial, could be attributed to fatigue  Pt will continue to benefit from skilled acute PT interventions to improve endurance and activity tolerance  Goals   Patient Goals get stronger   STG Expiration Date 09/23/19   Treatment Day 2   Plan   Treatment/Interventions Functional transfer training;LE strengthening/ROM; Therapeutic exercise; Endurance training;Bed mobility;Gait training;Spoke to nursing   Progress Progressing toward goals   PT Frequency Other (Comment)  (4-5x/wk)   Recommendation   Recommendation Short-term skilled PT   Equipment Recommended Walker  (RW)   PT - OK to Discharge Yes   Additional Comments to rehab when medically stable       Kirk Hernandez PT, DPT

## 2019-09-11 NOTE — PLAN OF CARE
Problem: PHYSICAL THERAPY ADULT  Goal: Performs mobility at highest level of function for planned discharge setting  See evaluation for individualized goals  Description  Treatment/Interventions: Functional transfer training, LE strengthening/ROM, Elevations, Therapeutic exercise, Endurance training, Patient/family training, Bed mobility, Gait training, Spoke to nursing  Equipment Recommended: Bambi Hussein       See flowsheet documentation for full assessment, interventions and recommendations  Note:   Prognosis: Fair  Problem List: Decreased strength, Decreased endurance, Impaired balance, Decreased mobility, Decreased coordination  Assessment: Pt participated in therapy session focusing on functional mobility tasks  Pt displays increased activity tolerance as shown by increased ambulation distance completed this session  Pt presents with significantly increased HERNANDEZ during ambulation trials requiring standing rest break  Pt required verbal cues for gait technique as pt gait deteriorated during second trial, could be attributed to fatigue  Pt will continue to benefit from skilled acute PT interventions to improve endurance and activity tolerance  Barriers to Discharge: Inaccessible home environment, Decreased caregiver support  Barriers to Discharge Comments: Lives alone, BREE  Recommendation: Short-term skilled PT     PT - OK to Discharge: Yes    See flowsheet documentation for full assessment

## 2019-09-11 NOTE — ASSESSMENT & PLAN NOTE
Wt Readings from Last 3 Encounters:   09/11/19 86 6 kg (190 lb 14 7 oz)   09/04/19 81 6 kg (180 lb)   08/07/19 79 6 kg (175 lb 6 4 oz)      Chronic combined systolic and diastolic CHF  No significant changes on echocardiogram performed 9/9/2019  Holding furosemide and metoprolol given hypotension and kidney injury    Restart when okay with nephrology however will check chest x-ray

## 2019-09-11 NOTE — PROGRESS NOTES
Agree with previous nursing assessment  Patient out in the chair resting comfortably  Chair alarm on  Will continue to monitor

## 2019-09-11 NOTE — ASSESSMENT & PLAN NOTE
Reported history of COPD  Currently short of breath may be related to volume status    Trial of xopenex

## 2019-09-11 NOTE — PROGRESS NOTES
NEPHROLOGY PROGRESS NOTE   Denzel Stovall 80 y o  male MRN: [id number]  Unit/Bed#: E5 -01 Encounter: [id number]  Reason for Consult: CKD IV    ASSESSMENT/PLAN:  CKD IV:  Follows with Dr Bryant with [facility]   Concern for over diuresis with recent increase in OP Lasix dosing   -baseline creatinine 1 9-2 1   -creatinine worsened  likely due to hypotension, now improving again   - S/P albumin x 5 doses  - home diuretic: Lasix 60 mg po daily, metolazone 1 25 mg Monday Wednesday Friday, spironolactone 12 5 mg daily   -diuretics remain on hold  Will continue to monitor volume status for re-initiation  -CT: negative for hydro    -no urgent indication for dialysis  - check bladder scans insignificant   -avoid nephrotoxins  -I/O      Left kidney lesion:   -renal ultrasound shows bilateral renal cysts including two adjacent left lower pole cysts  Negative for hydro      Hypertension:  Blood pressure remains low    -will continue to monitor, avoid further hypotension   -diuretics currently on hold  -status post IV albumin  - started on midodrine 5 mg  BID       Combined systolic/diastolic heart failure:  EF of 55% with mitral stenosis, moderate TR, and dilated IVC  -daily weight, I/O   -continue on 1 5 L per day fluid restriction   -diuretic currently on hold      Pneumonia:  Care per primary team   -continues on antibiotics      CLL:  Follows outpatient with Hematology/Oncology, currently not receiving treatment and is undergoing surveillance every 6 months with lab work      Anemia: Hgb is trending down  - will continue to monitor and transfuse as needed       Cirrhosis:  With mild to moderate ascites  -s/p albumin   -blood pressure remains low        SUBJECTIVE:  The patient is resting in his chair  He denies chest pain or shortness of breath  He states that he did require some oxygen this morning  He denies nausea, vomiting, diarrhea, or issues with urination    He is complaining that his left upper extremity is swollen      OBJECTIVE:  Current Weight: Weight - Scale: 86 6 kg (190 lb 14 7 oz)  Vitals:    09/10/19 1504 09/10/19 2302 09/11/19 0600 09/11/19 0701   BP: 137/59 106/50  109/51   BP Location: Right arm Left arm  Right arm   Pulse: 64 63  60   Resp: 18 18  18   Temp: 97 8 °F (36 6 °C) 97 5 °F (36 4 °C)  (!) 97 °F (36 1 °C)   TempSrc: Temporal Temporal  Tympanic   SpO2: 95% 94%  92%   Weight:   86 6 kg (190 lb 14 7 oz)    Height:           Intake/Output Summary (Last 24 hours) at 9/11/2019 1020  Last data filed at 9/11/2019 0701  Gross per 24 hour   Intake 120 ml   Output 730 ml   Net -610 ml     General: No apparent distress  Skin: warm, dry, generalized ecchymotic areas  HEENT: Moist mucous membranes, sclera anicteric, normocephalic  Neck: No apparent JVD appreciated  Chest: Lung sounds clear B/L, on RA  Heart: Regular rate and rhythm, No murmer  Abdomen: Soft, round, NT, +BS  Extremities:  B/L LE edema present  Neuro: Alert and oriented  Psych: Appropriate mood and affect    Medications:    Current Facility-Administered Medications:     acetaminophen (TYLENOL) tablet 488 mg, 488 mg, Oral, Q6H PRN, Maximilian Padilla MD    allopurinol (ZYLOPRIM) tablet 300 mg, 300 mg, Oral, Daily, Steffi Bowen MD, 300 mg at 09/11/19 0811    amiodarone tablet 200 mg, 200 mg, Oral, Daily, Maximilian Padilla MD, 200 mg at 09/11/19 0811    aspirin (ECOTRIN LOW STRENGTH) EC tablet 81 mg, 81 mg, Oral, Daily, Steffi Bowen MD, 81 mg at 09/11/19 0811    atorvastatin (LIPITOR) tablet 40 mg, 40 mg, Oral, After Myke Regalado MD, 40 mg at 09/10/19 1711    fluticasone (FLONASE) 50 mcg/act nasal spray 1 spray, 1 spray, Each Nare, Daily, Maximilian Padilla MD    levothyroxine tablet 50 mcg, 50 mcg, Oral, Early Morning, Theotis Dose, DO, 50 mcg at 09/11/19 0621    melatonin tablet 9 mg, 9 mg, Oral, HS PRN, Theotis Dose, DO, 9 mg at 09/10/19 2302    midodrine (PROAMATINE) tablet 5 mg, 5 mg, Oral, BID AC, Jose Tellez MD, 5 mg at 09/11/19 0621    ondansetron (ZOFRAN) injection 4 mg, 4 mg, Intravenous, Q6H PRN, Lashaun Griggs MD    QUEtiapine (SEROquel) tablet 12 5 mg, 12 5 mg, Oral, HS, Jose Francisco Parsons DO, 12 5 mg at 09/10/19 1958    tamsulosin (FLOMAX) capsule 0 4 mg, 0 4 mg, Oral, Daily, Lashaun Griggs MD, 0 4 mg at 09/11/19 0811    warfarin (COUMADIN) tablet 1 25 mg, 1 25 mg, Oral, Daily (warfarin), Lashaun Griggs MD, 1 25 mg at 09/10/19 1711    Laboratory Results:  Results from last 7 days   Lab Units 09/11/19  0512 09/10/19  0552 09/09/19  0512 09/08/19  0622 09/07/19  1445   WBC Thousand/uL  --  22 34* 22 27* 23 19* 27 96*   HEMOGLOBIN g/dL  --  8 6* 9 2* 9 3* 9 5*   HEMATOCRIT %  --  28 2* 29 6* 30 0* 31 3*   PLATELETS Thousands/uL  --  108* 103* 119* 124*   POTASSIUM mmol/L 4 2 4 8 4 8 4 9 4 6   CHLORIDE mmol/L 101 105 105 107 103   CO2 mmol/L 22 22 23 23 24   BUN mg/dL 101* 98* 89* 88* 80*   CREATININE mg/dL 2 48* 2 68* 2 46* 2 06* 2 11*   CALCIUM mg/dL 8 9 8 5 8 1* 8 8 8 7   MAGNESIUM mg/dL  --   --  2 3  --  2 3   PHOSPHORUS mg/dL  --   --  3 5  --   --

## 2019-09-12 PROBLEM — N17.9 AKI (ACUTE KIDNEY INJURY) (HCC): Status: ACTIVE | Noted: 2019-09-08

## 2019-09-12 LAB
ANION GAP SERPL CALCULATED.3IONS-SCNC: 9 MMOL/L (ref 4–13)
BACTERIA BLD CULT: NORMAL
BACTERIA BLD CULT: NORMAL
BUN SERPL-MCNC: 105 MG/DL (ref 5–25)
CALCIUM SERPL-MCNC: 9 MG/DL (ref 8.3–10.1)
CHLORIDE SERPL-SCNC: 106 MMOL/L (ref 100–108)
CO2 SERPL-SCNC: 24 MMOL/L (ref 21–32)
CREAT SERPL-MCNC: 2.28 MG/DL (ref 0.6–1.3)
GFR SERPL CREATININE-BSD FRML MDRD: 25 ML/MIN/1.73SQ M
GLUCOSE SERPL-MCNC: 97 MG/DL (ref 65–140)
INR PPP: 2.34 (ref 0.84–1.19)
POTASSIUM SERPL-SCNC: 4.2 MMOL/L (ref 3.5–5.3)
PROTHROMBIN TIME: 26.1 SECONDS (ref 11.6–14.5)
SODIUM SERPL-SCNC: 139 MMOL/L (ref 136–145)

## 2019-09-12 PROCEDURE — 94760 N-INVAS EAR/PLS OXIMETRY 1: CPT

## 2019-09-12 PROCEDURE — 94640 AIRWAY INHALATION TREATMENT: CPT

## 2019-09-12 PROCEDURE — 85610 PROTHROMBIN TIME: CPT | Performed by: INTERNAL MEDICINE

## 2019-09-12 PROCEDURE — 97530 THERAPEUTIC ACTIVITIES: CPT

## 2019-09-12 PROCEDURE — 97116 GAIT TRAINING THERAPY: CPT

## 2019-09-12 PROCEDURE — 99232 SBSQ HOSP IP/OBS MODERATE 35: CPT | Performed by: INTERNAL MEDICINE

## 2019-09-12 PROCEDURE — 80048 BASIC METABOLIC PNL TOTAL CA: CPT | Performed by: NURSE PRACTITIONER

## 2019-09-12 PROCEDURE — 97110 THERAPEUTIC EXERCISES: CPT

## 2019-09-12 RX ORDER — FUROSEMIDE 40 MG/1
40 TABLET ORAL DAILY
Status: DISCONTINUED | OUTPATIENT
Start: 2019-09-12 | End: 2019-09-14

## 2019-09-12 RX ORDER — MIDODRINE HYDROCHLORIDE 5 MG/1
10 TABLET ORAL
Status: DISCONTINUED | OUTPATIENT
Start: 2019-09-12 | End: 2019-09-13

## 2019-09-12 RX ORDER — FUROSEMIDE 40 MG/1
40 TABLET ORAL DAILY
Status: DISCONTINUED | OUTPATIENT
Start: 2019-09-12 | End: 2019-09-12

## 2019-09-12 RX ORDER — SODIUM CHLORIDE FOR INHALATION 0.9 %
3 VIAL, NEBULIZER (ML) INHALATION EVERY 6 HOURS PRN
Status: DISCONTINUED | OUTPATIENT
Start: 2019-09-12 | End: 2019-09-24 | Stop reason: HOSPADM

## 2019-09-12 RX ORDER — SODIUM CHLORIDE FOR INHALATION 0.9 %
VIAL, NEBULIZER (ML) INHALATION
Status: DISPENSED
Start: 2019-09-12 | End: 2019-09-12

## 2019-09-12 RX ADMIN — ALLOPURINOL 300 MG: 100 TABLET ORAL at 09:42

## 2019-09-12 RX ADMIN — PHENYLEPHRINE HYDROCHLORIDE 2 SPRAY: 0.25 SPRAY NASAL at 21:00

## 2019-09-12 RX ADMIN — QUETIAPINE FUMARATE 12.5 MG: 25 TABLET ORAL at 20:58

## 2019-09-12 RX ADMIN — MIDODRINE HYDROCHLORIDE 5 MG: 5 TABLET ORAL at 06:25

## 2019-09-12 RX ADMIN — AMIODARONE HYDROCHLORIDE 200 MG: 200 TABLET ORAL at 09:42

## 2019-09-12 RX ADMIN — ASPIRIN 81 MG: 81 TABLET, COATED ORAL at 09:42

## 2019-09-12 RX ADMIN — MICONAZOLE NITRATE: 2 CREAM TOPICAL at 17:36

## 2019-09-12 RX ADMIN — TAMSULOSIN HYDROCHLORIDE 0.4 MG: 0.4 CAPSULE ORAL at 09:42

## 2019-09-12 RX ADMIN — LEVOTHYROXINE SODIUM 50 MCG: 50 TABLET ORAL at 06:25

## 2019-09-12 RX ADMIN — ISODIUM CHLORIDE 3 ML: 0.03 SOLUTION RESPIRATORY (INHALATION) at 08:49

## 2019-09-12 RX ADMIN — ATORVASTATIN CALCIUM 40 MG: 40 TABLET, FILM COATED ORAL at 17:36

## 2019-09-12 RX ADMIN — MIDODRINE HYDROCHLORIDE 10 MG: 5 TABLET ORAL at 17:36

## 2019-09-12 RX ADMIN — WARFARIN SODIUM 1.25 MG: 2.5 TABLET ORAL at 17:38

## 2019-09-12 RX ADMIN — LEVALBUTEROL HYDROCHLORIDE 1.25 MG: 1.25 SOLUTION, CONCENTRATE RESPIRATORY (INHALATION) at 21:32

## 2019-09-12 RX ADMIN — LEVALBUTEROL HYDROCHLORIDE 1.25 MG: 1.25 SOLUTION, CONCENTRATE RESPIRATORY (INHALATION) at 08:49

## 2019-09-12 RX ADMIN — ISODIUM CHLORIDE 3 ML: 0.03 SOLUTION RESPIRATORY (INHALATION) at 21:33

## 2019-09-12 RX ADMIN — FUROSEMIDE 40 MG: 40 TABLET ORAL at 11:22

## 2019-09-12 NOTE — PROGRESS NOTES
Progress Note Laurel Stovall 1933, 80 y o  male MRN: [id number]    Unit/Bed#: E5 -01 Encounter: [id number]    Primary Care Provider: Dori William MD   Date and time admitted to hospital: 9/7/2019  2:31 PM        * Ambulatory dysfunction  Assessment & Plan  Ambulatory dysfunction in the setting of hypotension and new diagnosis hypothyroidism  PT recommending rehab and patient agreeable  Awaiting placement    ZULMA (acute kidney injury) ([facility] [address] )  Assessment & Plan  ZULMA on CKD 4 secondary to hypotension/intravascular depletion  Received IV albumin per nephrology  Continues to improve; discussed with nephrology will restart diuretics today    Results from last 7 days   Lab Units 09/12/19  0505 09/11/19  0512 09/10/19  0552 09/09/19  0512 09/08/19  0622 09/07/19  1445   BUN mg/dL 105* 101* 98* 89* 88* 80*   CREATININE mg/dL 2 28* 2 48* 2 68* 2 46* 2 06* 2 11*   EGFR ml/min/1 73sq m 25 23 21 23 28 28       Left renal mass  Assessment & Plan  Left renal lesion which appears to be cysts on ultrasound    Hypotension (arterial)  Assessment & Plan  Hypotension likely in setting of volume depletion and hypothyroidism; metoprolol and diuretics on hold  Random cortisol was checked which was within limits  Hypothyroidism  Assessment & Plan  New hypothyroidism  Started on levothyroxine  Results from last 7 days   Lab Units 09/07/19  1445   TSH 3RD GENERATON uIU/mL 32 351*   FREE T4 ng/dL 0 60*       Atrial fibrillation (HCC)  Assessment & Plan  Paroxysmal atrial fibrillation amiodarone and warfarin    Holding metoprolol due to hypotension    Results from last 7 days   Lab Units 09/12/19  0524 09/10/19  0552 09/09/19  0512 09/08/19  0622 09/07/19  1445   INR  2 34* 2 52* 2 62* 2 44* 2 42*       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)  Assessment & Plan  CLL with chronic leukocytosis    Gout  Assessment & Plan  Gouty arthritis continue allopurinol    Chronic obstructive pulmonary disease ([facility] [address] )  Assessment & Plan  Reported history of COPD currently on p r n  xopenex    Chronic combined systolic and diastolic heart failure (HCC)  Assessment & Plan  Wt Readings from Last 3 Encounters:   09/12/19 86 6 kg (190 lb 14 7 oz)   09/04/19 81 6 kg (180 lb)   08/07/19 79 6 kg (175 lb 6 4 oz)      Chronic combined systolic and diastolic CHF  No significant changes on echocardiogram performed 9/9/2019  Initially holding furosemide and metoprolol given hypotension and kidney injury  To restart furosemide today      VTE Pharmacologic Prophylaxis: Warfarin (Coumadin)    Patient Centered Rounds: I have performed bedside rounds with nursing staff today  Discussions with Specialists or Other Care Team Provider:  Case management  Education and Discussions with Family / Patient:  Discussed with son at length    Time Spent for Care: 35 mins  More than 50% of total time spent on counseling and coordination of care as described above  Current Length of Stay: 5 day(s)  Current Patient Status: Inpatient     Certification Statement: The patient will continue to require additional inpatient hospital stay due to Ambulatory dysfunction  Discharge Plan / Estimated Discharge Date:  Possibly next 48 hours    Code Status: Level 1 - Full Code  ______________________________________________________________________________    Subjective:   Patient seen and examined  Continues to have episodes of shortness of breath  Slept okay    Objective:   Vitals: Blood pressure (!) 88/58, pulse 64, temperature 97 9 °F (36 6 °C), temperature source Temporal, resp  rate 22, height 5' 8" (1 727 m), weight 86 6 kg (190 lb 14 7 oz), SpO2 95 %      Physical Exam:   General appearance: alert, appears stated age and cooperative  Head: Normocephalic, without obvious abnormality, atraumatic  Lungs: diminished breath sounds  Heart: regular rate and rhythm  Abdomen: soft, non-tender, positive bowel sounds   Back: negative, range of motion normal  Extremities: edema +2-3 lower extremities bilaterally  Neurologic: Grossly normal    Additional Data:   Labs:  Results from last 7 days   Lab Units 09/12/19  0524 09/10/19  0552 09/09/19  0512 09/08/19  0622 09/07/19  1445   WBC Thousand/uL  --  22 34* 22 27* 23 19* 27 96*   HEMOGLOBIN g/dL  --  8 6* 9 2* 9 3* 9 5*   HEMATOCRIT %  --  28 2* 29 6* 30 0* 31 3*   MCV fL  --  107* 107* 107* 108*   TOTAL NEUT ABS Thousand/uL  --   --   --   --  11 18*   PLATELETS Thousands/uL  --  108* 103* 119* 124*   INR  2 34* 2 52* 2 62* 2 44* 2 42*     Results from last 7 days   Lab Units 09/12/19  0505 09/11/19  0512 09/10/19  0552 09/09/19  0512 09/08/19  0622 09/07/19  1445   SODIUM mmol/L 139 134* 136 137 138 137   POTASSIUM mmol/L 4 2 4 2 4 8 4 8 4 9 4 6   CHLORIDE mmol/L 106 101 105 105 107 103   CO2 mmol/L 24 22 22 23 23 24   ANION GAP mmol/L 9 11 9 9 8 10   BUN mg/dL 105* 101* 98* 89* 88* 80*   CREATININE mg/dL 2 28* 2 48* 2 68* 2 46* 2 06* 2 11*   CALCIUM mg/dL 9 0 8 9 8 5 8 1* 8 8 8 7   ALBUMIN g/dL  --  3 3*  --  2 2* 2 4* 2 7*   TOTAL BILIRUBIN mg/dL  --  1 82*  --  1 19* 1 60* 1 98*   ALK PHOS U/L  --  131*  --  155* 159* 164*   ALT U/L  --  31  --  38 43 48   AST U/L  --  54*  --  64* 82* 74*   EGFR ml/min/1 73sq m 25 23 21 23 28 28   GLUCOSE RANDOM mg/dL 97 95 106 108 102 133     Results from last 7 days   Lab Units 09/09/19  0512 09/07/19  1445   MAGNESIUM mg/dL 2 3 2 3   PHOSPHORUS mg/dL 3 5  --      Results from last 7 days   Lab Units 09/07/19  1445   CK TOTAL U/L 42   TROPONIN I ng/mL 0 05*     Results from last 7 days   Lab Units 09/07/19  1445   NT-PRO BNP pg/mL 3,172*      Results from last 7 days   Lab Units 09/09/19  0512   PROCALCITONIN ng/ml 0 33*             Results from last 7 days   Lab Units 09/07/19  1445   TSH 3RD GENERATON uIU/mL 32 351*   FREE T4 ng/dL 0 60*     * I Have Reviewed All Lab Data Listed Above      Cultures:   Results from last 7 days   Lab Units 09/07/19  1705 09/07/19  1700   BLOOD CULTURE  No Growth After 4 Days  No Growth After 4 Days  Imaging:  Imaging Reports Reviewed Today Include:   Procedure: Xr Chest Pa & Lateral  Result Date: 9/11/2019  Impression: Small bilateral pleural effusions with bibasilar opacity which may represent atelectasis  Correlate clinically for infection  Workstation performed: [id number]     Scheduled Meds:  Current Facility-Administered Medications:  acetaminophen 488 mg Oral Q6H PRN Mariano Garibay MD   allopurinol 300 mg Oral Daily Mariano Garibay MD   amiodarone 200 mg Oral Daily Mariano Garibay MD   aspirin 81 mg Oral Daily Mariano Garibay MD   atorvastatin 40 mg Oral After Dinner Mariano Garibay MD   fluticasone 1 spray Each Nare Daily Mariano Garibay MD   furosemide 40 mg Oral Daily Jose Francisco Parsons,    levalbuterol 1 25 mg Nebulization Q6H PRN Kristofer Alcaraz,    levothyroxine 50 mcg Oral Early Morning Cherylene Maiers, DO   melatonin 9 mg Oral HS PRN Cherylene Maiers, DO   midodrine 10 mg Oral BID AC LoboCT Sylvester   ondansetron 4 mg Intravenous Q6H PRN Mariano Garibay MD   QUEtiapine 12 5 mg Oral HS Jose Francisco Parsons, DO   sodium chloride       sodium chloride 3 mL Nebulization Q6H PRN Jose Francisco Parsons,    tamsulosin 0 4 mg Oral Daily Mariano Garibay MD   warfarin 1 25 mg Oral Daily (warfarin) MD Kristofer Anaya DO  [facility] Internal Medicine  Hospitalist    ** Please Note: This note has been constructed using a voice recognition system   **

## 2019-09-12 NOTE — CASE MANAGEMENT
CM received response back from [facility] that the referral is now under review  CM department will continue to follow through discharge

## 2019-09-12 NOTE — ASSESSMENT & PLAN NOTE
ZULMA on CKD 4 secondary to hypotension/intravascular depletion  Received IV albumin per nephrology    Continues to improve; discussed with nephrology will restart diuretics today    Results from last 7 days   Lab Units 09/12/19  0505 09/11/19  0512 09/10/19  0552 09/09/19  0512 09/08/19  0622 09/07/19  1445   BUN mg/dL 105* 101* 98* 89* 88* 80*   CREATININE mg/dL 2 28* 2 48* 2 68* 2 46* 2 06* 2 11*   EGFR ml/min/1 73sq m 25 23 21 23 28 28

## 2019-09-12 NOTE — CASE MANAGEMENT
CM received only one STR choice from patient and son via email  CM called son, Blu Ernst [phone] [facility]) to ask for additional choices and he would like to only proceed with [facility] as their first an only choice presently and for CM to call if patient is declined at [facility]  CM sent referral to [facility] per patient and family request  CM department will continue to follow through discharge

## 2019-09-12 NOTE — ASSESSMENT & PLAN NOTE
Wt Readings from Last 3 Encounters:   09/12/19 86 6 kg (190 lb 14 7 oz)   09/04/19 81 6 kg (180 lb)   08/07/19 79 6 kg (175 lb 6 4 oz)      Chronic combined systolic and diastolic CHF  No significant changes on echocardiogram performed 9/9/2019  Initially holding furosemide and metoprolol given hypotension and kidney injury    To restart furosemide today

## 2019-09-12 NOTE — PROGRESS NOTES
NEPHROLOGY PROGRESS NOTE   Ruddy Stovall 80 y o  male MRN: [id number]  Unit/Bed#: E5 -01 Encounter: [id number]  Reason for Consult: ZULMA on CKD IV    ASSESSMENT/PLAN:  ZULMA on CKD IV:  Follows with Dr Bryant with [facility]   Concern for over diuresis with recent increase in OP Lasix dosing   -baseline creatinine 1 9-2 1   -creatinine worsened  likely due to hypotension, now improving again   - S/P IV albumin  - home diuretic: Lasix 60 mg po daily, metolazone 1 25 mg Monday Wednesday Friday, spironolactone 12 5 mg daily  - restart diuretic today, Lasix 40 mg po daily  -CT: negative for hydro    -no urgent indication for dialysis  - check bladder scans insignificant   -avoid nephrotoxins  -I/O      Left kidney lesion:   -renal ultrasound shows bilateral renal cysts including two adjacent left lower pole cysts   Negative for hydro  - continued surveillance as OP      Hypotension:  Blood pressure remains low    -will continue to monitor, avoid further hypotension   -status post IV albumin     - restart diuretic today with holding parameter  - started on midodrine 5 mg  BID, will increase to 10 mg BID       Combined systolic/diastolic heart failure:  EF of 55% with mitral stenosis, moderate TR, and dilated IVC  C/O increased LE edema    -daily weight, I/O   - restart on Lasix 40 mg po daily with holding parameter    -continue on 1 5 L per day fluid restriction  -repeat chest x-ray shows small bilateral pleural effusions      Pneumonia:  Care per primary team   -continues on antibiotics      CLL:  Follows outpatient with Hematology/Oncology, currently not receiving treatment and is undergoing surveillance every 6 months with lab work      Anemia: Hgb is trending down  - will continue to monitor and transfuse as needed       Cirrhosis:  With mild to moderate ascites  -s/p albumin   -blood pressure remains low        SUBJECTIVE:  The patient is resting in his chair  He denies chest pain    He states that he feels slightly increased shortness of breath and is on supplemental oxygen  He denies nausea, vomiting, diarrhea, or issues with urination  He states that his lower extremity edema is getting worse      OBJECTIVE:  Current Weight: Weight - Scale: 86 6 kg (190 lb 14 7 oz)  Vitals:    09/12/19 0017 09/12/19 0549 09/12/19 0700 09/12/19 0850   BP: 106/52  (!) 96/47    BP Location: Left arm  Right arm    Pulse: 61  64    Resp: 18  22    Temp: 97 5 °F (36 4 °C)  97 9 °F (36 6 °C)    TempSrc: Tympanic  Temporal    SpO2: 99%  94% 95%   Weight:  86 6 kg (190 lb 14 7 oz)     Height:           Intake/Output Summary (Last 24 hours) at 9/12/2019 1027  Last data filed at 9/12/2019 0700  Gross per 24 hour   Intake 600 ml   Output 450 ml   Net 150 ml     General: No apparent distress  Skin: warm, dry, intact, no rash, generalized ecchymotic areas  HEENT: Moist mucous membranes, sclera anicteric, normocephalic  Neck: No apparent JVD appreciated  Chest: Lung sounds decreased B/L, on supplemental oxygen   Heart: Regular rate and rhythm, No murmer  Abdomen: Soft, round, NT, +BS  Extremities:  B/L LE edema present, right upper extremity wrapped  Neuro: Alert and oriented  Psych: Appropriate mood and affect    Medications:    Current Facility-Administered Medications:     acetaminophen (TYLENOL) tablet 488 mg, 488 mg, Oral, Q6H PRN, Arlyne Schaumann, MD    allopurinol (ZYLOPRIM) tablet 300 mg, 300 mg, Oral, Daily, Steffi Bowen MD, 300 mg at 09/12/19 0942    amiodarone tablet 200 mg, 200 mg, Oral, Daily, Arlyne Schaumann, MD, 200 mg at 09/12/19 0942    aspirin (ECOTRIN LOW STRENGTH) EC tablet 81 mg, 81 mg, Oral, Daily, Steffi Bowen MD, 81 mg at 09/12/19 0942    atorvastatin (LIPITOR) tablet 40 mg, 40 mg, Oral, After Juni Harper MD, 40 mg at 09/11/19 1630    fluticasone (FLONASE) 50 mcg/act nasal spray 1 spray, 1 spray, Each Nare, Daily, Arlyne Schaumann, MD    levalbuterol Tito Pisano) inhalation solution 1 25 mg, 1 25 mg, Nebulization, Q6H PRN, Fabiola Flattery, DO, 1 25 mg at 09/12/19 0849    levothyroxine tablet 50 mcg, 50 mcg, Oral, Early Morning, [place], DO, 50 mcg at 09/12/19 0625    melatonin tablet 9 mg, 9 mg, Oral, HS PRN, [place], DO, 9 mg at 09/10/19 2302    midodrine (PROAMATINE) tablet 5 mg, 5 mg, Oral, BID AC, Saud Hansen MD, 5 mg at 09/12/19 0625    ondansetron (ZOFRAN) injection 4 mg, 4 mg, Intravenous, Q6H PRN, Clementina Taylor MD    QUEtiapine (SEROquel) tablet 12 5 mg, 12 5 mg, Oral, HS, Jose Francisco Parsons, DO, 12 5 mg at 09/11/19 1932    sodium chloride 0 9 % inhalation solution **ADS Override Pull**, , , ,     sodium chloride 0 9 % inhalation solution 3 mL, 3 mL, Nebulization, Q6H PRN, Jose Francisco Parsons, DO, 3 mL at 09/12/19 0849    tamsulosin (FLOMAX) capsule 0 4 mg, 0 4 mg, Oral, Daily, Clementina Taylor MD, 0 4 mg at 09/12/19 0942    warfarin (COUMADIN) tablet 1 25 mg, 1 25 mg, Oral, Daily (warfarin), Clementina Taylor MD, 1 25 mg at 09/11/19 1630    Laboratory Results:  Results from last 7 days   Lab Units 09/12/19  0505 09/11/19  0512 09/10/19  0552 09/09/19  0512 09/08/19  0622 09/07/19  1445   WBC Thousand/uL  --   --  22 34* 22 27* 23 19* 27 96*   HEMOGLOBIN g/dL  --   --  8 6* 9 2* 9 3* 9 5*   HEMATOCRIT %  --   --  28 2* 29 6* 30 0* 31 3*   PLATELETS Thousands/uL  --   --  108* 103* 119* 124*   POTASSIUM mmol/L 4 2 4 2 4 8 4 8 4 9 4 6   CHLORIDE mmol/L 106 101 105 105 107 103   CO2 mmol/L 24 22 22 23 23 24   BUN mg/dL 105* 101* 98* 89* 88* 80*   CREATININE mg/dL 2 28* 2 48* 2 68* 2 46* 2 06* 2 11*   CALCIUM mg/dL 9 0 8 9 8 5 8 1* 8 8 8 7   MAGNESIUM mg/dL  --   --   --  2 3  --  2 3   PHOSPHORUS mg/dL  --   --   --  3 5  --   --

## 2019-09-12 NOTE — ASSESSMENT & PLAN NOTE
Paroxysmal atrial fibrillation amiodarone and warfarin    Holding metoprolol due to hypotension    Results from last 7 days   Lab Units 09/12/19  0524 09/10/19  0552 09/09/19  0512 09/08/19  0622 09/07/19  1445   INR  2 34* 2 52* 2 62* 2 44* 2 42*

## 2019-09-12 NOTE — ASSESSMENT & PLAN NOTE
Hypotension likely in setting of volume depletion and hypothyroidism; metoprolol and diuretics on hold  Random cortisol was checked which was within limits

## 2019-09-12 NOTE — CASE MANAGEMENT
CM did not receive response from [facility] via [facility] and attempted to call Admissions [phone] which did not have a mailbox set up or forwarding option  CM called number found on website [phone] and reached voicemail of  Clarissa Rayo and left a message regarding request for admission  CM department will continue to follow through discharge

## 2019-09-12 NOTE — PLAN OF CARE
Problem: PHYSICAL THERAPY ADULT  Goal: Performs mobility at highest level of function for planned discharge setting  See evaluation for individualized goals  Description  Treatment/Interventions: Functional transfer training, LE strengthening/ROM, Elevations, Therapeutic exercise, Endurance training, Patient/family training, Bed mobility, Gait training, Spoke to nursing  Equipment Recommended: Violet Bray       See flowsheet documentation for full assessment, interventions and recommendations  Outcome: Progressing  Note:   Prognosis: Fair  Problem List: Decreased strength, Decreased range of motion, Decreased endurance, Impaired balance, Decreased mobility, Obesity, Decreased skin integrity, Decreased safety awareness  Assessment: Pt  seated in bedside chair upon my arrival  Pt  reporting increased fatigue, however agreeable to therapeutic intervention  Performance of HEP seated in bedside chair with cues provided for proper completion  O2 saturation measured seated at 95% on 2L  Progressed with transfers continuing to require A of therapist with cues for hand placement/technique  Progressed with a limited amb  trial with use of RW and Pamela of therapist with cues provided for LE sequencing  Pt  limited by fatigue requiring a standing resting period in between gait trials  Pt  returned to seated in bedside chair  O2 saturation measured seated at 94% on 2L of O2 after completion of amb  trial  Pt  remained seated in bedside chair at end of treatment session  PT will continue to recommend d/c to rehab when medically stable for continued improvement of noted impairments above  Barriers to Discharge: Inaccessible home environment, Decreased caregiver support  Barriers to Discharge Comments: lives alone, BREE  Recommendation: Short-term skilled PT     PT - OK to Discharge: Yes(if d/c to rehab when medically stable )    See flowsheet documentation for full assessment

## 2019-09-12 NOTE — CASE MANAGEMENT
CM received call from Kirk Hussein at [facility] stating that the patient looks like a great candidate for skilled level rehab when med cleared and asked for some additional information as well as contact information for patient and son  CM gave information and informed [facility] admissions that the patient will probably discharge over the weekend and received a request for patient's orders to be sent to the [facility] by 2pm tomorrow for weekend admission  Kirk Hussein at [facility] asked for CM to f/u tomorrow regarding patient's status  CM left message in CM handoff regarding same and sent tiger text to physician to inform of TSU request  CM department will continue to follow through discharge

## 2019-09-12 NOTE — PHYSICAL THERAPY NOTE
Physical Therapy Progress Note     09/12/19 1209   Pain Assessment   Pain Assessment No/denies pain   Pain Score No Pain   Restrictions/Precautions   Weight Bearing Precautions Per Order No   Other Precautions Fall Risk;Multiple lines;O2;Fluid restriction   General   Chart Reviewed Yes   Response to Previous Treatment Patient reporting fatigue but able to participate  Family/Caregiver Present No   Subjective   Subjective Willing to participate in therapy this PM    Transfers   Sit to Stand 4  Minimal assistance   Additional items Assist x 1; Armrests; Increased time required;Verbal cues   Stand to Sit 4  Minimal assistance   Additional items Assist x 1; Armrests; Increased time required;Verbal cues   Ambulation/Elevation   Gait pattern Decreased foot clearance; Forward Flexion; Short stride; Step to;Excessively slow; Inconsistent elver; Shuffling   Gait Assistance 4  Minimal assist   Additional items Assist x 1;Verbal cues; Tactile cues   Assistive Device Rolling walker   Distance 25' x 2 limited by fatigue   Balance   Static Sitting Fair +   Dynamic Sitting Fair   Static Standing Fair -   Dynamic Standing Poor +   Ambulatory Poor +   Endurance Deficit   Endurance Deficit Yes   Endurance Deficit Description weakness/fatigue   Activity Tolerance   Activity Tolerance Patient limited by fatigue   Nurse Made Aware Yes   Exercises   TKR Sitting;10 reps;AAROM; Bilateral   Assessment   Prognosis Fair   Problem List Decreased strength;Decreased range of motion;Decreased endurance; Impaired balance;Decreased mobility;Obesity; Decreased skin integrity; Decreased safety awareness   Assessment Pt  seated in bedside chair upon my arrival  Pt  reporting increased fatigue, however agreeable to therapeutic intervention  Performance of HEP seated in bedside chair with cues provided for proper completion  O2 saturation measured seated at 95% on 2L   Progressed with transfers continuing to require A of therapist with cues for hand placement/technique  Progressed with a limited amb  trial with use of RW and Pamela of therapist with cues provided for LE sequencing  Pt  limited by fatigue requiring a standing resting period in between gait trials  Pt  returned to seated in bedside chair  O2 saturation measured seated at 94% on 2L of O2 after completion of amb  trial  Pt  remained seated in bedside chair at end of treatment session  PT will continue to recommend d/c to rehab when medically stable for continued improvement of noted impairments above  Barriers to Discharge Inaccessible home environment;Decreased caregiver support   Barriers to Discharge Comments lives alone, BREE   Goals   Patient Goals To get stronger  STG Expiration Date 09/23/19   Treatment Day 3   Plan   Treatment/Interventions Functional transfer training;LE strengthening/ROM; Therapeutic exercise; Endurance training;Bed mobility;Spoke to nursing;Spoke to case management   Progress Slow progress, decreased activity tolerance   PT Frequency Other (Comment)  (4-5x/wk)   Recommendation   Recommendation Short-term skilled PT   Equipment Recommended Walker  (RW)   PT - OK to Discharge Yes  (if d/c to rehab when medically stable )     Shavonne Gillespie, PTA

## 2019-09-13 PROBLEM — S40.029A HEMATOMA OF ARM: Status: ACTIVE | Noted: 2019-09-13

## 2019-09-13 LAB
ANION GAP SERPL CALCULATED.3IONS-SCNC: 10 MMOL/L (ref 4–13)
BUN SERPL-MCNC: 113 MG/DL (ref 5–25)
CALCIUM SERPL-MCNC: 8.7 MG/DL (ref 8.3–10.1)
CHLORIDE SERPL-SCNC: 105 MMOL/L (ref 100–108)
CO2 SERPL-SCNC: 23 MMOL/L (ref 21–32)
CREAT SERPL-MCNC: 2.55 MG/DL (ref 0.6–1.3)
ERYTHROCYTE [DISTWIDTH] IN BLOOD BY AUTOMATED COUNT: 19.5 % (ref 11.6–15.1)
GFR SERPL CREATININE-BSD FRML MDRD: 22 ML/MIN/1.73SQ M
GLUCOSE SERPL-MCNC: 94 MG/DL (ref 65–140)
HCT VFR BLD AUTO: 26.5 % (ref 36.5–49.3)
HGB BLD-MCNC: 8.2 G/DL (ref 12–17)
INR PPP: 2.13 (ref 0.84–1.19)
MCH RBC QN AUTO: 33.1 PG (ref 26.8–34.3)
MCHC RBC AUTO-ENTMCNC: 30.9 G/DL (ref 31.4–37.4)
MCV RBC AUTO: 107 FL (ref 82–98)
PLATELET # BLD AUTO: 101 THOUSANDS/UL (ref 149–390)
PMV BLD AUTO: 11.6 FL (ref 8.9–12.7)
POTASSIUM SERPL-SCNC: 4.4 MMOL/L (ref 3.5–5.3)
PROTHROMBIN TIME: 24.2 SECONDS (ref 11.6–14.5)
RBC # BLD AUTO: 2.48 MILLION/UL (ref 3.88–5.62)
SODIUM SERPL-SCNC: 138 MMOL/L (ref 136–145)
WBC # BLD AUTO: 21.05 THOUSAND/UL (ref 4.31–10.16)

## 2019-09-13 PROCEDURE — 97116 GAIT TRAINING THERAPY: CPT

## 2019-09-13 PROCEDURE — 85027 COMPLETE CBC AUTOMATED: CPT | Performed by: INTERNAL MEDICINE

## 2019-09-13 PROCEDURE — 94640 AIRWAY INHALATION TREATMENT: CPT

## 2019-09-13 PROCEDURE — 94760 N-INVAS EAR/PLS OXIMETRY 1: CPT

## 2019-09-13 PROCEDURE — 97535 SELF CARE MNGMENT TRAINING: CPT

## 2019-09-13 PROCEDURE — 97110 THERAPEUTIC EXERCISES: CPT

## 2019-09-13 PROCEDURE — 80048 BASIC METABOLIC PNL TOTAL CA: CPT | Performed by: INTERNAL MEDICINE

## 2019-09-13 PROCEDURE — 99232 SBSQ HOSP IP/OBS MODERATE 35: CPT | Performed by: INTERNAL MEDICINE

## 2019-09-13 PROCEDURE — 85610 PROTHROMBIN TIME: CPT | Performed by: INTERNAL MEDICINE

## 2019-09-13 PROCEDURE — 97530 THERAPEUTIC ACTIVITIES: CPT

## 2019-09-13 PROCEDURE — 99233 SBSQ HOSP IP/OBS HIGH 50: CPT | Performed by: INTERNAL MEDICINE

## 2019-09-13 RX ORDER — ALBUMIN (HUMAN) 12.5 G/50ML
12.5 SOLUTION INTRAVENOUS 2 TIMES DAILY
Status: COMPLETED | OUTPATIENT
Start: 2019-09-13 | End: 2019-09-13

## 2019-09-13 RX ORDER — MIDODRINE HYDROCHLORIDE 5 MG/1
10 TABLET ORAL
Status: DISCONTINUED | OUTPATIENT
Start: 2019-09-13 | End: 2019-09-24

## 2019-09-13 RX ADMIN — ALBUMIN (HUMAN) 12.5 G: 12.5 SOLUTION INTRAVENOUS at 17:07

## 2019-09-13 RX ADMIN — LEVALBUTEROL HYDROCHLORIDE 1.25 MG: 1.25 SOLUTION, CONCENTRATE RESPIRATORY (INHALATION) at 12:04

## 2019-09-13 RX ADMIN — LEVALBUTEROL HYDROCHLORIDE 1.25 MG: 1.25 SOLUTION, CONCENTRATE RESPIRATORY (INHALATION) at 19:20

## 2019-09-13 RX ADMIN — MIDODRINE HYDROCHLORIDE 10 MG: 5 TABLET ORAL at 14:30

## 2019-09-13 RX ADMIN — WARFARIN SODIUM 1.25 MG: 2.5 TABLET ORAL at 17:04

## 2019-09-13 RX ADMIN — QUETIAPINE FUMARATE 12.5 MG: 25 TABLET ORAL at 20:29

## 2019-09-13 RX ADMIN — ASPIRIN 81 MG: 81 TABLET, COATED ORAL at 09:36

## 2019-09-13 RX ADMIN — ALBUMIN (HUMAN) 12.5 G: 12.5 SOLUTION INTRAVENOUS at 14:31

## 2019-09-13 RX ADMIN — AMIODARONE HYDROCHLORIDE 200 MG: 200 TABLET ORAL at 09:36

## 2019-09-13 RX ADMIN — MICONAZOLE NITRATE: 2 CREAM TOPICAL at 09:36

## 2019-09-13 RX ADMIN — FUROSEMIDE 40 MG: 40 TABLET ORAL at 09:35

## 2019-09-13 RX ADMIN — ISODIUM CHLORIDE 3 ML: 0.03 SOLUTION RESPIRATORY (INHALATION) at 12:05

## 2019-09-13 RX ADMIN — LEVOTHYROXINE SODIUM 50 MCG: 50 TABLET ORAL at 06:10

## 2019-09-13 RX ADMIN — ATORVASTATIN CALCIUM 40 MG: 40 TABLET, FILM COATED ORAL at 17:03

## 2019-09-13 RX ADMIN — TAMSULOSIN HYDROCHLORIDE 0.4 MG: 0.4 CAPSULE ORAL at 09:36

## 2019-09-13 RX ADMIN — ISODIUM CHLORIDE 3 ML: 0.03 SOLUTION RESPIRATORY (INHALATION) at 19:30

## 2019-09-13 RX ADMIN — MIDODRINE HYDROCHLORIDE 10 MG: 5 TABLET ORAL at 17:03

## 2019-09-13 RX ADMIN — ALLOPURINOL 300 MG: 100 TABLET ORAL at 09:36

## 2019-09-13 RX ADMIN — MICONAZOLE NITRATE: 2 CREAM TOPICAL at 17:06

## 2019-09-13 RX ADMIN — MIDODRINE HYDROCHLORIDE 10 MG: 5 TABLET ORAL at 06:10

## 2019-09-13 NOTE — PROGRESS NOTES
Progress Note Justen Stovall 1933, 80 y o  male MRN: [id number]    Unit/Bed#: E5 -01 Encounter: [id number]    Primary Care Provider: Madeline Escalante MD   Date and time admitted to hospital: 9/7/2019  2:31 PM        * Ambulatory dysfunction  Assessment & Plan  Ambulatory dysfunction in the setting of hypotension and new diagnosis hypothyroidism  PT recommending rehab and patient agreeable  Awaiting placement when medically stable    ZULMA (acute kidney injury) ([facility] [address] )  Assessment & Plan  ZULMA on CKD 4 secondary to hypotension/intravascular depletion  Continue IV albumin per nephrology  Monitor with restarting of diuretics    Results from last 7 days   Lab Units 09/13/19  0554 09/12/19  0505 09/11/19  0512 09/10/19  0552 09/09/19  0512 09/08/19  0622 09/07/19  1445   BUN mg/dL 113* 105* 101* 98* 89* 88* 80*   CREATININE mg/dL 2 55* 2 28* 2 48* 2 68* 2 46* 2 06* 2 11*   EGFR ml/min/1 73sq m 22 25 23 21 23 28 28       Hematoma of arm  Assessment & Plan  Hematoma right arm  Area of fluctuance, discussed with wound care  Have surgery evaluate for recommendations          Left renal mass  Assessment & Plan  Left renal lesion which appears to be cysts on ultrasound    Hypotension (arterial)  Assessment & Plan  Hypotension likely in setting of volume depletion and hypothyroidism; metoprolol on hold  Random cortisol was checked which was within limits  Furosemide restarted and midodrine increased    Hypothyroidism  Assessment & Plan  New hypothyroidism  Started on levothyroxine  Results from last 7 days   Lab Units 09/07/19  1445   TSH 3RD GENERATON uIU/mL 32 351*   FREE T4 ng/dL 0 60*       Atrial fibrillation (HCC)  Assessment & Plan  Paroxysmal atrial fibrillation amiodarone and warfarin    Holding metoprolol due to hypotension    Results from last 7 days   Lab Units 09/13/19  0554 09/12/19  0524 09/10/19  0552 09/09/19  0512 09/08/19  0622 09/07/19  1445   INR  2 13* 2 34* 2 52* 2 62* 2 44* 2 42* 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)  Assessment & Plan  CLL with chronic leukocytosis without evidence of infection    Chronic obstructive pulmonary disease (HCC)  Assessment & Plan  Reported history of COPD currently on p r n  xopenex    Chronic combined systolic and diastolic heart failure (HCC)  Assessment & Plan  Wt Readings from Last 3 Encounters:   09/13/19 88 3 kg (194 lb 10 7 oz)   09/04/19 81 6 kg (180 lb)   08/07/19 79 6 kg (175 lb 6 4 oz)      Chronic combined systolic and diastolic CHF  No significant changes on echocardiogram performed 9/9/2019  Initially holding furosemide and metoprolol given hypotension and kidney injury  Restarted furosemide       VTE Pharmacologic Prophylaxis: Warfarin (Coumadin)    Patient Centered Rounds: I have performed bedside rounds with nursing staff today  Discussions with Specialists or Other Care Team Provider:  Nephrology  Education and Discussions with Family / Patient:     Time Spent for Care: 25 mins  More than 50% of total time spent on counseling and coordination of care as described above  Current Length of Stay: 6 day(s)  Current Patient Status: Inpatient     Certification Statement: The patient will continue to require additional inpatient hospital stay due to Ambulatory dysfunction  Discharge Plan / Estimated Discharge Date:  Likely Monday    Code Status: Level 1 - Full Code  ______________________________________________________________________________    Subjective:   Patient seen and examined  Breathing better after furosemide from yesterday    Objective:   Vitals: Blood pressure (!) 94/44, pulse 69, temperature (!) 96 8 °F (36 °C), temperature source Temporal, resp  rate 20, height 5' 8" (1 727 m), weight 88 3 kg (194 lb 10 7 oz), SpO2 95 %      Physical Exam:   General appearance: alert, appears stated age and cooperative  Head: Normocephalic, without obvious abnormality, atraumatic  Lungs: diminished breath sounds  Heart: regular rate and rhythm  Abdomen: soft, non-tender, positive bowel sounds   Back: negative, range of motion normal  Extremities: edema +2 lower extremity; area of fluctuance under clot of right forearm  Neurologic: Grossly normal    Additional Data:   Labs:  Results from last 7 days   Lab Units 09/13/19  0554 09/12/19  0524 09/10/19  0552 09/09/19  0512 09/08/19  0622 09/07/19  1445   WBC Thousand/uL 21 05*  --  22 34* 22 27* 23 19* 27 96*   HEMOGLOBIN g/dL 8 2*  --  8 6* 9 2* 9 3* 9 5*   HEMATOCRIT % 26 5*  --  28 2* 29 6* 30 0* 31 3*   MCV fL 107*  --  107* 107* 107* 108*   TOTAL NEUT ABS Thousand/uL  --   --   --   --   --  11 18*   PLATELETS Thousands/uL 101*  --  108* 103* 119* 124*   INR  2 13* 2 34* 2 52* 2 62* 2 44* 2 42*     Results from last 7 days   Lab Units 09/13/19  0554 09/12/19  0505 09/11/19  0512 09/10/19  0552 09/09/19  0512 09/08/19  0622 09/07/19  1445   SODIUM mmol/L 138 139 134* 136 137 138 137   POTASSIUM mmol/L 4 4 4 2 4 2 4 8 4 8 4 9 4 6   CHLORIDE mmol/L 105 106 101 105 105 107 103   CO2 mmol/L 23 24 22 22 23 23 24   ANION GAP mmol/L 10 9 11 9 9 8 10   BUN mg/dL 113* 105* 101* 98* 89* 88* 80*   CREATININE mg/dL 2 55* 2 28* 2 48* 2 68* 2 46* 2 06* 2 11*   CALCIUM mg/dL 8 7 9 0 8 9 8 5 8 1* 8 8 8 7   ALBUMIN g/dL  --   --  3 3*  --  2 2* 2 4* 2 7*   TOTAL BILIRUBIN mg/dL  --   --  1 82*  --  1 19* 1 60* 1 98*   ALK PHOS U/L  --   --  131*  --  155* 159* 164*   ALT U/L  --   --  31  --  38 43 48   AST U/L  --   --  54*  --  64* 82* 74*   EGFR ml/min/1 73sq m 22 25 23 21 23 28 28   GLUCOSE RANDOM mg/dL 94 97 95 106 108 102 133     Results from last 7 days   Lab Units 09/09/19  0512 09/07/19  1445   MAGNESIUM mg/dL 2 3 2 3   PHOSPHORUS mg/dL 3 5  --      Results from last 7 days   Lab Units 09/07/19  1445   CK TOTAL U/L 42   TROPONIN I ng/mL 0 05*     Results from last 7 days   Lab Units 09/07/19  1445   NT-PRO BNP pg/mL 3,172*      Results from last 7 days Lab Units 09/09/19  0512   PROCALCITONIN ng/ml 0 33*             Results from last 7 days   Lab Units 09/07/19  1445   TSH 3RD GENERATON uIU/mL 32 351*   FREE T4 ng/dL 0 60*     * I Have Reviewed All Lab Data Listed Above  Cultures:   Results from last 7 days   Lab Units 09/07/19  1705 09/07/19  1700   BLOOD CULTURE  No Growth After 5 Days  No Growth After 5 Days  Imaging:  Imaging Reports Reviewed Today Include:   Procedure: Xr Chest Pa & Lateral  Result Date: 9/11/2019  Impression: Small bilateral pleural effusions with bibasilar opacity which may represent atelectasis  Correlate clinically for infection  Workstation performed: [id number]     Scheduled Meds:  Current Facility-Administered Medications:  acetaminophen 488 mg Oral Q6H PRN Radha Archer MD   albumin human 12 5 g Intravenous BID EnriqueCT Auguste   allopurinol 300 mg Oral Daily Radha Archer MD   amiodarone 200 mg Oral Daily Radha Archer MD   aspirin 81 mg Oral Daily Radha Archer MD   atorvastatin 40 mg Oral After Dinner Radha Archer MD   fluticasone 1 spray Each Nare Daily Radha Archer MD   furosemide 40 mg Oral Daily Jose Francisco Parsons, DO   levalbuterol 1 25 mg Nebulization Q6H PRN Addi Mann DO   levothyroxine 50 mcg Oral Early Morning Jamey December, DO   melatonin 9 mg Oral HS PRN Jamey December, DO   miconazole  Topical BID Jose Francisco Parsons, DO   midodrine 10 mg Oral TID AC Hai Kessler MD   ondansetron 4 mg Intravenous Q6H PRN Steffi Bowen MD   QUEtiapine 12 5 mg Oral HS Jose Francisco Parsons, DO   sodium chloride 3 mL Nebulization Q6H PRN Jose Francisco Parsons, DO   tamsulosin 0 4 mg Oral Daily Radha Archer MD   warfarin 1 25 mg Oral Daily (warfarin) MD Addi Sandoval DO  [facility] Internal Medicine  Hospitalist    ** Please Note: This note has been constructed using a voice recognition system   **

## 2019-09-13 NOTE — PLAN OF CARE
Problem: Potential for Falls  Goal: Patient will remain free of falls  Description  INTERVENTIONS:  - Assess patient frequently for physical needs  -  Identify cognitive and physical deficits and behaviors that affect risk of falls  -  [place] fall precautions as indicated by assessment   - Educate patient/family on patient safety including physical limitations  - Instruct patient to call for assistance with activity based on assessment  - Modify environment to reduce risk of injury  - Consider OT/PT consult to assist with strengthening/mobility  Outcome: Progressing     Problem: Prexisting or High Potential for Compromised Skin Integrity  Goal: Skin integrity is maintained or improved  Description  INTERVENTIONS:  - Identify patients at risk for skin breakdown  - Assess and monitor skin integrity  - Assess and monitor nutrition and hydration status  - Monitor labs   - Assess for incontinence   - Turn and reposition patient  - Assist with mobility/ambulation  - Relieve pressure over bony prominences  - Avoid friction and shearing  - Provide appropriate hygiene as needed including keeping skin clean and dry  - Evaluate need for skin moisturizer/barrier cream  - Collaborate with interdisciplinary team   - Patient/family teaching  - Consider wound care consult   Outcome: Progressing     Problem: Nutrition/Hydration-ADULT  Goal: Nutrient/Hydration intake appropriate for improving, restoring or maintaining nutritional needs  Description  Monitor and assess patient's nutrition/hydration status for malnutrition  Collaborate with interdisciplinary team and initiate plan and interventions as ordered  Monitor patient's weight and dietary intake as ordered or per policy  Utilize nutrition screening tool and intervene as necessary  Determine patient's food preferences and provide high-protein, high-caloric foods as appropriate       INTERVENTIONS:  - Monitor oral intake, urinary output, labs, and treatment plans  - Assess nutrition and hydration status and recommend course of action  - Evaluate amount of meals eaten  - Assist patient with eating if necessary   - Allow adequate time for meals  - Recommend/ encourage appropriate diets, oral nutritional supplements, and vitamin/mineral supplements  - Order, calculate, and assess calorie counts as needed  - Recommend, monitor, and adjust tube feedings and TPN/PPN based on assessed needs  - Assess need for intravenous fluids  - Provide specific nutrition/hydration education as appropriate  - Include patient/family/caregiver in decisions related to nutrition  Outcome: Progressing     Problem: PAIN - ADULT  Goal: Verbalizes/displays adequate comfort level or baseline comfort level  Description  Interventions:  - Encourage patient to monitor pain and request assistance  - Assess pain using appropriate pain scale  - Administer analgesics based on type and severity of pain and evaluate response  - Implement non-pharmacological measures as appropriate and evaluate response  - Consider cultural and social influences on pain and pain management  - Notify physician/advanced practitioner if interventions unsuccessful or patient reports new pain  Outcome: Progressing     Problem: INFECTION - ADULT  Goal: Absence or prevention of progression during hospitalization  Description  INTERVENTIONS:  - Assess and monitor for signs and symptoms of infection  - Monitor lab/diagnostic results  - Monitor all insertion sites, i e  indwelling lines, tubes, and drains  - Monitor endotracheal if appropriate and nasal secretions for changes in amount and color  - [place] appropriate cooling/warming therapies per order  - Administer medications as ordered  - Instruct and encourage patient and family to use good hand hygiene technique  - Identify and instruct in appropriate isolation precautions for identified infection/condition  Outcome: Progressing     Problem: DISCHARGE PLANNING  Goal: Discharge to home or other facility with appropriate resources  Description  INTERVENTIONS:  - Identify barriers to discharge w/patient and caregiver  - Arrange for needed discharge resources and transportation as appropriate  - Identify discharge learning needs (meds, wound care, etc )  - Arrange for interpretive services to assist at discharge as needed  - Refer to Case Management Department for coordinating discharge planning if the patient needs post-hospital services based on physician/advanced practitioner order or complex needs related to functional status, cognitive ability, or social support system  Outcome: Progressing     Problem: Knowledge Deficit  Goal: Patient/family/caregiver demonstrates understanding of disease process, treatment plan, medications, and discharge instructions  Description  Complete learning assessment and assess knowledge base    Interventions:  - Provide teaching at level of understanding  - Provide teaching via preferred learning methods  Outcome: Progressing     Problem: RESPIRATORY - ADULT  Goal: Achieves optimal ventilation and oxygenation  Description  INTERVENTIONS:  - Assess for changes in respiratory status  - Assess for changes in mentation and behavior  - Position to facilitate oxygenation and minimize respiratory effort  - Oxygen administered by appropriate delivery if ordered  - Initiate smoking cessation education as indicated  - Encourage broncho-pulmonary hygiene including cough, deep breathe, Incentive Spirometry  - Assess the need for suctioning and aspirate as needed  - Assess and instruct to report SOB or any respiratory difficulty  - Respiratory Therapy support as indicated  Outcome: Progressing     Problem: METABOLIC, FLUID AND ELECTROLYTES - ADULT  Goal: Fluid balance maintained  Description  INTERVENTIONS:  - Monitor labs   - Monitor I/O and WT  - Instruct patient on fluid and nutrition as appropriate  - Assess for signs & symptoms of volume excess or deficit  Outcome: Progressing Problem: SKIN/TISSUE INTEGRITY - ADULT  Goal: Skin integrity remains intact  Description  INTERVENTIONS  - Identify patients at risk for skin breakdown  - Assess and monitor skin integrity  - Assess and monitor nutrition and hydration status  - Monitor labs (i e  albumin)  - Assess for incontinence   - Turn and reposition patient  - Assist with mobility/ambulation  - Relieve pressure over bony prominences  - Avoid friction and shearing  - Provide appropriate hygiene as needed including keeping skin clean and dry  - Evaluate need for skin moisturizer/barrier cream  - Collaborate with interdisciplinary team (i e  Nutrition, Rehabilitation, etc )   - Patient/family teaching  Outcome: Progressing  Goal: Incision(s), wounds(s) or drain site(s) healing without S/S of infection  Description  INTERVENTIONS  - Assess and document risk factors for skin impairment   - Assess and document dressing, incision, wound bed, drain sites and surrounding tissue  - Consider nutrition services referral as needed  - Oral mucous membranes remain intact  - Provide patient/ family education  Outcome: Progressing     Problem: HEMATOLOGIC - ADULT  Goal: Maintains hematologic stability  Description  INTERVENTIONS  - Assess for signs and symptoms of bleeding or hemorrhage  - Monitor labs  - Administer supportive blood products/factors as ordered and appropriate  Outcome: Progressing     Problem: CARDIOVASCULAR - ADULT  Goal: Maintains optimal cardiac output and hemodynamic stability  Description  INTERVENTIONS:  - Monitor I/O, vital signs and rhythm  - Monitor for S/S and trends of decreased cardiac output  - Administer and titrate ordered vasoactive medications to optimize hemodynamic stability  - Assess quality of pulses, skin color and temperature  - Assess for signs of decreased coronary artery perfusion  - Instruct patient to report change in severity of symptoms  Outcome: Progressing  Goal: Absence of cardiac dysrhythmias or at baseline rhythm  Description  INTERVENTIONS:  - Continuous cardiac monitoring, vital signs, obtain 12 lead EKG if ordered  - Administer antiarrhythmic and heart rate control medications as ordered  - Monitor electrolytes and administer replacement therapy as ordered  Outcome: Progressing

## 2019-09-13 NOTE — PLAN OF CARE
Problem: PHYSICAL THERAPY ADULT  Goal: Performs mobility at highest level of function for planned discharge setting  See evaluation for individualized goals  Description  Treatment/Interventions: Functional transfer training, LE strengthening/ROM, Elevations, Therapeutic exercise, Endurance training, Patient/family training, Bed mobility, Gait training, Spoke to nursing  Equipment Recommended: Susan Finch       See flowsheet documentation for full assessment, interventions and recommendations  Outcome: Progressing  Note:   Prognosis: Fair  Problem List: Decreased strength, Decreased range of motion, Impaired balance, Decreased endurance, Decreased mobility, Decreased cognition, Impaired judgement, Decreased safety awareness, Decreased skin integrity  Assessment: Pt  seated in bedside chair upon my arrival  Pt  reporting increased fatigue, however agreeable to therapeutic intervention  Noted O2 saturation on 2L throughout treatment session at 94-95%  Performance of HEP seated in bedside chair with cues provided for proper completion  Progressed with transfers being able to complete with A of therapist with cues for hand placement/technique  Progressed with a limited amb  trial with use of RW and A of therapist with cues provided for LE sequencing  Pt  requested to use br, able to complete self pericare  Continued with a second limited amb  trial with eventual return to room  Pt  repositioned seated in bedside chair at end of treatment session  PT will continue to recommend d/c to rehab when medically stable for continued improvement of noted impairments above  Barriers to Discharge: Inaccessible home environment, Decreased caregiver support  Barriers to Discharge Comments: lives alone, BREE  Recommendation: Short-term skilled PT     PT - OK to Discharge: Yes(if d/c to rehab when medically stable )    See flowsheet documentation for full assessment

## 2019-09-13 NOTE — ASSESSMENT & PLAN NOTE
Wt Readings from Last 3 Encounters:   09/13/19 88 3 kg (194 lb 10 7 oz)   09/04/19 81 6 kg (180 lb)   08/07/19 79 6 kg (175 lb 6 4 oz)      Chronic combined systolic and diastolic CHF  No significant changes on echocardiogram performed 9/9/2019  Initially holding furosemide and metoprolol given hypotension and kidney injury    Restarted furosemide

## 2019-09-13 NOTE — ASSESSMENT & PLAN NOTE
Ambulatory dysfunction in the setting of hypotension and new diagnosis hypothyroidism  PT recommending rehab and patient agreeable    Awaiting placement when medically stable

## 2019-09-13 NOTE — CONSULTS
Consult Note - Wound   Kaity Stovall 80 y o  male MRN: [id number]  Unit/Bed#: E5 -01 Encounter: [id number]      History and Present Illness:  80year old male presented to the hospital with progressive generalized weakness  Patient's history significant for CHF, falls, chronic lymphocytic leukemia, aortic valve replacement--receiving anticoagulation  Assessment Findings:   Patient seen per consult  Denies wound related pain  Assisted patient OOB to recliner--assist x 1 and walker  Patient able to independently reposition himself in bed  Denies incontinence  Reports fair appetite, nutrition following  Bruising to mid anterior chest, bilateral arms (right worse than left), right/mid back (fading), and right 3rd toe  Blanchable redness to bilateral buttocks and heels  Heels, buttocks, sacrum, and skin folds all intact at this time  Bilateral lower extremity moderate edema with scaling to right anterior shin  Scabbed abrasions to bilateral knees  1   Present on admission partial thickness skin tears to right medial and lateral hand  2   Present on admission skin tear with hematoma vs abscess to right posterior forearm--patient reports having a skin tear and "goose egg" to this area status post a recent fall  Center of wound with black jelly-like consistency/fluctuance  Remainder of wound pink with epithelialization  Patient denies pain on palpation  Mild waylon-wound induration extending from black center less than 2 cm circumferentially  Patient follows with Dr Sabrina Osorio in [facility] wound center for right arm tears  Last seen 9/6/2019  Plan:   1-Hydraguard lotion to bilateral sacrum, buttock and heels BID and PRN  2-Elevate heels to offload pressure  3-EHOB offloading cushion in chair when out of bed  4-Moisturize skin daily with skin nourishing cream   5-Encourage/remind patient to turn/reposition q2h or when medically stable for pressure re-distribution on skin   Provide assistance as needed  6-Right hand tears--cleanse with normal saline, pat dry  Apply xeroform gauze, 4x4, and wrap with luis fernando  Change dressing every other day  7-Surgery consult for possible debridement of right forearm hematoma vs abscess  8-Wound care team to follow  Plan of care reviewed with primary RN  Dr Ashkan Moreira to bedside to evaluate wounds--surgical consult placed  Discharge plan:  Short term rehab  Patient should follow-up at wound center on discharge  Wound 09/07/19 Skin tear Skin tear Arm Right (Active)   Wound Image   9/13/2019 11:13 AM   Wound Description Black;Pink;Epithelialization 9/13/2019 11:13 AM   Frances-wound Assessment Pink;Purple;Fragile 9/13/2019 11:13 AM   Wound Length (cm) 3 5 cm 9/13/2019 11:13 AM   Wound Width (cm) 1 5 cm 9/13/2019 11:13 AM   Wound Depth (cm) 0 1 9/13/2019 11:13 AM   Calculated Wound Area (cm^2) 5 25 cm^2 9/13/2019 11:13 AM   Calculated Wound Volume (cm^3) 0 52 cm^3 9/13/2019 11:13 AM   Tunneling 0 cm 9/13/2019 11:13 AM   Undermining 0 9/13/2019 11:13 AM   Closure DELTA 9/12/2019  9:40 AM   Drainage Amount Scant 9/13/2019 11:13 AM   Drainage Description Serosanguineous 9/13/2019 11:13 AM   Non-staged Wound Description Not applicable 2/53/9615 04:04 AM   Treatments Cleansed 9/13/2019 11:13 AM   Dressing Xeroform;Gauze 9/13/2019 11:13 AM   Wound packed? No 9/9/2019  9:00 PM   Dressing Changed Changed 9/13/2019 11:13 AM   Patient Tolerance Tolerated well 9/13/2019 11:13 AM   Dressing Status Clean;Dry; Intact 9/13/2019 11:13 AM       Wound 09/13/19 Skin tear Hand Right;Lateral (Active)   Wound Image   9/13/2019 11:14 AM   Wound Description Beefy red 9/13/2019 11:14 AM   Frances-wound Assessment Clean; Intact;Dry;Fragile 9/13/2019 11:14 AM   Wound Length (cm) 3 cm 9/13/2019 11:14 AM   Wound Width (cm) 2 cm 9/13/2019 11:14 AM   Wound Depth (cm) 0 1 9/13/2019 11:14 AM   Calculated Wound Area (cm^2) 6 cm^2 9/13/2019 11:14 AM   Calculated Wound Volume (cm^3) 0 6 cm^3 9/13/2019 11:14 AM   Tunneling 0 cm 9/13/2019 11:14 AM   Undermining 0 9/13/2019 11:14 AM   Drainage Amount Small 9/13/2019 11:14 AM   Drainage Description Serosanguineous 9/13/2019 11:14 AM   Non-staged Wound Description Partial thickness 9/13/2019 11:14 AM   Treatments Cleansed 9/13/2019 11:14 AM   Dressing Xeroform;Gauze 9/13/2019 11:14 AM   Dressing Changed Changed 9/13/2019 11:14 AM   Patient Tolerance Tolerated well 9/13/2019 11:14 AM   Dressing Status Clean; Intact;Dry 9/13/2019 11:14 AM       Wound 09/13/19 Skin tear Hand Right;Medial (Active)   Wound Image   9/13/2019 11:15 AM   Wound Description Pink 9/13/2019 11:15 AM   Frances-wound Assessment Clean;Dry; Intact;Fragile 9/13/2019 11:15 AM   Wound Length (cm) 4 2 cm 9/13/2019 11:15 AM   Wound Width (cm) 1 cm 9/13/2019 11:15 AM   Wound Depth (cm) 0 1 9/13/2019 11:15 AM   Calculated Wound Area (cm^2) 4 2 cm^2 9/13/2019 11:15 AM   Calculated Wound Volume (cm^3) 0 42 cm^3 9/13/2019 11:15 AM   Tunneling 0 cm 9/13/2019 11:15 AM   Undermining 0 9/13/2019 11:15 AM   Drainage Amount Scant 9/13/2019 11:15 AM   Drainage Description Serosanguineous 9/13/2019 11:15 AM   Non-staged Wound Description Partial thickness 9/13/2019 11:15 AM   Treatments Cleansed 9/13/2019 11:15 AM   Dressing Gauze; Xeroform 9/13/2019 11:15 AM   Dressing Changed Changed 9/13/2019 11:15 AM   Patient Tolerance Tolerated well 9/13/2019 11:15 AM   Dressing Status Clean; Intact;Dry 9/13/2019 11:15 AM     Candice MARTINN, RN, [employer] DC instructions

## 2019-09-13 NOTE — ASSESSMENT & PLAN NOTE
Hypotension likely in setting of volume depletion and hypothyroidism; metoprolol on hold  Random cortisol was checked which was within limits    Furosemide restarted and midodrine increased

## 2019-09-13 NOTE — ASSESSMENT & PLAN NOTE
Hematoma right arm  Area of fluctuance, discussed with wound care    Have surgery evaluate for recommendations

## 2019-09-13 NOTE — PHYSICAL THERAPY NOTE
Physical Therapy Progress Note     09/13/19 1220   Pain Assessment   Pain Assessment No/denies pain   Pain Score No Pain   Restrictions/Precautions   Weight Bearing Precautions Per Order No   Other Precautions Fall Risk;O2;Multiple lines   General   Chart Reviewed Yes   Response to Previous Treatment Patient reporting fatigue but able to participate  Family/Caregiver Present No   Subjective   Subjective Willing to participate in therapy this PM    Transfers   Sit to Stand 3  Moderate assistance   Additional items Assist x 1; Armrests; Increased time required;Verbal cues   Stand to Sit 3  Moderate assistance   Additional items Assist x 1; Armrests; Increased time required;Verbal cues   Toilet transfer 3  Moderate assistance   Additional items Assist x 1; Armrests; Increased time required;Standard toilet;Verbal cues  (use of grab bar)   Ambulation/Elevation   Gait pattern Decreased foot clearance; Forward Flexion; Short stride; Excessively slow; Inconsistent elver; Shuffling   Gait Assistance 4  Minimal assist   Additional items Assist x 1; Tactile cues; Verbal cues; Other (Comment)  (with second present for safety/line management)   Assistive Device Rolling walker   Distance 20' x 2 with seated toileting break in between   Balance   Static Sitting Fair +   Dynamic Sitting Fair   Static Standing Fair -   Dynamic Standing Poor +   Ambulatory Poor +   Endurance Deficit   Endurance Deficit Yes   Endurance Deficit Description weakness/fatigue   Activity Tolerance   Activity Tolerance Patient limited by fatigue   Nurse Made Aware Yes   Exercises   TKR Sitting;10 reps;AAROM; Bilateral   Assessment   Prognosis Fair   Problem List Decreased strength;Decreased range of motion; Impaired balance;Decreased endurance;Decreased mobility; Decreased cognition; Impaired judgement;Decreased safety awareness;Decreased skin integrity   Assessment Pt  seated in bedside chair upon my arrival  Pt  reporting increased fatigue, however agreeable to therapeutic intervention  Noted O2 saturation on 2L throughout treatment session at 94-95%  Performance of HEP seated in bedside chair with cues provided for proper completion  Progressed with transfers being able to complete with A of therapist with cues for hand placement/technique  Progressed with a limited amb  trial with use of RW and A of therapist with cues provided for LE sequencing  Pt  requested to use br, able to complete self pericare  Continued with a second limited amb  trial with eventual return to room  Pt  repositioned seated in bedside chair at end of treatment session  PT will continue to recommend d/c to rehab when medically stable for continued improvement of noted impairments above  Barriers to Discharge Inaccessible home environment;Decreased caregiver support   Barriers to Discharge Comments lives alone, BREE   Goals   Patient Goals To get better  STG Expiration Date 09/23/19   Treatment Day 4   Plan   Treatment/Interventions LE strengthening/ROM; Functional transfer training; Therapeutic exercise; Endurance training;Gait training;Spoke to nursing;Spoke to case management;OT   Progress Slow progress, decreased activity tolerance   PT Frequency Other (Comment)  (4-5x/wk)   Recommendation   Recommendation Short-term skilled PT   Equipment Recommended Walker  (RW)   PT - OK to Discharge Yes  (if d/c to rehab when medically stable )     Ely De Jesus, PTA

## 2019-09-13 NOTE — PROGRESS NOTES
NEPHROLOGY PROGRESS NOTE   Del Stovall 80 y o  male MRN: [id number]  Unit/Bed#: E5 -01 Encounter: [id number]  Reason for Consult: ZULMA on CKD IV    ASSESSMENT/PLAN:  ZULMA on CKD IV:  Follows with Dr Bryant with [facility]   Initial concern for over diuresis with recent increase in OP Lasix dosing, worsening likely due to hypotension    - creatinine improved to 2 2 09/12 and now again elevated likely due to re-initiation on diuretic    -baseline creatinine 1 9-2 1   -will give additional 2 doses of albumin today   -continue Lasix 40 mg p o daily and continue to monitor volume status  - home diuretic: Lasix 60 mg po daily, metolazone 1 25 mg Monday Wednesday Friday, spironolactone 12 5 mg daily  -CT: negative for hydro    -no urgent indication for dialysis  - check bladder scans insignificant   -avoid nephrotoxins  -I/O      Left kidney lesion:   -renal ultrasound shows bilateral renal cysts including two adjacent left lower pole cysts   Negative for hydro  - continued surveillance as OP      Hypotension:  Blood pressure slightly improved but remains low   -will continue to monitor, avoid further hypotension   -receiving additional albumin today  - continues on Lasix 40 mg daily  - continue midodrine 10 mg b i d        Combined systolic/diastolic heart failure:  EF of 55% with mitral stenosis, moderate TR, and dilated IVC  C/O increased LE edema    -daily weight, I/O   - Lasix 40 mg po daily with holding parameter    -continue on 1 5 L per day fluid restriction  -repeat chest x-ray shows small bilateral pleural effusions      Pneumonia:  Care per primary team   -continues on antibiotics      CLL:  Follows outpatient with Hematology/Oncology, currently not receiving treatment and is undergoing surveillance every 6 months with lab work      Anemia: Hgb is trending down  - will continue to monitor and transfuse as needed       Cirrhosis:  With mild to moderate ascites    -s/p albumin challenge, receiving additional albumin today   -blood pressure remains low, continues on midodrine  -hep C antibody negative   -considering GI evaluation        SUBJECTIVE:  The patient is resting in bed  He appears to be comfortable  He denies chest pain or shortness of breath  He is intermittently using oxygen for comfort  He denies nausea, vomiting, diarrhea, or issues with urination      OBJECTIVE:  Current Weight: Weight - Scale: 88 3 kg (194 lb 10 7 oz)  Vitals:    09/12/19 2350 09/13/19 0600 09/13/19 0838 09/13/19 0900   BP: 142/65  102/64    BP Location: Right arm  Right arm    Pulse: 64  62    Resp: 18  22    Temp: 97 9 °F (36 6 °C)  98 °F (36 7 °C)    TempSrc: Temporal  Temporal    SpO2: 92%  92%    Weight:  92 1 kg (203 lb 0 7 oz)  88 3 kg (194 lb 10 7 oz)   Height:           Intake/Output Summary (Last 24 hours) at 9/13/2019 0954  Last data filed at 9/13/2019 0901  Gross per 24 hour   Intake 1180 ml   Output 825 ml   Net 355 ml     General: No apparent distress  Skin: warm, dry, intact, no rash  HEENT: Moist mucous membranes, sclera anicteric, normocephalic  Neck: No apparent JVD appreciated  Chest: Lung sounds clear, decreased B/L, on RA  Heart: Regular rate and rhythm, No murmer  Abdomen: Soft, round, NT, +BS  Extremities: B/L LE edema present  Neuro: Alert and oriented  Psych: Appropriate mood and affect    Medications:    Current Facility-Administered Medications:     acetaminophen (TYLENOL) tablet 488 mg, 488 mg, Oral, Q6H PRN, Jorge Marshall MD    allopurinol (ZYLOPRIM) tablet 300 mg, 300 mg, Oral, Daily, Steffi Bowen MD, 300 mg at 09/13/19 0936    amiodarone tablet 200 mg, 200 mg, Oral, Daily, Jorge Marshall MD, 200 mg at 09/13/19 0936    aspirin (ECOTRIN LOW STRENGTH) EC tablet 81 mg, 81 mg, Oral, Daily, Steffi Bowen MD, 81 mg at 09/13/19 0936    atorvastatin (LIPITOR) tablet 40 mg, 40 mg, Oral, After Lencho Stephens MD, 40 mg at 09/12/19 1736    fluticasone (FLONASE) 50 mcg/act nasal spray 1 spray, 1 spray, Each Nare, Daily, Kristofer Epps MD    furosemide (LASIX) tablet 40 mg, 40 mg, Oral, Daily, Jose Francisco Parsons DO, 40 mg at 09/13/19 0935    levalbuterol (Sharlette Hazy) inhalation solution 1 25 mg, 1 25 mg, Nebulization, Q6H PRN, Odalis Stands, DO, 1 25 mg at 09/12/19 2132    levothyroxine tablet 50 mcg, 50 mcg, Oral, Early Morning, [place] Bers, DO, 50 mcg at 09/13/19 0610    melatonin tablet 9 mg, 9 mg, Oral, HS PRN, Dandy Bers, DO, 9 mg at 09/10/19 2302    miconazole 2 % cream, , Topical, BID, Jose Francisco Parsons DO    midodrine (PROAMATINE) tablet 10 mg, 10 mg, Oral, BID AC, Jg Gear, CRNP, 10 mg at 09/13/19 0610    ondansetron (ZOFRAN) injection 4 mg, 4 mg, Intravenous, Q6H PRN, Kristofer Epps MD    phenylephrine (YONAS-SYNEPHRINE) 0 25 % nasal spray 2 spray, 2 spray, Each Nare, 5x Daily, Jose Francisco Parsons DO, 2 spray at 09/12/19 2100    QUEtiapine (SEROquel) tablet 12 5 mg, 12 5 mg, Oral, HS, Jose Francisco Parsons, DO, 12 5 mg at 09/12/19 2058    sodium chloride 0 9 % inhalation solution 3 mL, 3 mL, Nebulization, Q6H PRN, Odalis Stands, DO, 3 mL at 09/12/19 2133    tamsulosin (FLOMAX) capsule 0 4 mg, 0 4 mg, Oral, Daily, Kristofer Epps MD, 0 4 mg at 09/13/19 0936    warfarin (COUMADIN) tablet 1 25 mg, 1 25 mg, Oral, Daily (warfarin), Kristofer Epps MD, 1 25 mg at 09/12/19 1738    Laboratory Results:  Results from last 7 days   Lab Units 09/13/19  0554 09/12/19  0505 09/11/19  0512 09/10/19  0552 09/09/19  0512 09/08/19  0622 09/07/19  1445   WBC Thousand/uL 21 05*  --   --  22 34* 22 27* 23 19* 27 96*   HEMOGLOBIN g/dL 8 2*  --   --  8 6* 9 2* 9 3* 9 5*   HEMATOCRIT % 26 5*  --   --  28 2* 29 6* 30 0* 31 3*   PLATELETS Thousands/uL 101*  --   --  108* 103* 119* 124*   POTASSIUM mmol/L 4 4 4 2 4 2 4 8 4 8 4 9 4 6   CHLORIDE mmol/L 105 106 101 105 105 107 103   CO2 mmol/L 23 24 22 22 23 23 24   BUN mg/dL 113* 105* 101* 98* 89* 88* 80*   CREATININE mg/dL 2 55* 2 28* 2 48* 2 68* 2 46* 2 06* 2 11*   CALCIUM mg/dL 8 7 9 0 8 9 8 5 8 1* 8 8 8 7   MAGNESIUM mg/dL  --   --   --   --  2 3  --  2 3   PHOSPHORUS mg/dL  --   --   --   --  3 5  --   --

## 2019-09-13 NOTE — OCCUPATIONAL THERAPY NOTE
Occupational Therapy Treatment Note:         09/13/19 1209   Restrictions/Precautions   Weight Bearing Precautions Per Order No   Other Precautions Fall Risk;O2;Multiple lines   Pain Assessment   Pain Assessment No/denies pain   Pain Score No Pain   ADL   Where Assessed Chair   Grooming Assistance 4  Minimal Assistance   Grooming Deficit Setup;Verbal cueing;Supervision/safety; Wash/dry hands   LB Dressing Assistance 3  Moderate Assistance   LB Dressing Deficit Setup;Verbal cueing;Supervision/safety; Increased time to complete; Don/doff R sock; Don/doff L sock   LB Dressing Comments limited functional reach due to shoulder weakness and BLE edema noted    Toileting Assistance  4  Minimal Assistance   Toileting Deficit Verbal cueing;Supervison/safety; Increased time to complete   Toileting Comments increased time to complete    Functional Standing Tolerance   Time 2-3 mins    Activity dynamic stand balance activity    Comments SOB with activity noted    Bed Mobility   Additional Comments unable to assess, Pt seated in bedside chair for tx session   Transfers   Sit to Stand 3  Moderate assistance   Additional items Assist x 1; Armrests; Increased time required;Verbal cues   Stand to Sit 3  Moderate assistance   Additional items Armrests; Increased time required;Verbal cues   Stand pivot 4  Minimal assistance   Additional items Assist x 1; Increased time required;Verbal cues   Toilet transfer 3  Moderate assistance   Additional items Assist x 1; Increased time required;Verbal cues;Standard toilet   Additional Comments SOB noted with transfers   Functional Mobility   Functional Mobility 4  Minimal assistance   Additional Comments x1   Additional items Rolling walker   Toilet Transfers   Toilet Transfer From Rolling walker   Toilet Transfer Type To and from   Toilet Transfer to Standard toilet   Toilet Transfer Technique Stand pivot   Toilet Transfers Moderate assistance   Toilet Transfers Comments Pt reports use of raised toilet at home    Cognition   Overall Cognitive Status [facility]   Arousal/Participation Alert; Responsive; Cooperative   Attention Within functional limits   Orientation Level Oriented X4   Memory Within functional limits   Following Commands Follows one step commands without difficulty   Comments Pt verbalized good understanding of safety techniques    Additional Activities   Additional Activities Other (Comment)  (fall prevention techniques )   Additional Activities Comments Pt verbalized understanding of how to apply techniques in own home    Activity Tolerance   Activity Tolerance Patient limited by fatigue   Medical Staff Made Aware appropriate to treat per nursing staff    Assessment   Assessment Pt was seen for skilled OT with focus on self-toileting routine, functional mobility, review of fall prevention techniques, and review of current plan of care  Pt denied pain upon start of tx session  Discussed/reviewed home layout and fall prevention techniques  Verbalized good understanding of fall prevention techniques and how to utilize the techniques in his own home  Reports carrying cell phone at all times in home but declines need for a medical alert system at this time  SAT 92% on 2L O2 while seated in bedside chair at rest  Overall transfers completed with Mod A x 1 with cues for safe hand placement and weakness of BUE noted  Pt completed functional mobility with Min A x1 with cues for safety provided  Increased A provided for O2 line management  Pt completed self-toileting routine with Min a and increased time to complete  Grooming completed at sink with cues for safe RW management  SAT 94% on 2L O2 after returning to bedside chair  Pt reports having difficulty with use of hand held urinal due to introverted penis reported with attempts  Educated Pt on availability of female urinal to encourage optimal ease with activity and accuracy of targeted area  Further review will be required   Pt may benefit from further rehab with focus on achieving optimal performance levels with all functional tasks  Continue to recommend Inpatient rehab   Plan   Treatment Interventions ADL retraining;Functional transfer training; Endurance training   Goal Expiration Date 09/24/19   Treatment Day 2   OT Frequency 3-5x/wk   Recommendation   OT Discharge Recommendation Short Term Rehab   OT - OK to Discharge Yes  (when medically cleared  )   Barthel Index   Feeding 10   Bathing 0   Grooming Score 0   Dressing Score 0   Bladder Score 10   Bowels Score 10   Toilet Use Score 5   Transfers (Bed/Chair) Score 10   Mobility (Level Surface) Score 0   Stairs Score 0   Barthel Index Score 45   Modified Refugio Scale   Modified Deangelo Scale 4   Rogleio Villalba, [address]

## 2019-09-13 NOTE — DISCHARGE INSTR - OTHER ORDERS
Wound Care:  1-Hydraguard lotion to bilateral sacrum, buttock and heels twice per day and as needed for prevention/protection  2-Elevate heels off of bed to offload pressure  3-Offloading cushion in chair when out of bed  4-Moisturize skin daily with lotion  5-Turn/reposition every 2 hours while in bed for pressure re-distribution on skin  Make small weight shifts frequently while sitting in chair to re-distribute pressure on skin  6-Right forearm wound--cleanse with saline  Apply dermagran and DSD  Wrap with luis fernando  Change dressing every other day  7-Left arm tears--cleanse with saline  Apply adaptic to entire forearm covering tears and blister  Cover with Maxorb and ABD  Wrap with luis fernando daily  8-Elevate left arm on pillows  Follow-up at wound center--[phone]

## 2019-09-13 NOTE — ASSESSMENT & PLAN NOTE
ZULMA on CKD 4 secondary to hypotension/intravascular depletion  Continue IV albumin per nephrology    Monitor with restarting of diuretics    Results from last 7 days   Lab Units 09/13/19  0554 09/12/19  0505 09/11/19  0512 09/10/19  0552 09/09/19  0512 09/08/19  0622 09/07/19  1445   BUN mg/dL 113* 105* 101* 98* 89* 88* 80*   CREATININE mg/dL 2 55* 2 28* 2 48* 2 68* 2 46* 2 06* 2 11*   EGFR ml/min/1 73sq m 22 25 23 21 23 28 28

## 2019-09-13 NOTE — PLAN OF CARE
Problem: OCCUPATIONAL THERAPY ADULT  Goal: Performs self-care activities at highest level of function for planned discharge setting  See evaluation for individualized goals  Description  Treatment Interventions: ADL retraining, Functional transfer training, UE strengthening/ROM, Endurance training, Cognitive reorientation, Patient/family training, Equipment evaluation/education, Compensatory technique education, Energy conservation, Activityengagement          See flowsheet documentation for full assessment, interventions and recommendations  Outcome: Progressing  Note:   Limitation: Decreased ADL status, Decreased Safe judgement during ADL, Decreased UE strength, Decreased sensation, Decreased cognition, Decreased endurance, Decreased high-level ADLs, Decreased self-care trans  Prognosis: Good  Assessment: Pt was seen for skilled OT with focus on self-toileting routine, functional mobility, review of fall prevention techniques, and review of current plan of care  Pt denied pain upon start of tx session  Discussed/reviewed home layout and fall prevention techniques  Verbalized good understanding of fall prevention techniques and how to utilize the techniques in his own home  Reports carrying cell phone at all times in home but declines need for a medical alert system at this time  SAT 92% on 2L O2 while seated in bedside chair at rest  Overall transfers completed with Mod A x 1 with cues for safe hand placement and weakness of BUE noted  Pt completed functional mobility with Min A x1 with cues for safety provided  Increased A provided for O2 line management  Pt completed self-toileting routine with Min a and increased time to complete  Grooming completed at sink with cues for safe RW management  SAT 94% on 2L O2 after returning to bedside chair  Pt reports having difficulty with use of hand held urinal due to introverted penis reported with attempts   Educated Pt on availability of female urinal to encourage optimal ease with activity and accuracy of targeted area  Further review will be required  Pt may benefit from further rehab with focus on achieving optimal performance levels with all functional tasks   Continue to recommend Inpatient rehab     OT Discharge Recommendation: Short Term Rehab  OT - OK to Discharge: Yes(when medically cleared  )

## 2019-09-13 NOTE — SOCIAL WORK
Pt not medically stable for dc at this point  Adelaida Rdz at [facility] admissions ([phone]) notified  CM dept will discuss update with her Monday

## 2019-09-13 NOTE — ASSESSMENT & PLAN NOTE
Paroxysmal atrial fibrillation amiodarone and warfarin    Holding metoprolol due to hypotension    Results from last 7 days   Lab Units 09/13/19  0554 09/12/19  0524 09/10/19  0552 09/09/19  0512 09/08/19  0622 09/07/19  1445   INR  2 13* 2 34* 2 52* 2 62* 2 44* 2 42*

## 2019-09-14 LAB
ANION GAP SERPL CALCULATED.3IONS-SCNC: 9 MMOL/L (ref 4–13)
BUN SERPL-MCNC: 121 MG/DL (ref 5–25)
CALCIUM SERPL-MCNC: 8.9 MG/DL (ref 8.3–10.1)
CHLORIDE SERPL-SCNC: 106 MMOL/L (ref 100–108)
CO2 SERPL-SCNC: 24 MMOL/L (ref 21–32)
CREAT SERPL-MCNC: 2.71 MG/DL (ref 0.6–1.3)
GFR SERPL CREATININE-BSD FRML MDRD: 20 ML/MIN/1.73SQ M
GLUCOSE SERPL-MCNC: 103 MG/DL (ref 65–140)
POTASSIUM SERPL-SCNC: 4.4 MMOL/L (ref 3.5–5.3)
SODIUM SERPL-SCNC: 139 MMOL/L (ref 136–145)

## 2019-09-14 PROCEDURE — 10140 I&D HMTMA SEROMA/FLUID COLLJ: CPT | Performed by: SURGERY

## 2019-09-14 PROCEDURE — 99232 SBSQ HOSP IP/OBS MODERATE 35: CPT | Performed by: INTERNAL MEDICINE

## 2019-09-14 PROCEDURE — 0J9G0ZZ DRAINAGE OF RIGHT LOWER ARM SUBCUTANEOUS TISSUE AND FASCIA, OPEN APPROACH: ICD-10-PCS | Performed by: SURGERY

## 2019-09-14 PROCEDURE — 80048 BASIC METABOLIC PNL TOTAL CA: CPT | Performed by: NURSE PRACTITIONER

## 2019-09-14 PROCEDURE — 99222 1ST HOSP IP/OBS MODERATE 55: CPT | Performed by: SURGERY

## 2019-09-14 PROCEDURE — 94640 AIRWAY INHALATION TREATMENT: CPT

## 2019-09-14 PROCEDURE — NC001 PR NO CHARGE: Performed by: SURGERY

## 2019-09-14 PROCEDURE — 94760 N-INVAS EAR/PLS OXIMETRY 1: CPT

## 2019-09-14 RX ORDER — FUROSEMIDE 10 MG/ML
40 INJECTION INTRAMUSCULAR; INTRAVENOUS EVERY 12 HOURS
Status: DISCONTINUED | OUTPATIENT
Start: 2019-09-14 | End: 2019-09-15

## 2019-09-14 RX ORDER — ALBUMIN (HUMAN) 12.5 G/50ML
25 SOLUTION INTRAVENOUS EVERY 6 HOURS
Status: COMPLETED | OUTPATIENT
Start: 2019-09-14 | End: 2019-09-15

## 2019-09-14 RX ORDER — OCTREOTIDE ACETATE 100 UG/ML
75 INJECTION, SOLUTION INTRAVENOUS; SUBCUTANEOUS EVERY 8 HOURS SCHEDULED
Status: DISCONTINUED | OUTPATIENT
Start: 2019-09-14 | End: 2019-09-19

## 2019-09-14 RX ADMIN — LEVALBUTEROL HYDROCHLORIDE 1.25 MG: 1.25 SOLUTION, CONCENTRATE RESPIRATORY (INHALATION) at 01:44

## 2019-09-14 RX ADMIN — MIDODRINE HYDROCHLORIDE 10 MG: 5 TABLET ORAL at 11:43

## 2019-09-14 RX ADMIN — FUROSEMIDE 40 MG: 40 TABLET ORAL at 09:19

## 2019-09-14 RX ADMIN — ATORVASTATIN CALCIUM 40 MG: 40 TABLET, FILM COATED ORAL at 17:12

## 2019-09-14 RX ADMIN — LEVOTHYROXINE SODIUM 50 MCG: 50 TABLET ORAL at 06:17

## 2019-09-14 RX ADMIN — FLUTICASONE PROPIONATE 1 SPRAY: 50 SPRAY, METERED NASAL at 09:19

## 2019-09-14 RX ADMIN — QUETIAPINE FUMARATE 12.5 MG: 25 TABLET ORAL at 22:25

## 2019-09-14 RX ADMIN — ALBUMIN (HUMAN) 25 G: 12.5 SOLUTION INTRAVENOUS at 22:32

## 2019-09-14 RX ADMIN — AMIODARONE HYDROCHLORIDE 200 MG: 200 TABLET ORAL at 09:19

## 2019-09-14 RX ADMIN — TAMSULOSIN HYDROCHLORIDE 0.4 MG: 0.4 CAPSULE ORAL at 09:19

## 2019-09-14 RX ADMIN — MICONAZOLE NITRATE: 2 CREAM TOPICAL at 17:20

## 2019-09-14 RX ADMIN — ISODIUM CHLORIDE 3 ML: 0.03 SOLUTION RESPIRATORY (INHALATION) at 01:44

## 2019-09-14 RX ADMIN — OCTREOTIDE ACETATE 75 MCG: 100 INJECTION, SOLUTION INTRAVENOUS; SUBCUTANEOUS at 19:05

## 2019-09-14 RX ADMIN — WARFARIN SODIUM 1.25 MG: 2.5 TABLET ORAL at 17:11

## 2019-09-14 RX ADMIN — MICONAZOLE NITRATE: 2 CREAM TOPICAL at 09:19

## 2019-09-14 RX ADMIN — ALBUMIN (HUMAN) 25 G: 12.5 SOLUTION INTRAVENOUS at 17:13

## 2019-09-14 RX ADMIN — ALLOPURINOL 300 MG: 100 TABLET ORAL at 09:18

## 2019-09-14 RX ADMIN — MIDODRINE HYDROCHLORIDE 10 MG: 5 TABLET ORAL at 17:00

## 2019-09-14 RX ADMIN — MIDODRINE HYDROCHLORIDE 10 MG: 5 TABLET ORAL at 06:17

## 2019-09-14 RX ADMIN — ASPIRIN 81 MG: 81 TABLET, COATED ORAL at 09:18

## 2019-09-14 RX ADMIN — FUROSEMIDE 40 MG: 10 INJECTION, SOLUTION INTRAMUSCULAR; INTRAVENOUS at 17:19

## 2019-09-14 NOTE — PROCEDURES
Incision and drain  Date/Time: 9/14/2019 10:17 AM  Performed by: Fili Prather MD  Authorized by: Fili Prather MD     Patient location:  Bedside  Consent:     Consent obtained:  Verbal    Consent given by:  Patient  Location:     Type:  Hematoma    Location:  Upper extremity    Upper extremity location:  R arm  Pre-procedure details:     Skin preparation:  Antiseptic wash  Anesthesia (see MAR for exact dosages): Anesthesia method:  None  Procedure details:     Complexity:  Simple    Needle aspiration: no      Incision types:  Elliptical    Scalpel blade:  15    Approach:  Open    Incision depth:  Subcutaneous    Wound management:  Debrided    Drainage:  Bloody    Drainage amount: Moderate    Wound treatment:  Wound left open  Post-procedure details:     Patient tolerance of procedure:   Tolerated well, no immediate complications

## 2019-09-14 NOTE — ASSESSMENT & PLAN NOTE
ZULMA on CKD 4 secondary to hypotension/intravascular depletion  Continue IV albumin; IV furosemide and octreotide added by nephrology      Results from last 7 days   Lab Units 09/14/19  0559 09/13/19  0554 09/12/19  0505 09/11/19  0512 09/10/19  0552 09/09/19  0512 09/08/19  0622   BUN mg/dL 121* 113* 105* 101* 98* 89* 88*   CREATININE mg/dL 2 71* 2 55* 2 28* 2 48* 2 68* 2 46* 2 06*   EGFR ml/min/1 73sq m 20 22 25 23 21 23 28

## 2019-09-14 NOTE — PROGRESS NOTES
Progress Note - Nephrology   Gulshanjean Stovall 80 y o  male MRN: [id number]  Unit/Bed#: E5 -01 Encounter: [id number]    Assessment:  1  Acute renal failure/acute kidney injury on stage 4 chronic kidney disease:  Baseline creatinine is 1 9-2 1 and patient follows with [facility]  Acute kidney injury is compounded by hypotension which may be affected by cirrhosis and ascites with decreased SVR as well as combined systolic and diastolic heart failure  Patient has any ejection fraction 55% with mitral stenosis, moderate TR dilated IVC  As of today, his creatinine is increased to 2 7  I think this is secondary to hypotension with above altered hemodynamics including cirrhosis and cardiac related issues affecting renal perfusion  Patient is nonoliguric patient is admitted urine which is maximize yesterday 10 mg t i d   Will start subcutaneous octreotide, increased albumin 25 g every 6 hours give Lasix every 12 hours with blood pressure parameters to help with swelling  Recheck urine sodium  2  Hypotension:  Likely secondary to cirrhosis with ascites with decreased SVR  Midodrine has been maximized  Adding albumin as described above  3  Cirrhosis with ascites:  Management as above  4  Anemia unspecified:  Hemoglobin is decreased to 8 2 if hemoglobin patient's hemodynamics were to further decrease consideration of transfusion  Plan:  As above      Subjective:   Patient seen in follow-up for acute kidney injury on chronic kidney disease  Patient resting comfortably  Systolic blood pressure is between 94 and 100 to over the past 24 hours  Urine output 800 cc over the past 24 hours  He denies any chest pressure or shortness of Breath  He states he does have a productive cough no hemoptysis  He is currently not on oxygen  Objective:     Vitals: Blood pressure (!) 92/45, pulse 60, temperature (!) 97 3 °F (36 3 °C), temperature source Temporal, resp   rate 18, height 5' 8" (1 727 m), weight 88 kg (194 lb 0 1 oz), SpO2 95 %  ,Body mass index is 29 5 kg/m²  Weight (last 2 days)     Date/Time   Weight    09/14/19 0600   88 (194 01)    09/13/19 0900   88 3 (194 67)    09/13/19 0600   92 1 (203 04)    09/12/19 0549   86 6 (190 92)                Intake/Output Summary (Last 24 hours) at 9/14/2019 1617  Last data filed at 9/14/2019 1541  Gross per 24 hour   Intake 480 ml   Output 1000 ml   Net -520 ml            Physical Exam: General: patient is in NAD  Skin:  No new rash;  Lower multiple areas of ecchymosis noted  Eyes:  No scleral icterus:   Neck:  Supple no adenopathy  Chest:  Breath sounds decreased  CVS:  S1-S2  Abdomen:  Soft positive bowel sounds  Extremities:  Bilateral leg edema noted  Neuro:  Nonfocal  Psych:  Patient answers questions appropriately              Medications Prior to Admission   Medication    acetaminophen (TYLENOL) 500 mg tablet    allopurinol (ZYLOPRIM) 300 mg tablet    amiodarone 200 mg tablet    aspirin (ASPIRIN 81) 81 mg EC tablet    atorvastatin (LIPITOR) 40 mg tablet    cholecalciferol (VITAMIN D3) 1,000 units tablet    fluticasone (FLONASE) 50 mcg/act nasal spray    furosemide (LASIX) 40 mg tablet    Lactobacillus (PROBIOTIC ACIDOPHILUS PO)    metolazone (ZAROXOLYN) 2 5 mg tablet    metoprolol tartrate (LOPRESSOR) 50 mg tablet    Multiple Vitamins-Minerals (MULTIVITAMIN ADULT PO)    spironolactone (ALDACTONE) 25 mg tablet    tamsulosin (FLOMAX) 0 4 mg    warfarin (COUMADIN) 2 5 mg tablet    Cholecalciferol (VITAMIN D3) 20 MCG TABS    metolazone (ZAROXOLYN) 2 5 mg tablet    spironolactone (ALDACTONE) 25 mg tablet    warfarin (COUMADIN) 2 5 mg tablet       Current Facility-Administered Medications   Medication Dose Route Frequency    acetaminophen (TYLENOL) tablet 488 mg  488 mg Oral Q6H PRN    allopurinol (ZYLOPRIM) tablet 300 mg  300 mg Oral Daily    amiodarone tablet 200 mg  200 mg Oral Daily    aspirin (ECOTRIN LOW STRENGTH) EC tablet 81 mg 81 mg Oral Daily    atorvastatin (LIPITOR) tablet 40 mg  40 mg Oral After Dinner    fluticasone (FLONASE) 50 mcg/act nasal spray 1 spray  1 spray Each Nare Daily    furosemide (LASIX) tablet 40 mg  40 mg Oral Daily    levalbuterol (XOPENEX) inhalation solution 1 25 mg  1 25 mg Nebulization Q6H PRN    levothyroxine tablet 50 mcg  50 mcg Oral Early Morning    melatonin tablet 9 mg  9 mg Oral HS PRN    miconazole 2 % cream   Topical BID    midodrine (PROAMATINE) tablet 10 mg  10 mg Oral TID AC    ondansetron (ZOFRAN) injection 4 mg  4 mg Intravenous Q6H PRN    QUEtiapine (SEROquel) tablet 12 5 mg  12 5 mg Oral HS    sodium chloride 0 9 % inhalation solution 3 mL  3 mL Nebulization Q6H PRN    tamsulosin (FLOMAX) capsule 0 4 mg  0 4 mg Oral Daily    warfarin (COUMADIN) tablet 1 25 mg  1 25 mg Oral Daily (warfarin)        Lab, Imaging and other studies: I have personally reviewed pertinent labs    CBC:   Lab Results   Component Value Date    WBC 21 05 (H) 09/13/2019    RBC 2 48 (L) 09/13/2019     CMP:   Lab Results   Component Value Date     09/14/2019    CO2 24 09/14/2019     (H) 09/14/2019    CREATININE 2 71 (H) 09/14/2019    CALCIUM 8 9 09/14/2019    AST 54 (H) 09/11/2019    ALT 31 09/11/2019    ALKPHOS 131 (H) 09/11/2019    EGFR 20 09/14/2019     Phosphorus:   Lab Results   Component Value Date    PHOS 3 5 09/09/2019     Magnesium:   Lab Results   Component Value Date    MG 2 3 09/09/2019     Urinalysis:   Lab Results   Component Value Date    COLORU Yellow 09/09/2019    CLARITYU Clear 09/09/2019    SPECGRAV 1 020 09/09/2019    PHUR 5 0 09/09/2019    LEUKOCYTESUR Negative 09/09/2019    NITRITE Negative 09/09/2019    GLUCOSEU Negative 09/09/2019    KETONESU Negative 09/09/2019    BILIRUBINUR Negative 09/09/2019    BLOODU Small (A) 09/09/2019     BMP:   Lab Results   Component Value Date    SODIUM 139 09/14/2019    CO2 24 09/14/2019     (H) 09/14/2019    CREATININE 2 71 (H) 09/14/2019 CALCIUM 8 9 09/14/2019

## 2019-09-14 NOTE — PROGRESS NOTES
Progress Note Shaina Stovall 1933, 80 y o  male MRN: [id number]    Unit/Bed#: E5 -01 Encounter: [id number]    Primary Care Provider: Lashawn Rice MD   Date and time admitted to hospital: 9/7/2019  2:31 PM        * Ambulatory dysfunction  Assessment & Plan  Ambulatory dysfunction in the setting of hypotension and new diagnosis hypothyroidism  PT recommending rehab and patient agreeable  Awaiting placement when medically stable    ZULMA (acute kidney injury) ([facility] [address] )  Assessment & Plan  ZULMA on CKD 4 secondary to hypotension/intravascular depletion  Continue IV albumin; IV furosemide and octreotide added by nephrology  Results from last 7 days   Lab Units 09/14/19  0559 09/13/19  0554 09/12/19  0505 09/11/19  0512 09/10/19  0552 09/09/19  0512 09/08/19  0622   BUN mg/dL 121* 113* 105* 101* 98* 89* 88*   CREATININE mg/dL 2 71* 2 55* 2 28* 2 48* 2 68* 2 46* 2 06*   EGFR ml/min/1 73sq m 20 22 25 23 21 23 28       Hematoma of arm  Assessment & Plan  Hematoma right arm  Appreciate surgery evaluation and debridement today          Hypotension (arterial)  Assessment & Plan  Hypotension likely in setting of volume depletion and hypothyroidism; metoprolol on hold  Random cortisol was checked which was within limits  Furosemide restarted and midodrine increased    Hypothyroidism  Assessment & Plan  New hypothyroidism  Started on levothyroxine  Results from last 7 days   Lab Units 09/07/19  1445   TSH 3RD GENERATON uIU/mL 32 351*   FREE T4 ng/dL 0 60*       H/O aortic valve replacement  Assessment & Plan  History of bioprosthetic AVR  Atrial fibrillation [facility])  Assessment & Plan  Paroxysmal atrial fibrillation amiodarone and warfarin    Holding metoprolol due to hypotension    Results from last 7 days   Lab Units 09/13/19  0554 09/12/19  0524 09/10/19  0552 09/09/19  0512 09/08/19  0622   INR  2 13* 2 34* 2 52* 2 62* 2 44*       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 [facility])  Assessment & Plan  CLL with chronic leukocytosis without evidence of infection    Chronic obstructive pulmonary disease (HCC)  Assessment & Plan  Reported history of COPD currently on p r n  xopenex    Chronic combined systolic and diastolic heart failure (HCC)  Assessment & Plan  Wt Readings from Last 3 Encounters:   09/14/19 88 kg (194 lb 0 1 oz)   09/04/19 81 6 kg (180 lb)   08/07/19 79 6 kg (175 lb 6 4 oz)      Chronic combined systolic and diastolic CHF  No significant changes on echocardiogram performed 9/9/2019  Initially holding furosemide and metoprolol given hypotension and kidney injury  Restarted furosemide which has been changed to IV today by nephrology      VTE Pharmacologic Prophylaxis: Warfarin (Coumadin)    Patient Centered Rounds: I have performed bedside rounds with nursing staff today  Discussions with Specialists or Other Care Team Provider:   Education and Discussions with Family / Patient:     Time Spent for Care: 25 mins  More than 50% of total time spent on counseling and coordination of care as described above  Current Length of Stay: 7 day(s)  Current Patient Status: Inpatient     Certification Statement: The patient will continue to require additional inpatient hospital stay due to Ambulatory dysfunction  Discharge Plan / Estimated Discharge Date:     Code Status: Level 1 - Full Code  ______________________________________________________________________________    Subjective:   Patient seen and examined  No new complaints  Was evaluated by surgery today and had hematoma of right arm drained    Objective:   Vitals: Blood pressure (!) 92/45, pulse 60, temperature (!) 97 3 °F (36 3 °C), temperature source Temporal, resp  rate 18, height 5' 8" (1 727 m), weight 88 kg (194 lb 0 1 oz), SpO2 95 %      Physical Exam:   General appearance: alert, appears stated age and cooperative  Head: Normocephalic, without obvious abnormality, atraumatic  Lungs: wheezing and crackles bibasilar  Heart: irregularly irregular rhythm  Abdomen: soft, non-tender, positive bowel sounds   Back: negative, range of motion normal  Extremities: edema +3 lower extremities bilaterally  Neurologic: Grossly normal    Additional Data:   Labs:  Results from last 7 days   Lab Units 09/13/19  0554 09/12/19  0524 09/10/19  0552 09/09/19  0512 09/08/19  0622   WBC Thousand/uL 21 05*  --  22 34* 22 27* 23 19*   HEMOGLOBIN g/dL 8 2*  --  8 6* 9 2* 9 3*   HEMATOCRIT % 26 5*  --  28 2* 29 6* 30 0*   MCV fL 107*  --  107* 107* 107*   PLATELETS Thousands/uL 101*  --  108* 103* 119*   INR  2 13* 2 34* 2 52* 2 62* 2 44*     Results from last 7 days   Lab Units 09/14/19  0559 09/13/19  0554 09/12/19  0505 09/11/19  0512 09/10/19  0552 09/09/19  0512 09/08/19  0622   SODIUM mmol/L 139 138 139 134* 136 137 138   POTASSIUM mmol/L 4 4 4 4 4 2 4 2 4 8 4 8 4 9   CHLORIDE mmol/L 106 105 106 101 105 105 107   CO2 mmol/L 24 23 24 22 22 23 23   ANION GAP mmol/L 9 10 9 11 9 9 8   BUN mg/dL 121* 113* 105* 101* 98* 89* 88*   CREATININE mg/dL 2 71* 2 55* 2 28* 2 48* 2 68* 2 46* 2 06*   CALCIUM mg/dL 8 9 8 7 9 0 8 9 8 5 8 1* 8 8   ALBUMIN g/dL  --   --   --  3 3*  --  2 2* 2 4*   TOTAL BILIRUBIN mg/dL  --   --   --  1 82*  --  1 19* 1 60*   ALK PHOS U/L  --   --   --  131*  --  155* 159*   ALT U/L  --   --   --  31  --  38 43   AST U/L  --   --   --  54*  --  64* 82*   EGFR ml/min/1 73sq m 20 22 25 23 21 23 28   GLUCOSE RANDOM mg/dL 103 94 97 95 106 108 102     Results from last 7 days   Lab Units 09/09/19  0512   MAGNESIUM mg/dL 2 3   PHOSPHORUS mg/dL 3 5              Results from last 7 days   Lab Units 09/09/19  0512   PROCALCITONIN ng/ml 0 33*                 * I Have Reviewed All Lab Data Listed Above  Cultures:           Imaging:  Imaging Reports Reviewed Today Include:   No results found    Scheduled Meds:  Current Facility-Administered Medications:  acetaminophen 488 mg Oral Q6H PRN Misa Villalobos MD   albumin human 25 g Intravenous Q6H Miguel Bradshaw, DO   allopurinol 300 mg Oral Daily Jorge Marshall MD   amiodarone 200 mg Oral Daily Jorge Marshall MD   aspirin 81 mg Oral Daily Jorge Marshall MD   atorvastatin 40 mg Oral After Chantell Sloan MD   fluticasone 1 spray Each Nare Daily Jorge Marshall MD   furosemide 40 mg Intravenous Q12H Myriam Conner, DO   levalbuterol 1 25 mg Nebulization Q6H PRN Kathy Luke,    levothyroxine 50 mcg Oral Early Morning Arelia Reba, DO   melatonin 9 mg Oral HS PRN Arealeksandera Reba, DO   miconazole  Topical BID Jose Francisco Parsons, DO   midodrine 10 mg Oral TID AC Virgilio Tee MD   octreotide 75 mcg Subcutaneous [facility] Miguel Ortiz, DO   ondansetron 4 mg Intravenous Q6H PRN Jorge Marshall MD   QUEtiapine 12 5 mg Oral HS Jose Francisco Parsons, DO   sodium chloride 3 mL Nebulization Q6H PRN Jose Francisco Parsons, DO   tamsulosin 0 4 mg Oral Daily Jorge Marshall MD   warfarin 1 25 mg Oral Daily (warfarin) MD Kathy Carranza DO  [facility] Internal Medicine  Hospitalist    ** Please Note: This note has been constructed using a voice recognition system   **

## 2019-09-14 NOTE — CONSULTS
Consultation - General Surgery   Jaci Botello 80 y o  male MRN: [id number]  Unit/Bed#: E5 -01 Encounter: [id number]    Assessment/Plan     Assessment:Plan:  Right forearm hematoma status post fall  Bedside debridement and drainage of old hematoma  Continue with local dressing care  The residual hematoma will liquify with dressing changes  Will likely need further debridement in a few days  Right forearm hand skin tears continue dermagran      Ambulatory dysfunction awaiting placement for rehab    Acute kidney injury management per nephrology      History of Present Illness     HPI:  Jaci Botello is a 80 y o  male who presents with manish dysfunction history of frequent falling with laceration to the right upper extremity  He was admitted and managed by internal medicine and nephrology  The right forearm skin tear is are improving however the lump more proximal forearm has progressed and surgery was consult for evaluation       Inpatient consult to Acute Care Surgery  Consult performed by: Shirley Gross MD  Consult ordered by: Bibiana Wilkerson DO          Review of Systems   Constitutional: Negative for chills, fever and unexpected weight change  HENT: Negative for congestion, sore throat and trouble swallowing  Eyes: Negative for discharge and itching  Respiratory: Negative for cough, shortness of breath and wheezing  Cardiovascular: Negative for chest pain and leg swelling  Gastrointestinal: Negative for abdominal distention and abdominal pain  Endocrine: Negative for cold intolerance and heat intolerance  Musculoskeletal: Positive for gait problem  Negative for arthralgias and joint swelling  Skin: Positive for wound  Negative for color change  Neurological: Positive for weakness  Negative for dizziness  Psychiatric/Behavioral: Negative for agitation and confusion  The patient is not nervous/anxious          Historical Information   Past Medical History:   Diagnosis Date    AAA (abdominal aortic aneurysm) (HCC)     Abnormal blood chemistry     last assessed 12/12/2013    Allergic rhinitis     last assessed 11/21/2016    Anemia     Ankle edema     Arthritis     Atrophy of prostate     Chronic diarrhea     COPD (chronic obstructive pulmonary disease) (HCC)     Coronary artery disease     stents, pacemaker    Elevated PSA     GERD (gastroesophageal reflux disease)     Gout     Heart failure (HCC)     Heart murmur     aortic stenosis    Hyperlipidemia     Hypertension     Irregular heart beat     a fib    Joint pain     Kidney failure     stage 3    Leukemia (HCC)     Malignant neoplasm prostate ([address] )     Myocardial infarction ([facility] [address] )     Nocturia     Osteopenia     Pleural effusion     PVD (peripheral vascular disease) (HCC)     Radiation gastroenteritis     Skin cancer     Sore muscles     Type 2 diabetes mellitus (HCC)      Past Surgical History:   Procedure Laterality Date    CARDIAC PACEMAKER PLACEMENT  2014    CARDIAC VALVE REPLACEMENT      CORONARY ANGIOPLASTY      prox left anterior descending artery assessment    OTHER SURGICAL HISTORY      catheter ablation    PROSTATE BIOPSY Bilateral 2003, 2008    VASCULAR SURGERY      iliac stent     Social History   Social History     Substance and Sexual Activity   Alcohol Use No    Frequency: Never    Binge frequency: Never     Social History     Substance and Sexual Activity   Drug Use No     Social History     Tobacco Use   Smoking Status Former Smoker   Smokeless Tobacco Never Used     Family History: non-contributory    Meds/Allergies   all current active meds have been reviewed  Allergies   Allergen Reactions    Indomethacin        Objective   First Vitals:   Blood Pressure: (!) 80/43 (09/07/19 1429)  Pulse: 68 (09/07/19 1429)  Temperature: 97 5 °F (36 4 °C) (09/07/19 1429)  Temp Source: Oral (09/07/19 1429)  Respirations: 18 (09/07/19 1429)  Height: 5' 8" (172 7 cm) (09/09/19 0958)  Weight - Scale: 82 kg (180 lb 12 4 oz) (09/07/19 1429)  SpO2: 98 % (09/07/19 1429)    Current Vitals:   Blood Pressure: 101/56 (09/14/19 0746)  Pulse: 80 (09/14/19 0746)  Temperature: 99 °F (37 2 °C) (09/14/19 0746)  Temp Source: Temporal (09/14/19 0746)  Respirations: 20 (09/13/19 1537)  Height: 5' 8" (172 7 cm) (09/09/19 7867)  Weight - Scale: 88 kg (194 lb 0 1 oz) (09/14/19 0600)  SpO2: 95 % (09/14/19 0746)      Intake/Output Summary (Last 24 hours) at 9/14/2019 1012  Last data filed at 9/14/2019 0601  Gross per 24 hour   Intake 840 ml   Output 600 ml   Net 240 ml       Invasive Devices     Peripheral Intravenous Line            Peripheral IV 09/12/19 Left;Ventral (anterior) Forearm 2 days                Physical Exam   Constitutional: He is oriented to person, place, and time  He appears well-developed and well-nourished  HENT:   Head: Normocephalic and atraumatic  Eyes: Pupils are equal, round, and reactive to light  EOM are normal    Neck: Normal range of motion  Neck supple  Cardiovascular: Normal rate and regular rhythm  Pulmonary/Chest: Effort normal and breath sounds normal    Abdominal: Soft  Bowel sounds are normal  He exhibits no distension  There is no tenderness  Musculoskeletal: Normal range of motion  Neurological: He is alert and oriented to person, place, and time  Skin: Skin is warm and dry  Right forearm distal the lateral and medial are long skin tears that are granulating and improving  Proximal forearm there is a 3 x 2 x 1 cm raised area with old necrotic skin and hematoma  Psychiatric: He has a normal mood and affect  His behavior is normal        Lab Results: I have personally reviewed pertinent lab results  Imaging: I have personally reviewed pertinent films in PACS  EKG, Pathology, and Other Studies: I have personally reviewed pertinent reports

## 2019-09-14 NOTE — ASSESSMENT & PLAN NOTE
Wt Readings from Last 3 Encounters:   09/14/19 88 kg (194 lb 0 1 oz)   09/04/19 81 6 kg (180 lb)   08/07/19 79 6 kg (175 lb 6 4 oz)      Chronic combined systolic and diastolic CHF  No significant changes on echocardiogram performed 9/9/2019  Initially holding furosemide and metoprolol given hypotension and kidney injury    Restarted furosemide which has been changed to IV today by nephrology

## 2019-09-14 NOTE — ASSESSMENT & PLAN NOTE
Paroxysmal atrial fibrillation amiodarone and warfarin    Holding metoprolol due to hypotension    Results from last 7 days   Lab Units 09/13/19  0554 09/12/19  0524 09/10/19  0552 09/09/19  0512 09/08/19  0622   INR  2 13* 2 34* 2 52* 2 62* 2 44*

## 2019-09-15 LAB
ANION GAP SERPL CALCULATED.3IONS-SCNC: 12 MMOL/L (ref 4–13)
BUN SERPL-MCNC: 120 MG/DL (ref 5–25)
CALCIUM SERPL-MCNC: 9.3 MG/DL (ref 8.3–10.1)
CHLORIDE SERPL-SCNC: 104 MMOL/L (ref 100–108)
CO2 SERPL-SCNC: 23 MMOL/L (ref 21–32)
CREAT SERPL-MCNC: 2.65 MG/DL (ref 0.6–1.3)
ERYTHROCYTE [DISTWIDTH] IN BLOOD BY AUTOMATED COUNT: 19.7 % (ref 11.6–15.1)
GFR SERPL CREATININE-BSD FRML MDRD: 21 ML/MIN/1.73SQ M
GLUCOSE SERPL-MCNC: 128 MG/DL (ref 65–140)
HCT VFR BLD AUTO: 27.9 % (ref 36.5–49.3)
HGB BLD-MCNC: 8.6 G/DL (ref 12–17)
INR PPP: 1.89 (ref 0.84–1.19)
MCH RBC QN AUTO: 33.2 PG (ref 26.8–34.3)
MCHC RBC AUTO-ENTMCNC: 30.8 G/DL (ref 31.4–37.4)
MCV RBC AUTO: 108 FL (ref 82–98)
PLATELET # BLD AUTO: 102 THOUSANDS/UL (ref 149–390)
PMV BLD AUTO: 11.8 FL (ref 8.9–12.7)
POTASSIUM SERPL-SCNC: 4.9 MMOL/L (ref 3.5–5.3)
PROTHROMBIN TIME: 22 SECONDS (ref 11.6–14.5)
RBC # BLD AUTO: 2.59 MILLION/UL (ref 3.88–5.62)
SODIUM SERPL-SCNC: 139 MMOL/L (ref 136–145)
WBC # BLD AUTO: 23.12 THOUSAND/UL (ref 4.31–10.16)

## 2019-09-15 PROCEDURE — 97530 THERAPEUTIC ACTIVITIES: CPT

## 2019-09-15 PROCEDURE — 94640 AIRWAY INHALATION TREATMENT: CPT

## 2019-09-15 PROCEDURE — 80048 BASIC METABOLIC PNL TOTAL CA: CPT | Performed by: INTERNAL MEDICINE

## 2019-09-15 PROCEDURE — 85610 PROTHROMBIN TIME: CPT | Performed by: INTERNAL MEDICINE

## 2019-09-15 PROCEDURE — 94760 N-INVAS EAR/PLS OXIMETRY 1: CPT

## 2019-09-15 PROCEDURE — 99232 SBSQ HOSP IP/OBS MODERATE 35: CPT | Performed by: INTERNAL MEDICINE

## 2019-09-15 PROCEDURE — 85027 COMPLETE CBC AUTOMATED: CPT | Performed by: INTERNAL MEDICINE

## 2019-09-15 PROCEDURE — 97535 SELF CARE MNGMENT TRAINING: CPT

## 2019-09-15 RX ORDER — FUROSEMIDE 10 MG/ML
40 INJECTION INTRAMUSCULAR; INTRAVENOUS EVERY 12 HOURS
Status: DISCONTINUED | OUTPATIENT
Start: 2019-09-15 | End: 2019-09-15

## 2019-09-15 RX ORDER — ALBUMIN (HUMAN) 12.5 G/50ML
25 SOLUTION INTRAVENOUS
Status: COMPLETED | OUTPATIENT
Start: 2019-09-15 | End: 2019-09-18

## 2019-09-15 RX ORDER — FUROSEMIDE 10 MG/ML
40 INJECTION INTRAMUSCULAR; INTRAVENOUS
Status: DISCONTINUED | OUTPATIENT
Start: 2019-09-15 | End: 2019-09-15

## 2019-09-15 RX ORDER — FUROSEMIDE 10 MG/ML
60 INJECTION INTRAMUSCULAR; INTRAVENOUS
Status: DISCONTINUED | OUTPATIENT
Start: 2019-09-15 | End: 2019-09-21

## 2019-09-15 RX ORDER — ALBUMIN (HUMAN) 12.5 G/50ML
25 SOLUTION INTRAVENOUS ONCE
Status: COMPLETED | OUTPATIENT
Start: 2019-09-15 | End: 2019-09-15

## 2019-09-15 RX ADMIN — MICONAZOLE NITRATE: 2 CREAM TOPICAL at 09:12

## 2019-09-15 RX ADMIN — ALBUMIN (HUMAN) 25 G: 12.5 SOLUTION INTRAVENOUS at 12:31

## 2019-09-15 RX ADMIN — ISODIUM CHLORIDE 3 ML: 0.03 SOLUTION RESPIRATORY (INHALATION) at 14:21

## 2019-09-15 RX ADMIN — ATORVASTATIN CALCIUM 40 MG: 40 TABLET, FILM COATED ORAL at 17:15

## 2019-09-15 RX ADMIN — MICONAZOLE NITRATE: 2 CREAM TOPICAL at 17:18

## 2019-09-15 RX ADMIN — WARFARIN SODIUM 1.25 MG: 2.5 TABLET ORAL at 17:15

## 2019-09-15 RX ADMIN — ISODIUM CHLORIDE 3 ML: 0.03 SOLUTION RESPIRATORY (INHALATION) at 20:05

## 2019-09-15 RX ADMIN — AMIODARONE HYDROCHLORIDE 200 MG: 200 TABLET ORAL at 09:01

## 2019-09-15 RX ADMIN — LEVALBUTEROL HYDROCHLORIDE 1.25 MG: 1.25 SOLUTION, CONCENTRATE RESPIRATORY (INHALATION) at 20:05

## 2019-09-15 RX ADMIN — ALLOPURINOL 300 MG: 100 TABLET ORAL at 09:02

## 2019-09-15 RX ADMIN — OCTREOTIDE ACETATE 75 MCG: 100 INJECTION, SOLUTION INTRAVENOUS; SUBCUTANEOUS at 21:07

## 2019-09-15 RX ADMIN — MIDODRINE HYDROCHLORIDE 10 MG: 5 TABLET ORAL at 12:30

## 2019-09-15 RX ADMIN — OCTREOTIDE ACETATE 75 MCG: 100 INJECTION, SOLUTION INTRAVENOUS; SUBCUTANEOUS at 14:10

## 2019-09-15 RX ADMIN — FUROSEMIDE 40 MG: 10 INJECTION, SOLUTION INTRAMUSCULAR; INTRAVENOUS at 12:30

## 2019-09-15 RX ADMIN — TAMSULOSIN HYDROCHLORIDE 0.4 MG: 0.4 CAPSULE ORAL at 09:01

## 2019-09-15 RX ADMIN — MIDODRINE HYDROCHLORIDE 10 MG: 5 TABLET ORAL at 17:00

## 2019-09-15 RX ADMIN — LEVALBUTEROL HYDROCHLORIDE 1.25 MG: 1.25 SOLUTION, CONCENTRATE RESPIRATORY (INHALATION) at 14:21

## 2019-09-15 RX ADMIN — LEVOTHYROXINE SODIUM 50 MCG: 50 TABLET ORAL at 05:11

## 2019-09-15 RX ADMIN — OCTREOTIDE ACETATE 75 MCG: 100 INJECTION, SOLUTION INTRAVENOUS; SUBCUTANEOUS at 06:39

## 2019-09-15 RX ADMIN — MIDODRINE HYDROCHLORIDE 10 MG: 5 TABLET ORAL at 08:00

## 2019-09-15 RX ADMIN — ASPIRIN 81 MG: 81 TABLET, COATED ORAL at 09:01

## 2019-09-15 RX ADMIN — ALBUMIN (HUMAN) 25 G: 12.5 SOLUTION INTRAVENOUS at 16:00

## 2019-09-15 RX ADMIN — QUETIAPINE FUMARATE 12.5 MG: 25 TABLET ORAL at 21:13

## 2019-09-15 RX ADMIN — OCTREOTIDE ACETATE 75 MCG: 100 INJECTION, SOLUTION INTRAVENOUS; SUBCUTANEOUS at 00:34

## 2019-09-15 NOTE — PROGRESS NOTES
Progress Note Charlotte Stovall 1933, 80 y o  male MRN: [id number]    Unit/Bed#: E5 -01 Encounter: [id number]    Primary Care Provider: Caleb Morton MD   Date and time admitted to hospital: 9/7/2019  2:31 PM        * Ambulatory dysfunction  Assessment & Plan  Ambulatory dysfunction in the setting of hypotension ZULMA and new diagnosis hypothyroidism  PT recommending rehab and patient agreeable  Discharge to [facility] when medically stable    ZULMA (acute kidney injury) [facility])  Assessment & Plan  ZULMA on CKD 4 secondary to hypotension/intravascular depletion  Continue IV albumin IV furosemide and octreotide added by nephrology  Results from last 7 days   Lab Units 09/15/19  0518 09/14/19  0559 09/13/19  0554 09/12/19  0505 09/11/19  0512 09/10/19  0552 09/09/19  0512   BUN mg/dL 120* 121* 113* 105* 101* 98* 89*   CREATININE mg/dL 2 65* 2 71* 2 55* 2 28* 2 48* 2 68* 2 46*   EGFR ml/min/1 73sq m 21 20 22 25 23 21 23       Hematoma of arm  Assessment & Plan  Hematoma right arm  Appreciate surgery evaluation and debridement     Left renal mass  Assessment & Plan  Left renal lesion which appears to be cysts on ultrasound    Hypotension (arterial)  Assessment & Plan  Hypotension likely in setting of volume depletion and hypothyroidism; metoprolol on hold  Random cortisol was checked which was within limits  Furosemide restarted and midodrine added and increased    Hypothyroidism  Assessment & Plan  New hypothyroidism  Started on levothyroxine  Results from last 7 days   Lab Units 09/07/19  1445   TSH 3RD GENERATON uIU/mL 32 351*   FREE T4 ng/dL 0 60*       H/O aortic valve replacement  Assessment & Plan  History of bioprosthetic AVR  Atrial fibrillation [facility])  Assessment & Plan  Paroxysmal atrial fibrillation amiodarone and warfarin    Holding metoprolol due to hypotension    Results from last 7 days   Lab Units 09/15/19  0518 09/13/19  8565 09/12/19  5187 09/10/19  1431 09/09/19  1286 INR  1 89* 2 13* 2 34* 2 52* 2 62*       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)  Assessment & Plan  CLL with chronic leukocytosis without evidence of infection    Results from last 7 days   Lab Units 09/15/19  0518 09/13/19  0554 09/10/19  0552 09/09/19  0512   WBC Thousand/uL 23 12* 21 05* 22 34* 22 27*       Gout  Assessment & Plan  Gouty arthritis continue allopurinol    Chronic obstructive pulmonary disease ([facility] [address] )  Assessment & Plan  Reported history of COPD currently on p r n  xopenex    Chronic combined systolic and diastolic heart failure (HCC)  Assessment & Plan  Wt Readings from Last 3 Encounters:   09/15/19 88 5 kg (195 lb)   09/04/19 81 6 kg (180 lb)   08/07/19 79 6 kg (175 lb 6 4 oz)      Chronic combined systolic and diastolic CHF  No significant changes on echocardiogram performed 9/9/2019  Initially holding furosemide and metoprolol given hypotension and kidney injury  Restarted furosemide which has been changed to IV by nephrology      VTE Pharmacologic Prophylaxis: Warfarin (Coumadin)    Patient Centered Rounds: I have performed bedside rounds with nursing staff today  Discussions with Specialists or Other Care Team Provider:  Nephrology  Education and Discussions with Family / Patient:  Son    Time Spent for Care: 30 mins  More than 50% of total time spent on counseling and coordination of care as described above  Current Length of Stay: 8 day(s)  Current Patient Status: Inpatient     Certification Statement: The patient will continue to require additional inpatient hospital stay due to Ambulatory dysfunction  Discharge Plan / Estimated Discharge Date:  Hopefully next 24-48 hours to rehab    Code Status: Level 1 - Full Code  ______________________________________________________________________________    Subjective:   Patient seen and examined  No new complaints    Tolerating IV lasix    Objective:   Vitals: Blood pressure (!) 93/44, pulse 66, temperature (!) 96 3 °F (35 7 °C), temperature source Tympanic, resp  rate 18, height 5' 8" (1 727 m), weight 88 5 kg (195 lb), SpO2 95 %      Physical Exam:   General appearance: alert, appears stated age and cooperative  Head: Normocephalic, without obvious abnormality, atraumatic  Lungs: diminished breath sounds  Heart: regular rate and rhythm  Abdomen: soft, non-tender, positive bowel sounds   Back: negative  Extremities: edema +2-3 lower extremities; right arm wrapped  Neurologic: Grossly normal    Additional Data:   Labs:  Results from last 7 days   Lab Units 09/15/19  0518 09/13/19  0554 09/12/19  0524 09/10/19  0552 09/09/19  0512   WBC Thousand/uL 23 12* 21 05*  --  22 34* 22 27*   HEMOGLOBIN g/dL 8 6* 8 2*  --  8 6* 9 2*   HEMATOCRIT % 27 9* 26 5*  --  28 2* 29 6*   MCV fL 108* 107*  --  107* 107*   PLATELETS Thousands/uL 102* 101*  --  108* 103*   INR  1 89* 2 13* 2 34* 2 52* 2 62*     Results from last 7 days   Lab Units 09/15/19  0518 09/14/19  0559 09/13/19  0554 09/12/19  0505 09/11/19  0512 09/10/19  0552 09/09/19  0512   SODIUM mmol/L 139 139 138 139 134* 136 137   POTASSIUM mmol/L 4 9 4 4 4 4 4 2 4 2 4 8 4 8   CHLORIDE mmol/L 104 106 105 106 101 105 105   CO2 mmol/L 23 24 23 24 22 22 23   ANION GAP mmol/L 12 9 10 9 11 9 9   BUN mg/dL 120* 121* 113* 105* 101* 98* 89*   CREATININE mg/dL 2 65* 2 71* 2 55* 2 28* 2 48* 2 68* 2 46*   CALCIUM mg/dL 9 3 8 9 8 7 9 0 8 9 8 5 8 1*   ALBUMIN g/dL  --   --   --   --  3 3*  --  2 2*   TOTAL BILIRUBIN mg/dL  --   --   --   --  1 82*  --  1 19*   ALK PHOS U/L  --   --   --   --  131*  --  155*   ALT U/L  --   --   --   --  31  --  38   AST U/L  --   --   --   --  54*  --  64*   EGFR ml/min/1 73sq m 21 20 22 25 23 21 23   GLUCOSE RANDOM mg/dL 128 103 94 97 95 106 108     Results from last 7 days   Lab Units 09/09/19  0512   MAGNESIUM mg/dL 2 3   PHOSPHORUS mg/dL 3 5              Results from last 7 days   Lab Units 09/09/19  0512   PROCALCITONIN ng/ml 0 33* * I Have Reviewed All Lab Data Listed Above  Cultures:           Imaging:  Imaging Reports Reviewed Today Include:   No results found  Scheduled Meds:  Current Facility-Administered Medications:  acetaminophen 488 mg Oral Q6H PRN Shayla Gastelum MD   albumin human 25 g Intravenous BID (diuretic) Morelia Rush DO   allopurinol 300 mg Oral Daily Shayla Gastelum MD   amiodarone 200 mg Oral Daily Shayla Gastelum MD   aspirin 81 mg Oral Daily Shayla Gastelum MD   atorvastatin 40 mg Oral After Dinner Shayla Gastelum MD   fluticasone 1 spray Each Nare Daily Shayla Gastelum MD   furosemide 60 mg Intravenous BID (diuretic) Morelia Rush DO   levalbuterol 1 25 mg Nebulization Q6H PRN Theodora Rae,    levothyroxine 50 mcg Oral Early Morning Brenda Deras, DO   melatonin 9 mg Oral HS PRN Brenda Deras, DO   miconazole  Topical BID Jose Francisco Parsons DO   midodrine 10 mg Oral TID AC Sarah Burroughs MD   octreotide 75 mcg Subcutaneous [facility] Miguel Ortiz, DO   ondansetron 4 mg Intravenous Q6H PRN Shayla Gastelum MD   QUEtiapine 12 5 mg Oral HS Jose Francisco Parsons, DO   sodium chloride 3 mL Nebulization Q6H PRN Jose Francisco Parsons DO   tamsulosin 0 4 mg Oral Daily Shayla Gastelum MD   warfarin 1 25 mg Oral Daily (warfarin) MD Theodora Joyce DO  [facility]  Hospitalist    ** Please Note: This note has been constructed using a voice recognition system   **

## 2019-09-15 NOTE — ASSESSMENT & PLAN NOTE
Hypotension likely in setting of volume depletion and hypothyroidism; metoprolol on hold  Random cortisol was checked which was within limits    Furosemide restarted and midodrine added and increased

## 2019-09-15 NOTE — ASSESSMENT & PLAN NOTE
Ambulatory dysfunction in the setting of hypotension ZULMA and new diagnosis hypothyroidism  PT recommending rehab and patient agreeable    Discharge to [facility] when medically stable

## 2019-09-15 NOTE — ASSESSMENT & PLAN NOTE
Paroxysmal atrial fibrillation amiodarone and warfarin    Holding metoprolol due to hypotension    Results from last 7 days   Lab Units 09/15/19  0518 09/13/19  0554 09/12/19  0524 09/10/19  0552 09/09/19  0512   INR  1 89* 2 13* 2 34* 2 52* 2 62*

## 2019-09-15 NOTE — PLAN OF CARE
Problem: Potential for Falls  Goal: Patient will remain free of falls  Description  INTERVENTIONS:  - Assess patient frequently for physical needs  -  Identify cognitive and physical deficits and behaviors that affect risk of falls  -  [place] fall precautions as indicated by assessment   - Educate patient/family on patient safety including physical limitations  - Instruct patient to call for assistance with activity based on assessment  - Modify environment to reduce risk of injury  - Consider OT/PT consult to assist with strengthening/mobility  Outcome: Progressing     Problem: Prexisting or High Potential for Compromised Skin Integrity  Goal: Skin integrity is maintained or improved  Description  INTERVENTIONS:  - Identify patients at risk for skin breakdown  - Assess and monitor skin integrity  - Assess and monitor nutrition and hydration status  - Monitor labs   - Assess for incontinence   - Turn and reposition patient  - Assist with mobility/ambulation  - Relieve pressure over bony prominences  - Avoid friction and shearing  - Provide appropriate hygiene as needed including keeping skin clean and dry  - Evaluate need for skin moisturizer/barrier cream  - Collaborate with interdisciplinary team   - Patient/family teaching  - Consider wound care consult   Outcome: Progressing     Problem: Nutrition/Hydration-ADULT  Goal: Nutrient/Hydration intake appropriate for improving, restoring or maintaining nutritional needs  Description  Monitor and assess patient's nutrition/hydration status for malnutrition  Collaborate with interdisciplinary team and initiate plan and interventions as ordered  Monitor patient's weight and dietary intake as ordered or per policy  Utilize nutrition screening tool and intervene as necessary  Determine patient's food preferences and provide high-protein, high-caloric foods as appropriate       INTERVENTIONS:  - Monitor oral intake, urinary output, labs, and treatment plans  - Assess nutrition and hydration status and recommend course of action  - Evaluate amount of meals eaten  - Assist patient with eating if necessary   - Allow adequate time for meals  - Recommend/ encourage appropriate diets, oral nutritional supplements, and vitamin/mineral supplements  - Order, calculate, and assess calorie counts as needed  - Recommend, monitor, and adjust tube feedings and TPN/PPN based on assessed needs  - Assess need for intravenous fluids  - Provide specific nutrition/hydration education as appropriate  - Include patient/family/caregiver in decisions related to nutrition  Outcome: Progressing     Problem: PAIN - ADULT  Goal: Verbalizes/displays adequate comfort level or baseline comfort level  Description  Interventions:  - Encourage patient to monitor pain and request assistance  - Assess pain using appropriate pain scale  - Administer analgesics based on type and severity of pain and evaluate response  - Implement non-pharmacological measures as appropriate and evaluate response  - Consider cultural and social influences on pain and pain management  - Notify physician/advanced practitioner if interventions unsuccessful or patient reports new pain  Outcome: Progressing     Problem: INFECTION - ADULT  Goal: Absence or prevention of progression during hospitalization  Description  INTERVENTIONS:  - Assess and monitor for signs and symptoms of infection  - Monitor lab/diagnostic results  - Monitor all insertion sites, i e  indwelling lines, tubes, and drains  - Monitor endotracheal if appropriate and nasal secretions for changes in amount and color  - [place] appropriate cooling/warming therapies per order  - Administer medications as ordered  - Instruct and encourage patient and family to use good hand hygiene technique  - Identify and instruct in appropriate isolation precautions for identified infection/condition  Outcome: Progressing     Problem: DISCHARGE PLANNING  Goal: Discharge to home or other facility with appropriate resources  Description  INTERVENTIONS:  - Identify barriers to discharge w/patient and caregiver  - Arrange for needed discharge resources and transportation as appropriate  - Identify discharge learning needs (meds, wound care, etc )  - Arrange for interpretive services to assist at discharge as needed  - Refer to Case Management Department for coordinating discharge planning if the patient needs post-hospital services based on physician/advanced practitioner order or complex needs related to functional status, cognitive ability, or social support system  Outcome: Progressing     Problem: Knowledge Deficit  Goal: Patient/family/caregiver demonstrates understanding of disease process, treatment plan, medications, and discharge instructions  Description  Complete learning assessment and assess knowledge base    Interventions:  - Provide teaching at level of understanding  - Provide teaching via preferred learning methods  Outcome: Progressing     Problem: RESPIRATORY - ADULT  Goal: Achieves optimal ventilation and oxygenation  Description  INTERVENTIONS:  - Assess for changes in respiratory status  - Assess for changes in mentation and behavior  - Position to facilitate oxygenation and minimize respiratory effort  - Oxygen administered by appropriate delivery if ordered  - Initiate smoking cessation education as indicated  - Encourage broncho-pulmonary hygiene including cough, deep breathe, Incentive Spirometry  - Assess the need for suctioning and aspirate as needed  - Assess and instruct to report SOB or any respiratory difficulty  - Respiratory Therapy support as indicated  Outcome: Progressing     Problem: METABOLIC, FLUID AND ELECTROLYTES - ADULT  Goal: Fluid balance maintained  Description  INTERVENTIONS:  - Monitor labs   - Monitor I/O and WT  - Instruct patient on fluid and nutrition as appropriate  - Assess for signs & symptoms of volume excess or deficit  Outcome: Progressing Problem: SKIN/TISSUE INTEGRITY - ADULT  Goal: Skin integrity remains intact  Description  INTERVENTIONS  - Identify patients at risk for skin breakdown  - Assess and monitor skin integrity  - Assess and monitor nutrition and hydration status  - Monitor labs (i e  albumin)  - Assess for incontinence   - Turn and reposition patient  - Assist with mobility/ambulation  - Relieve pressure over bony prominences  - Avoid friction and shearing  - Provide appropriate hygiene as needed including keeping skin clean and dry  - Evaluate need for skin moisturizer/barrier cream  - Collaborate with interdisciplinary team (i e  Nutrition, Rehabilitation, etc )   - Patient/family teaching  Outcome: Progressing  Goal: Incision(s), wounds(s) or drain site(s) healing without S/S of infection  Description  INTERVENTIONS  - Assess and document risk factors for skin impairment   - Assess and document dressing, incision, wound bed, drain sites and surrounding tissue  - Consider nutrition services referral as needed  - Oral mucous membranes remain intact  - Provide patient/ family education  Outcome: Progressing     Problem: HEMATOLOGIC - ADULT  Goal: Maintains hematologic stability  Description  INTERVENTIONS  - Assess for signs and symptoms of bleeding or hemorrhage  - Monitor labs  - Administer supportive blood products/factors as ordered and appropriate  Outcome: Progressing     Problem: CARDIOVASCULAR - ADULT  Goal: Maintains optimal cardiac output and hemodynamic stability  Description  INTERVENTIONS:  - Monitor I/O, vital signs and rhythm  - Monitor for S/S and trends of decreased cardiac output  - Administer and titrate ordered vasoactive medications to optimize hemodynamic stability  - Assess quality of pulses, skin color and temperature  - Assess for signs of decreased coronary artery perfusion  - Instruct patient to report change in severity of symptoms  Outcome: Progressing  Goal: Absence of cardiac dysrhythmias or at baseline rhythm  Description  INTERVENTIONS:  - Continuous cardiac monitoring, vital signs, obtain 12 lead EKG if ordered  - Administer antiarrhythmic and heart rate control medications as ordered  - Monitor electrolytes and administer replacement therapy as ordered  Outcome: Progressing

## 2019-09-15 NOTE — ASSESSMENT & PLAN NOTE
ZULMA on CKD 4 secondary to hypotension/intravascular depletion  Continue IV albumin IV furosemide and octreotide added by nephrology      Results from last 7 days   Lab Units 09/15/19  0518 09/14/19  0559 09/13/19  0554 09/12/19  0505 09/11/19  0512 09/10/19  0552 09/09/19  0512   BUN mg/dL 120* 121* 113* 105* 101* 98* 89*   CREATININE mg/dL 2 65* 2 71* 2 55* 2 28* 2 48* 2 68* 2 46*   EGFR ml/min/1 73sq m 21 20 22 25 23 21 23

## 2019-09-15 NOTE — ASSESSMENT & PLAN NOTE
CLL with chronic leukocytosis without evidence of infection    Results from last 7 days   Lab Units 09/15/19  0518 09/13/19  0554 09/10/19  0552 09/09/19  0512   WBC Thousand/uL 23 12* 21 05* 22 34* 22 27*

## 2019-09-15 NOTE — PROGRESS NOTES
[facility] PROGRESS NOTE   Ruthann Stovall 80 y o  male MRN: [id number]  Unit/Bed#: E5 -01 Encounter: [id number]  Reason for Consult:  Acute kidney injury    ASSESSMENT and PLAN:  1  Acute kidney injury on CKD  · Etiology:  Hypotension in the setting of cirrhosis with ascites and cardiorenal (EF 55% with mitral stenosis, moderate TR, dilated IVC)  · Creatinine peaked at 2 71 on 09/14  Today creatinine is slightly lower at 2 65   · On 09/14 patient placed on octreotide and albumin infusions increased to every 6 hours with Lasix every 12 hours  · Urine lytes on 09/09  Fractional excretion of urate 25 85% supporting pre renal   Fractional excretion of sodium 0 45% also supporting pre renal  · Plan:  Continue Lasix with albumin infusions, octreotide  2  Chronic kidney disease, stage IV:    · Baseline creatinine 1 9-2 1  Patient follows with [facility]  3  Hypotension:  Likely secondary to cirrhosis physiology with ascites/decrease SVR  On maximal dose of midodrine  Continue albumin with Lasix   4  Cirrhosis with ascites: On midodrine/albumin/octreotide  5  Anemia:  Hemoglobin low but stable, 8 2 yesterday, today 8 6      SUBJECTIVE / INTERVAL HISTORY:  Short of breath with exertion  States his weight is up 30 lb in 1 week  OBJECTIVE:  Current Weight: Weight - Scale: 88 5 kg (195 lb)  Vitals:    09/14/19 2246 09/15/19 0507 09/15/19 0534 09/15/19 0737   BP: 130/60 102/52  105/70   BP Location: Left arm Left arm  Right arm   Pulse: 62   70   Resp: 18      Temp: 97 5 °F (36 4 °C)   97 9 °F (36 6 °C)   TempSrc: Temporal   Temporal   SpO2: 93%   98%   Weight:   88 5 kg (195 lb)    Height:           Intake/Output Summary (Last 24 hours) at 9/15/2019 1207  Last data filed at 9/15/2019 0901  Gross per 24 hour   Intake    Output 400 ml   Net -400 ml     General:  Patient working with physical therapy    Short of breath after standing  Skin:  Sallow  Eyes: anicteric sclera  ENT: moist mucous membrane  Neck: supple  Chest:  Posteriorly lungs clear bilaterally, anteriorly I & E wheezes over large airways this was noted after patient work with physical therapy  CVS: s1s2, regular    No murmur appreciated but difficult to hear due to lung sounds  Abdomen:  Abdomen large, distended  Extremities:  +3 edema  LE b/l  : no hernandez  Neuro: AAOX3  Psych: normal affect  Medications:    Current Facility-Administered Medications:     acetaminophen (TYLENOL) tablet 488 mg, 488 mg, Oral, Q6H PRN, Caroline Yeung MD    albumin human (FLEXBUMIN) 25 % injection 25 g, 25 g, Intravenous, Once, CT Vaughan    albumin human (FLEXBUMIN) 25 % injection 25 g, 25 g, Intravenous, BID (diuretic), CT Vaughan    allopurinol (ZYLOPRIM) tablet 300 mg, 300 mg, Oral, Daily, Steffi Bowen MD, 300 mg at 09/15/19 0902    amiodarone tablet 200 mg, 200 mg, Oral, Daily, Steffi Bowen MD, 200 mg at 09/15/19 0901    aspirin (ECOTRIN LOW STRENGTH) EC tablet 81 mg, 81 mg, Oral, Daily, Steffi Bowen MD, 81 mg at 09/15/19 0901    atorvastatin (LIPITOR) tablet 40 mg, 40 mg, Oral, After Luana Gilliland MD, 40 mg at 09/14/19 1712    fluticasone (FLONASE) 50 mcg/act nasal spray 1 spray, 1 spray, Each Nare, Daily, Caroline Yeung MD, 1 spray at 09/14/19 0919    furosemide (LASIX) injection 40 mg, 40 mg, Intravenous, BID (diuretic), Pat Aschoff, DO    levalbuterol (XOPENEX) inhalation solution 1 25 mg, 1 25 mg, Nebulization, Q6H PRN, Jose Francisco Parsons DO, 1 25 mg at 09/14/19 0144    levothyroxine tablet 50 mcg, 50 mcg, Oral, Early Morning, Marylen Glad, DO, 50 mcg at 09/15/19 0511    melatonin tablet 9 mg, 9 mg, Oral, HS PRN, Marylen Glad, DO, 9 mg at 09/10/19 2302    miconazole 2 % cream, , Topical, BID, Jose Francisco Parsons DO    midodrine (PROAMATINE) tablet 10 mg, 10 mg, Oral, TID AC, Hai Kessler MD, 10 mg at 09/15/19 0800    octreotide (SandoSTATIN) injection 75 mcg, 75 mcg, Subcutaneous, Q8H [facility], Dexter Dunn DO, 75 mcg at 09/15/19 0639    ondansetron (ZOFRAN) injection 4 mg, 4 mg, Intravenous, Q6H PRN, Nicho Leyva MD    QUEtiapine (SEROquel) tablet 12 5 mg, 12 5 mg, Oral, HS, Jose Francisco Parsons DO, 12 5 mg at 09/14/19 2225    sodium chloride 0 9 % inhalation solution 3 mL, 3 mL, Nebulization, Q6H PRN, Jose Francisco Parsons DO, 3 mL at 09/14/19 0144    tamsulosin (FLOMAX) capsule 0 4 mg, 0 4 mg, Oral, Daily, Nicho Leyva MD, 0 4 mg at 09/15/19 0901    warfarin (COUMADIN) tablet 1 25 mg, 1 25 mg, Oral, Daily (warfarin), Nicho Leyva MD, 1 25 mg at 09/14/19 1711    Laboratory Results:  Results from last 7 days   Lab Units 09/15/19  0518 09/14/19  0559 09/13/19  0554 09/12/19  0505 09/11/19  0512 09/10/19  0552 09/09/19  0512   WBC Thousand/uL 23 12*  --  21 05*  --   --  22 34* 22 27*   HEMOGLOBIN g/dL 8 6*  --  8 2*  --   --  8 6* 9 2*   HEMATOCRIT % 27 9*  --  26 5*  --   --  28 2* 29 6*   PLATELETS Thousands/uL 102*  --  101*  --   --  108* 103*   POTASSIUM mmol/L 4 9 4 4 4 4 4 2 4 2 4 8 4 8   CHLORIDE mmol/L 104 106 105 106 101 105 105   CO2 mmol/L 23 24 23 24 22 22 23   BUN mg/dL 120* 121* 113* 105* 101* 98* 89*   CREATININE mg/dL 2 65* 2 71* 2 55* 2 28* 2 48* 2 68* 2 46*   CALCIUM mg/dL 9 3 8 9 8 7 9 0 8 9 8 5 8 1*   MAGNESIUM mg/dL  --   --   --   --   --   --  2 3   PHOSPHORUS mg/dL  --   --   --   --   --   --  3 5

## 2019-09-15 NOTE — PLAN OF CARE
Problem: OCCUPATIONAL THERAPY ADULT  Goal: Performs self-care activities at highest level of function for planned discharge setting  See evaluation for individualized goals  Description  Treatment Interventions: ADL retraining, Functional transfer training, UE strengthening/ROM, Endurance training, Cognitive reorientation, Patient/family training, Equipment evaluation/education, Compensatory technique education, Energy conservation, Activityengagement          See flowsheet documentation for full assessment, interventions and recommendations  Outcome: Progressing  Note:   Limitation: Decreased ADL status, Decreased Safe judgement during ADL, Decreased UE strength, Decreased sensation, Decreased cognition, Decreased endurance, Decreased high-level ADLs, Decreased self-care trans  Prognosis: Good  Assessment: Pt was seen for skilled OT with focus on toileting routine, functional transfers, review of energy conservation techniques, and review of current plan of care  Pt on BSC at start of tx session  Toileting completed with Max A for perineal hygiene and steadying required  Toilet transfer completed with Mod A due to fatigue noted  Pt reports "I sometimes feel a little depressed" and emotional support provided with extended time to self express regarding current health status and limitations  Grooming completed with S with setup of materials for brushing teeth  Reviewed energy conservation techniques and Pt verbalized how to utilize them in his current routines  Completed static standing balance card activity with functional reach with stand tolerance of 3 mins before fatigue  Pt demonstrated good ability to organize cards into correct suits  Pt states "I enjoy playing card games and play a lot of Solitaire at home " Transfers completed with Min A and cues for hand placement  SAT maintained between 91%-95% on RA throughout tx session and with activity  Pt denied pain this tx session   Pt left with call bell/phone within reach  Pt may benefit from further rehab with focus on achieving optimal performance levels with all functional tasks  Continue to recommend Inpatient rehab       OT Discharge Recommendation: Short Term Rehab  OT - OK to Discharge: Yes(when medically cleared  )

## 2019-09-15 NOTE — OCCUPATIONAL THERAPY NOTE
Occupational Therapy Treatment Note:         09/15/19 1206   Restrictions/Precautions   Weight Bearing Precautions Per Order No   Other Precautions Fall Risk;O2   Pain Assessment   Pain Assessment No/denies pain   Pain Score No Pain   ADL   Where Assessed Chair   Grooming Assistance 5  Supervision/Setup   Grooming Deficit Setup;Verbal cueing;Supervision/safety; Increased time to complete;Wash/dry hands; Teeth care   Grooming Comments required setup of materials    Toileting Assistance  2  Maximal Assistance   Toileting Deficit Steadying;Verbal cueing;Supervison/safety; Increased time to complete; Bedside commode;Perineal hygiene   Toileting Comments increased A for perineal hygiene and steadying   Functional Standing Tolerance   Time 3 mins    Activity static stand balance activity    Comments maintained SAT 91% upon standing on RA   Bed Mobility   Additional Comments unable to assess; Pt seen OOB this tx session   Transfers   Sit to Stand 4  Minimal assistance   Additional items Assist x 1; Armrests; Increased time required;Verbal cues   Stand to Sit 4  Minimal assistance   Additional items Assist x 1; Armrests; Increased time required;Verbal cues   Stand pivot 4  Minimal assistance   Additional items Assist x 1; Increased time required;Verbal cues   Toilet transfer 3  Moderate assistance   Additional items Assist x 1; Increased time required;Verbal cues; Commode   Additional Comments cues for safe hand placement required    Functional Mobility   Functional Mobility 4  Minimal assistance   Additional Comments x1, increased time required    Additional items Rolling walker   Toilet Transfers   Toilet Transfer From Rolling walker   Toilet Transfer Type To and from   Toilet Transfer to Standard bedside commode   Toilet Transfer Technique Stand pivot   Toilet Transfers Moderate assistance   Toilet Transfers Comments increased A for steadying required    Cognition   Overall Cognitive Status [facility]   Arousal/Participation Alert; Responsive; Cooperative   Attention Within functional limits   Orientation Level Oriented X4   Memory Within functional limits   Following Commands Follows one step commands without difficulty   Comments Pt verbalized good understanding of energy conservation techniques    Additional Activities   Additional Activities Other (Comment)  (energy conservation techniques)   Additional Activities Comments Pt verbalized how to incorporate EC techniques into own routine    Activity Tolerance   Activity Tolerance Patient limited by fatigue   Medical Staff Made Aware appropriate to treat per nursing staff    Assessment   Assessment Pt was seen for skilled OT with focus on toileting routine, functional transfers, review of energy conservation techniques, and review of current plan of care  Pt on BSC at start of tx session  Toileting completed with Max A for perineal hygiene and steadying required  Toilet transfer completed with Mod A due to fatigue noted  Pt reports "I sometimes feel a little depressed" and emotional support provided with extended time to self express regarding current health status and limitations  Grooming completed with S with setup of materials for brushing teeth  Reviewed energy conservation techniques and Pt verbalized how to utilize them in his current routines  Completed static standing balance card activity with functional reach with stand tolerance of 3 mins before fatigue  Pt demonstrated good ability to organize cards into correct suits  Pt states "I enjoy playing card games and play a lot of Solitaire at home " Transfers completed with Min A and cues for hand placement  SAT maintained between 91%-95% on RA throughout tx session and with activity  Pt denied pain this tx session  Pt left with call bell/phone within reach  Pt may benefit from further rehab with focus on achieving optimal performance levels with all functional tasks  Continue to recommend Inpatient rehab     Plan   Treatment Interventions ADL retraining;Functional transfer training; Endurance training; Activityengagement; Energy conservation   Goal Expiration Date 09/24/19   Treatment Day 3   OT Frequency 3-5x/wk   Recommendation   OT Discharge Recommendation Short Term Rehab   Barthel Index   Feeding 10   Bathing 0   Grooming Score 0   Dressing Score 0   Bladder Score 10   Bowels Score 10   Toilet Use Score 5   Transfers (Bed/Chair) Score 10   Mobility (Level Surface) Score 0   Stairs Score 0   Barthel Index Score 45   Modified Deangelo Scale   Modified Deangelo Scale 4   Dakota Herring, [address]  [address]

## 2019-09-16 PROBLEM — N18.4 CKD (CHRONIC KIDNEY DISEASE) STAGE 4, GFR 15-29 ML/MIN (HCC): Status: ACTIVE | Noted: 2019-09-16

## 2019-09-16 PROBLEM — N18.9 ACUTE KIDNEY INJURY SUPERIMPOSED ON CHRONIC KIDNEY DISEASE (HCC): Status: ACTIVE | Noted: 2019-09-08

## 2019-09-16 PROBLEM — I50.43 ACUTE ON CHRONIC COMBINED SYSTOLIC AND DIASTOLIC HEART FAILURE (HCC): Status: ACTIVE | Noted: 2019-03-21

## 2019-09-16 PROBLEM — D63.1 ANEMIA DUE TO STAGE 4 CHRONIC KIDNEY DISEASE (HCC): Status: ACTIVE | Noted: 2019-09-16

## 2019-09-16 PROBLEM — N18.4 ANEMIA DUE TO STAGE 4 CHRONIC KIDNEY DISEASE (HCC): Status: ACTIVE | Noted: 2019-09-16

## 2019-09-16 LAB
ALBUMIN SERPL BCP-MCNC: 3.9 G/DL (ref 3.5–5)
ALP SERPL-CCNC: 119 U/L (ref 46–116)
ALT SERPL W P-5'-P-CCNC: 39 U/L (ref 12–78)
ANION GAP SERPL CALCULATED.3IONS-SCNC: 11 MMOL/L (ref 4–13)
AST SERPL W P-5'-P-CCNC: 64 U/L (ref 5–45)
BILIRUB SERPL-MCNC: 1.96 MG/DL (ref 0.2–1)
BUN SERPL-MCNC: 125 MG/DL (ref 5–25)
CALCIUM SERPL-MCNC: 8.7 MG/DL (ref 8.3–10.1)
CHLORIDE SERPL-SCNC: 104 MMOL/L (ref 100–108)
CO2 SERPL-SCNC: 22 MMOL/L (ref 21–32)
CREAT SERPL-MCNC: 2.9 MG/DL (ref 0.6–1.3)
ERYTHROCYTE [DISTWIDTH] IN BLOOD BY AUTOMATED COUNT: 19.3 % (ref 11.6–15.1)
GFR SERPL CREATININE-BSD FRML MDRD: 19 ML/MIN/1.73SQ M
GLUCOSE SERPL-MCNC: 127 MG/DL (ref 65–140)
GLUCOSE SERPL-MCNC: 170 MG/DL (ref 65–140)
HCT VFR BLD AUTO: 25.3 % (ref 36.5–49.3)
HGB BLD-MCNC: 7.6 G/DL (ref 12–17)
INR PPP: 2.02 (ref 0.84–1.19)
MCH RBC QN AUTO: 32.3 PG (ref 26.8–34.3)
MCHC RBC AUTO-ENTMCNC: 30 G/DL (ref 31.4–37.4)
MCV RBC AUTO: 108 FL (ref 82–98)
PLATELET # BLD AUTO: 106 THOUSANDS/UL (ref 149–390)
PMV BLD AUTO: 11.2 FL (ref 8.9–12.7)
POTASSIUM SERPL-SCNC: 4.8 MMOL/L (ref 3.5–5.3)
PROT SERPL-MCNC: 5.6 G/DL (ref 6.4–8.2)
PROTHROMBIN TIME: 23.2 SECONDS (ref 11.6–14.5)
RBC # BLD AUTO: 2.35 MILLION/UL (ref 3.88–5.62)
SODIUM SERPL-SCNC: 137 MMOL/L (ref 136–145)
WBC # BLD AUTO: 21.36 THOUSAND/UL (ref 4.31–10.16)

## 2019-09-16 PROCEDURE — 97530 THERAPEUTIC ACTIVITIES: CPT

## 2019-09-16 PROCEDURE — 80053 COMPREHEN METABOLIC PANEL: CPT | Performed by: NURSE PRACTITIONER

## 2019-09-16 PROCEDURE — 99232 SBSQ HOSP IP/OBS MODERATE 35: CPT | Performed by: INTERNAL MEDICINE

## 2019-09-16 PROCEDURE — 82948 REAGENT STRIP/BLOOD GLUCOSE: CPT

## 2019-09-16 PROCEDURE — 97116 GAIT TRAINING THERAPY: CPT

## 2019-09-16 PROCEDURE — 94762 N-INVAS EAR/PLS OXIMTRY CONT: CPT

## 2019-09-16 PROCEDURE — 85610 PROTHROMBIN TIME: CPT | Performed by: INTERNAL MEDICINE

## 2019-09-16 PROCEDURE — 85027 COMPLETE CBC AUTOMATED: CPT | Performed by: NURSE PRACTITIONER

## 2019-09-16 PROCEDURE — 99232 SBSQ HOSP IP/OBS MODERATE 35: CPT | Performed by: FAMILY MEDICINE

## 2019-09-16 PROCEDURE — 94640 AIRWAY INHALATION TREATMENT: CPT

## 2019-09-16 PROCEDURE — 97110 THERAPEUTIC EXERCISES: CPT

## 2019-09-16 RX ORDER — ALBUMIN (HUMAN) 12.5 G/50ML
12.5 SOLUTION INTRAVENOUS ONCE
Status: COMPLETED | OUTPATIENT
Start: 2019-09-16 | End: 2019-09-16

## 2019-09-16 RX ORDER — ECHINACEA PURPUREA EXTRACT 125 MG
1 TABLET ORAL AS NEEDED
Status: DISCONTINUED | OUTPATIENT
Start: 2019-09-16 | End: 2019-09-24 | Stop reason: HOSPADM

## 2019-09-16 RX ADMIN — LEVOTHYROXINE SODIUM 50 MCG: 50 TABLET ORAL at 06:48

## 2019-09-16 RX ADMIN — WARFARIN SODIUM 1.25 MG: 2.5 TABLET ORAL at 17:30

## 2019-09-16 RX ADMIN — MICONAZOLE NITRATE: 2 CREAM TOPICAL at 17:33

## 2019-09-16 RX ADMIN — OCTREOTIDE ACETATE 75 MCG: 100 INJECTION, SOLUTION INTRAVENOUS; SUBCUTANEOUS at 22:58

## 2019-09-16 RX ADMIN — ISODIUM CHLORIDE 3 ML: 0.03 SOLUTION RESPIRATORY (INHALATION) at 19:52

## 2019-09-16 RX ADMIN — MIDODRINE HYDROCHLORIDE 10 MG: 5 TABLET ORAL at 08:43

## 2019-09-16 RX ADMIN — MIDODRINE HYDROCHLORIDE 10 MG: 5 TABLET ORAL at 12:44

## 2019-09-16 RX ADMIN — MIDODRINE HYDROCHLORIDE 10 MG: 5 TABLET ORAL at 17:30

## 2019-09-16 RX ADMIN — ASPIRIN 81 MG: 81 TABLET, COATED ORAL at 08:44

## 2019-09-16 RX ADMIN — LEVALBUTEROL HYDROCHLORIDE 1.25 MG: 1.25 SOLUTION, CONCENTRATE RESPIRATORY (INHALATION) at 19:52

## 2019-09-16 RX ADMIN — FUROSEMIDE 60 MG: 10 INJECTION, SOLUTION INTRAMUSCULAR; INTRAVENOUS at 10:16

## 2019-09-16 RX ADMIN — ALLOPURINOL 300 MG: 100 TABLET ORAL at 08:44

## 2019-09-16 RX ADMIN — ALBUMIN (HUMAN) 25 G: 12.5 SOLUTION INTRAVENOUS at 09:13

## 2019-09-16 RX ADMIN — ALBUMIN (HUMAN) 12.5 G: 12.5 SOLUTION INTRAVENOUS at 02:10

## 2019-09-16 RX ADMIN — OCTREOTIDE ACETATE 75 MCG: 100 INJECTION, SOLUTION INTRAVENOUS; SUBCUTANEOUS at 09:12

## 2019-09-16 RX ADMIN — ATORVASTATIN CALCIUM 40 MG: 40 TABLET, FILM COATED ORAL at 17:30

## 2019-09-16 RX ADMIN — FUROSEMIDE 60 MG: 10 INJECTION, SOLUTION INTRAMUSCULAR; INTRAVENOUS at 17:31

## 2019-09-16 RX ADMIN — FLUTICASONE PROPIONATE 1 SPRAY: 50 SPRAY, METERED NASAL at 08:46

## 2019-09-16 RX ADMIN — OCTREOTIDE ACETATE 75 MCG: 100 INJECTION, SOLUTION INTRAVENOUS; SUBCUTANEOUS at 17:20

## 2019-09-16 RX ADMIN — TAMSULOSIN HYDROCHLORIDE 0.4 MG: 0.4 CAPSULE ORAL at 08:43

## 2019-09-16 RX ADMIN — MICONAZOLE NITRATE: 2 CREAM TOPICAL at 09:19

## 2019-09-16 RX ADMIN — QUETIAPINE FUMARATE 12.5 MG: 25 TABLET ORAL at 20:50

## 2019-09-16 RX ADMIN — AMIODARONE HYDROCHLORIDE 200 MG: 200 TABLET ORAL at 08:43

## 2019-09-16 RX ADMIN — ALBUMIN (HUMAN) 25 G: 12.5 SOLUTION INTRAVENOUS at 17:21

## 2019-09-16 NOTE — ASSESSMENT & PLAN NOTE
New hypothyroidism  Started on levothyroxine  Check TSH in 1 month      Results from last 7 days   Lab Units 09/07/19  1445   TSH 3RD GENERATON uIU/mL 32 351*   FREE T4 ng/dL 0 60*

## 2019-09-16 NOTE — PLAN OF CARE
Problem: Potential for Falls  Goal: Patient will remain free of falls  Description  INTERVENTIONS:  - Assess patient frequently for physical needs  -  Identify cognitive and physical deficits and behaviors that affect risk of falls  -  [place] fall precautions as indicated by assessment   - Educate patient/family on patient safety including physical limitations  - Instruct patient to call for assistance with activity based on assessment  - Modify environment to reduce risk of injury  - Consider OT/PT consult to assist with strengthening/mobility  Outcome: Not Progressing     Problem: Prexisting or High Potential for Compromised Skin Integrity  Goal: Skin integrity is maintained or improved  Description  INTERVENTIONS:  - Identify patients at risk for skin breakdown  - Assess and monitor skin integrity  - Assess and monitor nutrition and hydration status  - Monitor labs   - Assess for incontinence   - Turn and reposition patient  - Assist with mobility/ambulation  - Relieve pressure over bony prominences  - Avoid friction and shearing  - Provide appropriate hygiene as needed including keeping skin clean and dry  - Evaluate need for skin moisturizer/barrier cream  - Collaborate with interdisciplinary team   - Patient/family teaching  - Consider wound care consult   Outcome: Not Progressing     Problem: Nutrition/Hydration-ADULT  Goal: Nutrient/Hydration intake appropriate for improving, restoring or maintaining nutritional needs  Description  Monitor and assess patient's nutrition/hydration status for malnutrition  Collaborate with interdisciplinary team and initiate plan and interventions as ordered  Monitor patient's weight and dietary intake as ordered or per policy  Utilize nutrition screening tool and intervene as necessary  Determine patient's food preferences and provide high-protein, high-caloric foods as appropriate       INTERVENTIONS:  - Monitor oral intake, urinary output, labs, and treatment plans  - Assess nutrition and hydration status and recommend course of action  - Evaluate amount of meals eaten  - Assist patient with eating if necessary   - Allow adequate time for meals  - Recommend/ encourage appropriate diets, oral nutritional supplements, and vitamin/mineral supplements  - Order, calculate, and assess calorie counts as needed  - Recommend, monitor, and adjust tube feedings and TPN/PPN based on assessed needs  - Assess need for intravenous fluids  - Provide specific nutrition/hydration education as appropriate  - Include patient/family/caregiver in decisions related to nutrition  Outcome: Not Progressing     Problem: PAIN - ADULT  Goal: Verbalizes/displays adequate comfort level or baseline comfort level  Description  Interventions:  - Encourage patient to monitor pain and request assistance  - Assess pain using appropriate pain scale  - Administer analgesics based on type and severity of pain and evaluate response  - Implement non-pharmacological measures as appropriate and evaluate response  - Consider cultural and social influences on pain and pain management  - Notify physician/advanced practitioner if interventions unsuccessful or patient reports new pain  Outcome: Not Progressing     Problem: INFECTION - ADULT  Goal: Absence or prevention of progression during hospitalization  Description  INTERVENTIONS:  - Assess and monitor for signs and symptoms of infection  - Monitor lab/diagnostic results  - Monitor all insertion sites, i e  indwelling lines, tubes, and drains  - Monitor endotracheal if appropriate and nasal secretions for changes in amount and color  - [place] appropriate cooling/warming therapies per order  - Administer medications as ordered  - Instruct and encourage patient and family to use good hand hygiene technique  - Identify and instruct in appropriate isolation precautions for identified infection/condition  Outcome: Not Progressing     Problem: DISCHARGE PLANNING  Goal: Discharge to home or other facility with appropriate resources  Description  INTERVENTIONS:  - Identify barriers to discharge w/patient and caregiver  - Arrange for needed discharge resources and transportation as appropriate  - Identify discharge learning needs (meds, wound care, etc )  - Arrange for interpretive services to assist at discharge as needed  - Refer to Case Management Department for coordinating discharge planning if the patient needs post-hospital services based on physician/advanced practitioner order or complex needs related to functional status, cognitive ability, or social support system  Outcome: Not Progressing     Problem: Knowledge Deficit  Goal: Patient/family/caregiver demonstrates understanding of disease process, treatment plan, medications, and discharge instructions  Description  Complete learning assessment and assess knowledge base    Interventions:  - Provide teaching at level of understanding  - Provide teaching via preferred learning methods  Outcome: Not Progressing     Problem: RESPIRATORY - ADULT  Goal: Achieves optimal ventilation and oxygenation  Description  INTERVENTIONS:  - Assess for changes in respiratory status  - Assess for changes in mentation and behavior  - Position to facilitate oxygenation and minimize respiratory effort  - Oxygen administered by appropriate delivery if ordered  - Initiate smoking cessation education as indicated  - Encourage broncho-pulmonary hygiene including cough, deep breathe, Incentive Spirometry  - Assess the need for suctioning and aspirate as needed  - Assess and instruct to report SOB or any respiratory difficulty  - Respiratory Therapy support as indicated  Outcome: Not Progressing     Problem: METABOLIC, FLUID AND ELECTROLYTES - ADULT  Goal: Fluid balance maintained  Description  INTERVENTIONS:  - Monitor labs   - Monitor I/O and WT  - Instruct patient on fluid and nutrition as appropriate  - Assess for signs & symptoms of volume excess or deficit  Outcome: Not Progressing     Problem: SKIN/TISSUE INTEGRITY - ADULT  Goal: Skin integrity remains intact  Description  INTERVENTIONS  - Identify patients at risk for skin breakdown  - Assess and monitor skin integrity  - Assess and monitor nutrition and hydration status  - Monitor labs (i e  albumin)  - Assess for incontinence   - Turn and reposition patient  - Assist with mobility/ambulation  - Relieve pressure over bony prominences  - Avoid friction and shearing  - Provide appropriate hygiene as needed including keeping skin clean and dry  - Evaluate need for skin moisturizer/barrier cream  - Collaborate with interdisciplinary team (i e  Nutrition, Rehabilitation, etc )   - Patient/family teaching  Outcome: Not Progressing  Goal: Incision(s), wounds(s) or drain site(s) healing without S/S of infection  Description  INTERVENTIONS  - Assess and document risk factors for skin impairment   - Assess and document dressing, incision, wound bed, drain sites and surrounding tissue  - Consider nutrition services referral as needed  - Oral mucous membranes remain intact  - Provide patient/ family education  Outcome: Not Progressing     Problem: HEMATOLOGIC - ADULT  Goal: Maintains hematologic stability  Description  INTERVENTIONS  - Assess for signs and symptoms of bleeding or hemorrhage  - Monitor labs  - Administer supportive blood products/factors as ordered and appropriate  Outcome: Not Progressing     Problem: CARDIOVASCULAR - ADULT  Goal: Maintains optimal cardiac output and hemodynamic stability  Description  INTERVENTIONS:  - Monitor I/O, vital signs and rhythm  - Monitor for S/S and trends of decreased cardiac output  - Administer and titrate ordered vasoactive medications to optimize hemodynamic stability  - Assess quality of pulses, skin color and temperature  - Assess for signs of decreased coronary artery perfusion  - Instruct patient to report change in severity of symptoms  Outcome: Not Progressing  Goal: Absence of cardiac dysrhythmias or at baseline rhythm  Description  INTERVENTIONS:  - Continuous cardiac monitoring, vital signs, obtain 12 lead EKG if ordered  - Administer antiarrhythmic and heart rate control medications as ordered  - Monitor electrolytes and administer replacement therapy as ordered  Outcome: Not Progressing

## 2019-09-16 NOTE — PLAN OF CARE
Problem: PHYSICAL THERAPY ADULT  Goal: Performs mobility at highest level of function for planned discharge setting  See evaluation for individualized goals  Description  Treatment/Interventions: Functional transfer training, LE strengthening/ROM, Elevations, Therapeutic exercise, Endurance training, Patient/family training, Bed mobility, Gait training, Spoke to nursing  Equipment Recommended: Erin Strong       See flowsheet documentation for full assessment, interventions and recommendations  Outcome: Progressing  Note:   Prognosis: Fair  Problem List: Decreased strength, Decreased range of motion, Decreased endurance, Decreased mobility, Impaired balance, Decreased skin integrity, Impaired sensation, Impaired hearing  Assessment: Pt  supine in bed upon my arrival  Pt  reporting increased fatigue, however agreeable to therapeutic intervention  Performance of HEP supine in bed with cues provided for proper completion  Progressed with transfers requiring A of therapist with cues for hand placement/technique  Progressed with a limited amb  trial with use of RW and Pamela of therapist with cues provided for LE sequencing  Pt  limited by fatigue requiring quick positioning seated in bedside chair  Pt  remained seated in bedside chair with alarm active at end of treatment session  PT will continue to recommend d/c to rehab when medically stable for continued improvement of noted impairments above  Barriers to Discharge: Inaccessible home environment, Decreased caregiver support  Barriers to Discharge Comments: Lives alone, BREE  Recommendation: Short-term skilled PT     PT - OK to Discharge: Yes(if d/c to rehab when medically stable )    See flowsheet documentation for full assessment

## 2019-09-16 NOTE — PHYSICAL THERAPY NOTE
Physical Therapy Progress Note     09/16/19 1458   Pain Assessment   Pain Assessment No/denies pain   Pain Score No Pain   Restrictions/Precautions   Weight Bearing Precautions Per Order No   Other Precautions Fall Risk;Multiple lines;O2;Telemetry; Chair Alarm   General   Chart Reviewed Yes   Response to Previous Treatment Patient reporting fatigue but able to participate  Family/Caregiver Present No   Subjective   Subjective Willing to participate in therapy this PM    Bed Mobility   Supine to Sit 4  Minimal assistance   Additional items Assist x 1;Bedrails;HOB elevated;Leg ; Increased time required;Verbal cues;LE management   Transfers   Sit to Stand 4  Minimal assistance   Additional items Assist x 1;Bedrails; Increased time required;Verbal cues   Stand to Sit 4  Minimal assistance   Additional items Assist x 1; Armrests; Increased time required;Verbal cues   Ambulation/Elevation   Gait pattern Decreased foot clearance; Forward Flexion; Short stride; Step to;Excessively slow; Inconsistent elver; Shuffling   Gait Assistance 4  Minimal assist   Additional items Assist x 1;Verbal cues; Tactile cues   Assistive Device Rolling walker   Distance 10'   Balance   Static Sitting Fair +   Dynamic Sitting Fair   Static Standing Fair -   Dynamic Standing Poor +   Ambulatory Poor +   Endurance Deficit   Endurance Deficit Yes   Endurance Deficit Description fatigue/weakness   Activity Tolerance   Activity Tolerance Patient limited by fatigue   Nurse Made Aware Yes   Exercises   TKR Sitting;10 reps;AAROM; Bilateral   Assessment   Prognosis Fair   Problem List Decreased strength;Decreased range of motion;Decreased endurance;Decreased mobility; Impaired balance;Decreased skin integrity; Impaired sensation; Impaired hearing   Assessment Pt  supine in bed upon my arrival  Pt  reporting increased fatigue, however agreeable to therapeutic intervention  Performance of HEP supine in bed with cues provided for proper completion  Progressed with transfers requiring A of therapist with cues for hand placement/technique  Progressed with a limited amb  trial with use of RW and Pamela of therapist with cues provided for LE sequencing  Pt  limited by fatigue requiring quick positioning seated in bedside chair  Pt  remained seated in bedside chair with alarm active at end of treatment session  PT will continue to recommend d/c to rehab when medically stable for continued improvement of noted impairments above  Barriers to Discharge Inaccessible home environment;Decreased caregiver support   Barriers to Discharge Comments Lives alone, BREE   Goals   Patient Goals To get better  [facility] Expiration Date 09/23/19   Treatment Day 5   Plan   Treatment/Interventions Functional transfer training;LE strengthening/ROM; Therapeutic exercise; Endurance training;Bed mobility;Gait training;Spoke to nursing;Spoke to case management   Progress Slow progress, decreased activity tolerance   PT Frequency Other (Comment)  (4-5x/wk)   Recommendation   Recommendation Short-term skilled PT   Equipment Recommended Walker  (RW)   PT - OK to Discharge Yes  (if d/c to rehab when medically stable )     Lontheresa Tanner, PTA

## 2019-09-16 NOTE — ASSESSMENT & PLAN NOTE
Wt Readings from Last 3 Encounters:   09/16/19 90 4 kg (199 lb 4 7 oz)   09/04/19 81 6 kg (180 lb)   08/07/19 79 6 kg (175 lb 6 4 oz)      Acute on chronic combined systolic and diastolic CHF with evidence of fluid overload  No significant changes on echocardiogram performed 9/9/2019  Restarted furosemide which has been changed to IV by nephrology  Monitor daily weights, intake and output

## 2019-09-16 NOTE — ASSESSMENT & PLAN NOTE
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

## 2019-09-16 NOTE — ASSESSMENT & PLAN NOTE
Patient continues to have episodes of severe hypotension, again occurred overnight with blood pressure now normalizing    Hypotension likely in setting of volume depletion and hypothyroidism; metoprolol on hold  Random cortisol was checked which was within limits  Furosemide restarted and midodrine added and increased    Discussed with Nephrology  The patient may need to be on pressors to allow blood pressure room for diuresis

## 2019-09-16 NOTE — ASSESSMENT & PLAN NOTE
Paroxysmal atrial fibrillation amiodarone and warfarin    Holding metoprolol due to hypotension    Results from last 7 days   Lab Units 09/16/19  0437 09/15/19  0518 09/13/19  0554 09/12/19  0524 09/10/19  0552   INR  2 02* 1 89* 2 13* 2 34* 2 52*

## 2019-09-16 NOTE — ASSESSMENT & PLAN NOTE
ZULMA on CKD 4 secondary to hypotension/intravascular depletion  Creatinine worsened to 2 9 today, patient markedly hypotensive overnight likely cause of patient's worsening creatinine  Continue IV albumin IV furosemide and octreotide added by nephrology  It is noted that patient may need to be placed on pressors while in diuresis  Appreciate Nephrology input      Results from last 7 days   Lab Units 09/16/19  0437 09/15/19  0518 09/14/19  0559 09/13/19  0554 09/12/19  0505 09/11/19  0512 09/10/19  0552   BUN mg/dL 125* 120* 121* 113* 105* 101* 98*   CREATININE mg/dL 2 90* 2 65* 2 71* 2 55* 2 28* 2 48* 2 68*   EGFR ml/min/1 73sq m 19 21 20 22 25 23 21

## 2019-09-16 NOTE — ASSESSMENT & PLAN NOTE
Ambulatory dysfunction in the setting of hypotension ZULMA and new diagnosis hypothyroidism  PT recommending rehab and patient agreeable    Discharge to [facility] when medically stable

## 2019-09-16 NOTE — ASSESSMENT & PLAN NOTE
Pneumonia has been ruled out with negative procalcitonin, antibiotics have been discontinued    Continue IV Lasix with evidence of fluid overload

## 2019-09-16 NOTE — PROGRESS NOTES
Progress Note Hung Stovall 1933, 80 y o  male MRN: [id number]    Unit/Bed#: E4 -01 Encounter: [id number]    Primary Care Provider: Laverne Lam MD   Date and time admitted to hospital: 9/7/2019  2:31 PM        * Acute kidney injury superimposed on chronic kidney disease ([facility] [address] )  Assessment & Plan  ZULMA on CKD 4 secondary to hypotension/intravascular depletion  Creatinine worsened to 2 9 today, patient markedly hypotensive overnight likely cause of patient's worsening creatinine  Continue IV albumin IV furosemide and octreotide added by nephrology  It is noted that patient may need to be placed on pressors while in diuresis  Appreciate Nephrology input  Results from last 7 days   Lab Units 09/16/19  0437 09/15/19  0518 09/14/19  0559 09/13/19  0554 09/12/19  0505 09/11/19  0512 09/10/19  0552   BUN mg/dL 125* 120* 121* 113* 105* 101* 98*   CREATININE mg/dL 2 90* 2 65* 2 71* 2 55* 2 28* 2 48* 2 68*   EGFR ml/min/1 73sq m 19 21 20 22 25 23 21       CKD (chronic kidney disease) stage 4, GFR 15-29 ml/min ([facility])  Assessment & Plan  Creatinine trended up to 2 9, S felt to be due to hypotension related to underlying liver cirrhosis  Appreciate Nephrology, will continue recommendations  Patient currently on Lasix 60 mg IV b i d , will continue, noted that patient may require pressor continue with diuresis    Acute on chronic combined systolic and diastolic heart failure (HCC)  Assessment & Plan  Wt Readings from Last 3 Encounters:   09/16/19 90 4 kg (199 lb 4 7 oz)   09/04/19 81 6 kg (180 lb)   08/07/19 79 6 kg (175 lb 6 4 oz)      Acute on chronic combined systolic and diastolic CHF with evidence of fluid overload  No significant changes on echocardiogram performed 9/9/2019  Restarted furosemide which has been changed to IV by nephrology  Monitor daily weights, intake and output      Ambulatory dysfunction  Assessment & Plan  Ambulatory dysfunction in the setting of hypotension ZULMA and new diagnosis hypothyroidism  PT recommending rehab and patient agreeable  Discharge to [facility] when medically stable    Hematoma of arm  Assessment & Plan  Hematoma right arm  Appreciate surgery evaluation and debridement     Left renal mass  Assessment & Plan  Left renal lesion which appears to be cysts on ultrasound  Outpatient surveillance    Hypotension (arterial)  Assessment & Plan  Patient continues to have episodes of severe hypotension, again occurred overnight with blood pressure now normalizing    Hypotension likely in setting of volume depletion and hypothyroidism; metoprolol on hold  Random cortisol was checked which was within limits  Furosemide restarted and midodrine added and increased    Discussed with Nephrology  The patient may need to be on pressors to allow blood pressure room for diuresis  Hypothyroidism  Assessment & Plan  New hypothyroidism  Started on levothyroxine  Check TSH in 1 month  Results from last 7 days   Lab Units 09/07/19  1445   TSH 3RD GENERATON uIU/mL 32 351*   FREE T4 ng/dL 0 60*       Pneumonia of right middle lobe due to infectious organism [facility])  Assessment & Plan  Pneumonia has been ruled out with negative procalcitonin, antibiotics have been discontinued    Continue IV Lasix with evidence of fluid overload    H/O aortic valve replacement  Assessment & Plan  History of bioprosthetic AVR  Atrial fibrillation [facility])  Assessment & Plan  Paroxysmal atrial fibrillation amiodarone and warfarin    Holding metoprolol due to hypotension    Results from last 7 days   Lab Units 09/16/19  0437 09/15/19  0518 09/13/19  0554 09/12/19  0524 09/10/19  0552   INR  2 02* 1 89* 2 13* 2 34* 2 52*       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)  Assessment & Plan  CLL with chronic leukocytosis without evidence of infection    Results from last 7 days   Lab Units 09/16/19  0437 09/15/19  0518 09/13/19  0554 09/10/19  0552   WBC Thousand/uL 21 36* 23 12* 21 05* 22 34*       Chronic obstructive pulmonary disease (HCC)  Assessment & Plan  Reported history of COPD currently on p r n  xopenex  Patient currently on supplemental oxygen, the patient does not use oxygen at his baseline      VTE Pharmacologic Prophylaxis:   Pharmacologic: Warfarin (Coumadin)  Mechanical VTE Prophylaxis in Place: Yes    Patient Centered Rounds: I have performed bedside rounds with nursing staff today  Discussions with Specialists or Other Care Team Provider: Nephrology    Education and Discussions with Family / Patient: Patient    Time Spent for Care: 30 minutes  More than 50% of total time spent on counseling and coordination of care as described above  Current Length of Stay: 9 day(s)    Current Patient Status: Inpatient   Certification Statement: The patient will continue to require additional inpatient hospital stay due to IV Lasix, close monitoring     Discharge Plan: TBD    Code Status: Level 1 - Full Code      Subjective:   Patient seen and examined  He is complaining of nasal dryness, no other complaints  He on 2 L cannula with oxygen saturations in the low 90s  No overnight events  Objective:     Vitals:   Temp (24hrs), Av 8 °F (36 6 °C), Min:97 3 °F (36 3 °C), Max:98 3 °F (36 8 °C)    Temp:  [97 3 °F (36 3 °C)-98 3 °F (36 8 °C)] 97 3 °F (36 3 °C)  HR:  [60-63] 60  Resp:  [16-22] 20  BP: ()/(40-54) 110/53  SpO2:  [91 %-93 %] 91 %  Body mass index is 29 43 kg/m²  Input and Output Summary (last 24 hours):     No intake or output data in the 24 hours ending 19 1520    Physical Exam:     Physical Exam   Constitutional: He is oriented to person, place, and time  No distress  HENT:   Head: Normocephalic and atraumatic  Eyes: Conjunctivae are normal    Neck: No JVD present  Cardiovascular: Normal rate and regular rhythm  No murmur heard  Pulmonary/Chest: Effort normal  No respiratory distress  He has no wheezes   He has rales (bibasilar )    Abdominal: Soft  He exhibits no distension  There is no guarding  Musculoskeletal: He exhibits edema (b/l upper and lower extremities )  Neurological: He is alert and oriented to person, place, and time  Skin: Skin is warm and dry  Psychiatric: He has a normal mood and affect  Additional Data:     Labs:    Results from last 7 days   Lab Units 09/16/19  0437   WBC Thousand/uL 21 36*   HEMOGLOBIN g/dL 7 6*   HEMATOCRIT % 25 3*   PLATELETS Thousands/uL 106*     Results from last 7 days   Lab Units 09/16/19 0437   SODIUM mmol/L 137   POTASSIUM mmol/L 4 8   CHLORIDE mmol/L 104   CO2 mmol/L 22   BUN mg/dL 125*   CREATININE mg/dL 2 90*   ANION GAP mmol/L 11   CALCIUM mg/dL 8 7   ALBUMIN g/dL 3 9   TOTAL BILIRUBIN mg/dL 1 96*   ALK PHOS U/L 119*   ALT U/L 39   AST U/L 64*   GLUCOSE RANDOM mg/dL 127     Results from last 7 days   Lab Units 09/16/19 0437   INR  2 02*                   * I Have Reviewed All Lab Data Listed Above  * Additional Pertinent Lab Tests Reviewed:  [address] Admission Reviewed    Imaging:    Imaging Reports Reviewed Today Include: no new    Recent Cultures (last 7 days):           Last 24 Hours Medication List:     Current Facility-Administered Medications:  acetaminophen 488 mg Oral Q6H PRN Elian Damon MD    albumin human 25 g Intravenous BID (diuretic) CT Trevino Last Rate: 0 g (09/15/19 1630)   allopurinol 300 mg Oral Daily Elian Damon MD    amiodarone 200 mg Oral Daily Elian Damon MD    aspirin 81 mg Oral Daily Elian Damon MD    atorvastatin 40 mg Oral After Dinner Elian Damon MD    fluticasone 1 spray Each Nare Daily Elian Damon MD    furosemide 60 mg Intravenous BID (diuretic) Jasmyne Reddy,     levalbuterol 1 25 mg Nebulization Q6H PRN Wendy Jenkins DO    levothyroxine 50 mcg Oral Early Morning Yessi [place] DO    melatonin 9 mg Oral HS PRN Yessi [place],     miconazole  Topical BID Wendy Jenkins DO midodrine 10 mg Oral TID AC Sarah Burroughs MD    octreotide 75 mcg Subcutaneous Replaced by [facility] Miguel Ortiz, DO    ondansetron 4 mg Intravenous Q6H PRN Shayla Gastelum MD    QUEtiapine 12 5 mg Oral HS Jose Francisco Parsons, DO    sodium chloride 1 spray Each Nare PRN Renata Mckeon MD    sodium chloride 3 mL Nebulization Q6H PRN Jose Francisco Parsons, DO    tamsulosin 0 4 mg Oral Daily Shayla Gastelum MD    warfarin 1 25 mg Oral Daily (warfarin) Shayla Gastelum MD         Today, Patient Was Seen By: Shabnam Ortiz MD    ** Please Note: Dictation voice to text software may have been used in the creation of this document   **

## 2019-09-16 NOTE — ASSESSMENT & PLAN NOTE
Reported history of COPD currently on p r n  xopenex  Patient currently on supplemental oxygen, the patient does not use oxygen at his baseline

## 2019-09-16 NOTE — PROGRESS NOTES
NEPHROLOGY PROGRESS NOTE   Jayde Stovall 80 y o  male MRN: [id number]  Unit/Bed#: E4 -01 Encounter: [id number]  Reason for Consult:  Acute kidney injury on CKD stage 4    ASSESSMENT/PLAN:  ZULMA on CKD IV:  Likely secondary to hypotension in the setting of cirrhosis, ascites, +/- cardiorenal syndrome   -Follows with Dr Bryant with [facility]     -baseline creatinine 1 9-2 1   -creatinine worsened to 2 9, likely secondary to hypotension   -continue Lasix (increased 09/15), albumin, and octreotide  - home diuretic: Lasix 60 mg po daily, metolazone 1 25 mg Monday Wednesday Friday, spironolactone 12 5 mg daily  -CT: negative for hydro    -no urgent indication for dialysis  - check bladder scans insignificant   -avoid nephrotoxins  -I/O      Left kidney lesion:   -renal ultrasound shows bilateral renal cysts including two adjacent left lower pole cysts   Negative for hydro  - continued surveillance as OP      Hypotension:  Blood pressure slightly improved but remains low   -will continue to monitor, avoid further hypotension   -continue on albumin, Lasix, and octreotide combination  - continue midodrine 10 mg t i d        Combined systolic/diastolic heart failure:  EF of 55% with mitral stenosis, moderate TR, and dilated IVC  C/O increased LE edema    -daily weight, I/O   - continues on IV Lasix with holding parameter   -continue on 1 5 L per day fluid restriction  -repeat chest x-ray shows small bilateral pleural effusions      Pneumonia:  Care per primary team   -continues on antibiotics      CLL:  Follows outpatient with Hematology/Oncology, currently not receiving treatment and is undergoing surveillance every 6 months with lab work      Anemia: Hgb is trending down  - will continue to monitor and transfuse as needed       Cirrhosis:  With mild to moderate ascites  -continues on octreotide, Lasix, and albumin   -blood pressure remains low,  continues on max dose of midodrine    -hep C antibody negative   -considering GI evaluation  SUBJECTIVE:   The patient is resting in bed  He currently denies any complaints  He remains on supplemental oxygen  He states that he has a poor appetite  He states that he is issues with urination due to inverted penis      OBJECTIVE:  Current Weight: Weight - Scale: 90 4 kg (199 lb 4 7 oz)  Vitals:    09/16/19 0043 09/16/19 0300 09/16/19 0838 09/16/19 1144   BP: (!) 78/40 (!) 83/44 120/52 107/50   BP Location: Left arm Left arm Right arm Right arm   Pulse: 60 60 61 60   Resp:  18 22 22   Temp:  98 3 °F (36 8 °C) 98 °F (36 7 °C) 97 6 °F (36 4 °C)   TempSrc:  Temporal Temporal Temporal   SpO2: 92% 93% 93% 91%   Weight:  90 4 kg (199 lb 4 7 oz)     Height:  5' 9" (1 753 m)       No intake or output data in the 24 hours ending 09/16/19 1336  General: No apparent distress  Skin: warm, dry, generalized ecchymotic areas, frail  HEENT: Moist mucous membranes, sclera anicteric, normocephalic  Neck: No apparent JVD appreciated  Chest: Lung sounds decreased B/L, on supplemental oxygen  Heart: Regular rate and rhythm, No murmer  Abdomen: Soft, round, NT, +BS  Extremities: B/L LE edema present, lower extremity scaly, left upper extremity edema, right upper extremity wrapped  Neuro: Alert and oriented  Psych: Appropriate mood and affect    Medications:    Current Facility-Administered Medications:     acetaminophen (TYLENOL) tablet 488 mg, 488 mg, Oral, Q6H PRN, Lashaun Griggs MD    albumin human (FLEXBUMIN) 25 % injection 25 g, 25 g, Intravenous, BID (diuretic), Merissa Christine DO, Last Rate: 0 mL/hr at 09/15/19 1630, 25 g at 09/16/19 0913    allopurinol (ZYLOPRIM) tablet 300 mg, 300 mg, Oral, Daily, Lashaun Griggs MD, 300 mg at 09/16/19 0844    amiodarone tablet 200 mg, 200 mg, Oral, Daily, Lashaun Griggs MD, 200 mg at 09/16/19 0843    aspirin (ECOTRIN LOW STRENGTH) EC tablet 81 mg, 81 mg, Oral, Daily, Lashaun Griggs MD, 81 mg at 09/16/19 0844    atorvastatin (LIPITOR) tablet 40 mg, 40 mg, Oral, After Ann Hernandes MD, 40 mg at 09/15/19 1715    fluticasone (FLONASE) 50 mcg/act nasal spray 1 spray, 1 spray, Each Nare, Daily, Venkat Victoria MD, 1 spray at 09/16/19 0846    furosemide (LASIX) injection 60 mg, 60 mg, Intravenous, BID (diuretic), Levada Ebenezer, DO, 60 mg at 09/16/19 1016    levalbuterol ([place]) inhalation solution 1 25 mg, 1 25 mg, Nebulization, Q6H PRN, Mukul Robles DO, 1 25 mg at 09/15/19 2005    levothyroxine tablet 50 mcg, 50 mcg, Oral, Early Morning, Edi Vanegas DO, 50 mcg at 09/16/19 0648    melatonin tablet 9 mg, 9 mg, Oral, HS PRN, Edi Vanegas DO, 9 mg at 09/10/19 2302    miconazole 2 % cream, , Topical, BID, Jose Francisco Parsons DO    midodrine (PROAMATINE) tablet 10 mg, 10 mg, Oral, TID AC, Seth Roberson MD, 10 mg at 09/16/19 1244    octreotide (SandoSTATIN) injection 75 mcg, 75 mcg, Subcutaneous, Q8H [address], Levada Ebenezer, DO, 75 mcg at 09/16/19 0912    ondansetron (ZOFRAN) injection 4 mg, 4 mg, Intravenous, Q6H PRN, Venkat Victoria MD    QUEtiapine (SEROquel) tablet 12 5 mg, 12 5 mg, Oral, HS, Jose Francisco Parsons DO, 12 5 mg at 09/15/19 2113    sodium chloride (OCEAN) 0 65 % nasal spray 1 spray, 1 spray, Each Nare, PRN, Meme Barahona MD    sodium chloride 0 9 % inhalation solution 3 mL, 3 mL, Nebulization, Q6H PRN, Jose Francisco Parsons DO, 3 mL at 09/15/19 2005    tamsulosin (FLOMAX) capsule 0 4 mg, 0 4 mg, Oral, Daily, Venkat Victoria MD, 0 4 mg at 09/16/19 0843    warfarin (COUMADIN) tablet 1 25 mg, 1 25 mg, Oral, Daily (warfarin), Venkat Victoria MD, 1 25 mg at 09/15/19 1715    Laboratory Results:  Results from last 7 days   Lab Units 09/16/19  0437 09/15/19  0518 09/14/19  0559 09/13/19  0554 09/12/19  0505 09/11/19  0512 09/10/19  0552   WBC Thousand/uL 21 36* 23 12*  --  21 05*  --   --  22 34*   HEMOGLOBIN g/dL 7 6* 8 6*  --  8 2*  --   --  8 6*   HEMATOCRIT % 25 3* 27 9*  --  26 5*  --   --  28 2* PLATELETS Thousands/uL 106* 102*  --  101*  --   --  108*   POTASSIUM mmol/L 4 8 4 9 4 4 4 4 4 2 4 2 4 8   CHLORIDE mmol/L 104 104 106 105 106 101 105   CO2 mmol/L 22 23 24 23 24 22 22   BUN mg/dL 125* 120* 121* 113* 105* 101* 98*   CREATININE mg/dL 2 90* 2 65* 2 71* 2 55* 2 28* 2 48* 2 68*   CALCIUM mg/dL 8 7 9 3 8 9 8 7 9 0 8 9 8 5

## 2019-09-16 NOTE — ASSESSMENT & PLAN NOTE
Creatinine trended up to 2 9, S felt to be due to hypotension related to underlying liver cirrhosis  Appreciate Nephrology, will continue recommendations  Patient currently on Lasix 60 mg IV b i d , will continue, noted that patient may require pressor continue with diuresis

## 2019-09-17 ENCOUNTER — APPOINTMENT (INPATIENT)
Dept: RADIOLOGY | Facility: HOSPITAL | Age: 84
DRG: 987 | End: 2019-09-17
Payer: MEDICARE

## 2019-09-17 ENCOUNTER — TELEPHONE (OUTPATIENT)
Dept: INTERNAL MEDICINE CLINIC | Facility: CLINIC | Age: 84
End: 2019-09-17

## 2019-09-17 PROBLEM — N19 UREMIA: Status: ACTIVE | Noted: 2019-09-17

## 2019-09-17 PROBLEM — K74.69 OTHER CIRRHOSIS OF LIVER (HCC): Status: ACTIVE | Noted: 2019-09-17

## 2019-09-17 LAB
ABO GROUP BLD: NORMAL
ALBUMIN SERPL BCP-MCNC: 3.8 G/DL (ref 3.5–5)
ALP SERPL-CCNC: 109 U/L (ref 46–116)
ALT SERPL W P-5'-P-CCNC: 36 U/L (ref 12–78)
ANION GAP SERPL CALCULATED.3IONS-SCNC: 11 MMOL/L (ref 4–13)
ANION GAP SERPL CALCULATED.3IONS-SCNC: 13 MMOL/L (ref 4–13)
ANISOCYTOSIS BLD QL SMEAR: PRESENT
AST SERPL W P-5'-P-CCNC: 54 U/L (ref 5–45)
BACTERIA UR QL AUTO: ABNORMAL /HPF
BASOPHILS # BLD MANUAL: 0 THOUSAND/UL (ref 0–0.1)
BASOPHILS NFR MAR MANUAL: 0 % (ref 0–1)
BILIRUB SERPL-MCNC: 1.92 MG/DL (ref 0.2–1)
BILIRUB UR QL STRIP: NEGATIVE
BLD GP AB SCN SERPL QL: NEGATIVE
BUN SERPL-MCNC: 131 MG/DL (ref 5–25)
BUN SERPL-MCNC: 135 MG/DL (ref 5–25)
CA-I BLD-SCNC: 1.14 MMOL/L (ref 1.12–1.32)
CALCIUM SERPL-MCNC: 9 MG/DL (ref 8.3–10.1)
CALCIUM SERPL-MCNC: 9.1 MG/DL (ref 8.3–10.1)
CHLORIDE SERPL-SCNC: 103 MMOL/L (ref 100–108)
CHLORIDE SERPL-SCNC: 104 MMOL/L (ref 100–108)
CHLORIDE UR-SCNC: 41 MMOL/L
CLARITY UR: CLEAR
CO2 SERPL-SCNC: 22 MMOL/L (ref 21–32)
CO2 SERPL-SCNC: 24 MMOL/L (ref 21–32)
COLOR UR: YELLOW
CREAT SERPL-MCNC: 2.97 MG/DL (ref 0.6–1.3)
CREAT SERPL-MCNC: 3.11 MG/DL (ref 0.6–1.3)
CREAT UR-MCNC: 67.2 MG/DL
DACRYOCYTES BLD QL SMEAR: PRESENT
EOSINOPHIL # BLD MANUAL: 0 THOUSAND/UL (ref 0–0.4)
EOSINOPHIL NFR BLD MANUAL: 0 % (ref 0–6)
ERYTHROCYTE [DISTWIDTH] IN BLOOD BY AUTOMATED COUNT: 19.2 % (ref 11.6–15.1)
GFR SERPL CREATININE-BSD FRML MDRD: 17 ML/MIN/1.73SQ M
GFR SERPL CREATININE-BSD FRML MDRD: 18 ML/MIN/1.73SQ M
GLUCOSE SERPL-MCNC: 122 MG/DL (ref 65–140)
GLUCOSE SERPL-MCNC: 148 MG/DL (ref 65–140)
GLUCOSE UR STRIP-MCNC: NEGATIVE MG/DL
HCT VFR BLD AUTO: 24.6 % (ref 36.5–49.3)
HGB BLD-MCNC: 7.8 G/DL (ref 12–17)
HGB UR QL STRIP.AUTO: NEGATIVE
HYPERCHROMIA BLD QL SMEAR: PRESENT
INR PPP: 2.24 (ref 0.84–1.19)
KETONES UR STRIP-MCNC: NEGATIVE MG/DL
LEUKOCYTE ESTERASE UR QL STRIP: NEGATIVE
LYMPHOCYTES # BLD AUTO: 12.82 THOUSAND/UL (ref 0.6–4.47)
LYMPHOCYTES # BLD AUTO: 60 % (ref 14–44)
MAGNESIUM SERPL-MCNC: 2.5 MG/DL (ref 1.6–2.6)
MCH RBC QN AUTO: 33.8 PG (ref 26.8–34.3)
MCHC RBC AUTO-ENTMCNC: 31.7 G/DL (ref 31.4–37.4)
MCV RBC AUTO: 107 FL (ref 82–98)
MONOCYTES # BLD AUTO: 1.92 THOUSAND/UL (ref 0–1.22)
MONOCYTES NFR BLD: 9 % (ref 4–12)
NEUTROPHILS # BLD MANUAL: 6.62 THOUSAND/UL (ref 1.85–7.62)
NEUTS SEG NFR BLD AUTO: 31 % (ref 43–75)
NITRITE UR QL STRIP: NEGATIVE
NON-SQ EPI CELLS URNS QL MICRO: ABNORMAL /HPF
NRBC BLD AUTO-RTO: 0 /100 WBCS
PH UR STRIP.AUTO: 5 [PH]
PHOSPHATE SERPL-MCNC: 4.8 MG/DL (ref 2.3–4.1)
PLATELET # BLD AUTO: 106 THOUSANDS/UL (ref 149–390)
PLATELET BLD QL SMEAR: ABNORMAL
PMV BLD AUTO: 11.3 FL (ref 8.9–12.7)
POTASSIUM SERPL-SCNC: 4.2 MMOL/L (ref 3.5–5.3)
POTASSIUM SERPL-SCNC: 4.4 MMOL/L (ref 3.5–5.3)
PROT SERPL-MCNC: 5.5 G/DL (ref 6.4–8.2)
PROT UR STRIP-MCNC: NEGATIVE MG/DL
PROTHROMBIN TIME: 25.2 SECONDS (ref 11.6–14.5)
RBC # BLD AUTO: 2.31 MILLION/UL (ref 3.88–5.62)
RBC #/AREA URNS AUTO: ABNORMAL /HPF
RH BLD: POSITIVE
SCHISTOCYTES BLD QL SMEAR: PRESENT
SODIUM 24H UR-SCNC: 18 MOL/L
SODIUM SERPL-SCNC: 138 MMOL/L (ref 136–145)
SODIUM SERPL-SCNC: 139 MMOL/L (ref 136–145)
SP GR UR STRIP.AUTO: 1.01 (ref 1–1.03)
SPECIMEN EXPIRATION DATE: NORMAL
TOTAL CELLS COUNTED SPEC: 100
UROBILINOGEN UR QL STRIP.AUTO: 0.2 E.U./DL
UUN 24H UR-MCNC: 567 MG/DL
WBC # BLD AUTO: 21.37 THOUSAND/UL (ref 4.31–10.16)
WBC #/AREA URNS AUTO: ABNORMAL /HPF
WBC CASTS URNS QL MICRO: ABNORMAL /LPF

## 2019-09-17 PROCEDURE — 84540 ASSAY OF URINE/UREA-N: CPT | Performed by: INTERNAL MEDICINE

## 2019-09-17 PROCEDURE — 94760 N-INVAS EAR/PLS OXIMETRY 1: CPT

## 2019-09-17 PROCEDURE — 80053 COMPREHEN METABOLIC PANEL: CPT | Performed by: FAMILY MEDICINE

## 2019-09-17 PROCEDURE — 99233 SBSQ HOSP IP/OBS HIGH 50: CPT | Performed by: INTERNAL MEDICINE

## 2019-09-17 PROCEDURE — 85610 PROTHROMBIN TIME: CPT | Performed by: FAMILY MEDICINE

## 2019-09-17 PROCEDURE — 80048 BASIC METABOLIC PNL TOTAL CA: CPT | Performed by: INTERNAL MEDICINE

## 2019-09-17 PROCEDURE — 84300 ASSAY OF URINE SODIUM: CPT | Performed by: INTERNAL MEDICINE

## 2019-09-17 PROCEDURE — 97530 THERAPEUTIC ACTIVITIES: CPT

## 2019-09-17 PROCEDURE — 71045 X-RAY EXAM CHEST 1 VIEW: CPT

## 2019-09-17 PROCEDURE — 36556 INSERT NON-TUNNEL CV CATH: CPT | Performed by: NURSE PRACTITIONER

## 2019-09-17 PROCEDURE — P9017 PLASMA 1 DONOR FRZ W/IN 8 HR: HCPCS

## 2019-09-17 PROCEDURE — 94640 AIRWAY INHALATION TREATMENT: CPT

## 2019-09-17 PROCEDURE — 86901 BLOOD TYPING SEROLOGIC RH(D): CPT | Performed by: NURSE PRACTITIONER

## 2019-09-17 PROCEDURE — 90945 DIALYSIS ONE EVALUATION: CPT

## 2019-09-17 PROCEDURE — 99223 1ST HOSP IP/OBS HIGH 75: CPT | Performed by: INTERNAL MEDICINE

## 2019-09-17 PROCEDURE — NC001 PR NO CHARGE: Performed by: NURSE PRACTITIONER

## 2019-09-17 PROCEDURE — 81001 URINALYSIS AUTO W/SCOPE: CPT | Performed by: INTERNAL MEDICINE

## 2019-09-17 PROCEDURE — 94762 N-INVAS EAR/PLS OXIMTRY CONT: CPT

## 2019-09-17 PROCEDURE — 82436 ASSAY OF URINE CHLORIDE: CPT | Performed by: INTERNAL MEDICINE

## 2019-09-17 PROCEDURE — 02HV33Z INSERTION OF INFUSION DEVICE INTO SUPERIOR VENA CAVA, PERCUTANEOUS APPROACH: ICD-10-PCS | Performed by: INTERNAL MEDICINE

## 2019-09-17 PROCEDURE — 82570 ASSAY OF URINE CREATININE: CPT | Performed by: INTERNAL MEDICINE

## 2019-09-17 PROCEDURE — 86900 BLOOD TYPING SEROLOGIC ABO: CPT | Performed by: NURSE PRACTITIONER

## 2019-09-17 PROCEDURE — 82330 ASSAY OF CALCIUM: CPT | Performed by: INTERNAL MEDICINE

## 2019-09-17 PROCEDURE — 86850 RBC ANTIBODY SCREEN: CPT | Performed by: NURSE PRACTITIONER

## 2019-09-17 PROCEDURE — 97110 THERAPEUTIC EXERCISES: CPT

## 2019-09-17 PROCEDURE — 83735 ASSAY OF MAGNESIUM: CPT | Performed by: INTERNAL MEDICINE

## 2019-09-17 PROCEDURE — 85007 BL SMEAR W/DIFF WBC COUNT: CPT | Performed by: FAMILY MEDICINE

## 2019-09-17 PROCEDURE — 84100 ASSAY OF PHOSPHORUS: CPT | Performed by: INTERNAL MEDICINE

## 2019-09-17 PROCEDURE — 97116 GAIT TRAINING THERAPY: CPT

## 2019-09-17 PROCEDURE — 99232 SBSQ HOSP IP/OBS MODERATE 35: CPT | Performed by: INTERNAL MEDICINE

## 2019-09-17 PROCEDURE — 85027 COMPLETE CBC AUTOMATED: CPT | Performed by: FAMILY MEDICINE

## 2019-09-17 PROCEDURE — 99233 SBSQ HOSP IP/OBS HIGH 50: CPT | Performed by: FAMILY MEDICINE

## 2019-09-17 RX ORDER — GABAPENTIN 100 MG/1
100 CAPSULE ORAL
Status: DISCONTINUED | OUTPATIENT
Start: 2019-09-17 | End: 2019-09-24

## 2019-09-17 RX ORDER — SODIUM CHLORIDE FOR INHALATION 0.9 %
3 VIAL, NEBULIZER (ML) INHALATION
Status: DISCONTINUED | OUTPATIENT
Start: 2019-09-17 | End: 2019-09-24 | Stop reason: HOSPADM

## 2019-09-17 RX ORDER — LEVALBUTEROL 1.25 MG/.5ML
1.25 SOLUTION, CONCENTRATE RESPIRATORY (INHALATION)
Status: DISCONTINUED | OUTPATIENT
Start: 2019-09-17 | End: 2019-09-24 | Stop reason: HOSPADM

## 2019-09-17 RX ADMIN — OCTREOTIDE ACETATE 75 MCG: 100 INJECTION, SOLUTION INTRAVENOUS; SUBCUTANEOUS at 13:32

## 2019-09-17 RX ADMIN — ACETAMINOPHEN 488 MG: 325 TABLET ORAL at 20:44

## 2019-09-17 RX ADMIN — AMIODARONE HYDROCHLORIDE 200 MG: 200 TABLET ORAL at 08:34

## 2019-09-17 RX ADMIN — LEVOTHYROXINE SODIUM 50 MCG: 50 TABLET ORAL at 05:29

## 2019-09-17 RX ADMIN — ISODIUM CHLORIDE 3 ML: 0.03 SOLUTION RESPIRATORY (INHALATION) at 13:57

## 2019-09-17 RX ADMIN — OCTREOTIDE ACETATE 75 MCG: 100 INJECTION, SOLUTION INTRAVENOUS; SUBCUTANEOUS at 05:29

## 2019-09-17 RX ADMIN — FUROSEMIDE 60 MG: 10 INJECTION, SOLUTION INTRAMUSCULAR; INTRAVENOUS at 15:21

## 2019-09-17 RX ADMIN — LEVALBUTEROL HYDROCHLORIDE 1.25 MG: 1.25 SOLUTION, CONCENTRATE RESPIRATORY (INHALATION) at 08:44

## 2019-09-17 RX ADMIN — LEVALBUTEROL HYDROCHLORIDE 1.25 MG: 1.25 SOLUTION, CONCENTRATE RESPIRATORY (INHALATION) at 03:56

## 2019-09-17 RX ADMIN — MIDODRINE HYDROCHLORIDE 10 MG: 5 TABLET ORAL at 15:14

## 2019-09-17 RX ADMIN — ISODIUM CHLORIDE 3 ML: 0.03 SOLUTION RESPIRATORY (INHALATION) at 19:07

## 2019-09-17 RX ADMIN — ALLOPURINOL 300 MG: 100 TABLET ORAL at 08:34

## 2019-09-17 RX ADMIN — ISODIUM CHLORIDE 3 ML: 0.03 SOLUTION RESPIRATORY (INHALATION) at 08:44

## 2019-09-17 RX ADMIN — MIDODRINE HYDROCHLORIDE 10 MG: 5 TABLET ORAL at 11:43

## 2019-09-17 RX ADMIN — Medication 20000 ML: at 22:38

## 2019-09-17 RX ADMIN — ASPIRIN 81 MG: 81 TABLET, COATED ORAL at 08:34

## 2019-09-17 RX ADMIN — NOREPINEPHRINE BITARTRATE 5 MCG/MIN: 1 INJECTION INTRAVENOUS at 22:56

## 2019-09-17 RX ADMIN — ISODIUM CHLORIDE 3 ML: 0.03 SOLUTION RESPIRATORY (INHALATION) at 03:56

## 2019-09-17 RX ADMIN — ALBUMIN (HUMAN) 25 G: 12.5 SOLUTION INTRAVENOUS at 08:37

## 2019-09-17 RX ADMIN — ATORVASTATIN CALCIUM 40 MG: 40 TABLET, FILM COATED ORAL at 17:32

## 2019-09-17 RX ADMIN — LEVALBUTEROL HYDROCHLORIDE 1.25 MG: 1.25 SOLUTION, CONCENTRATE RESPIRATORY (INHALATION) at 19:07

## 2019-09-17 RX ADMIN — MELATONIN TAB 3 MG 9 MG: 3 TAB at 22:55

## 2019-09-17 RX ADMIN — OCTREOTIDE ACETATE 75 MCG: 100 INJECTION, SOLUTION INTRAVENOUS; SUBCUTANEOUS at 22:59

## 2019-09-17 RX ADMIN — FLUTICASONE PROPIONATE 1 SPRAY: 50 SPRAY, METERED NASAL at 08:47

## 2019-09-17 RX ADMIN — LEVALBUTEROL HYDROCHLORIDE 1.25 MG: 1.25 SOLUTION, CONCENTRATE RESPIRATORY (INHALATION) at 13:57

## 2019-09-17 RX ADMIN — ALBUMIN (HUMAN) 25 G: 12.5 SOLUTION INTRAVENOUS at 15:15

## 2019-09-17 RX ADMIN — MICONAZOLE NITRATE: 2 CREAM TOPICAL at 08:46

## 2019-09-17 RX ADMIN — MICONAZOLE NITRATE: 2 CREAM TOPICAL at 17:34

## 2019-09-17 RX ADMIN — MIDODRINE HYDROCHLORIDE 10 MG: 5 TABLET ORAL at 08:36

## 2019-09-17 RX ADMIN — QUETIAPINE FUMARATE 12.5 MG: 25 TABLET ORAL at 22:55

## 2019-09-17 NOTE — PLAN OF CARE
Problem: Potential for Falls  Goal: Patient will remain free of falls  Description  INTERVENTIONS:  - Assess patient frequently for physical needs  -  Identify cognitive and physical deficits and behaviors that affect risk of falls  -  [place] fall precautions as indicated by assessment   - Educate patient/family on patient safety including physical limitations  - Instruct patient to call for assistance with activity based on assessment  - Modify environment to reduce risk of injury  - Consider OT/PT consult to assist with strengthening/mobility  Outcome: Progressing     Problem: Prexisting or High Potential for Compromised Skin Integrity  Goal: Skin integrity is maintained or improved  Description  INTERVENTIONS:  - Identify patients at risk for skin breakdown  - Assess and monitor skin integrity  - Assess and monitor nutrition and hydration status  - Monitor labs   - Assess for incontinence   - Turn and reposition patient  - Assist with mobility/ambulation  - Relieve pressure over bony prominences  - Avoid friction and shearing  - Provide appropriate hygiene as needed including keeping skin clean and dry  - Evaluate need for skin moisturizer/barrier cream  - Collaborate with interdisciplinary team   - Patient/family teaching  - Consider wound care consult   Outcome: Progressing     Problem: Nutrition/Hydration-ADULT  Goal: Nutrient/Hydration intake appropriate for improving, restoring or maintaining nutritional needs  Description  Monitor and assess patient's nutrition/hydration status for malnutrition  Collaborate with interdisciplinary team and initiate plan and interventions as ordered  Monitor patient's weight and dietary intake as ordered or per policy  Utilize nutrition screening tool and intervene as necessary  Determine patient's food preferences and provide high-protein, high-caloric foods as appropriate       INTERVENTIONS:  - Monitor oral intake, urinary output, labs, and treatment plans  - Assess nutrition and hydration status and recommend course of action  - Evaluate amount of meals eaten  - Assist patient with eating if necessary   - Allow adequate time for meals  - Recommend/ encourage appropriate diets, oral nutritional supplements, and vitamin/mineral supplements  - Order, calculate, and assess calorie counts as needed  - Recommend, monitor, and adjust tube feedings and TPN/PPN based on assessed needs  - Assess need for intravenous fluids  - Provide specific nutrition/hydration education as appropriate  - Include patient/family/caregiver in decisions related to nutrition  Outcome: Progressing     Problem: PAIN - ADULT  Goal: Verbalizes/displays adequate comfort level or baseline comfort level  Description  Interventions:  - Encourage patient to monitor pain and request assistance  - Assess pain using appropriate pain scale  - Administer analgesics based on type and severity of pain and evaluate response  - Implement non-pharmacological measures as appropriate and evaluate response  - Consider cultural and social influences on pain and pain management  - Notify physician/advanced practitioner if interventions unsuccessful or patient reports new pain  Outcome: Progressing     Problem: INFECTION - ADULT  Goal: Absence or prevention of progression during hospitalization  Description  INTERVENTIONS:  - Assess and monitor for signs and symptoms of infection  - Monitor lab/diagnostic results  - Monitor all insertion sites, i e  indwelling lines, tubes, and drains  - Monitor endotracheal if appropriate and nasal secretions for changes in amount and color  - [place] appropriate cooling/warming therapies per order  - Administer medications as ordered  - Instruct and encourage patient and family to use good hand hygiene technique  - Identify and instruct in appropriate isolation precautions for identified infection/condition  Outcome: Progressing     Problem: DISCHARGE PLANNING  Goal: Discharge to home or other facility with appropriate resources  Description  INTERVENTIONS:  - Identify barriers to discharge w/patient and caregiver  - Arrange for needed discharge resources and transportation as appropriate  - Identify discharge learning needs (meds, wound care, etc )  - Arrange for interpretive services to assist at discharge as needed  - Refer to Case Management Department for coordinating discharge planning if the patient needs post-hospital services based on physician/advanced practitioner order or complex needs related to functional status, cognitive ability, or social support system  Outcome: Progressing     Problem: Knowledge Deficit  Goal: Patient/family/caregiver demonstrates understanding of disease process, treatment plan, medications, and discharge instructions  Description  Complete learning assessment and assess knowledge base    Interventions:  - Provide teaching at level of understanding  - Provide teaching via preferred learning methods  Outcome: Progressing     Problem: RESPIRATORY - ADULT  Goal: Achieves optimal ventilation and oxygenation  Description  INTERVENTIONS:  - Assess for changes in respiratory status  - Assess for changes in mentation and behavior  - Position to facilitate oxygenation and minimize respiratory effort  - Oxygen administered by appropriate delivery if ordered  - Initiate smoking cessation education as indicated  - Encourage broncho-pulmonary hygiene including cough, deep breathe, Incentive Spirometry  - Assess the need for suctioning and aspirate as needed  - Assess and instruct to report SOB or any respiratory difficulty  - Respiratory Therapy support as indicated  Outcome: Progressing     Problem: METABOLIC, FLUID AND ELECTROLYTES - ADULT  Goal: Fluid balance maintained  Description  INTERVENTIONS:  - Monitor labs   - Monitor I/O and WT  - Instruct patient on fluid and nutrition as appropriate  - Assess for signs & symptoms of volume excess or deficit  Outcome: Progressing Problem: SKIN/TISSUE INTEGRITY - ADULT  Goal: Skin integrity remains intact  Description  INTERVENTIONS  - Identify patients at risk for skin breakdown  - Assess and monitor skin integrity  - Assess and monitor nutrition and hydration status  - Monitor labs (i e  albumin)  - Assess for incontinence   - Turn and reposition patient  - Assist with mobility/ambulation  - Relieve pressure over bony prominences  - Avoid friction and shearing  - Provide appropriate hygiene as needed including keeping skin clean and dry  - Evaluate need for skin moisturizer/barrier cream  - Collaborate with interdisciplinary team (i e  Nutrition, Rehabilitation, etc )   - Patient/family teaching  Outcome: Progressing  Goal: Incision(s), wounds(s) or drain site(s) healing without S/S of infection  Description  INTERVENTIONS  - Assess and document risk factors for skin impairment   - Assess and document dressing, incision, wound bed, drain sites and surrounding tissue  - Consider nutrition services referral as needed  - Oral mucous membranes remain intact  - Provide patient/ family education  Outcome: Progressing     Problem: HEMATOLOGIC - ADULT  Goal: Maintains hematologic stability  Description  INTERVENTIONS  - Assess for signs and symptoms of bleeding or hemorrhage  - Monitor labs  - Administer supportive blood products/factors as ordered and appropriate  Outcome: Progressing     Problem: CARDIOVASCULAR - ADULT  Goal: Maintains optimal cardiac output and hemodynamic stability  Description  INTERVENTIONS:  - Monitor I/O, vital signs and rhythm  - Monitor for S/S and trends of decreased cardiac output  - Administer and titrate ordered vasoactive medications to optimize hemodynamic stability  - Assess quality of pulses, skin color and temperature  - Assess for signs of decreased coronary artery perfusion  - Instruct patient to report change in severity of symptoms  Outcome: Progressing  Goal: Absence of cardiac dysrhythmias or at baseline rhythm  Description  INTERVENTIONS:  - Continuous cardiac monitoring, vital signs, obtain 12 lead EKG if ordered  - Administer antiarrhythmic and heart rate control medications as ordered  - Monitor electrolytes and administer replacement therapy as ordered  Outcome: Progressing

## 2019-09-17 NOTE — TREATMENT PLAN
Nursing has informed me of oliguria  No significant PVR on bladder scan  I have discussed the need for hemodialysis with the patient and his son, Michelle Smith  Risks of dialysis including elecrolyte shifts, arrhythmia, hypotension, shock and death were reviewed  Risks of not having dialysis were also reviewed  These risks include hyperkalemia, electrolyte disturbances, hypervolemia, shock, acidosis, and death  The patient has signed consent for dialysis in the presence of his son  Consent is in the chart  The patient will be transferred to the ICU for pressor support as well as line placement and CRRT initiation  I have discussed the plan at length with Dr Best Awan of [address] [place] [address]

## 2019-09-17 NOTE — ASSESSMENT & PLAN NOTE
Pneumonia has been ruled out with negative procalcitonin, antibiotics have been discontinued    Patient with refractory fluid overload and will be transferred to ICU for CRRT

## 2019-09-17 NOTE — NURSING NOTE
Morning BP 93/45 beneath hold parameters for furosemide administration  Metalazone and albumin given and then BP rechecked 1 hour later  BP still below hold parameters at 89/42  Notified via Tiger Text Dr Miko Rogers from [address] [place] [address]

## 2019-09-17 NOTE — PROGRESS NOTES
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  1  Patient was receiving lasix per nephrology recommendations however patient creatinine continues to rise, with decreased urine output and decreased blood pressure  Patient will be started on CVVH for volume removal  2  Patient echocardiogram 9/9 shows an EF 55% with combined congestive heart failure  3  Acute on chronic kidney disease  1  Nephrology following and would like to start patient on CVVH  He will be transferred to the ICU for further management  2  Creatinine trending up, urine output decreased  4  Atrial fibrillation with RVR  1  Continue to amiodarone  2  Hold coumadin due to placement of central line  3  Lopressor on hold due to hypotension  5  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 underlying fatty liver disease  1  Continue octreotide, midodrine and albumin  2  Gastroenterology evaluated the patient and he can follow up as an outpatient  6  Hypotension  1  Continue midodrine  If blood pressure does not improve the patient may require vasopressor support for dialysis  7  Chronic lymphocytic leukemia type B  1  Being monitored by observation only      Critical Care Time:   Documented critical care time excludes any procedures documented elsewhere  It also excludes any family updates    _____________________________________________________________________    HPI/24hr events:   Patient was admitted on 9/7/2019 for increasing fatigue, falls, and dehydration  During his admission, the patient's creatinine continued to increase, his blood pressure decreased, urine output became marginal, and had increasing shortness of breath with volume overload   He is being followed by nephrology and was initially diuresed with lasix with little improvement  He is now being transferred to the ICU for CVVH and volume removal      Medications:    Current Facility-Administered Medications:  acetaminophen 488 mg Oral Q6H PRN Ashley Nichols MD   allopurinol 300 mg Oral Daily Ashley Nichols MD   amiodarone 200 mg Oral Daily Ashley Nichols MD   aspirin 81 mg Oral Daily Ashley Nichols MD   atorvastatin 40 mg Oral After Yessenia Diez MD   fluticasone 1 spray Each Nare Daily Ashley Nichols MD   furosemide 60 mg Intravenous BID (diuretic) Jeanna Pierre, DO   levalbuterol 1 25 mg Nebulization Q6H PRN Preethi Bob, DO   levalbuterol 1 25 mg Nebulization TID Eduar Lion MD   levothyroxine 50 mcg Oral Early Morning Kiko Lizeth, DO   melatonin 9 mg Oral HS PRN Kiko Lizeth, DO   miconazole  Topical BID Jose Francisco Parsons, DO   midodrine 10 mg Oral TID AC Stephania Lucas MD   octreotide 75 mcg Subcutaneous [facility] Miguel Ortiz, DO   ondansetron 4 mg Intravenous Q6H PRN Ashley Nichols MD   QUEtiapine 12 5 mg Oral HS Jose Francisco Parsons, DO   sodium chloride 1 spray Each Nare PRN Eduar Lion MD   sodium chloride 3 mL Nebulization Q6H PRN Jose Francisco Parsons, DO   sodium chloride 3 mL Nebulization TID Eduar Lion MD   tamsulosin 0 4 mg Oral Daily Ashley Nichols MD              Physical exam:  Vitals: Body mass index is 28 68 kg/m²  Blood pressure (!) 106/49, pulse 62, temperature 97 6 °F (36 4 °C), temperature source Temporal, resp  rate 20, height 5' 9" (1 753 m), weight 88 1 kg (194 lb 3 6 oz), SpO2 96 %  ,  Temp  Min: 96 3 °F (35 7 °C)  Max: 99 °F (37 2 °C)  IBW: 70 7 kg    SpO2: 96 %  SpO2 Activity: At Rest  O2 Device: Nasal cannula      Intake/Output Summary (Last 24 hours) at 9/17/2019 1701  Last data filed at 9/17/2019 1601  Gross per 24 hour   Intake 810 ml   Output 800 ml   Net 10 ml       Invasive/non-invasive ventilation settings:   Respiratory    Lab Data (Last 4 hours) None         O2/Vent Data (Last 4 hours)    None              Invasive Devices     Peripheral Intravenous Line            Peripheral IV 09/17/19 Right Arm less than 1 day                  Physical Exam:  Gen: frail appearing  HEENT: NC/AT PERRLA EOMI, oropharynx moist  Neck: supple, No JVD, trachea midline, no adenopathy  Chest: diminished at the bases  Cor: Clear S1 and S2  Abd: soft NT/ND +BS  Ext: +3 edema  Neuro: non-focal  Skin: W/D      Diagnostic Data:  Lab: I have personally reviewed pertinent lab results  CBC:   Results from last 7 days   Lab Units 09/17/19  0336 09/16/19  0437 09/15/19  0518   WBC Thousand/uL 21 37* 21 36* 23 12*   HEMOGLOBIN g/dL 7 8* 7 6* 8 6*   HEMATOCRIT % 24 6* 25 3* 27 9*   PLATELETS Thousands/uL 106* 106* 102*       CMP:   Results from last 7 days   Lab Units 09/17/19  0336 09/16/19  0437 09/15/19  0518  09/11/19  0512   SODIUM mmol/L 139 137 139   < > 134*   POTASSIUM mmol/L 4 4 4 8 4 9   < > 4 2   CHLORIDE mmol/L 104 104 104   < > 101   CO2 mmol/L 24 22 23   < > 22   BUN mg/dL 135* 125* 120*   < > 101*   CREATININE mg/dL 2 97* 2 90* 2 65*   < > 2 48*   CALCIUM mg/dL 9 0 8 7 9 3   < > 8 9   ALK PHOS U/L 109 119*  --   --  131*   ALT U/L 36 39  --   --  31   AST U/L 54* 64*  --   --  54*    < > = values in this interval not displayed  PT/INR:   Lab Results   Component Value Date    INR 2 24 (H) 09/17/2019   ,   Magnesium:     Phosphorous:       Microbiology:        Imaging:  none    Cardiac lab/EKG/telemetry/ECHO:   Atrial fibrillation    VTE Prophylaxis: coumadin and venodynes    Code Status: Level 1 - Full Code    CT Mendiola    Portions of the record may have been created with voice recognition software  Occasional wrong word or "sound a like" substitutions may have occurred due to the inherent limitations of voice recognition software  Read the chart carefully and recognize, using context, where substitutions have occurred

## 2019-09-17 NOTE — ASSESSMENT & PLAN NOTE
Creatinine trended up to 2 97, felt to be due to hypotension related to underlying liver cirrhosis  Appreciate Nephrology, will continue recommendations  Patient currently on Lasix 60 mg IV b i d , patient unable to tolerate due to hypotension, patient will be transferred to ICU with recommendations for CRRT

## 2019-09-17 NOTE — ASSESSMENT & PLAN NOTE
CLL with chronic leukocytosis without evidence of infection       Results from last 7 days   Lab Units 09/17/19  0336 09/16/19  0437 09/15/19  0518 09/13/19  0554   WBC Thousand/uL 21 37* 21 36* 23 12* 21 05*

## 2019-09-17 NOTE — ASSESSMENT & PLAN NOTE
ZULMA on CKD 4 secondary to hypotension; worsening fluid overload  Creatinine worsened to 2 97 today, patient continues with hypotensive episodes  Continue IV albumin IV furosemide and octreotide added by nephrology  Patient will be transferred to ICU for CRRT  Appreciate Nephrology input      Results from last 7 days   Lab Units 09/17/19  0336 09/16/19  0437 09/15/19  0518 09/14/19  0559 09/13/19  0554 09/12/19  0505 09/11/19  0512   BUN mg/dL 135* 125* 120* 121* 113* 105* 101*   CREATININE mg/dL 2 97* 2 90* 2 65* 2 71* 2 55* 2 28* 2 48*   EGFR ml/min/1 73sq m 18 19 21 20 22 25 23

## 2019-09-17 NOTE — ASSESSMENT & PLAN NOTE
Hematoma right arm  Appreciate surgery evaluation and debridement  Patient continues to soak dressings, Surgery made aware

## 2019-09-17 NOTE — ASSESSMENT & PLAN NOTE
Wt Readings from Last 3 Encounters:   09/17/19 88 1 kg (194 lb 3 6 oz)   09/04/19 81 6 kg (180 lb)   08/07/19 79 6 kg (175 lb 6 4 oz)      Acute on chronic combined systolic and diastolic CHF with evidence of fluid overload  No significant changes on echocardiogram performed 9/9/2019  Restarted furosemide which has been changed to IV by nephrology  Due to refractory fluid overload, worsening ZULMA and signs of uremia, the patient will be transferred to ICU for CRRT  Monitor daily weights, intake and output

## 2019-09-17 NOTE — PROGRESS NOTES
Progress Note Bj Stovall 1933, 80 y o  male MRN: [id number]    Unit/Bed#: E4 -01 Encounter: [id number]    Primary Care Provider: Melodee Gowers, MD   Date and time admitted to hospital: 9/7/2019  2:31 PM        * Acute kidney injury superimposed on chronic kidney disease ([facility] [address] )  Assessment & Plan  ZULMA on CKD 4 secondary to hypotension; worsening fluid overload  Creatinine worsened to 2 97 today, patient continues with hypotensive episodes  Continue IV albumin IV furosemide and octreotide added by nephrology  Patient will be transferred to ICU for CRRT  Appreciate Nephrology input  Results from last 7 days   Lab Units 09/17/19  0336 09/16/19  0437 09/15/19  0518 09/14/19  0559 09/13/19  0554 09/12/19  0505 09/11/19  0512   BUN mg/dL 135* 125* 120* 121* 113* 105* 101*   CREATININE mg/dL 2 97* 2 90* 2 65* 2 71* 2 55* 2 28* 2 48*   EGFR ml/min/1 73sq m 18 19 21 20 22 25 23       CKD (chronic kidney disease) stage 4, GFR 15-29 ml/min ([facility])  Assessment & Plan  Creatinine trended up to 2 97, felt to be due to hypotension related to underlying liver cirrhosis  Appreciate Nephrology, will continue recommendations  Patient currently on Lasix 60 mg IV b i d , patient unable to tolerate due to hypotension, patient will be transferred to ICU with recommendations for CRRT    Acute on chronic combined systolic and diastolic heart failure (HCC)  Assessment & Plan  Wt Readings from Last 3 Encounters:   09/17/19 88 1 kg (194 lb 3 6 oz)   09/04/19 81 6 kg (180 lb)   08/07/19 79 6 kg (175 lb 6 4 oz)      Acute on chronic combined systolic and diastolic CHF with evidence of fluid overload  No significant changes on echocardiogram performed 9/9/2019  Restarted furosemide which has been changed to IV by nephrology  Due to refractory fluid overload, worsening ZULMA and signs of uremia, the patient will be transferred to ICU for CRRT  Monitor daily weights, intake and output      Ambulatory dysfunction  Assessment & Plan  Ambulatory dysfunction in the setting of hypotension ZULMA and new diagnosis hypothyroidism  PT recommending rehab and patient agreeable  Discharge to [facility] when medically stable    Other cirrhosis of liver ([facility] [address] )  Assessment & Plan  As was evidenced on CT abd/pelvis, associated with ascites  Etiology of the cirrhosis is unclear, question if this is the cause   Will request GI consult for an evaluation, appreciate input       Anemia due to stage 4 chronic kidney disease ([facility] [address] )  Assessment & Plan  Baseline around 9, dropping to around 8, likely mutlifactorial  Monitor CBC    Hematoma of arm  Assessment & Plan  Hematoma right arm  Appreciate surgery evaluation and debridement  Patient continues to soak dressings, Surgery made aware  Left renal mass  Assessment & Plan  Left renal lesion which appears to be cysts on ultrasound  Outpatient surveillance    Hypotension (arterial)  Assessment & Plan  Patient continues to have episodes of severe hypotension and worsening ZULMA    Hypotension of unclear etiology, cirrhosis noted on CT abd/pelvis  Random cortisol was checked which was within limits  Furosemide restarted and midodrine added and increased  Patient has been unable to receive furosemide due to low BP  Discussed with Nephrology  The patient is recommended transfer to ICU for pressors and CRRT  Discussed with Critical care, patient is accepted  Pneumonia of right middle lobe due to infectious organism [facility])  Assessment & Plan  Pneumonia has been ruled out with negative procalcitonin, antibiotics have been discontinued    Patient with refractory fluid overload and will be transferred to ICU for CRRT    H/O aortic valve replacement  Assessment & Plan  History of bioprosthetic AVR  Atrial fibrillation [facility])  Assessment & Plan  Paroxysmal atrial fibrillation amiodarone and warfarin  Holding metoprolol due to hypotension  INR therapeutic      Results from last 7 days   Lab Units 19  0336 19  0437 09/15/19  0518 19  0554 19  0524   INR  2 24* 2 02* 1 89* 2 13* 2 34*       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)  Assessment & Plan  CLL with chronic leukocytosis without evidence of infection  Results from last 7 days   Lab Units 19  0336 19  0437 09/15/19  0518 19  0554   WBC Thousand/uL 21 37* 21 36* 23 12* 21 05*       Chronic obstructive pulmonary disease (HCC)  Assessment & Plan  Reported history of COPD currently on p r n  xopenex  Patient currently on supplemental oxygen due to worsening volume overload, the patient does not use oxygen at his baseline      VTE Pharmacologic Prophylaxis:   Pharmacologic: Warfarin (Coumadin)  Mechanical VTE Prophylaxis in Place: Yes    Patient Centered Rounds: I have performed bedside rounds with nursing staff today  Discussions with Specialists or Other Care Team Provider: Nephrology, Critical Care, GI    Education and Discussions with Family / Patient: Patient's son rBenda Calvin    Time Spent for Care: 45 minutes  More than 50% of total time spent on counseling and coordination of care as described above  Current Length of Stay: 10 day(s)    Current Patient Status: Inpatient   Certification Statement: The patient will continue to require additional inpatient hospital stay due to need for ICU monitoring    Discharge Plan: TBD    Code Status: Level 1 - Full Code      Subjective:   Patient seen and examined  He reports feeling "tired"  He appears lethargic but is arousable  Continues to have episodes of hypotension  Objective:     Vitals:   Temp (24hrs), Av 5 °F (36 4 °C), Min:96 9 °F (36 1 °C), Max:97 8 °F (36 6 °C)    Temp:  [96 9 °F (36 1 °C)-97 8 °F (36 6 °C)] 96 9 °F (36 1 °C)  HR:  [60-68] 68  Resp:  [20] 20  BP: ()/(42-53) 94/43  SpO2:  [91 %-96 %] 93 %  Body mass index is 28 68 kg/m²       Input and Output Summary (last 24 hours): Intake/Output Summary (Last 24 hours) at 9/17/2019 1450  Last data filed at 9/17/2019 1401  Gross per 24 hour   Intake 570 ml   Output 525 ml   Net 45 ml       Physical Exam:     Physical Exam   Constitutional: He is oriented to person, place, and time  No distress (weak)  HENT:   Head: Normocephalic and atraumatic  Eyes: Conjunctivae are normal    Neck: No JVD present  Cardiovascular: Normal rate and regular rhythm  No murmur heard  Pulmonary/Chest: Effort normal  No respiratory distress  He has no wheezes  He has rales  Abdominal: Soft  He exhibits distension (mild )  There is no tenderness  There is no guarding  Musculoskeletal: He exhibits edema (+2 b/l ankle)  Neurological: He is alert and oriented to person, place, and time  Skin:   Right arm dressing   Psychiatric: His speech is delayed  He is slowed and withdrawn  Additional Data:     Labs:    Results from last 7 days   Lab Units 09/17/19  0336   WBC Thousand/uL 21 37*   HEMOGLOBIN g/dL 7 8*   HEMATOCRIT % 24 6*   PLATELETS Thousands/uL 106*   LYMPHO PCT % 60*   MONO PCT % 9   EOS PCT % 0     Results from last 7 days   Lab Units 09/17/19  0336   SODIUM mmol/L 139   POTASSIUM mmol/L 4 4   CHLORIDE mmol/L 104   CO2 mmol/L 24   BUN mg/dL 135*   CREATININE mg/dL 2 97*   ANION GAP mmol/L 11   CALCIUM mg/dL 9 0   ALBUMIN g/dL 3 8   TOTAL BILIRUBIN mg/dL 1 92*   ALK PHOS U/L 109   ALT U/L 36   AST U/L 54*   GLUCOSE RANDOM mg/dL 122     Results from last 7 days   Lab Units 09/17/19  0336   INR  2 24*     Results from last 7 days   Lab Units 09/16/19  2037   POC GLUCOSE mg/dl 170*               * I Have Reviewed All Lab Data Listed Above  * Additional Pertinent Lab Tests Reviewed:  Freya 66 Admission Reviewed    Imaging:    Imaging Reports Reviewed Today Include: no new      Recent Cultures (last 7 days):           Last 24 Hours Medication List:     Current Facility-Administered Medications:  acetaminophen 488 mg Oral Q6H PRN Caroline Yeung MD    albumin human 25 g Intravenous BID (diuretic) CT Jaimes Last Rate: 0 g (09/15/19 1630)   allopurinol 300 mg Oral Daily Caroline Yeung MD    amiodarone 200 mg Oral Daily Caroline Yeung MD    aspirin 81 mg Oral Daily Caroline Yeung MD    atorvastatin 40 mg Oral After Estrella Hutchins MD    fluticasone 1 spray Each Nare Daily Caroline Yeung MD    furosemide 60 mg Intravenous BID (diuretic) Elviamiriam Ferraro, DO    levalbuterol 1 25 mg Nebulization Q6H PRN Pat Aschoff, DO    levalbuterol 1 25 mg Nebulization TID Malaika Nguyễn MD    levothyroxine 50 mcg Oral Early Morning Marylen Glad, DO    melatonin 9 mg Oral HS PRN Marylen Glad, DO    miconazole  Topical BID Jose Francisco Parsons, DO    midodrine 10 mg Oral TID AC Lorrin Hodgkins, MD    octreotide 75 mcg Subcutaneous [facility] Miguel Ortiz, DO    ondansetron 4 mg Intravenous Q6H PRN Caroline Yeung MD    QUEtiapine 12 5 mg Oral HS Jose Francisco Parsons, DO    sodium chloride 1 spray Each Nare PRN Malaika Nguyễn MD    sodium chloride 3 mL Nebulization Q6H PRN Jose Francisco Parsons, DO    sodium chloride 3 mL Nebulization TID Malaika Nguyễn MD    tamsulosin 0 4 mg Oral Daily Caroline Yeung MD    warfarin 1 25 mg Oral Daily (warfarin) Caroline Yeung MD         Today, Patient Was Seen By: Alexandra Ash MD    ** Please Note: Dictation voice to text software may have been used in the creation of this document   **

## 2019-09-17 NOTE — PHYSICAL THERAPY NOTE
Physical Therapy Progress Note     09/17/19 1120   Pain Assessment   Pain Assessment No/denies pain   Pain Score No Pain   Restrictions/Precautions   Weight Bearing Precautions Per Order No   Other Precautions Fall Risk;Multiple lines;O2;Bed Alarm; Chair Alarm;Telemetry   General   Chart Reviewed Yes   Response to Previous Treatment Patient reporting fatigue but able to participate  Family/Caregiver Present No   Subjective   Subjective Willing to participate in therapy this AM    Bed Mobility   Sit to Supine 4  Minimal assistance   Additional items Assist x 1;Bedrails;Leg ; Increased time required;LE management;Verbal cues   Transfers   Sit to Stand 3  Moderate assistance   Additional items Assist x 1; Armrests; Increased time required;Verbal cues   Stand to Sit 4  Minimal assistance   Additional items Assist x 1;Bedrails; Increased time required;Verbal cues   Ambulation/Elevation   Gait pattern Decreased foot clearance; Forward Flexion; Short stride; Step to;Excessively slow; Inconsistent elver; Shuffling   Gait Assistance 4  Minimal assist   Additional items Assist x 1;Verbal cues; Tactile cues   Assistive Device Rolling walker   Distance 12'   Balance   Static Sitting Fair +   Dynamic Sitting Fair   Static Standing Fair -   Dynamic Standing Poor +   Ambulatory Poor +   Endurance Deficit   Endurance Deficit Yes   Endurance Deficit Description fatigue/weakness   Activity Tolerance   Activity Tolerance Patient limited by fatigue   Nurse Made Aware Yes   Exercises   TKR Sitting;10 reps;AAROM; Bilateral   Assessment   Prognosis Fair   Problem List Decreased range of motion;Decreased strength;Decreased endurance;Decreased mobility; Impaired balance; Impaired judgement;Decreased cognition;Decreased safety awareness;Decreased skin integrity   Assessment Pt  seated in beside chair upon my arrival  Pt  reporting increased fatigue, however agreeable to therapeutic intervention   Performance of HEP seated in bedside with cues for proper completion  Progressed with transfers requiring A of therapist with cues for hand placement/technique  Progressed with an amb  trial with use of RW and Pamela of therapist with cues for LE seqeucning  Pt  limited by fatigue requiring quick return to bedside  Pt  remains limited by fatigue  Pt  repositioned supine in bed at end of treatment session with alarm active  PT will continue to recommend d/c to rehab when medically stable for continued improvement of noted impairments above  Barriers to Discharge Inaccessible home environment;Decreased caregiver support   Barriers to Discharge Comments Lives alone, BREE   Goals   Patient Goals To get better  [facility] Expiration Date 09/23/19   Treatment Day 6   Plan   Treatment/Interventions Functional transfer training;LE strengthening/ROM; Therapeutic exercise; Endurance training;Bed mobility;Gait training;Spoke to nursing;Spoke to case management   Progress Slow progress, decreased activity tolerance   PT Frequency Other (Comment)  (4-5x/wk)   Recommendation   Recommendation Short-term skilled PT   Equipment Recommended Walker  (RW)   PT - OK to Discharge Yes  (if d/c to rehab when medically stable )     Curtis Guardado, PTA

## 2019-09-17 NOTE — PROGRESS NOTES
Progress Note - Nephrology   Edgar Stovall 80 y o  male MRN: [id number]  Unit/Bed#: E4 -01 Encounter: [id number]      Assessment / Plan:  1  ZULMA on CKD stage 4 likely d/t hypotension in setting of cirrhosis/ascites/CRS  -sCr has risen to 2 97 with  today  BP remains low and preventing ability to diurese  -on albumin/octreotide/midodrine combination  Also on lasix 60mg IV BID    -UOP has declined  -would consider ICU transfer for pressor/CRRT initiation  Would need HD line placed    -Reason for CRRT initiation would be concern for uremia as well as volume overload in setting of hypotension  -f/u am BMP  2  Hypotension: likely d/t cirrhosis with ascites with decreased SVR  On max dose midodrine  Also on albumin  3  Anemia in setting of CLL: avoid MELVIN  Monitor CBC  Hgb in high 7s  Wonder if hematology consult would be beneficial  4  Left kidney lesion: monitor outpatient  +renal cysts  5  Chronic combined heart failure, EF 50% with mitral stenosis, moderate TR and dilated IVC  -weight continues to increase despite IV Lasix  Hypotension limiting factor  On 1 5 L fluid restriction per day  Would benefit from UF on CRT  6  PNA - Abx per primary team   7  Cirrhosis with mild to moderate ascites- on treatment as above  Consider GI evaluation  Subjective:   He states he feels okay  He is a bit short of breath  Per nurse, unable to give Lasix today due to hypotension  The patient is denying dizziness or lightheadedness  I discussed risks of dialysis, need for pressor, and transfer to ICU at length with the patient as well as his son over the phone for 20 minutes  Nurse at bedside  I also discussed risks of not pursuing dialysis  Patient states he does not want "heroic measures" but defers to son make decision  They are not yet ready to make a decision  Objective:     Vitals: Blood pressure (!) 94/43, pulse 68, temperature (!) 96 9 °F (36 1 °C), temperature source Tympanic, resp   rate 20, height 5' 9" (1 753 m), weight 88 1 kg (194 lb 3 6 oz), SpO2 93 %  ,Body mass index is 28 68 kg/m²  Temp (24hrs), Av 5 °F (36 4 °C), Min:96 9 °F (36 1 °C), Max:97 8 °F (36 6 °C)      Weight (last 2 days)     Date/Time   Weight    19 0600   88 1 (194 23)    19 0300   90 4 (199 3)    09/15/19 0534   88 5 (195)                Intake/Output Summary (Last 24 hours) at 2019 1455  Last data filed at 2019 1401  Gross per 24 hour   Intake 570 ml   Output 525 ml   Net 45 ml     I/O last 24 hours: In: 18 [P O :570]  Out: 26 [Urine:525]        Physical Exam:   Physical Exam   Constitutional: He appears well-developed and well-nourished  No distress  HENT:   Head: Normocephalic and atraumatic  Mouth/Throat: No oropharyngeal exudate  Eyes: Right eye exhibits no discharge  Left eye exhibits no discharge  No scleral icterus  Neck: Neck supple  Cardiovascular: Normal rate, regular rhythm and normal heart sounds  Pulmonary/Chest: Effort normal and breath sounds normal  He has no wheezes  He has no rales  Abdominal: Soft  Bowel sounds are normal  He exhibits no distension  There is no tenderness  Musculoskeletal: He exhibits edema (b/l LE pitting)  Neurological: He is alert  Awake, +twitching at rest in hands   Skin: Skin is warm and dry  No rash noted  He is not diaphoretic  Psychiatric: He has a normal mood and affect  His behavior is normal    Vitals reviewed        Invasive Devices     Peripheral Intravenous Line            Peripheral IV 19 Right Arm less than 1 day                Medications:    Scheduled Meds:  Current Facility-Administered Medications:  acetaminophen 488 mg Oral Q6H PRN Stefania Lynch MD    albumin human 25 g Intravenous BID (diuretic) CT Muhammad Last Rate: 0 g (09/15/19 1630)   allopurinol 300 mg Oral Daily Stefania Lynch MD    amiodarone 200 mg Oral Daily Stefania Lynch MD    aspirin 81 mg Oral Daily Stefania Lynch MD    atorvastatin 40 mg Oral After Selestine MD Jozef    fluticasone 1 spray Each Nare Daily Radha Archer MD    furosemide 60 mg Intravenous BID (diuretic) Mendel Bustard, DO    levalbuterol 1 25 mg Nebulization Q6H PRN Addi Mann, DO    levalbuterol 1 25 mg Nebulization TID Alessandro Hays MD    levothyroxine 50 mcg Oral Early Morning Jamey December, DO    melatonin 9 mg Oral HS PRN Jamey December, DO    miconazole  Topical BID Addi Mann, DO    midodrine 10 mg Oral TID AC James Arreaga MD    octreotide 75 mcg Subcutaneous [facility] Miguel Ortiz, DO    ondansetron 4 mg Intravenous Q6H PRN Radha Archer MD    QUEtiapine 12 5 mg Oral HS Jose Francisco Parsons, DO    sodium chloride 1 spray Each Nare PRN Alessandro Hays MD    sodium chloride 3 mL Nebulization Q6H PRN Jose Francisco Parsons, DO    sodium chloride 3 mL Nebulization TID Alessandro Hays MD    tamsulosin 0 4 mg Oral Daily Radha Archer MD    warfarin 1 25 mg Oral Daily (warfarin) Radha Archer MD        PRN Meds:   acetaminophen    levalbuterol    melatonin    ondansetron    sodium chloride    sodium chloride    Continuous Infusions:         LAB RESULTS:      Results from last 7 days   Lab Units 09/17/19  0336 09/16/19  0437 09/15/19  0518 09/14/19  0559 09/13/19  0554 09/12/19  0505 09/11/19  0512   WBC Thousand/uL 21 37* 21 36* 23 12*  --  21 05*  --   --    HEMOGLOBIN g/dL 7 8* 7 6* 8 6*  --  8 2*  --   --    HEMATOCRIT % 24 6* 25 3* 27 9*  --  26 5*  --   --    PLATELETS Thousands/uL 106* 106* 102*  --  101*  --   --    LYMPHO PCT % 60*  --   --   --   --   --   --    MONO PCT % 9  --   --   --   --   --   --    EOS PCT % 0  --   --   --   --   --   --    POTASSIUM mmol/L 4 4 4 8 4 9 4 4 4 4 4 2 4 2   CHLORIDE mmol/L 104 104 104 106 105 106 101   CO2 mmol/L 24 22 23 24 23 24 22   BUN mg/dL 135* 125* 120* 121* 113* 105* 101*   CREATININE mg/dL 2 97* 2 90* 2 65* 2 71* 2 55* 2 28* 2 48*   CALCIUM mg/dL 9 0 8 7 9 3 8 9 8 7 9 0 8 9   ALK PHOS U/L 109 119* --   --   --   --  131*   ALT U/L 36 39  --   --   --   --  31   AST U/L 54* 64*  --   --   --   --  54*       CUTURES:  Lab Results   Component Value Date    BLOODCX No Growth After 5 Days  09/07/2019    BLOODCX No Growth After 5 Days  09/07/2019                 Portions of the record may have been created with voice recognition software  Occasional wrong word or "sound a like" substitutions may have occurred due to the inherent limitations of voice recognition software  Read the chart carefully and recognize, using context, where substitutions have occurred  If you have any questions, please contact the dictating provider

## 2019-09-17 NOTE — ASSESSMENT & PLAN NOTE
Ambulatory dysfunction in the setting of hypotension ZULMA and new diagnosis hypothyroidism  PT recommending rehab and patient agreeable    Discharge to [facility] when medically stable

## 2019-09-17 NOTE — PLAN OF CARE
Problem: PHYSICAL THERAPY ADULT  Goal: Performs mobility at highest level of function for planned discharge setting  See evaluation for individualized goals  Description  Treatment/Interventions: Functional transfer training, LE strengthening/ROM, Elevations, Therapeutic exercise, Endurance training, Patient/family training, Bed mobility, Gait training, Spoke to nursing  Equipment Recommended: Marcos Bajwa       See flowsheet documentation for full assessment, interventions and recommendations  Outcome: Progressing  Note:   Prognosis: Fair  Problem List: Decreased range of motion, Decreased strength, Decreased endurance, Decreased mobility, Impaired balance, Impaired judgement, Decreased cognition, Decreased safety awareness, Decreased skin integrity  Assessment: Pt  seated in beside chair upon my arrival  Pt  reporting increased fatigue, however agreeable to therapeutic intervention  Performance of HEP seated in bedside with cues for proper completion  Progressed with transfers requiring A of therapist with cues for hand placement/technique  Progressed with an amb  trial with use of RW and Pamela of therapist with cues for LE seqeucning  Pt  limited by fatigue requiring quick return to bedside  Pt  remains limited by fatigue  Pt  repositioned supine in bed at end of treatment session with alarm active  PT will continue to recommend d/c to rehab when medically stable for continued improvement of noted impairments above  Barriers to Discharge: Inaccessible home environment, Decreased caregiver support  Barriers to Discharge Comments: Lives alone, BREE  Recommendation: Short-term skilled PT     PT - OK to Discharge: Yes(if d/c to rehab when medically stable )    See flowsheet documentation for full assessment

## 2019-09-17 NOTE — ASSESSMENT & PLAN NOTE
As was evidenced on CT abd/pelvis, associated with ascites  Etiology of the cirrhosis is unclear, question if this is the cause   Will request GI consult for an evaluation, appreciate input

## 2019-09-17 NOTE — ASSESSMENT & PLAN NOTE
Reported history of COPD currently on p r n  xopenex  Patient currently on supplemental oxygen due to worsening volume overload, the patient does not use oxygen at his baseline

## 2019-09-17 NOTE — ASSESSMENT & PLAN NOTE
Paroxysmal atrial fibrillation amiodarone and warfarin  Holding metoprolol due to hypotension  INR therapeutic      Results from last 7 days   Lab Units 09/17/19  0336 09/16/19  0437 09/15/19  0518 09/13/19  0554 09/12/19  0524   INR  2 24* 2 02* 1 89* 2 13* 2 34*

## 2019-09-17 NOTE — NURSING NOTE
Pt had very little urine output since this morning  Only 100mL since 0830  Bladder scan shows only 155mLs  Dr Cee Free from Nephrology aware

## 2019-09-17 NOTE — ASSESSMENT & PLAN NOTE
Patient continues to have episodes of severe hypotension and worsening ZULMA    Hypotension of unclear etiology, cirrhosis noted on CT abd/pelvis  Random cortisol was checked which was within limits  Furosemide restarted and midodrine added and increased  Patient has been unable to receive furosemide due to low BP  Discussed with Nephrology  The patient is recommended transfer to ICU for pressors and CRRT  Discussed with Critical care, patient is accepted

## 2019-09-17 NOTE — CONSULTS
Consultation -  [facility]  Derek Gabino Stovall 80 y o  male MRN: [id number]  Unit/Bed#: E4 -01 Encounter: [id number]        ASSESSMENT/PLAN:   80y o  year old male with a PMH of CLL, CKD, CHF, prostate CA, hypothyroidism, s/p AVR, a  Fib on coumadin, COPD and cirrhosis is admitted with ZULMA and hypotension  1  Alcoholic cirrhosis  2  Hypotension  3  ZULMA on CKD   -he has morphologic features of cirrhosis on CT scan, he has splenomegaly as well indicating portal hypertension  He has mild-to-moderate ascites  The portal vein appeared patent    -he relates a history of drinking up to 12 beers per day for many years    -blood cultures x2 were negative, urinalysis was negative for infection  He was initially treated with antibiotics for possible pneumonia, his procalcitonin was within normal limits so antibiotics were discontinued  -he has stable thrombocytopenia with platelet count 109   -LFTs appear stable   -he has chronic anemia, his hemoglobin is 7 8 today  On admission to the hospital his hemoglobin was in the 9s  He denies any overt GI bleeding currently, he states he had some bright red blood per rectum on the toilet tissue last week  He believes his last colonoscopy was about 3 years ago but he does not recall who did this  Reviewed his chart in Care everywhere, no colonoscopy report was available    -he had hypoalbuminemia on admission, he has been given IV albumin for his hypertension, unfortunately he continues to be hypotensive    -he denies any abdominal pain, no abdominal tenderness on exam making SBP unlikely  No current evidence of decompensation of his cirrhosis  -most recent /49   -he is being transferred to the ICU for pressors and CRRT given his uremia  He is also on midodrine, being followed by nephrology    -Discussed with Dr Anna Simpson, would consider other causes for refractory hypotension      Inpatient consult to gastroenterology  Consult performed by: Isaac Bradshaw PA-C  Consult ordered by: Magdi Trujillo MD          Reason for Consult / Principal Problem: Acute kidney injury superimposed on chronic kidney disease [facility])    HPI: Peter Vargas is a 80y o  year old male with a PMH of CLL, CKD, CHF, prostate CA, hypothyroidism, s/p AVR, a  Fib on coumadin, COPD and cirrhosis is admitted with ZULMA and hypotension  He initially presented to the hospital 9/7/19 for weakness and feeling unsteady when standing  He reports that at home he was having episodes of dizziness when he stood up to go to the bathroom at night  He states he has not been told that he had cirrhosis  He says when his wife was alive he was drinking 10-12 beers per day, he decreased this after she passed in 1992 and currently drinks about 4 beers per week  He denies any abdominal pain, nausea, vomiting, change in appetite, unintended weight loss, difficulty swallowing or jaundice  He denies any melena but states he does have intermittent bright red blood on the toilet tissue  He denies any blood on the stool or in the toilet bowl  He states this last happened a few weeks ago and denies any bleeding currently  He believes his last colonoscopy was about 3 years ago  He does not recall having had an upper endoscopy in the past   He denies any fevers or chills  He denies shortness of breath  Review of Systems: as per HPI  Review of Systems   Constitutional: Negative for activity change, appetite change, chills, fatigue, fever and unexpected weight change  HENT: Negative for mouth sores, sore throat and trouble swallowing  Respiratory: Negative for shortness of breath  Cardiovascular: Negative for chest pain  Gastrointestinal: Positive for blood in stool  Negative for abdominal distention, abdominal pain, constipation, diarrhea, nausea and vomiting  Skin: Negative for color change, pallor, rash and wound  Neurological: Positive for light-headedness   Negative for tremors and syncope  All other systems reviewed and are negative        Historical Information   Past Medical History:   Diagnosis Date    AAA [facility]abdominal aortic aneurysm) ([facility])     Abnormal blood chemistry     last assessed 12/12/2013    Allergic rhinitis     last assessed 11/21/2016    Anemia     Ankle edema     Arthritis     Atrophy of prostate     Chronic diarrhea     COPD (chronic obstructive pulmonary disease) ([facility])     Coronary artery disease     stents, pacemaker    Elevated PSA     GERD (gastroesophageal reflux disease)     Gout     Heart failure ([facility])     Heart murmur     aortic stenosis    Hyperlipidemia     Hypertension     Irregular heart beat     a fib    Joint pain     Kidney failure     stage 3    Leukemia ([facility])     Malignant neoplasm prostate [facility]     Myocardial infarction ([facility]     Nocturia     Osteopenia     Pleural effusion     PVD (peripheral vascular disease) ([facility])     Radiation gastroenteritis     Skin cancer     Sore muscles     Type 2 diabetes mellitus ([facility] )      Past Surgical History:   Procedure Laterality Date    CARDIAC PACEMAKER PLACEMENT  2014    CARDIAC VALVE REPLACEMENT      CORONARY ANGIOPLASTY      prox left anterior descending artery assessment    OTHER SURGICAL HISTORY      catheter ablation    PROSTATE BIOPSY Bilateral 2003, 2008    VASCULAR SURGERY      iliac stent     Social History   Social History     Substance and Sexual Activity   Alcohol Use No    Frequency: Never    Binge frequency: Never     Social History     Substance and Sexual Activity   Drug Use No     Social History     Tobacco Use   Smoking Status Former Smoker   Smokeless Tobacco Never Used     Family History   Problem Relation Age of Onset    Cancer Mother     Cancer Father        Meds/Allergies     Medications Prior to Admission   Medication    acetaminophen (TYLENOL) 500 mg tablet    allopurinol (ZYLOPRIM) 300 mg tablet    amiodarone 200 mg tablet    aspirin (ASPIRIN 81) 81 mg EC tablet    atorvastatin (LIPITOR) 40 mg tablet    cholecalciferol (VITAMIN D3) 1,000 units tablet    fluticasone (FLONASE) 50 mcg/act nasal spray    furosemide (LASIX) 40 mg tablet    Lactobacillus (PROBIOTIC ACIDOPHILUS PO)    metolazone (ZAROXOLYN) 2 5 mg tablet    metoprolol tartrate (LOPRESSOR) 50 mg tablet    Multiple Vitamins-Minerals (MULTIVITAMIN ADULT PO)    spironolactone (ALDACTONE) 25 mg tablet    tamsulosin (FLOMAX) 0 4 mg    warfarin (COUMADIN) 2 5 mg tablet    Cholecalciferol (VITAMIN D3) 20 MCG TABS    metolazone (ZAROXOLYN) 2 5 mg tablet    spironolactone (ALDACTONE) 25 mg tablet    warfarin (COUMADIN) 2 5 mg tablet     Current Facility-Administered Medications   Medication Dose Route Frequency    acetaminophen (TYLENOL) tablet 488 mg  488 mg Oral Q6H PRN    albumin human (FLEXBUMIN) 25 % injection 25 g  25 g Intravenous BID (diuretic)    allopurinol (ZYLOPRIM) tablet 300 mg  300 mg Oral Daily    amiodarone tablet 200 mg  200 mg Oral Daily    aspirin (ECOTRIN LOW STRENGTH) EC tablet 81 mg  81 mg Oral Daily    atorvastatin (LIPITOR) tablet 40 mg  40 mg Oral After Dinner    fluticasone (FLONASE) 50 mcg/act nasal spray 1 spray  1 spray Each Nare Daily    furosemide (LASIX) injection 60 mg  60 mg Intravenous BID (diuretic)    levalbuterol (XOPENEX) inhalation solution 1 25 mg  1 25 mg Nebulization Q6H PRN    levalbuterol (XOPENEX) inhalation solution 1 25 mg  1 25 mg Nebulization TID    levothyroxine tablet 50 mcg  50 mcg Oral Early Morning    melatonin tablet 9 mg  9 mg Oral HS PRN    miconazole 2 % cream   Topical BID    midodrine (PROAMATINE) tablet 10 mg  10 mg Oral TID AC    octreotide (SandoSTATIN) injection 75 mcg  75 mcg Subcutaneous Q8H [address]    ondansetron (ZOFRAN) injection 4 mg  4 mg Intravenous Q6H PRN    QUEtiapine (SEROquel) tablet 12 5 mg  12 5 mg Oral HS    sodium chloride (OCEAN) 0 65 % nasal spray 1 spray  1 spray Each Nare PRN    sodium chloride 0 9 % inhalation solution 3 mL  3 mL Nebulization Q6H PRN    sodium chloride 0 9 % inhalation solution 3 mL  3 mL Nebulization TID    tamsulosin (FLOMAX) capsule 0 4 mg  0 4 mg Oral Daily       Allergies   Allergen Reactions    Indomethacin        Objective     Blood pressure (!) 94/43, pulse 68, temperature (!) 96 9 °F (36 1 °C), temperature source Tympanic, resp  rate 20, height 5' 9" (1 753 m), weight 88 1 kg (194 lb 3 6 oz), SpO2 93 %  Intake/Output Summary (Last 24 hours) at 9/17/2019 1511  Last data filed at 9/17/2019 1401  Gross per 24 hour   Intake 570 ml   Output 625 ml   Net -55 ml       PHYSICAL EXAM     Physical Exam   Constitutional: He is oriented to person, place, and time  He appears well-developed  No distress  Nasal cannula in place   HENT:   Head: Normocephalic and atraumatic  Eyes: Right eye exhibits no discharge  Left eye exhibits no discharge  No scleral icterus  Neck: Neck supple  No tracheal deviation present  Cardiovascular: Normal rate, regular rhythm, normal heart sounds and intact distal pulses  Exam reveals no gallop and no friction rub  No murmur heard  Pulmonary/Chest: Breath sounds normal  No respiratory distress  He has no wheezes  He has no rales  He exhibits no tenderness  Appears to have increased work of breathing, denies SOB   Abdominal: Soft  Bowel sounds are normal  He exhibits no distension and no mass  There is no tenderness  There is no rebound and no guarding  Neurological: He is alert and oriented to person, place, and time  Skin: Skin is warm and dry  Psychiatric: He has a normal mood and affect         Lab Results:   CBC:   Lab Results   Component Value Date    WBC 21 37 (H) 09/17/2019    HGB 7 8 (L) 09/17/2019    HCT 24 6 (L) 09/17/2019     (H) 09/17/2019     (L) 09/17/2019    MCH 33 8 09/17/2019    MCHC 31 7 09/17/2019    RDW 19 2 (H) 09/17/2019    MPV 11 3 09/17/2019    NRBC 0 09/17/2019   ,   CMP:   Lab Results   Component Value Date    K 4 4 09/17/2019     09/17/2019    CO2 24 09/17/2019     (H) 09/17/2019    CREATININE 2 97 (H) 09/17/2019    CALCIUM 9 0 09/17/2019    AST 54 (H) 09/17/2019    ALT 36 09/17/2019    ALKPHOS 109 09/17/2019    EGFR 18 09/17/2019   ,   Lipase: No results found for: LIPASE,  PT/INR:   Lab Results   Component Value Date    INR 2 24 (H) 09/17/2019   ,   Troponin: No results found for: TROPONINI,   Magnesium: No components found for: MAG,   Phosphorous: No results found for: PHOS  Imaging Studies: I have personally reviewed pertinent reports  CT CHEST, ABDOMEN AND PELVIS WITHOUT IV CONTRAST     INDICATION:   History of fall, coumadin      COMPARISON:  Chest exam 9/6/2019 and additional priors     TECHNIQUE: CT examination of the chest, abdomen and pelvis was performed without intravenous contrast   Axial, sagittal, and coronal 2D reformatted images were created from the source data and submitted for interpretation       Radiation dose length product (DLP) for this visit:  883 mGy-cm   This examination, like all CT scans performed in the [facility], was performed utilizing techniques to minimize radiation dose exposure, including the use of iterative   reconstruction and automated exposure control       Enteric contrast was not administered       FINDINGS:  Exam is limited without IV and oral contrast particularly for soft tissue evaluation      CHEST     LUNGS:  Scattered debris in the trachea and right mainstem bronchus  Focal consolidation in the right middle lobe centrally  Scattered atelectatic changes in the bilateral lower lobes  Linear atelectasis versus scarring left upper lobe anteriorly  Diffuse interlobular septal thickening      PLEURA:  Small bilateral pleural effusions      HEART/GREAT VESSELS:  Heart is moderately enlarged  Prior aortic valve replacement  Dense coronary artery calcifications  Prior sternotomy and CABG   AICD/pacemaker noted      MEDIASTINUM AND OSBALDO:  Unremarkable      CHEST WALL AND LOWER NECK:   Unremarkable      ABDOMEN     LIVER/BILIARY TREE:  The liver demonstrates cirrhotic morphology  Within the limitations of this examination there is no evidence of suspicious hepatic mass  No biliary dilatation  Portal vein is patent      GALLBLADDER:  Contracted with mild pericholecystic fluid  Mild cholelithiasis  Possible mild gallbladder wall calcification      SPLEEN:  The spleen is enlarged, measuring over 18 cm in length      PANCREAS:  Unremarkable      ADRENAL GLANDS:  Unremarkable      KIDNEYS/URETERS:  No hydronephrosis  Scattered renal cysts  Left lower pole isodense renal lesion measuring up to 2 7 cm in size  This measures higher than simple fluid attenuation      STOMACH AND BOWEL:  Scattered colonic diverticulosis  No bowel obstruction      APPENDIX:  No findings to suggest appendicitis      ABDOMINOPELVIC CAVITY:  Mild/moderate abdominal pelvic ascites  No free air  Mildly prominent mesenteric lymph nodes noted centrally in the upper abdomen, nonspecific      VESSELS:  Dense wall calcification of the aorta and arterial branches      PELVIS     REPRODUCTIVE ORGANS:  Prostatic seed implants noted      URINARY BLADDER:  Unremarkable      ABDOMINAL WALL/INGUINAL REGIONS:  Large left inguinal hernia containing portion of the proximal sigmoid colon and ascites without obstruction  Small fat-containing right inguinal hernia      OSSEOUS STRUCTURES:  Chronic appearing rib fractures on the right including the right eighth and ninth ribs posterior laterally  No acute fractures      IMPRESSION:  Exam is limited without IV and oral contrast particularly for soft tissue evaluation  1   Scattered debris in the trachea and right mainstem bronchus  Focal consolidation in the right middle lobe centrally may be related to atelectasis or pneumonia  Recommend follow-up chest CT in 3-4 weeks to ensure resolution  2  Diffuse interlobular septal thickening  Small bilateral pleural effusions  Findings suggest mild CHF  3  Cirrhotic appearance to the liver  Mild-to-moderate ascites  Splenomegaly  4   Indeterminate left lower pole renal lesion measuring up to 2 7 cm in size  Recommend follow-up with nonemergent renal ultrasound  Patient was seen and examined by Dr Martita Adame  All han medical decisions were made by Dr Martita Adame  Thank you for allowing us to participate in the care of this present patient  We will follow-up with you closely

## 2019-09-18 ENCOUNTER — APPOINTMENT (INPATIENT)
Dept: NON INVASIVE DIAGNOSTICS | Facility: HOSPITAL | Age: 84
DRG: 987 | End: 2019-09-18
Payer: MEDICARE

## 2019-09-18 LAB
ABO GROUP BLD BPU: NORMAL
ABO GROUP BLD BPU: NORMAL
ALBUMIN SERPL BCP-MCNC: 4.3 G/DL (ref 3.5–5)
ALP SERPL-CCNC: 111 U/L (ref 46–116)
ALT SERPL W P-5'-P-CCNC: 27 U/L (ref 12–78)
ANION GAP SERPL CALCULATED.3IONS-SCNC: 11 MMOL/L (ref 4–13)
ANION GAP SERPL CALCULATED.3IONS-SCNC: 12 MMOL/L (ref 4–13)
ANION GAP SERPL CALCULATED.3IONS-SCNC: 12 MMOL/L (ref 4–13)
AST SERPL W P-5'-P-CCNC: 55 U/L (ref 5–45)
BASOPHILS # BLD AUTO: 0.11 THOUSANDS/ΜL (ref 0–0.1)
BASOPHILS NFR BLD AUTO: 0 % (ref 0–1)
BILIRUB SERPL-MCNC: 2.55 MG/DL (ref 0.2–1)
BPU ID: NORMAL
BPU ID: NORMAL
BUN SERPL-MCNC: 102 MG/DL (ref 5–25)
BUN SERPL-MCNC: 110 MG/DL (ref 5–25)
BUN SERPL-MCNC: 65 MG/DL (ref 5–25)
BUN SERPL-MCNC: 80 MG/DL (ref 5–25)
BUN SERPL-MCNC: 95 MG/DL (ref 5–25)
CA-I BLD-SCNC: 1.15 MMOL/L (ref 1.12–1.32)
CA-I BLD-SCNC: 1.16 MMOL/L (ref 1.12–1.32)
CA-I BLD-SCNC: 1.18 MMOL/L (ref 1.12–1.32)
CA-I BLD-SCNC: 1.19 MMOL/L (ref 1.12–1.32)
CALCIUM SERPL-MCNC: 9.4 MG/DL (ref 8.3–10.1)
CALCIUM SERPL-MCNC: 9.4 MG/DL (ref 8.3–10.1)
CALCIUM SERPL-MCNC: 9.5 MG/DL (ref 8.3–10.1)
CALCIUM SERPL-MCNC: 9.5 MG/DL (ref 8.3–10.1)
CALCIUM SERPL-MCNC: 9.7 MG/DL (ref 8.3–10.1)
CHLORIDE SERPL-SCNC: 103 MMOL/L (ref 100–108)
CHLORIDE SERPL-SCNC: 103 MMOL/L (ref 100–108)
CHLORIDE SERPL-SCNC: 104 MMOL/L (ref 100–108)
CHLORIDE SERPL-SCNC: 104 MMOL/L (ref 100–108)
CHLORIDE SERPL-SCNC: 105 MMOL/L (ref 100–108)
CO2 SERPL-SCNC: 24 MMOL/L (ref 21–32)
CO2 SERPL-SCNC: 25 MMOL/L (ref 21–32)
CO2 SERPL-SCNC: 25 MMOL/L (ref 21–32)
CREAT SERPL-MCNC: 1.7 MG/DL (ref 0.6–1.3)
CREAT SERPL-MCNC: 1.93 MG/DL (ref 0.6–1.3)
CREAT SERPL-MCNC: 2.26 MG/DL (ref 0.6–1.3)
CREAT SERPL-MCNC: 2.39 MG/DL (ref 0.6–1.3)
CREAT SERPL-MCNC: 2.4 MG/DL (ref 0.6–1.3)
EOSINOPHIL # BLD AUTO: 0.23 THOUSAND/ΜL (ref 0–0.61)
EOSINOPHIL NFR BLD AUTO: 1 % (ref 0–6)
ERYTHROCYTE [DISTWIDTH] IN BLOOD BY AUTOMATED COUNT: 19.1 % (ref 11.6–15.1)
GFR SERPL CREATININE-BSD FRML MDRD: 24 ML/MIN/1.73SQ M
GFR SERPL CREATININE-BSD FRML MDRD: 24 ML/MIN/1.73SQ M
GFR SERPL CREATININE-BSD FRML MDRD: 25 ML/MIN/1.73SQ M
GFR SERPL CREATININE-BSD FRML MDRD: 31 ML/MIN/1.73SQ M
GFR SERPL CREATININE-BSD FRML MDRD: 36 ML/MIN/1.73SQ M
GLUCOSE SERPL-MCNC: 131 MG/DL (ref 65–140)
GLUCOSE SERPL-MCNC: 132 MG/DL (ref 65–140)
GLUCOSE SERPL-MCNC: 135 MG/DL (ref 65–140)
GLUCOSE SERPL-MCNC: 138 MG/DL (ref 65–140)
GLUCOSE SERPL-MCNC: 139 MG/DL (ref 65–140)
HCT VFR BLD AUTO: 25.4 % (ref 36.5–49.3)
HGB BLD-MCNC: 7.8 G/DL (ref 12–17)
IMM GRANULOCYTES # BLD AUTO: 0.1 THOUSAND/UL (ref 0–0.2)
IMM GRANULOCYTES NFR BLD AUTO: 0 % (ref 0–2)
INR PPP: 2.02 (ref 0.84–1.19)
LYMPHOCYTES # BLD AUTO: 27.47 THOUSANDS/ΜL (ref 0.6–4.47)
LYMPHOCYTES NFR BLD AUTO: 73 % (ref 14–44)
MAGNESIUM SERPL-MCNC: 2 MG/DL (ref 1.6–2.6)
MAGNESIUM SERPL-MCNC: 2 MG/DL (ref 1.6–2.6)
MAGNESIUM SERPL-MCNC: 2.2 MG/DL (ref 1.6–2.6)
MAGNESIUM SERPL-MCNC: 2.4 MG/DL (ref 1.6–2.6)
MCH RBC QN AUTO: 32.6 PG (ref 26.8–34.3)
MCHC RBC AUTO-ENTMCNC: 30.7 G/DL (ref 31.4–37.4)
MCV RBC AUTO: 106 FL (ref 82–98)
MONOCYTES # BLD AUTO: 2.22 THOUSAND/ΜL (ref 0.17–1.22)
MONOCYTES NFR BLD AUTO: 6 % (ref 4–12)
NEUTROPHILS # BLD AUTO: 7.44 THOUSANDS/ΜL (ref 1.85–7.62)
NEUTS SEG NFR BLD AUTO: 20 % (ref 43–75)
NRBC BLD AUTO-RTO: 0 /100 WBCS
PHOSPHATE SERPL-MCNC: 3.1 MG/DL (ref 2.3–4.1)
PHOSPHATE SERPL-MCNC: 3.3 MG/DL (ref 2.3–4.1)
PHOSPHATE SERPL-MCNC: 3.5 MG/DL (ref 2.3–4.1)
PHOSPHATE SERPL-MCNC: 4.1 MG/DL (ref 2.3–4.1)
PLATELET # BLD AUTO: 119 THOUSANDS/UL (ref 149–390)
PMV BLD AUTO: 12.3 FL (ref 8.9–12.7)
POTASSIUM SERPL-SCNC: 4.1 MMOL/L (ref 3.5–5.3)
POTASSIUM SERPL-SCNC: 4.2 MMOL/L (ref 3.5–5.3)
POTASSIUM SERPL-SCNC: 4.2 MMOL/L (ref 3.5–5.3)
POTASSIUM SERPL-SCNC: 4.6 MMOL/L (ref 3.5–5.3)
POTASSIUM SERPL-SCNC: 4.6 MMOL/L (ref 3.5–5.3)
PROT SERPL-MCNC: 5.9 G/DL (ref 6.4–8.2)
PROTHROMBIN TIME: 23.2 SECONDS (ref 11.6–14.5)
RBC # BLD AUTO: 2.39 MILLION/UL (ref 3.88–5.62)
SODIUM SERPL-SCNC: 139 MMOL/L (ref 136–145)
SODIUM SERPL-SCNC: 139 MMOL/L (ref 136–145)
SODIUM SERPL-SCNC: 140 MMOL/L (ref 136–145)
UNIT DISPENSE STATUS: NORMAL
UNIT DISPENSE STATUS: NORMAL
UNIT PRODUCT CODE: NORMAL
UNIT PRODUCT CODE: NORMAL
UNIT RH: NORMAL
UNIT RH: NORMAL
WBC # BLD AUTO: 37.57 THOUSAND/UL (ref 4.31–10.16)

## 2019-09-18 PROCEDURE — 80048 BASIC METABOLIC PNL TOTAL CA: CPT | Performed by: INTERNAL MEDICINE

## 2019-09-18 PROCEDURE — 82330 ASSAY OF CALCIUM: CPT | Performed by: INTERNAL MEDICINE

## 2019-09-18 PROCEDURE — 83735 ASSAY OF MAGNESIUM: CPT | Performed by: INTERNAL MEDICINE

## 2019-09-18 PROCEDURE — 90945 DIALYSIS ONE EVALUATION: CPT

## 2019-09-18 PROCEDURE — 84100 ASSAY OF PHOSPHORUS: CPT | Performed by: INTERNAL MEDICINE

## 2019-09-18 PROCEDURE — 85610 PROTHROMBIN TIME: CPT | Performed by: FAMILY MEDICINE

## 2019-09-18 PROCEDURE — 30233K1 TRANSFUSION OF NONAUTOLOGOUS FROZEN PLASMA INTO PERIPHERAL VEIN, PERCUTANEOUS APPROACH: ICD-10-PCS | Performed by: INTERNAL MEDICINE

## 2019-09-18 PROCEDURE — 93880 EXTRACRANIAL BILAT STUDY: CPT

## 2019-09-18 PROCEDURE — 85025 COMPLETE CBC W/AUTO DIFF WBC: CPT | Performed by: INTERNAL MEDICINE

## 2019-09-18 PROCEDURE — 80053 COMPREHEN METABOLIC PANEL: CPT | Performed by: INTERNAL MEDICINE

## 2019-09-18 PROCEDURE — NC001 PR NO CHARGE: Performed by: INTERNAL MEDICINE

## 2019-09-18 PROCEDURE — 94640 AIRWAY INHALATION TREATMENT: CPT

## 2019-09-18 PROCEDURE — 90945 DIALYSIS ONE EVALUATION: CPT | Performed by: INTERNAL MEDICINE

## 2019-09-18 RX ORDER — LEVALBUTEROL 1.25 MG/.5ML
1.25 SOLUTION, CONCENTRATE RESPIRATORY (INHALATION) EVERY 4 HOURS PRN
Status: DISCONTINUED | OUTPATIENT
Start: 2019-09-18 | End: 2019-09-19

## 2019-09-18 RX ADMIN — FUROSEMIDE 60 MG: 10 INJECTION, SOLUTION INTRAMUSCULAR; INTRAVENOUS at 15:48

## 2019-09-18 RX ADMIN — QUETIAPINE FUMARATE 12.5 MG: 25 TABLET ORAL at 20:30

## 2019-09-18 RX ADMIN — LEVALBUTEROL HYDROCHLORIDE 1.25 MG: 1.25 SOLUTION, CONCENTRATE RESPIRATORY (INHALATION) at 07:40

## 2019-09-18 RX ADMIN — OCTREOTIDE ACETATE 75 MCG: 100 INJECTION, SOLUTION INTRAVENOUS; SUBCUTANEOUS at 21:18

## 2019-09-18 RX ADMIN — TAMSULOSIN HYDROCHLORIDE 0.4 MG: 0.4 CAPSULE ORAL at 08:37

## 2019-09-18 RX ADMIN — CALCIUM GLUCONATE 1 G: 98 INJECTION, SOLUTION INTRAVENOUS at 13:34

## 2019-09-18 RX ADMIN — ISODIUM CHLORIDE 3 ML: 0.03 SOLUTION RESPIRATORY (INHALATION) at 13:05

## 2019-09-18 RX ADMIN — LEVOTHYROXINE SODIUM 50 MCG: 50 TABLET ORAL at 06:31

## 2019-09-18 RX ADMIN — ATORVASTATIN CALCIUM 40 MG: 40 TABLET, FILM COATED ORAL at 18:07

## 2019-09-18 RX ADMIN — CALCIUM GLUCONATE 1 G: 98 INJECTION, SOLUTION INTRAVENOUS at 23:19

## 2019-09-18 RX ADMIN — FUROSEMIDE 60 MG: 10 INJECTION, SOLUTION INTRAMUSCULAR; INTRAVENOUS at 08:37

## 2019-09-18 RX ADMIN — ASPIRIN 81 MG: 81 TABLET, COATED ORAL at 08:37

## 2019-09-18 RX ADMIN — Medication 20000 ML: at 06:34

## 2019-09-18 RX ADMIN — ISODIUM CHLORIDE 3 ML: 0.03 SOLUTION RESPIRATORY (INHALATION) at 19:42

## 2019-09-18 RX ADMIN — MIDODRINE HYDROCHLORIDE 10 MG: 5 TABLET ORAL at 12:06

## 2019-09-18 RX ADMIN — Medication 20000 ML: at 14:56

## 2019-09-18 RX ADMIN — CALCIUM GLUCONATE 1 G: 98 INJECTION, SOLUTION INTRAVENOUS at 00:16

## 2019-09-18 RX ADMIN — NOREPINEPHRINE BITARTRATE 7 MCG/MIN: 1 INJECTION INTRAVENOUS at 07:52

## 2019-09-18 RX ADMIN — NOREPINEPHRINE BITARTRATE 12.5 MCG/MIN: 1 INJECTION INTRAVENOUS at 21:23

## 2019-09-18 RX ADMIN — MICONAZOLE NITRATE: 2 CREAM TOPICAL at 18:08

## 2019-09-18 RX ADMIN — OCTREOTIDE ACETATE 75 MCG: 100 INJECTION, SOLUTION INTRAVENOUS; SUBCUTANEOUS at 06:57

## 2019-09-18 RX ADMIN — GABAPENTIN 100 MG: 100 CAPSULE ORAL at 21:18

## 2019-09-18 RX ADMIN — CALCIUM GLUCONATE 1 G: 98 INJECTION, SOLUTION INTRAVENOUS at 18:07

## 2019-09-18 RX ADMIN — AMIODARONE HYDROCHLORIDE 200 MG: 200 TABLET ORAL at 08:37

## 2019-09-18 RX ADMIN — MIDODRINE HYDROCHLORIDE 10 MG: 5 TABLET ORAL at 15:47

## 2019-09-18 RX ADMIN — CALCIUM GLUCONATE 1 G: 98 INJECTION, SOLUTION INTRAVENOUS at 05:16

## 2019-09-18 RX ADMIN — ALLOPURINOL 300 MG: 100 TABLET ORAL at 08:37

## 2019-09-18 RX ADMIN — LEVALBUTEROL HYDROCHLORIDE 1.25 MG: 1.25 SOLUTION, CONCENTRATE RESPIRATORY (INHALATION) at 13:06

## 2019-09-18 RX ADMIN — LEVALBUTEROL HYDROCHLORIDE 1.25 MG: 1.25 SOLUTION, CONCENTRATE RESPIRATORY (INHALATION) at 19:42

## 2019-09-18 RX ADMIN — MIDODRINE HYDROCHLORIDE 10 MG: 5 TABLET ORAL at 07:01

## 2019-09-18 RX ADMIN — NOREPINEPHRINE BITARTRATE 12.5 MCG/MIN: 1 INJECTION INTRAVENOUS at 15:07

## 2019-09-18 RX ADMIN — MICONAZOLE NITRATE: 2 CREAM TOPICAL at 15:53

## 2019-09-18 RX ADMIN — FLUTICASONE PROPIONATE 1 SPRAY: 50 SPRAY, METERED NASAL at 08:38

## 2019-09-18 RX ADMIN — ISODIUM CHLORIDE 3 ML: 0.03 SOLUTION RESPIRATORY (INHALATION) at 07:40

## 2019-09-18 RX ADMIN — SALINE NASAL SPRAY 1 SPRAY: 1.5 SOLUTION NASAL at 20:30

## 2019-09-18 RX ADMIN — OCTREOTIDE ACETATE 75 MCG: 100 INJECTION, SOLUTION INTRAVENOUS; SUBCUTANEOUS at 13:34

## 2019-09-18 NOTE — PROCEDURES
Temporary HD Catheter  Date/Time: 9/17/2019 8:31 PM  Performed by: CT Ceron  Authorized by: CT Ceron     Consent:     Consent obtained:  Written    Consent given by:  Patient    Risks discussed:  Arterial puncture, incorrect placement, bleeding, infection and pneumothorax    Alternatives discussed:  No treatment  Universal protocol:     Immediately prior to procedure, a time out was called: yes      Patient identity confirmed:  Arm band  Pre-procedure details:     Hand hygiene: Hand hygiene performed prior to insertion      Sterile barrier technique: All elements of maximal sterile technique followed      Skin preparation:  2% chlorhexidine  Indications:     Central line indications: dialysis    Anesthesia (see MAR for exact dosages): Anesthesia method:  Local infiltration    Local anesthetic:  Lidocaine 2% w/o epi  Procedure details:     Location:  Right internal jugular    Vessel type: vein      Laterality:  Right    Approach: percutaneous technique used      Patient position:  Reverse Trendelenburg    Catheter type:  Triple lumen    Catheter length:  16 cm    Landmarks identified: yes      Ultrasound guidance: yes      Sterile ultrasound techniques: Sterile gel and sterile probe covers were used      Number of attempts:  1    Successful placement: yes    Post-procedure details:     Post-procedure:  Dressing applied and line sutured    Assessment:  Blood return through all ports, no pneumothorax on x-ray and free fluid flow    Patient tolerance of procedure: Tolerated well, no immediate complications  Comments: On ultrasound exam of the right IJ, the right carotid artery was visualized which contained a significant amount of plaque/calcification  Some of this was difficult to determine whether there may be some plaque mobility  The patient was told of this finding  A formal carotid duplex study should be obtained   He has a known history of carotid plaque per an ultrasound done last year as an outpatient

## 2019-09-18 NOTE — TREATMENT PLAN
HD line placed by ICU team  Will initiate CVVH Qb 2200 ml/hr, Qb 250ml/m, UF goal even to -50ml/hr as tolerated maintain MAP > 65  Recommend initiating levophed gtt to maintain renal perfusion  Patient started on 4K/3cal bath  Should monitor BUN closely to ensure no significant drop as at risk for dialysis dysequilibrium syndrome  Serial labs and tele ordered  CVVH initiated for uremia as well as volume overload  Consent obtained earlier today by me  Consent in chart

## 2019-09-18 NOTE — PROGRESS NOTES
Progress Note - Critical Care   Radha Stovall 80 y o  male MRN: [id number]  Unit/Bed#: ICU 10 Encounter: [id number]    Assessment/Plan:  1  Acute hypoxic respiratory failure secondary to combined acute on chronic congestive heart failure  · We will continue to monitor the patient on nasal cannula for now with a goal to maintain his oxygen saturation greater than 88%  · Will order bronchodilator therapy as the patient was noted to have some wheezing  2  Acute kidney injury on chronic kidney disease stage 4  · The patient was started on CVVH overnight  · We will defer management to Nephrology team's recommendation  3  Atrial fibrillation with rapid ventricular response-currently rate controlled  · We will continue the patient on his outpatient amiodarone  4  Hypotension,-likely multifactorial related to his decompensated heart failure, acute kidney injury and possible atrial fibrillation  · The patient was placed on low-dose Levophed overnight for allowing of removal of fluid on CVVH  · Our goal will be to maintain his mean arterial pressure greater than 65  5  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 and underlying fatty liver disease  · We will continue him on his octreotide, midodrine and albumin  6  Chronic lymphocytic leukemia type B    Critical Care Time:   Documented critical care time excludes any procedures documented elsewhere  It also excludes any family updates    _____________________________________________________________________    HPI/24hr events:   No events overnight  The patient does complain of some shortness of breath this morning and has some wheezing        Medications:    Current Facility-Administered Medications:  acetaminophen 488 mg Oral Q6H PRN Renata Mckeon MD    allopurinol 300 mg Oral Daily Renata Mckeon MD    amiodarone 200 mg Oral Daily Renata Mckeon MD    aspirin 81 mg Oral Daily Renata Mckeon MD    atorvastatin 40 mg Oral After Yennifer Lynch MD    calcium gluconate 1 G  100 mL IVPB 1 g Intravenous Once Dedra K Sarah, DO Last Rate: 1 g (09/18/19 0516)   fluticasone 1 spray Each Nare Daily Margo Giles MD    furosemide 60 mg Intravenous BID (diuretic) Tomi Li MD    gabapentin 100 mg Oral HS Matthewnal Aspjoseph, CRNP    levalbuterol 1 25 mg Nebulization Q6H PRN Tomi Li MD    levalbuterol 1 25 mg Nebulization TID Tomi Li MD    levothyroxine 50 mcg Oral Early Morning Margo Giles MD    melatonin 9 mg Oral HS PRN Tomi Li MD    miconazole  Topical BID Tomi Li MD    midodrine 10 mg Oral TID AC Tomi Li MD    norepinephrine 1-30 mcg/min Intravenous Titrated SHAWN LandonNP Last Rate: 7 mcg/min (09/18/19 0143)   NxStage K 4/Ca 3 20,000 mL Dialysis Continuous Dedra K Sarah, DO    octreotide 75 mcg Subcutaneous Q8H [facility] Margo Giles MD    ondansetron 4 mg Intravenous Q6H PRN Margo Giles MD    QUEtiapine 12 5 mg Oral HS Tomi Li MD    sodium chloride 1 spray Each Nare PRN Tomi Li MD    sodium chloride 3 mL Nebulization Q6H PRN Tomi Li MD    sodium chloride 3 mL Nebulization TID Tomi Li MD    tamsulosin 0 4 mg Oral Daily Tomi Li MD          norepinephrine 1-30 mcg/min Last Rate: 7 mcg/min (09/18/19 0143)   NxStage K 4/Ca 3 20,000 mL          Physical exam:  Vitals: Body mass index is 28 68 kg/m²  Blood pressure (!) 105/39, pulse 60, temperature 98 3 °F (36 8 °C), resp  rate (!) 26, height 5' 9" (1 753 m), weight 88 1 kg (194 lb 3 6 oz), SpO2 95 %  ,  Temp  Min: 96 3 °F (35 7 °C)  Max: 99 °F (37 2 °C)  IBW: 70 7 kg    SpO2: 95 %  SpO2 Activity: At Rest  O2 Device: Nasal cannula      Intake/Output Summary (Last 24 hours) at 9/18/2019 0641  Last data filed at 9/18/2019 0500  Gross per 24 hour   Intake 1073 17 ml   Output 900 ml   Net 173 17 ml       Invasive/non-invasive ventilation settings:   Respiratory    Lab Data (Last 4 hours)    None         O2/Vent Data (Last 4 hours)    None              Invasive Devices     Peripheral Intravenous Line            Peripheral IV 09/17/19 Right Arm 1 day    Peripheral IV 09/17/19 Left;Proximal;Upper;Ventral (anterior) Arm less than 1 day          Line            Temporary HD Catheter less than 1 day                  Physical Exam:  Gen:  Awake and alert  HEENT:  Pupils are equal round reactive to light  The right IJ dialysis catheter site has some evidence of a ecchymosis however the site is clean dry and intact  Neck:  Supple negative for lymphadenopathy  Chest:  Crackles in the bases with some scattered wheezes throughout  Cor:  Regular rate and rhythm  Abd:  Obese but soft and nontender  Ext:  There is no significant edema clubbing or cyanosis  Neuro:  Awake and alert, able to move his extremities  Skin:  Warm and dry      Diagnostic Data:  Lab: I have personally reviewed pertinent lab results     CBC:   Results from last 7 days   Lab Units 09/18/19 0518 09/17/19 0336 09/16/19 0437   WBC Thousand/uL 37 57* 21 37* 21 36*   HEMOGLOBIN g/dL 7 8* 7 8* 7 6*   HEMATOCRIT % 25 4* 24 6* 25 3*   PLATELETS Thousands/uL 119* 106* 106*       CMP:   Results from last 7 days   Lab Units 09/18/19 0518 09/18/19 0407 09/17/19 2202 09/17/19 0336 09/16/19  0437   SODIUM mmol/L 140 139 138 139 137   POTASSIUM mmol/L 4 1 4 2 4 2 4 4 4 8   CHLORIDE mmol/L 104 103 103 104 104   CO2 mmol/L 25 24 22 24 22   BUN mg/dL 102* 110* 131* 135* 125*   CREATININE mg/dL 2 40* 2 39* 3 11* 2 97* 2 90*   CALCIUM mg/dL 9 5 9 4 9 1 9 0 8 7   ALK PHOS U/L 111  --   --  109 119*   ALT U/L 27  --   --  36 39   AST U/L 55*  --   --  54* 64*     PT/INR:   Lab Results   Component Value Date    INR 2 02 (H) 09/18/2019   ,   Magnesium:   Results from last 7 days   Lab Units 09/18/19 0407 09/17/19  2202   MAGNESIUM mg/dL 2 4 2 5     Phosphorous:   Results from last 7 days   Lab Units 09/18/19 0407 09/17/19  2202   PHOSPHORUS mg/dL 4 1 4 8*       Microbiology:        Imaging:      Cardiac lab/EKG/telemetry/ECHO: VTE Prophylaxis:  Coumadin    Code Status: Level 1 - Full Code    CT Fernandez    Portions of the record may have been created with voice recognition software  Occasional wrong word or "sound a like" substitutions may have occurred due to the inherent limitations of voice recognition software  Read the chart carefully and recognize, using context, where substitutions have occurred

## 2019-09-18 NOTE — TELEPHONE ENCOUNTER
I did speak to Lowell Mary last night at length about his dad's declining health he will be going to the ICU at [facility]

## 2019-09-18 NOTE — PROGRESS NOTES
Procedure Note - Nephrology   Amina Stovall 80 y o  male MRN: [id number]  Unit/Bed#: ICU 10 Encounter: [id number]    Procedure note- REJI(66170)  Assessment / Plan:  1  ZULMA on CKD stage 4 likely d/t hypotension in setting of cirrhosis/ascites/CRS  -patient seen and examined by me on CRRT today  Patient requires levophed gtt to maintain MAP for fluid removal  Will plan for -50ml/hr UF today  -continue albumin/octreotide/midodrine/lasix 60mg IV BID    -UOP ok  -HD line placed   -Reason for CRRT initiation was concern for uremia as well as volume overload in setting of hypotension  -f/u am BMP  2  Hypotension: likely d/t cirrhosis with ascites with decreased SVR  On max dose midodrine  Also on albumin  Goal MAP > 65 for renal perfusion and to allow for fluid removal on CRRT  3  Anemia in setting of CLL: avoid MELVIN  Monitor CBC  Hgb in high 7s  Wonder if hematology consult would be beneficial  I note rise in WBC to 38 today  4  Left kidney lesion: monitor outpatient  +renal cysts  5  Chronic combined heart failure, 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 mitral stenosis, moderate TR and dilated IVC  -UF as tolerated on CRRT  6  PNA - s/p Abx per primary team   7  Cirrhosis with mild to moderate ascites, likely d/t alcohol and underlying fatty liver - on treatment as above  Consider GI evaluation  Subjective:   Patient seen and examined by me on CRRT at about 10:30am  BP 98/39, even UF goal  Also on levo gtt  He denies chest pain or shortness of breath  Tolerating CRRT well  Objective:     Vitals: Blood pressure (!) 106/41, pulse 62, temperature (!) 97 °F (36 1 °C), temperature source Temporal, resp  rate 21, height 5' 9" (1 753 m), weight 88 1 kg (194 lb 3 6 oz), SpO2 93 %  ,Body mass index is 28 68 kg/m²  Temp (24hrs), Av 4 °F (36 3 °C), Min:96 8 °F (36 °C), Max:98 3 °F (36 8 °C)      Weight (last 2 days)     Date/Time   Weight    19 0600   88 1 (194 23)    19 0300   90 4 (199 3)                Intake/Output Summary (Last 24 hours) at 9/18/2019 1224  Last data filed at 9/18/2019 1000  Gross per 24 hour   Intake 1144 99 ml   Output 1237 ml   Net -92 01 ml     I/O last 24 hours: In: 6384 [P O :710; I V :225; Blood:320; IV Piggyback:100]  Out: 2967 [Urine:705; Other:632]        Physical Exam:   Physical Exam   Constitutional: He appears well-developed and well-nourished  No distress  Chronically ill appearing   HENT:   Head: Normocephalic and atraumatic  Mouth/Throat: No oropharyngeal exudate  MM dry   Eyes: Right eye exhibits no discharge  Left eye exhibits no discharge  No scleral icterus  Neck: Neck supple  Cardiovascular: Normal rate, regular rhythm and normal heart sounds  Pulmonary/Chest: Effort normal  He has wheezes  He has no rales  Abdominal: Soft  Bowel sounds are normal  He exhibits no distension  There is no tenderness  Musculoskeletal: He exhibits edema (pitting LE b/l)  Neurological: He is alert  awake   Skin: Skin is warm and dry  No rash noted  He is not diaphoretic  There is pallor  Psychiatric:   Flat affect   Vitals reviewed        Invasive Devices     Peripheral Intravenous Line            Peripheral IV 09/17/19 Right Arm 1 day    Peripheral IV 09/17/19 Left;Proximal;Upper;Ventral (anterior) Arm less than 1 day          Line            Temporary HD Catheter less than 1 day                Medications:    Scheduled Meds:  Current Facility-Administered Medications:  acetaminophen 488 mg Oral Q6H PRN Radha Alejandre MD    allopurinol 300 mg Oral Daily Radha Alejandre MD    amiodarone 200 mg Oral Daily Radha Alejandre MD    aspirin 81 mg Oral Daily Radha Alejandre MD    atorvastatin 40 mg Oral After Angie Francois MD    calcium gluconate 1 G  100 mL IVPB 1 g Intravenous Once Prakash Weiss MD    fluticasone 1 spray Each Nare Daily Radha Alejandre MD    furosemide 60 mg Intravenous BID (diuretic) Radha Alejandre MD    gabapentin 100 mg Oral HS CT Pastor    levalbuterol 1 25 mg Nebulization Q6H PRN Tarik Norman MD    levalbuterol 1 25 mg Nebulization TID Tarik Norman MD    levalbuterol 1 25 mg Nebulization Q4H PRN Geoffrey Boop, CRNP    levothyroxine 50 mcg Oral Early Morning Tarik Norman MD    melatonin 9 mg Oral HS PRN Tarik Norman MD    miconazole  Topical BID Tarik Norman MD    midodrine 10 mg Oral TID AC Tarik Norman MD    norepinephrine 1-30 mcg/min Intravenous Titrated Geoffrey Boop, CRNP Last Rate: 12 5 mcg/min (09/18/19 1106)   NxStage K 4/Ca 3 20,000 mL Dialysis Continuous Dedra K DO Sarah    octreotide 75 mcg Subcutaneous Q8H Juan Diego Parker MD    ondansetron 4 mg Intravenous Q6H PRN Tarik Norman MD    QUEtiapine 12 5 mg Oral HS Tarik Norman MD    sodium chloride 1 spray Each Nare PRN Tarik Norman MD    sodium chloride 3 mL Nebulization Q6H PRN Tarik Norman MD    sodium chloride 3 mL Nebulization TID Tarik Norman MD    tamsulosin 0 4 mg Oral Daily Tarik Norman MD        PRN Meds:   acetaminophen    levalbuterol    levalbuterol    melatonin    ondansetron    sodium chloride    sodium chloride    Continuous Infusions:  norepinephrine 1-30 mcg/min Last Rate: 12 5 mcg/min (09/18/19 1106)   NxStage K 4/Ca 3 20,000 mL            LAB RESULTS:      Results from last 7 days   Lab Units 09/18/19  0903 09/18/19  0518 09/18/19  0407 09/17/19  2202 09/17/19  0336 09/16/19  0437 09/15/19  0518  09/13/19  0554   WBC Thousand/uL  --  37 57*  --   --  21 37* 21 36* 23 12*  --  21 05*   HEMOGLOBIN g/dL  --  7 8*  --   --  7 8* 7 6* 8 6*  --  8 2*   HEMATOCRIT %  --  25 4*  --   --  24 6* 25 3* 27 9*  --  26 5*   PLATELETS Thousands/uL  --  119*  --   --  106* 106* 102*  --  101*   NEUTROS PCT %  --  20*  --   --   --   --   --   --   --    LYMPHS PCT %  --  73*  --   --   --   --   --   --   --    LYMPHO PCT %  --   --   --   --  60*  --   --   --   --    MONOS PCT %  --  6  --   --   --   --   --   --   --    MONO PCT %  --   --   --   --  9  --   -- --   --    EOS PCT %  --  1  --   --  0  --   --   --   --    POTASSIUM mmol/L 4 2 4 1 4 2 4 2 4 4 4 8 4 9   < > 4 4   CHLORIDE mmol/L 105 104 103 103 104 104 104   < > 105   CO2 mmol/L 24 25 24 22 24 22 23   < > 23   BUN mg/dL 95* 102* 110* 131* 135* 125* 120*   < > 113*   CREATININE mg/dL 2 26* 2 40* 2 39* 3 11* 2 97* 2 90* 2 65*   < > 2 55*   CALCIUM mg/dL 9 4 9 5 9 4 9 1 9 0 8 7 9 3   < > 8 7   ALK PHOS U/L  --  111  --   --  109 119*  --   --   --    ALT U/L  --  27  --   --  36 39  --   --   --    AST U/L  --  55*  --   --  54* 64*  --   --   --    MAGNESIUM mg/dL 2 2  --  2 4 2 5  --   --   --   --   --    PHOSPHORUS mg/dL 3 5  --  4 1 4 8*  --   --   --   --   --     < > = values in this interval not displayed  CUTURES:  Lab Results   Component Value Date    BLOODCX No Growth After 5 Days  09/07/2019    BLOODCX No Growth After 5 Days  09/07/2019                 Portions of the record may have been created with voice recognition software  Occasional wrong word or "sound a like" substitutions may have occurred due to the inherent limitations of voice recognition software  Read the chart carefully and recognize, using context, where substitutions have occurred  If you have any questions, please contact the dictating provider

## 2019-09-19 PROBLEM — J96.01 ACUTE RESPIRATORY FAILURE WITH HYPOXIA (HCC): Status: ACTIVE | Noted: 2019-09-19

## 2019-09-19 LAB
ANION GAP SERPL CALCULATED.3IONS-SCNC: 10 MMOL/L (ref 4–13)
ANION GAP SERPL CALCULATED.3IONS-SCNC: 11 MMOL/L (ref 4–13)
ANION GAP SERPL CALCULATED.3IONS-SCNC: 8 MMOL/L (ref 4–13)
ANION GAP SERPL CALCULATED.3IONS-SCNC: 9 MMOL/L (ref 4–13)
BUN SERPL-MCNC: 35 MG/DL (ref 5–25)
BUN SERPL-MCNC: 40 MG/DL (ref 5–25)
BUN SERPL-MCNC: 49 MG/DL (ref 5–25)
BUN SERPL-MCNC: 54 MG/DL (ref 5–25)
CA-I BLD-SCNC: 1.14 MMOL/L (ref 1.12–1.32)
CA-I BLD-SCNC: 1.17 MMOL/L (ref 1.12–1.32)
CA-I BLD-SCNC: 1.2 MMOL/L (ref 1.12–1.32)
CA-I BLD-SCNC: 1.2 MMOL/L (ref 1.12–1.32)
CALCIUM SERPL-MCNC: 10 MG/DL (ref 8.3–10.1)
CALCIUM SERPL-MCNC: 9.1 MG/DL (ref 8.3–10.1)
CALCIUM SERPL-MCNC: 9.2 MG/DL (ref 8.3–10.1)
CALCIUM SERPL-MCNC: 9.6 MG/DL (ref 8.3–10.1)
CHLORIDE SERPL-SCNC: 104 MMOL/L (ref 100–108)
CO2 SERPL-SCNC: 24 MMOL/L (ref 21–32)
CO2 SERPL-SCNC: 25 MMOL/L (ref 21–32)
CO2 SERPL-SCNC: 26 MMOL/L (ref 21–32)
CO2 SERPL-SCNC: 27 MMOL/L (ref 21–32)
CREAT SERPL-MCNC: 1.4 MG/DL (ref 0.6–1.3)
CREAT SERPL-MCNC: 1.4 MG/DL (ref 0.6–1.3)
CREAT SERPL-MCNC: 1.53 MG/DL (ref 0.6–1.3)
CREAT SERPL-MCNC: 1.55 MG/DL (ref 0.6–1.3)
ERYTHROCYTE [DISTWIDTH] IN BLOOD BY AUTOMATED COUNT: 19.3 % (ref 11.6–15.1)
GFR SERPL CREATININE-BSD FRML MDRD: 40 ML/MIN/1.73SQ M
GFR SERPL CREATININE-BSD FRML MDRD: 41 ML/MIN/1.73SQ M
GFR SERPL CREATININE-BSD FRML MDRD: 45 ML/MIN/1.73SQ M
GFR SERPL CREATININE-BSD FRML MDRD: 45 ML/MIN/1.73SQ M
GLUCOSE SERPL-MCNC: 126 MG/DL (ref 65–140)
GLUCOSE SERPL-MCNC: 127 MG/DL (ref 65–140)
GLUCOSE SERPL-MCNC: 141 MG/DL (ref 65–140)
GLUCOSE SERPL-MCNC: 149 MG/DL (ref 65–140)
HCT VFR BLD AUTO: 26.5 % (ref 36.5–49.3)
HGB BLD-MCNC: 7.8 G/DL (ref 12–17)
MAGNESIUM SERPL-MCNC: 1.8 MG/DL (ref 1.6–2.6)
MAGNESIUM SERPL-MCNC: 1.9 MG/DL (ref 1.6–2.6)
MAGNESIUM SERPL-MCNC: 2 MG/DL (ref 1.6–2.6)
MAGNESIUM SERPL-MCNC: 2.3 MG/DL (ref 1.6–2.6)
MCH RBC QN AUTO: 31.7 PG (ref 26.8–34.3)
MCHC RBC AUTO-ENTMCNC: 29.4 G/DL (ref 31.4–37.4)
MCV RBC AUTO: 108 FL (ref 82–98)
NRBC BLD AUTO-RTO: 0 /100 WBCS
PHOSPHATE SERPL-MCNC: 2.3 MG/DL (ref 2.3–4.1)
PHOSPHATE SERPL-MCNC: 2.7 MG/DL (ref 2.3–4.1)
PHOSPHATE SERPL-MCNC: 2.8 MG/DL (ref 2.3–4.1)
PHOSPHATE SERPL-MCNC: 2.8 MG/DL (ref 2.3–4.1)
PLATELET # BLD AUTO: 128 THOUSANDS/UL (ref 149–390)
PLATELET BLD QL SMEAR: ABNORMAL
PMV BLD AUTO: 11.7 FL (ref 8.9–12.7)
POTASSIUM SERPL-SCNC: 4.1 MMOL/L (ref 3.5–5.3)
POTASSIUM SERPL-SCNC: 4.2 MMOL/L (ref 3.5–5.3)
POTASSIUM SERPL-SCNC: 4.7 MMOL/L (ref 3.5–5.3)
POTASSIUM SERPL-SCNC: 4.9 MMOL/L (ref 3.5–5.3)
RBC # BLD AUTO: 2.46 MILLION/UL (ref 3.88–5.62)
SODIUM SERPL-SCNC: 139 MMOL/L (ref 136–145)
WBC # BLD AUTO: 37.99 THOUSAND/UL (ref 4.31–10.16)

## 2019-09-19 PROCEDURE — 84100 ASSAY OF PHOSPHORUS: CPT | Performed by: INTERNAL MEDICINE

## 2019-09-19 PROCEDURE — 90945 DIALYSIS ONE EVALUATION: CPT | Performed by: INTERNAL MEDICINE

## 2019-09-19 PROCEDURE — 82330 ASSAY OF CALCIUM: CPT | Performed by: INTERNAL MEDICINE

## 2019-09-19 PROCEDURE — 99233 SBSQ HOSP IP/OBS HIGH 50: CPT | Performed by: INTERNAL MEDICINE

## 2019-09-19 PROCEDURE — 90945 DIALYSIS ONE EVALUATION: CPT

## 2019-09-19 PROCEDURE — 94640 AIRWAY INHALATION TREATMENT: CPT

## 2019-09-19 PROCEDURE — 80048 BASIC METABOLIC PNL TOTAL CA: CPT | Performed by: INTERNAL MEDICINE

## 2019-09-19 PROCEDURE — NC001 PR NO CHARGE: Performed by: INTERNAL MEDICINE

## 2019-09-19 PROCEDURE — 85007 BL SMEAR W/DIFF WBC COUNT: CPT | Performed by: NURSE PRACTITIONER

## 2019-09-19 PROCEDURE — 93880 EXTRACRANIAL BILAT STUDY: CPT | Performed by: SURGERY

## 2019-09-19 PROCEDURE — 83735 ASSAY OF MAGNESIUM: CPT | Performed by: INTERNAL MEDICINE

## 2019-09-19 PROCEDURE — 85027 COMPLETE CBC AUTOMATED: CPT | Performed by: NURSE PRACTITIONER

## 2019-09-19 RX ORDER — MAGNESIUM SULFATE 1 G/100ML
1 INJECTION INTRAVENOUS ONCE
Status: COMPLETED | OUTPATIENT
Start: 2019-09-19 | End: 2019-09-19

## 2019-09-19 RX ORDER — MAGNESIUM SULFATE 1 G/100ML
1 INJECTION INTRAVENOUS ONCE
Status: COMPLETED | OUTPATIENT
Start: 2019-09-19 | End: 2019-09-20

## 2019-09-19 RX ORDER — WARFARIN SODIUM 2.5 MG/1
1.25 TABLET ORAL
Status: DISCONTINUED | OUTPATIENT
Start: 2019-09-19 | End: 2019-09-24

## 2019-09-19 RX ADMIN — NOREPINEPHRINE BITARTRATE 12.5 MCG/MIN: 1 INJECTION INTRAVENOUS at 07:55

## 2019-09-19 RX ADMIN — ISODIUM CHLORIDE 3 ML: 0.03 SOLUTION RESPIRATORY (INHALATION) at 04:52

## 2019-09-19 RX ADMIN — MAGNESIUM SULFATE HEPTAHYDRATE 1 G: 1 INJECTION, SOLUTION INTRAVENOUS at 05:37

## 2019-09-19 RX ADMIN — TAMSULOSIN HYDROCHLORIDE 0.4 MG: 0.4 CAPSULE ORAL at 08:44

## 2019-09-19 RX ADMIN — NOREPINEPHRINE BITARTRATE 12.5 MCG/MIN: 1 INJECTION INTRAVENOUS at 02:47

## 2019-09-19 RX ADMIN — CALCIUM GLUCONATE 1 G: 98 INJECTION, SOLUTION INTRAVENOUS at 05:54

## 2019-09-19 RX ADMIN — MELATONIN TAB 3 MG 9 MG: 3 TAB at 22:02

## 2019-09-19 RX ADMIN — LEVOTHYROXINE SODIUM 50 MCG: 50 TABLET ORAL at 05:44

## 2019-09-19 RX ADMIN — MAGNESIUM SULFATE HEPTAHYDRATE 1 G: 1 INJECTION, SOLUTION INTRAVENOUS at 23:25

## 2019-09-19 RX ADMIN — LEVALBUTEROL HYDROCHLORIDE 1.25 MG: 1.25 SOLUTION, CONCENTRATE RESPIRATORY (INHALATION) at 04:51

## 2019-09-19 RX ADMIN — NOREPINEPHRINE BITARTRATE 12.5 MCG/MIN: 1 INJECTION INTRAVENOUS at 12:59

## 2019-09-19 RX ADMIN — FLUTICASONE PROPIONATE 1 SPRAY: 50 SPRAY, METERED NASAL at 08:45

## 2019-09-19 RX ADMIN — ALLOPURINOL 300 MG: 100 TABLET ORAL at 08:44

## 2019-09-19 RX ADMIN — GABAPENTIN 100 MG: 100 CAPSULE ORAL at 21:02

## 2019-09-19 RX ADMIN — ISODIUM CHLORIDE 3 ML: 0.03 SOLUTION RESPIRATORY (INHALATION) at 13:31

## 2019-09-19 RX ADMIN — OCTREOTIDE ACETATE 75 MCG: 100 INJECTION, SOLUTION INTRAVENOUS; SUBCUTANEOUS at 05:44

## 2019-09-19 RX ADMIN — MICONAZOLE NITRATE 1 APPLICATION: 2 CREAM TOPICAL at 08:44

## 2019-09-19 RX ADMIN — Medication 20000 ML: at 06:56

## 2019-09-19 RX ADMIN — FUROSEMIDE 60 MG: 10 INJECTION, SOLUTION INTRAMUSCULAR; INTRAVENOUS at 16:35

## 2019-09-19 RX ADMIN — LEVALBUTEROL HYDROCHLORIDE 1.25 MG: 1.25 SOLUTION, CONCENTRATE RESPIRATORY (INHALATION) at 18:51

## 2019-09-19 RX ADMIN — AMIODARONE HYDROCHLORIDE 200 MG: 200 TABLET ORAL at 08:44

## 2019-09-19 RX ADMIN — WARFARIN SODIUM 1.25 MG: 2.5 TABLET ORAL at 18:21

## 2019-09-19 RX ADMIN — Medication 20000 ML: at 21:59

## 2019-09-19 RX ADMIN — MIDODRINE HYDROCHLORIDE 10 MG: 5 TABLET ORAL at 16:36

## 2019-09-19 RX ADMIN — SODIUM PHOSPHATE, MONOBASIC, MONOHYDRATE 6 MMOL: 276; 142 INJECTION, SOLUTION INTRAVENOUS at 18:22

## 2019-09-19 RX ADMIN — LEVALBUTEROL HYDROCHLORIDE 1.25 MG: 1.25 SOLUTION, CONCENTRATE RESPIRATORY (INHALATION) at 13:30

## 2019-09-19 RX ADMIN — Medication 20000 ML: at 13:14

## 2019-09-19 RX ADMIN — CALCIUM GLUCONATE 1 G: 98 INJECTION, SOLUTION INTRAVENOUS at 23:21

## 2019-09-19 RX ADMIN — ATORVASTATIN CALCIUM 40 MG: 40 TABLET, FILM COATED ORAL at 18:22

## 2019-09-19 RX ADMIN — FUROSEMIDE 60 MG: 10 INJECTION, SOLUTION INTRAMUSCULAR; INTRAVENOUS at 08:43

## 2019-09-19 RX ADMIN — ISODIUM CHLORIDE 3 ML: 0.03 SOLUTION RESPIRATORY (INHALATION) at 18:50

## 2019-09-19 RX ADMIN — QUETIAPINE FUMARATE 12.5 MG: 25 TABLET ORAL at 21:02

## 2019-09-19 RX ADMIN — ASPIRIN 81 MG: 81 TABLET, COATED ORAL at 08:44

## 2019-09-19 RX ADMIN — NOREPINEPHRINE BITARTRATE 12.5 MCG/MIN: 1 INJECTION INTRAVENOUS at 18:30

## 2019-09-19 RX ADMIN — MICONAZOLE NITRATE: 2 CREAM TOPICAL at 18:22

## 2019-09-19 RX ADMIN — MIDODRINE HYDROCHLORIDE 10 MG: 5 TABLET ORAL at 08:44

## 2019-09-19 NOTE — PROGRESS NOTES
Procedure Note - Nephrology   Laura Stovall 80 y o  male MRN: [id number]  Unit/Bed#: ICU 10 Encounter: [id number]    Procedure note-CRRT(86594)  Assessment / Plan:  1  ZULMA on CKD stage 4 likely d/t hypotension in setting of cirrhosis/ascites/CRS  -patient seen and examined by me on CRRT today  - Patient requires levophed gtt to maintain MAP for fluid removal  Will plan for -50ml/hr UF today  Will change to 2K bath as K 4 9   -continue albumin/midodrine/lasix 60mg IV BID    -may stop octreotide  -hernandez placed 9/18 for urinary retention  -HD line placed 9/17  -Reason for CRRT initiation was concern for uremia as well as volume overload in setting of hypotension  -f/u am BMP  2  Hypotension: likely d/t cirrhosis with ascites with decreased SVR  On max dose midodrine  Also on albumin and levo gtt at 12mcg/hr  Goal MAP > 65 for renal perfusion and to allow for fluid removal on CRRT  Titration of pressor per ICU team  3  Anemia in setting of CLL: avoid MELVIN  Monitor CBC  Hgb in high 7s  Wonder if hematology consult would be beneficial  I note rise in WBC to 38 today  4  Left kidney lesion: monitor outpatient  +renal cysts  5  Chronic combined heart failure, EF 50% with mitral stenosis, moderate TR and dilated IVC  -UF as tolerated on CRRT  6  PNA - s/p Abx per primary team   7  Cirrhosis with mild to moderate ascites, likely d/t alcohol and underlying fatty liver - on treatment as above  Consider GI evaluation          Subjective:   Patient seen and examined by me on CRRT at approximately 10:10 a m     /49, UF -50 mL/hr  Patient had Hernandez placed for urinary retention  He is not sure what to do at this time wants to continue current therapy  He is on Levophed drip at 12 mics per hour  Objective:     Vitals: Blood pressure (!) 103/49, pulse 68, temperature (!) 96 1 °F (35 6 °C), resp  rate 15, height 5' 9" (1 753 m), weight 88 8 kg (195 lb 12 3 oz), SpO2 93 %  ,Body mass index is 28 91 kg/m²  Temp (24hrs), Av 4 °F (35 8 °C), Min:95 7 °F (35 4 °C), Max:98 2 °F (36 8 °C)      Weight (last 2 days)     Date/Time   Weight    19 0549   88 8 (195 77)    19 0600   88 1 (194 23)                Intake/Output Summary (Last 24 hours) at 2019 1138  Last data filed at 2019 1100  Gross per 24 hour   Intake 1944 07 ml   Output 3682 ml   Net -1737 93 ml     I/O last 24 hours: In: 2225 9 [P O :480; I V :1145 9; IV Piggyback:600]  Out: 8409 [Urine:789; Other:3419]        Physical Exam:   Physical Exam   Constitutional: He appears well-developed and well-nourished  No distress  Chronically ill appearing   HENT:   Head: Normocephalic and atraumatic  Mouth/Throat: No oropharyngeal exudate  Eyes: Right eye exhibits no discharge  Left eye exhibits no discharge  No scleral icterus  Neck: Neck supple  Cardiovascular: Normal rate, regular rhythm and normal heart sounds  Pulmonary/Chest: Effort normal and breath sounds normal  He has no wheezes  He has no rales  Abdominal: Soft  Bowel sounds are normal  He exhibits no distension  There is no tenderness  Genitourinary:   Genitourinary Comments: +hernandez   Musculoskeletal: Normal range of motion  He exhibits edema (b/l LE)  Neurological: He is alert  awake   Skin: Skin is warm and dry  No rash noted  He is not diaphoretic  Psychiatric: He has a normal mood and affect  His behavior is normal    Vitals reviewed        Invasive Devices     Peripheral Intravenous Line            Peripheral IV 19 Right Arm 2 days    Peripheral IV 19 Left;Proximal;Upper;Ventral (anterior) Arm 1 day          Line            Temporary HD Catheter 1 day          Drain            Urethral Catheter Temperature probe less than 1 day                Medications:    Scheduled Meds:  Current Facility-Administered Medications:  acetaminophen 488 mg Oral Q6H PRN Quinton Mendoza MD    allopurinol 300 mg Oral Daily Quinton Mendoza MD    amiodarone 200 mg Oral Daily Margo Oscar Shelley MD    aspirin 81 mg Oral Daily Zulma Bryson MD    atorvastatin 40 mg Oral After Yogesh Wilson MD    fluticasone 1 spray Each Nare Daily Zulma Bryson MD    furosemide 60 mg Intravenous BID (diuretic) Zulma Bryson MD    gabapentin 100 mg Oral HS Beauty Breath, CRNP    levalbuterol 1 25 mg Nebulization TID Zulma Bryson MD    levothyroxine 50 mcg Oral Early Morning Zulma Bryson MD    melatonin 9 mg Oral HS PRN Zulma Bryson MD    miconazole  Topical BID Zulma Bryson MD    midodrine 10 mg Oral TID AC Zulma Bryson MD    norepinephrine 1-30 mcg/min Intravenous Titrated Beauty Breath, CRNP Last Rate: 12 5 mcg/min (09/19/19 0755)   ondansetron 4 mg Intravenous Q6H PRN Zulma Bryson MD    QUEtiapine 12 5 mg Oral HS Margo Giles MD    sodium chloride 1 spray Each Nare PRN Zulma Bryson MD    sodium chloride 3 mL Nebulization Q6H PRN Zulma Bryson MD    sodium chloride 3 mL Nebulization TID Zulma Bryson MD    tamsulosin 0 4 mg Oral Daily Zulma Bryson MD    warfarin 1 25 mg Oral Daily (warfarin) CT [place]        PRN Meds:   acetaminophen    melatonin    ondansetron    sodium chloride    sodium chloride    Continuous Infusions:  norepinephrine 1-30 mcg/min Last Rate: 12 5 mcg/min (09/19/19 0755)           LAB RESULTS:      Results from last 7 days   Lab Units 09/19/19  0917 09/19/19  0433 09/18/19  2217 09/18/19  1552 09/18/19  0903 09/18/19  0518 09/18/19  0407 09/17/19  2202 09/17/19  0336 09/16/19  0437 09/15/19  0518  09/13/19  0554   WBC Thousand/uL  --  37 99*  --   --   --  37 57*  --   --  21 37* 21 36* 23 12*  --  21 05*   HEMOGLOBIN g/dL  --  7 8*  --   --   --  7 8*  --   --  7 8* 7 6* 8 6*  --  8 2*   HEMATOCRIT %  --  26 5*  --   --   --  25 4*  --   --  24 6* 25 3* 27 9*  --  26 5*   PLATELETS Thousands/uL  --  128*  --   --   --  119*  --   --  106* 106* 102*  --  101*   NEUTROS PCT %  --   --   --   --   --  20*  --   --   --   --   --   --   --    LYMPHS PCT %  --   --   --   --   --  73*  --   --   --   --   --   --   --    LYMPHO PCT %  --   --   --   --   --   --   --   --  60*  --   --   --   --    MONOS PCT %  --   --   --   --   --  6  --   --   --   --   --   --   --    MONO PCT %  --   --   --   --   --   --   --   --  9  --   --   --   --    EOS PCT %  --   --   --   --   --  1  --   --  0  --   --   --   --    POTASSIUM mmol/L 4 9 4 7 4 6 4 6 4 2 4 1 4 2 4 2 4 4 4 8 4 9   < > 4 4   CHLORIDE mmol/L 104 104 103 104 105 104 103 103 104 104 104   < > 105   CO2 mmol/L 24 26 24 25 24 25 24 22 24 22 23   < > 23   BUN mg/dL 49* 54* 65* 80* 95* 102* 110* 131* 135* 125* 120*   < > 113*   CREATININE mg/dL 1 53* 1 55* 1 70* 1 93* 2 26* 2 40* 2 39* 3 11* 2 97* 2 90* 2 65*   < > 2 55*   CALCIUM mg/dL 10 0 9 6 9 7 9 5 9 4 9 5 9 4 9 1 9 0 8 7 9 3   < > 8 7   ALK PHOS U/L  --   --   --   --   --  111  --   --  109 119*  --   --   --    ALT U/L  --   --   --   --   --  27  --   --  36 39  --   --   --    AST U/L  --   --   --   --   --  55*  --   --  54* 64*  --   --   --    MAGNESIUM mg/dL 2 3 1 9 2 0 2 0 2 2  --  2 4 2 5  --   --   --   --   --    PHOSPHORUS mg/dL 2 8 2 8 3 1 3 3 3 5  --  4 1 4 8*  --   --   --   --   --     < > = values in this interval not displayed  CUTURES:  Lab Results   Component Value Date    BLOODCX No Growth After 5 Days  09/07/2019    BLOODCX No Growth After 5 Days  09/07/2019                 Portions of the record may have been created with voice recognition software  Occasional wrong word or "sound a like" substitutions may have occurred due to the inherent limitations of voice recognition software  Read the chart carefully and recognize, using context, where substitutions have occurred  If you have any questions, please contact the dictating provider

## 2019-09-19 NOTE — PROGRESS NOTES
Progress Note - Critical Care   Cleo Stovall 80 y o  male MRN: [id number]  Unit/Bed#: ICU 10 Encounter: [id number]    Assessment/Plan:  1  Acute hypoxic respiratory failure secondary to acute on chronic CHF with reduced ejection fraction  · Improving  Patient's respiratory status is subjectively improved  Continue wean from oxygen as tolerated to maintain saturation greater than 90%  · Volume removal with dialysis  · Aggressive pulmonary toileting  Encourage cough and deep breathing  Use incentive spirometer  · Continue xopenex nebs  2  Acute on chronic CHF with reduced EF  · Echo from this admission shows EF of 50-55%  Volume removal on CVVH  3  Acute kidney injury on CKD stage 4  · Nephrology is following  Creatinine is improving  Remains anuric  Continue CVVH per nephrology recommendations  4  Afib with RVR  · Maintaining a paced sinus rhythm  Continue home amiodarone  · Coumadin was being held due to need for HD line placement  Consider restarting per home dosing now that line is in place  5  Liver cirrhosis, suspect secondary to alcohol and underlying fatty liver disease  · GI is following  Continue octreotide, midodrine and albumin  · No significant ascites on exam   Monitor abdominal exam  6  Hypotension  · Continue levophed with goal to maintain MAP>65 for blood pressure support on CVVH  · On midodrine as above  7  Chronic lymphocytic leukemia type B    _____________________________________________________________________    HPI/24hr events:   Afebrile    No acute events overnight    Medications:    Current Facility-Administered Medications:  acetaminophen 488 mg Oral Q6H PRN Kwadwo Dasilva MD    allopurinol 300 mg Oral Daily Kwadwo Dasilva MD    amiodarone 200 mg Oral Daily Kwadwo Dasilva MD    aspirin 81 mg Oral Daily Kwadwo Dasilva MD    atorvastatin 40 mg Oral After Tex Rogers MD    calcium gluconate 1 G  100 mL IVPB 1 g Intravenous Once Terrance Cross MD Last Rate: 1 g (09/19/19 0554)   fluticasone 1 spray Each Nare Daily Margo Giles MD    furosemide 60 mg Intravenous BID (diuretic) Kwadwo Dasilva MD    gabapentin 100 mg Oral HS CT [facility]aya    levalbuterol 1 25 mg Nebulization TID Kwadwo Dasilva MD    levothyroxine 50 mcg Oral Early Morning Kwadwo Dasilva MD    magnesium sulfate 1 g Intravenous Once Terrance Cross MD    melatonin 9 mg Oral HS PRN Kwadwo Dasilva MD    miconazole  Topical BID Kwadwo Dasilva MD    midodrine 10 mg Oral TID AC Kwadwo Dasilva MD    norepinephrine 1-30 mcg/min Intravenous Titrated CT Amaya Last Rate: 12 5 mcg/min (09/19/19 0247)   NxStage K 4/Ca 3 20,000 mL Dialysis Continuous Dedra MARA Smith DO    octreotide 75 mcg Subcutaneous Q8H Rolando Boss MD    ondansetron 4 mg Intravenous Q6H PRN Margo Giles MD    QUEtiapine 12 5 mg Oral HS Kwadwo Dasilva MD    sodium chloride 1 spray Each Nare PRN Kwadwo Dasilva MD    sodium chloride 3 mL Nebulization Q6H PRN Kwadwo Dasilva MD    sodium chloride 3 mL Nebulization TID Kwadwo Dasilva MD    tamsulosin 0 4 mg Oral Daily Kwadwo Dasilva MD          norepinephrine 1-30 mcg/min Last Rate: 12 5 mcg/min (09/19/19 0247)   NxStage K 4/Ca 3 20,000 mL          Physical exam:  Vitals: Body mass index is 28 91 kg/m²  Blood pressure 149/69, pulse 64, temperature (!) 96 4 °F (35 8 °C), resp  rate (!) 24, height 5' 9" (1 753 m), weight 88 8 kg (195 lb 12 3 oz), SpO2 99 %  ,  Temp  Min: 95 7 °F (35 4 °C)  Max: 99 °F (37 2 °C)  IBW: 70 7 kg    SpO2: 99 %  SpO2 Activity: At Rest  O2 Device: Nasal cannula      Intake/Output Summary (Last 24 hours) at 9/19/2019 7198  Last data filed at 9/19/2019 0600  Gross per 24 hour   Intake 2225 89 ml   Output 3441 ml   Net -1215 11 ml       Invasive/non-invasive ventilation settings:   Respiratory    Lab Data (Last 4 hours)    None         O2/Vent Data (Last 4 hours)    None              Invasive Devices     Peripheral Intravenous Line            Peripheral IV 09/17/19 Right Arm 2 days    Peripheral IV 09/17/19 Left;Proximal;Upper;Ventral (anterior) Arm 1 day          Line            Temporary HD Catheter 1 day          Drain            Urethral Catheter Temperature probe less than 1 day                  Physical Exam:  Gen:  Sleeping but easily arousable, slightly weak appearing, appropriate, no acute distress  HEENT:  Atraumatic, normocephalic, extraocular movements intact, pupils 3 mm equal and reactive, oropharynx clear  Neck:  Supple, trachea midline, no JVD, no lymphadenopathy  Chest:  Diminished bilaterally, no wheeze or rhonchi  Cor:  Single S1/S2, paced rhythm, no murmurs, rubs, gallops  Abd:  Soft, slightly distended, non tender, bowel sounds normoactive  Ext:  2+ bilateral lower extremity edema, no clubbing or cyanosis  Neuro:  Oriented x3, cranial nerves 2-12 grossly intact, no focal deficits  Skin:  Warm, dry      Diagnostic Data:  Lab: I have personally reviewed pertinent lab results  CBC:   Results from last 7 days   Lab Units 09/19/19  0433 09/18/19  0518 09/17/19  0336   WBC Thousand/uL 37 99* 37 57* 21 37*   HEMOGLOBIN g/dL 7 8* 7 8* 7 8*   HEMATOCRIT % 26 5* 25 4* 24 6*   PLATELETS Thousands/uL 128* 119* 106*       CMP:   Results from last 7 days   Lab Units 09/19/19  0433 09/18/19  2217 09/18/19  1552  09/18/19  0518  09/17/19  0336 09/16/19  0437   SODIUM mmol/L 139 139 140   < > 140   < > 139 137   POTASSIUM mmol/L 4 7 4 6 4 6   < > 4 1   < > 4 4 4 8   CHLORIDE mmol/L 104 103 104   < > 104   < > 104 104   CO2 mmol/L 26 24 25   < > 25   < > 24 22   BUN mg/dL 54* 65* 80*   < > 102*   < > 135* 125*   CREATININE mg/dL 1 55* 1 70* 1 93*   < > 2 40*   < > 2 97* 2 90*   CALCIUM mg/dL 9 6 9 7 9 5   < > 9 5   < > 9 0 8 7   ALK PHOS U/L  --   --   --   --  111  --  109 119*   ALT U/L  --   --   --   --  27  --  36 39   AST U/L  --   --   --   --  55*  --  54* 64*    < > = values in this interval not displayed       PT/INR:   No results found for: PT, INR, Magnesium:   Results from last 7 days   Lab Units 09/19/19  0433 09/18/19  2217 09/18/19  1552   MAGNESIUM mg/dL 1 9 2 0 2 0     Phosphorous:   Results from last 7 days   Lab Units 09/19/19  0433 09/18/19  2217 09/18/19  1552   PHOSPHORUS mg/dL 2 8 3 1 3 3       Microbiology:        Imaging:  No new imaging    Cardiac lab/EKG/telemetry/ECHO:   Paced rhythm    VTE Prophylaxis: SCDs    Code Status: Level 1 - Full Code    Hilton Sears Spatzer, CRNP    Portions of the record may have been created with voice recognition software  Occasional wrong word or "sound a like" substitutions may have occurred due to the inherent limitations of voice recognition software  Read the chart carefully and recognize, using context, where substitutions have occurred

## 2019-09-19 NOTE — PHYSICAL THERAPY NOTE
PHYSICAL THERAPY NOTE          Patient Name: Rhea Hodges  [id number] Date: 9/19/2019     Pt now on CVVHD hence will defer PT tx at this time  Will continue to follow as medically appropriate   [place], PT

## 2019-09-19 NOTE — PLAN OF CARE
Problem: Potential for Falls  Goal: Patient will remain free of falls  Description  INTERVENTIONS:  - Assess patient frequently for physical needs  -  Identify cognitive and physical deficits and behaviors that affect risk of falls  -  [place] fall precautions as indicated by assessment   - Educate patient/family on patient safety including physical limitations  - Instruct patient to call for assistance with activity based on assessment  - Modify environment to reduce risk of injury  - Consider OT/PT consult to assist with strengthening/mobility  Outcome: Progressing     Problem: Prexisting or High Potential for Compromised Skin Integrity  Goal: Skin integrity is maintained or improved  Description  INTERVENTIONS:  - Identify patients at risk for skin breakdown  - Assess and monitor skin integrity  - Assess and monitor nutrition and hydration status  - Monitor labs   - Assess for incontinence   - Turn and reposition patient  - Assist with mobility/ambulation  - Relieve pressure over bony prominences  - Avoid friction and shearing  - Provide appropriate hygiene as needed including keeping skin clean and dry  - Evaluate need for skin moisturizer/barrier cream  - Collaborate with interdisciplinary team   - Patient/family teaching  - Consider wound care consult   Outcome: Progressing     Problem: Nutrition/Hydration-ADULT  Goal: Nutrient/Hydration intake appropriate for improving, restoring or maintaining nutritional needs  Description  Monitor and assess patient's nutrition/hydration status for malnutrition  Collaborate with interdisciplinary team and initiate plan and interventions as ordered  Monitor patient's weight and dietary intake as ordered or per policy  Utilize nutrition screening tool and intervene as necessary  Determine patient's food preferences and provide high-protein, high-caloric foods as appropriate       INTERVENTIONS:  - Monitor oral intake, urinary output, labs, and treatment plans  - Assess nutrition and hydration status and recommend course of action  - Evaluate amount of meals eaten  - Assist patient with eating if necessary   - Allow adequate time for meals  - Recommend/ encourage appropriate diets, oral nutritional supplements, and vitamin/mineral supplements  - Order, calculate, and assess calorie counts as needed  - Recommend, monitor, and adjust tube feedings and TPN/PPN based on assessed needs  - Assess need for intravenous fluids  - Provide specific nutrition/hydration education as appropriate  - Include patient/family/caregiver in decisions related to nutrition  Outcome: Progressing     Problem: PAIN - ADULT  Goal: Verbalizes/displays adequate comfort level or baseline comfort level  Description  Interventions:  - Encourage patient to monitor pain and request assistance  - Assess pain using appropriate pain scale  - Administer analgesics based on type and severity of pain and evaluate response  - Implement non-pharmacological measures as appropriate and evaluate response  - Consider cultural and social influences on pain and pain management  - Notify physician/advanced practitioner if interventions unsuccessful or patient reports new pain  Outcome: Progressing     Problem: INFECTION - ADULT  Goal: Absence or prevention of progression during hospitalization  Description  INTERVENTIONS:  - Assess and monitor for signs and symptoms of infection  - Monitor lab/diagnostic results  - Monitor all insertion sites, i e  indwelling lines, tubes, and drains  - Monitor endotracheal if appropriate and nasal secretions for changes in amount and color  - Dawn appropriate cooling/warming therapies per order  - Administer medications as ordered  - Instruct and encourage patient and family to use good hand hygiene technique  - Identify and instruct in appropriate isolation precautions for identified infection/condition  Outcome: Progressing     Problem: DISCHARGE PLANNING  Goal: Discharge to home or other facility with appropriate resources  Description  INTERVENTIONS:  - Identify barriers to discharge w/patient and caregiver  - Arrange for needed discharge resources and transportation as appropriate  - Identify discharge learning needs (meds, wound care, etc )  - Arrange for interpretive services to assist at discharge as needed  - Refer to Case Management Department for coordinating discharge planning if the patient needs post-hospital services based on physician/advanced practitioner order or complex needs related to functional status, cognitive ability, or social support system  Outcome: Progressing     Problem: Knowledge Deficit  Goal: Patient/family/caregiver demonstrates understanding of disease process, treatment plan, medications, and discharge instructions  Description  Complete learning assessment and assess knowledge base    Interventions:  - Provide teaching at level of understanding  - Provide teaching via preferred learning methods  Outcome: Progressing     Problem: RESPIRATORY - ADULT  Goal: Achieves optimal ventilation and oxygenation  Description  INTERVENTIONS:  - Assess for changes in respiratory status  - Assess for changes in mentation and behavior  - Position to facilitate oxygenation and minimize respiratory effort  - Oxygen administered by appropriate delivery if ordered  - Initiate smoking cessation education as indicated  - Encourage broncho-pulmonary hygiene including cough, deep breathe, Incentive Spirometry  - Assess the need for suctioning and aspirate as needed  - Assess and instruct to report SOB or any respiratory difficulty  - Respiratory Therapy support as indicated  Outcome: Progressing     Problem: METABOLIC, FLUID AND ELECTROLYTES - ADULT  Goal: Fluid balance maintained  Description  INTERVENTIONS:  - Monitor labs   - Monitor I/O and WT  - Instruct patient on fluid and nutrition as appropriate  - Assess for signs & symptoms of volume excess or deficit  Outcome: Progressing Problem: SKIN/TISSUE INTEGRITY - ADULT  Goal: Skin integrity remains intact  Description  INTERVENTIONS  - Identify patients at risk for skin breakdown  - Assess and monitor skin integrity  - Assess and monitor nutrition and hydration status  - Monitor labs (i e  albumin)  - Assess for incontinence   - Turn and reposition patient  - Assist with mobility/ambulation  - Relieve pressure over bony prominences  - Avoid friction and shearing  - Provide appropriate hygiene as needed including keeping skin clean and dry  - Evaluate need for skin moisturizer/barrier cream  - Collaborate with interdisciplinary team (i e  Nutrition, Rehabilitation, etc )   - Patient/family teaching  Outcome: Progressing  Goal: Incision(s), wounds(s) or drain site(s) healing without S/S of infection  Description  INTERVENTIONS  - Assess and document risk factors for skin impairment   - Assess and document dressing, incision, wound bed, drain sites and surrounding tissue  - Consider nutrition services referral as needed  - Oral mucous membranes remain intact  - Provide patient/ family education  Outcome: Progressing     Problem: HEMATOLOGIC - ADULT  Goal: Maintains hematologic stability  Description  INTERVENTIONS  - Assess for signs and symptoms of bleeding or hemorrhage  - Monitor labs  - Administer supportive blood products/factors as ordered and appropriate  Outcome: Progressing     Problem: CARDIOVASCULAR - ADULT  Goal: Maintains optimal cardiac output and hemodynamic stability  Description  INTERVENTIONS:  - Monitor I/O, vital signs and rhythm  - Monitor for S/S and trends of decreased cardiac output  - Administer and titrate ordered vasoactive medications to optimize hemodynamic stability  - Assess quality of pulses, skin color and temperature  - Assess for signs of decreased coronary artery perfusion  - Instruct patient to report change in severity of symptoms  Outcome: Progressing  Goal: Absence of cardiac dysrhythmias or at baseline rhythm  Description  INTERVENTIONS:  - Continuous cardiac monitoring, vital signs, obtain 12 lead EKG if ordered  - Administer antiarrhythmic and heart rate control medications as ordered  - Monitor electrolytes and administer replacement therapy as ordered  Outcome: Progressing

## 2019-09-19 NOTE — PROGRESS NOTES
Interval Progress Note - Critical Care   Rodríguez Stovall 80 y o  male MRN: [id number]  Unit/Bed#: ICU 10 Encounter: [id number]    Family Meeting with patient's 2 sons and daughter-in-law  Patient also participating in the meeting  Myself and Dr Radha Hurtado as well as nursing staff and ICU advanced practitioner were present  Discussed current situation  He is requiring significant amounts of Levophed to maintain blood pressure in order to tolerate CVVH  He has previously been on midodrine with low blood pressures  He has a combination of cardio renal syndrome also with a component of cirrhosis of the liver  Unfortunately blood pressure would not be able to support hemodialysis off of vasopressor agents  We discussed the potential options for ongoing treatment  The patient has indicated that he is not interested in long-term dialysis and this would not be a quality of life that he would like to pursue  He is interested to see if he has some renal recovery and improvement on CVVH with regard to his hemodynamic parameters and kidney function  Family also indicated that they would like Dr Balta Park, is long-time family physician to be aware of the situation and to comment with recommendations  They will make contact with Dr Balta Park  Ultimately, options discussed were:  1  Continue short-term CVVH (48-72hrs) to assess for renal and hemodynamic recovery  Likelihood of renal recovery is low however  2  Transition to a palliative approach and potentially hospice with further deterioration  3  Continue CVVH with goal of long-term hemodialysis    All of this option is still being considered, the patient has indicated that he does not have interest in long-term dialysis    Kirby Peterson MD

## 2019-09-20 ENCOUNTER — TELEPHONE (OUTPATIENT)
Dept: INTERNAL MEDICINE CLINIC | Facility: CLINIC | Age: 84
End: 2019-09-20

## 2019-09-20 ENCOUNTER — APPOINTMENT (INPATIENT)
Dept: NON INVASIVE DIAGNOSTICS | Facility: HOSPITAL | Age: 84
DRG: 987 | End: 2019-09-20
Payer: MEDICARE

## 2019-09-20 LAB
ANION GAP SERPL CALCULATED.3IONS-SCNC: 8 MMOL/L (ref 4–13)
ANION GAP SERPL CALCULATED.3IONS-SCNC: 9 MMOL/L (ref 4–13)
BUN SERPL-MCNC: 22 MG/DL (ref 5–25)
BUN SERPL-MCNC: 24 MG/DL (ref 5–25)
BUN SERPL-MCNC: 26 MG/DL (ref 5–25)
BUN SERPL-MCNC: 31 MG/DL (ref 5–25)
CA-I BLD-SCNC: 1.18 MMOL/L (ref 1.12–1.32)
CA-I BLD-SCNC: 1.18 MMOL/L (ref 1.12–1.32)
CA-I BLD-SCNC: 1.19 MMOL/L (ref 1.12–1.32)
CA-I BLD-SCNC: 1.2 MMOL/L (ref 1.12–1.32)
CALCIUM SERPL-MCNC: 9 MG/DL (ref 8.3–10.1)
CALCIUM SERPL-MCNC: 9.1 MG/DL (ref 8.3–10.1)
CALCIUM SERPL-MCNC: 9.2 MG/DL (ref 8.3–10.1)
CALCIUM SERPL-MCNC: 9.2 MG/DL (ref 8.3–10.1)
CHLORIDE SERPL-SCNC: 103 MMOL/L (ref 100–108)
CHLORIDE SERPL-SCNC: 103 MMOL/L (ref 100–108)
CHLORIDE SERPL-SCNC: 104 MMOL/L (ref 100–108)
CHLORIDE SERPL-SCNC: 104 MMOL/L (ref 100–108)
CO2 SERPL-SCNC: 25 MMOL/L (ref 21–32)
CO2 SERPL-SCNC: 26 MMOL/L (ref 21–32)
CO2 SERPL-SCNC: 26 MMOL/L (ref 21–32)
CO2 SERPL-SCNC: 27 MMOL/L (ref 21–32)
CREAT SERPL-MCNC: 1.29 MG/DL (ref 0.6–1.3)
CREAT SERPL-MCNC: 1.32 MG/DL (ref 0.6–1.3)
CREAT SERPL-MCNC: 1.32 MG/DL (ref 0.6–1.3)
CREAT SERPL-MCNC: 1.34 MG/DL (ref 0.6–1.3)
ERYTHROCYTE [DISTWIDTH] IN BLOOD BY AUTOMATED COUNT: 19.1 % (ref 11.6–15.1)
GFR SERPL CREATININE-BSD FRML MDRD: 48 ML/MIN/1.73SQ M
GFR SERPL CREATININE-BSD FRML MDRD: 49 ML/MIN/1.73SQ M
GFR SERPL CREATININE-BSD FRML MDRD: 49 ML/MIN/1.73SQ M
GFR SERPL CREATININE-BSD FRML MDRD: 50 ML/MIN/1.73SQ M
GLUCOSE SERPL-MCNC: 118 MG/DL (ref 65–140)
GLUCOSE SERPL-MCNC: 128 MG/DL (ref 65–140)
GLUCOSE SERPL-MCNC: 131 MG/DL (ref 65–140)
GLUCOSE SERPL-MCNC: 144 MG/DL (ref 65–140)
HCT VFR BLD AUTO: 23.7 % (ref 36.5–49.3)
HGB BLD-MCNC: 7 G/DL (ref 12–17)
MAGNESIUM SERPL-MCNC: 1.9 MG/DL (ref 1.6–2.6)
MAGNESIUM SERPL-MCNC: 2 MG/DL (ref 1.6–2.6)
MAGNESIUM SERPL-MCNC: 2.1 MG/DL (ref 1.6–2.6)
MAGNESIUM SERPL-MCNC: 2.1 MG/DL (ref 1.6–2.6)
MCH RBC QN AUTO: 31.7 PG (ref 26.8–34.3)
MCHC RBC AUTO-ENTMCNC: 29.5 G/DL (ref 31.4–37.4)
MCV RBC AUTO: 107 FL (ref 82–98)
PHOSPHATE SERPL-MCNC: 2.4 MG/DL (ref 2.3–4.1)
PHOSPHATE SERPL-MCNC: 2.5 MG/DL (ref 2.3–4.1)
PHOSPHATE SERPL-MCNC: 2.5 MG/DL (ref 2.3–4.1)
PHOSPHATE SERPL-MCNC: 2.8 MG/DL (ref 2.3–4.1)
PLATELET # BLD AUTO: 103 THOUSANDS/UL (ref 149–390)
PMV BLD AUTO: 11.4 FL (ref 8.9–12.7)
POTASSIUM SERPL-SCNC: 3.7 MMOL/L (ref 3.5–5.3)
POTASSIUM SERPL-SCNC: 3.8 MMOL/L (ref 3.5–5.3)
POTASSIUM SERPL-SCNC: 3.8 MMOL/L (ref 3.5–5.3)
POTASSIUM SERPL-SCNC: 4.8 MMOL/L (ref 3.5–5.3)
RBC # BLD AUTO: 2.21 MILLION/UL (ref 3.88–5.62)
SODIUM SERPL-SCNC: 137 MMOL/L (ref 136–145)
SODIUM SERPL-SCNC: 138 MMOL/L (ref 136–145)
SODIUM SERPL-SCNC: 139 MMOL/L (ref 136–145)
SODIUM SERPL-SCNC: 139 MMOL/L (ref 136–145)
WBC # BLD AUTO: 23.93 THOUSAND/UL (ref 4.31–10.16)

## 2019-09-20 PROCEDURE — 90945 DIALYSIS ONE EVALUATION: CPT

## 2019-09-20 PROCEDURE — 94760 N-INVAS EAR/PLS OXIMETRY 1: CPT

## 2019-09-20 PROCEDURE — 80048 BASIC METABOLIC PNL TOTAL CA: CPT | Performed by: INTERNAL MEDICINE

## 2019-09-20 PROCEDURE — 83735 ASSAY OF MAGNESIUM: CPT | Performed by: INTERNAL MEDICINE

## 2019-09-20 PROCEDURE — 99233 SBSQ HOSP IP/OBS HIGH 50: CPT | Performed by: INTERNAL MEDICINE

## 2019-09-20 PROCEDURE — 84100 ASSAY OF PHOSPHORUS: CPT | Performed by: INTERNAL MEDICINE

## 2019-09-20 PROCEDURE — 85027 COMPLETE CBC AUTOMATED: CPT | Performed by: INTERNAL MEDICINE

## 2019-09-20 PROCEDURE — 94640 AIRWAY INHALATION TREATMENT: CPT

## 2019-09-20 PROCEDURE — 93970 EXTREMITY STUDY: CPT

## 2019-09-20 PROCEDURE — 90945 DIALYSIS ONE EVALUATION: CPT | Performed by: INTERNAL MEDICINE

## 2019-09-20 PROCEDURE — 90945 DIALYSIS ONE EVALUATION: CPT | Performed by: NURSE PRACTITIONER

## 2019-09-20 PROCEDURE — 82330 ASSAY OF CALCIUM: CPT | Performed by: INTERNAL MEDICINE

## 2019-09-20 RX ORDER — POTASSIUM CHLORIDE 29.8 MG/ML
40 INJECTION INTRAVENOUS ONCE
Status: DISCONTINUED | OUTPATIENT
Start: 2019-09-20 | End: 2019-09-20

## 2019-09-20 RX ORDER — MAGNESIUM SULFATE 1 G/100ML
1 INJECTION INTRAVENOUS ONCE
Status: COMPLETED | OUTPATIENT
Start: 2019-09-20 | End: 2019-09-20

## 2019-09-20 RX ORDER — POTASSIUM CHLORIDE 29.8 MG/ML
40 INJECTION INTRAVENOUS ONCE
Status: COMPLETED | OUTPATIENT
Start: 2019-09-20 | End: 2019-09-20

## 2019-09-20 RX ORDER — POTASSIUM CHLORIDE 29.8 MG/ML
40 INJECTION INTRAVENOUS ONCE
Status: COMPLETED | OUTPATIENT
Start: 2019-09-20 | End: 2019-09-21

## 2019-09-20 RX ADMIN — CALCIUM GLUCONATE 1 G: 98 INJECTION, SOLUTION INTRAVENOUS at 12:10

## 2019-09-20 RX ADMIN — LEVOTHYROXINE SODIUM 50 MCG: 50 TABLET ORAL at 05:56

## 2019-09-20 RX ADMIN — MICONAZOLE NITRATE: 2 CREAM TOPICAL at 17:48

## 2019-09-20 RX ADMIN — ISODIUM CHLORIDE 3 ML: 0.03 SOLUTION RESPIRATORY (INHALATION) at 19:17

## 2019-09-20 RX ADMIN — FUROSEMIDE 60 MG: 10 INJECTION, SOLUTION INTRAMUSCULAR; INTRAVENOUS at 16:52

## 2019-09-20 RX ADMIN — SODIUM PHOSPHATE, MONOBASIC, MONOHYDRATE 6 MMOL: 276; 142 INJECTION, SOLUTION INTRAVENOUS at 17:51

## 2019-09-20 RX ADMIN — TAMSULOSIN HYDROCHLORIDE 0.4 MG: 0.4 CAPSULE ORAL at 08:41

## 2019-09-20 RX ADMIN — GABAPENTIN 100 MG: 100 CAPSULE ORAL at 21:40

## 2019-09-20 RX ADMIN — CALCIUM GLUCONATE 1 G: 98 INJECTION, SOLUTION INTRAVENOUS at 22:10

## 2019-09-20 RX ADMIN — ASPIRIN 81 MG: 81 TABLET, COATED ORAL at 08:41

## 2019-09-20 RX ADMIN — ISODIUM CHLORIDE 3 ML: 0.03 SOLUTION RESPIRATORY (INHALATION) at 13:41

## 2019-09-20 RX ADMIN — QUETIAPINE FUMARATE 12.5 MG: 25 TABLET ORAL at 21:40

## 2019-09-20 RX ADMIN — FLUTICASONE PROPIONATE 1 SPRAY: 50 SPRAY, METERED NASAL at 08:43

## 2019-09-20 RX ADMIN — POTASSIUM CHLORIDE 40 MEQ: 400 INJECTION, SOLUTION INTRAVENOUS at 05:52

## 2019-09-20 RX ADMIN — Medication 20000 ML: at 06:22

## 2019-09-20 RX ADMIN — CALCIUM GLUCONATE 1 G: 98 INJECTION, SOLUTION INTRAVENOUS at 05:31

## 2019-09-20 RX ADMIN — MAGNESIUM SULFATE HEPTAHYDRATE 1 G: 1 INJECTION, SOLUTION INTRAVENOUS at 12:10

## 2019-09-20 RX ADMIN — AMIODARONE HYDROCHLORIDE 200 MG: 200 TABLET ORAL at 08:41

## 2019-09-20 RX ADMIN — LEVALBUTEROL HYDROCHLORIDE 1.25 MG: 1.25 SOLUTION, CONCENTRATE RESPIRATORY (INHALATION) at 07:35

## 2019-09-20 RX ADMIN — POTASSIUM CHLORIDE 40 MEQ: 400 INJECTION, SOLUTION INTRAVENOUS at 17:47

## 2019-09-20 RX ADMIN — Medication 20000 ML: at 10:42

## 2019-09-20 RX ADMIN — MICONAZOLE NITRATE 1 APPLICATION: 2 CREAM TOPICAL at 08:53

## 2019-09-20 RX ADMIN — ATORVASTATIN CALCIUM 40 MG: 40 TABLET, FILM COATED ORAL at 17:44

## 2019-09-20 RX ADMIN — LEVALBUTEROL HYDROCHLORIDE 1.25 MG: 1.25 SOLUTION, CONCENTRATE RESPIRATORY (INHALATION) at 19:17

## 2019-09-20 RX ADMIN — SODIUM PHOSPHATE, MONOBASIC, MONOHYDRATE 6 MMOL: 276; 142 INJECTION, SOLUTION INTRAVENOUS at 06:08

## 2019-09-20 RX ADMIN — ISODIUM CHLORIDE 3 ML: 0.03 SOLUTION RESPIRATORY (INHALATION) at 07:35

## 2019-09-20 RX ADMIN — MIDODRINE HYDROCHLORIDE 10 MG: 5 TABLET ORAL at 12:10

## 2019-09-20 RX ADMIN — NOREPINEPHRINE BITARTRATE 7 MCG/MIN: 1 INJECTION INTRAVENOUS at 02:03

## 2019-09-20 RX ADMIN — WARFARIN SODIUM 1.25 MG: 2.5 TABLET ORAL at 17:44

## 2019-09-20 RX ADMIN — ALLOPURINOL 300 MG: 100 TABLET ORAL at 08:41

## 2019-09-20 RX ADMIN — FUROSEMIDE 60 MG: 10 INJECTION, SOLUTION INTRAMUSCULAR; INTRAVENOUS at 08:38

## 2019-09-20 RX ADMIN — LEVALBUTEROL HYDROCHLORIDE 1.25 MG: 1.25 SOLUTION, CONCENTRATE RESPIRATORY (INHALATION) at 13:41

## 2019-09-20 RX ADMIN — MIDODRINE HYDROCHLORIDE 10 MG: 5 TABLET ORAL at 06:00

## 2019-09-20 RX ADMIN — SODIUM PHOSPHATE, MONOBASIC, MONOHYDRATE 6 MMOL: 276; 142 INJECTION, SOLUTION INTRAVENOUS at 12:10

## 2019-09-20 RX ADMIN — MIDODRINE HYDROCHLORIDE 10 MG: 5 TABLET ORAL at 16:52

## 2019-09-20 NOTE — WOUND OSTOMY CARE
Progress Note - Wound   Clayburn Gabino Stovall 80 y o  male MRN: [id number]  Unit/Bed#: ICU 10 Encounter: [id number]        Assessment:   Patient seen for wound care follow-up  Denies pain  Assessed while in bed on CVVH  [place] catheter intact  Continent of stool  Nutrition team following  Bruising to bilateral upper extremities and lower back  Left upper extremity severe edema with serous fluid filled blister to forearm  Epidermis extremely fragile with loss of subcutaneous tissue  Bilateral heels intact  Bilateral buttocks intact per primary RN--did not assess at this time  1   Present on admission partial thickness skin tears to right medial and lateral hand--RESOLVED  2   Present on admission skin tear with hematoma vs abscess to right posterior forearm--patient reports having a skin tear and "goose egg" to this area status post a recent fall  Center of wound originally with black jelly-like consistency/fluctuance  Debrided by surgery team on 9/14/2019  Wound bed is not dry and black  Periwound intact, dry, flaky  Patient follows with Dr Kaufman Doing in [facility] center for right arm tears  Last seen 9/6/2019  See flowsheet for wound details  Plan:   1-Hydraguard lotion to bilateral sacrum, buttock and heels BID and PRN  2-Elevate heels to offload pressure  3-EHOB offloading cushion in chair when out of bed  4-Moisturize skin daily with skin nourishing cream   5-Turn/reposition q2h or when medically stable for pressure re-distribution on skin  Use positioning wedges  6-Right forearm wound--cleanse with saline  Apply dermagran and DSD  Wrap with luis fernando  Change dressing every other day  7-Left arm tears--cleanse with saline  Apply adaptic to entire forearm covering tears and blister  Cover with Maxorb and ABD  Wrap with luis fernando daily  8-Elevate left arm on pillows  9-Wound care team to follow      Plan of care reviewed with primary RN      Elvin Mciknney made aware of left upper extremity swelling, weeping, blister, bruising--to order venous duplex  Wound 09/07/19 Skin tear Skin tear Arm Right (Active)   Wound Image   9/20/2019  3:36 PM   Wound Description Black;Dry 9/20/2019  3:36 PM   Frances-wound Assessment Purple;Fragile 9/20/2019  3:36 PM   Wound Length (cm) 2 6 cm 9/20/2019  3:36 PM   Wound Width (cm) 1 cm 9/20/2019  3:36 PM   Wound Depth (cm) 0 9/20/2019  3:36 PM   Calculated Wound Area (cm^2) 2 6 cm^2 9/20/2019  3:36 PM   Calculated Wound Volume (cm^3) 0 cm^3 9/20/2019  3:36 PM   Change in Wound Size % 100 9/20/2019  3:36 PM   Tunneling 0 cm 9/20/2019  3:36 PM   Undermining 0 9/20/2019  3:36 PM   Closure Open to air 9/17/2019  3:05 AM   Drainage Amount None 9/20/2019  3:36 PM   Drainage Description Bloody 9/17/2019  3:05 AM   Non-staged Wound Description Not applicable 5/48/3152  0:32 PM   Treatments Cleansed;Irrigation with NSS 9/20/2019  3:36 PM   Dressing Dermagran gauze;Dry dressing 9/20/2019  3:36 PM   Wound packed? Yes 9/17/2019  3:05 AM   Packing- # inserted 1 9/17/2019  3:05 AM   Dressing Changed Changed 9/20/2019  3:36 PM   Patient Tolerance Tolerated well 9/20/2019  3:36 PM   Dressing Status Clean;Dry; Intact 9/20/2019  3:36 PM       Wound 09/18/19 Skin tear Skin tear Arm Left; Lower (Active)   Wound Image   9/20/2019  3:34 PM   Wound Description Beefy red 9/20/2019  3:34 PM   Frances-wound Assessment Edema;Purple;Fragile 9/20/2019  3:34 PM   Wound Length (cm) 1 3 cm 9/20/2019  3:34 PM   Wound Width (cm) 0 3 cm 9/20/2019  3:34 PM   Wound Depth (cm) 0 1 9/20/2019  3:34 PM   Calculated Wound Area (cm^2) 0 39 cm^2 9/20/2019  3:34 PM   Calculated Wound Volume (cm^3) 0 04 cm^3 9/20/2019  3:34 PM   Tunneling 0 cm 9/20/2019  3:34 PM   Undermining 0 9/20/2019  3:34 PM   Drainage Amount Moderate 9/20/2019  3:34 PM   Drainage Description Serosanguineous 9/20/2019  3:34 PM   Non-staged Wound Description Partial thickness 9/20/2019  3:34 PM   Treatments Cleansed 9/20/2019  3:34 PM Dressing Non adherent;Calcium Alginate;ABD;Dry dressing 9/20/2019  3:34 PM   Dressing Changed Changed 9/20/2019  3:34 PM   Patient Tolerance Tolerated well 9/20/2019  3:34 PM   Dressing Status Clean;Dry; Intact 9/20/2019  3:34 PM     Lexis MARTINN, RN, Marija Quiñones

## 2019-09-20 NOTE — PROGRESS NOTES
Progress Note - Critical Care   Mayuri Stovall 80 y o  male MRN: [id number]  Unit/Bed#: ICU 10 Encounter: [id number]    Assessment/Plan:  1  Acute hypoxic respiratory failure secondary to acute on chronic CHF with reduced ejection fraction  · Respiratory status is fairly stable  Continue volume removal with dialysis  · Aggressive pulmonary toileting, encourage cough and deep breathing  Use incentive spirometer  · Xopenex nebs tid  2  Acute on chronic CHF with reduced EF  · Echo from this admission shows EF of 50-55%  Appears volume overloaded on exam   Continue volume removal with dialysis  3  Acute kidney injury on chronic kidney disease stage 4  · Nephrology following  Continue CRRT per their recommendations  Creatinine is improving  Remains essentially anuric  · Per family discussion plan to continue dialysis for the next couple of days with hope for recovery but patient would not want long-term dialysis  4  AFib with RVR  · Intended sinus rhythm  Continue home amiodarone  · Home coumadin was restarted  Trend INR  5  Liver cirrhosis, suspect secondary to alcohol and underlying fatty liver disease  · GI following  No significant ascites on exam     6  Hypotension  · Hemodynamics are slowly improving  Levophed is being weaned down  Continue wean as tolerated to maintain map greater than 65  · Continue midodrine  7  Chronic lymphocytic leukemia type B    _____________________________________________________________________    HPI/24hr events:   Afebrile    No acute events overnight    Medications:    Current Facility-Administered Medications:  acetaminophen 488 mg Oral Q6H PRN Meme Barahona MD    allopurinol 300 mg Oral Daily Meme Barahona MD    amiodarone 200 mg Oral Daily Meme Barahona MD    aspirin 81 mg Oral Daily Meme Barahona MD    atorvastatin 40 mg Oral After Robert Camarena MD    calcium gluconate 1 G  100 mL IVPB 1 g Intravenous Once Brad Poon MD Last Rate: 1 g (09/20/19 0531) fluticasone 1 spray Each Nare Daily Margo Giles MD    furosemide 60 mg Intravenous BID (diuretic) Maria Victoria Coburn MD    gabapentin 100 mg Oral HS CT Osorio    levalbuterol 1 25 mg Nebulization TID Maria Victoria Coburn MD    levothyroxine 50 mcg Oral Early Morning Maria Victoria Coburn MD    melatonin 9 mg Oral HS PRN Maria Victoria Coburn MD    miconazole  Topical BID Maria Victoria Coburn MD    midodrine 10 mg Oral TID AC Maria Victoria Coburn MD    norepinephrine 1-30 mcg/min Intravenous Titrated CT Osorio Last Rate: 5 mcg/min (09/20/19 0600)   NxStage K 2/Ca 3 20,000 mL Dialysis Continuous Dedra MARA Smith DO    ondansetron 4 mg Intravenous Q6H PRN Maria Victoria Coburn MD    potassium chloride 40 mEq Intravenous Once Yoana Hi MD    QUEtiapine 12 5 mg Oral HS Maria Victoria Coburn MD    sodium chloride 1 spray Each Nare PRN Maria Victoria Coburn MD    sodium chloride 3 mL Nebulization Q6H PRN Maria Victoria Coburn MD    sodium chloride 3 mL Nebulization TID Maria Victoria Coburn MD    sodium phosphate 6 mmol Intravenous Once Yoana Hi MD Last Rate: 6 mmol (09/20/19 4566)   tamsulosin 0 4 mg Oral Daily Maria Victoria Coburn MD    warfarin 1 25 mg Oral Daily (warfarin) CT Pineda          norepinephrine 1-30 mcg/min Last Rate: 5 mcg/min (09/20/19 0600)   NxStage K 2/Ca 3 20,000 mL          Physical exam:  Vitals: Body mass index is 29 27 kg/m²  Blood pressure (!) 128/48, pulse 60, temperature (!) 96 4 °F (35 8 °C), resp  rate 17, height 5' 9" (1 753 m), weight 89 9 kg (198 lb 3 1 oz), SpO2 95 %  ,  Temp  Min: 95 7 °F (35 4 °C)  Max: 99 °F (37 2 °C)  IBW: 70 7 kg    SpO2: 95 %  SpO2 Activity: At Rest  O2 Device: Nasal cannula      Intake/Output Summary (Last 24 hours) at 9/20/2019 269  Last data filed at 9/20/2019 0600  Gross per 24 hour   Intake 1589 74 ml   Output 2845 ml   Net -1255 26 ml       Invasive/non-invasive ventilation settings:   Respiratory    Lab Data (Last 4 hours)    None         O2/Vent Data (Last 4 hours)    None Invasive Devices     Peripheral Intravenous Line            Peripheral IV 09/17/19 Left;Proximal;Upper;Ventral (anterior) Arm 2 days          Line            Temporary HD Catheter 2 days          Drain            Urethral Catheter Temperature probe 1 day                  Physical Exam:  Gen:  Sleeping but easily arousable, appropriate, slightly weak appearing  HEENT:  Atraumatic, normocephalic, extraocular movements intact, pupils 3 mm equal and reactive, oropharynx clear  Neck:  Supple, trachea midline, no JVD, no lymphadenopathy  Chest:  Diminished with mild expiratory wheezing bilaterally, no rhonchi  Cor:  Single S1/S2, no murmurs, rubs, gallops, paced rhythm  Abd:  Soft, mildly distended, nontender to palpation, bowel sounds normoactive  Ext:  Anasarca of the extremities, no clubbing or cyanosis  Neuro:  Oriented x3, cranial nerves 2-12 grossly intact, no focal deficits  Skin:  Warm, dry      Diagnostic Data:  Lab: I have personally reviewed pertinent lab results  CBC:   Results from last 7 days   Lab Units 09/20/19  0420 09/19/19  0433 09/18/19  0518   WBC Thousand/uL 23 93* 37 99* 37 57*   HEMOGLOBIN g/dL 7 0* 7 8* 7 8*   HEMATOCRIT % 23 7* 26 5* 25 4*   PLATELETS Thousands/uL 103* 128* 119*       CMP:   Results from last 7 days   Lab Units 09/20/19  0420 09/19/19  2219 09/19/19  1635  09/18/19  0518  09/17/19  0336 09/16/19  0437   SODIUM mmol/L 139 139 139   < > 140   < > 139 137   POTASSIUM mmol/L 3 7 4 2 4 1   < > 4 1   < > 4 4 4 8   CHLORIDE mmol/L 104 104 104   < > 104   < > 104 104   CO2 mmol/L 26 25 27   < > 25   < > 24 22   BUN mg/dL 31* 35* 40*   < > 102*   < > 135* 125*   CREATININE mg/dL 1 29 1 40* 1 40*   < > 2 40*   < > 2 97* 2 90*   CALCIUM mg/dL 9 1 9 1 9 2   < > 9 5   < > 9 0 8 7   ALK PHOS U/L  --   --   --   --  111  --  109 119*   ALT U/L  --   --   --   --  27  --  36 39   AST U/L  --   --   --   --  55*  --  54* 64*    < > = values in this interval not displayed       PT/INR:   No results found for: PT, INR,   Magnesium:   Results from last 7 days   Lab Units 09/20/19  0420 09/19/19  2219 09/19/19  1635   MAGNESIUM mg/dL 2 1 1 8 2 0     Phosphorous:   Results from last 7 days   Lab Units 09/20/19  0420 09/19/19  2219 09/19/19  1635   PHOSPHORUS mg/dL 2 5 2 7 2 3       Microbiology:        Imaging:  No new imaging    Cardiac lab/EKG/telemetry/ECHO:   Paced rhythm    VTE Prophylaxis: Coumadin, SCDs    Code Status: Level 1 - Full Code    Marrion Spanish Spatzer, CRNP    Portions of the record may have been created with voice recognition software  Occasional wrong word or "sound a like" substitutions may have occurred due to the inherent limitations of voice recognition software  Read the chart carefully and recognize, using context, where substitutions have occurred

## 2019-09-20 NOTE — PLAN OF CARE
Problem: Potential for Falls  Goal: Patient will remain free of falls  Description  INTERVENTIONS:  - Assess patient frequently for physical needs  -  Identify cognitive and physical deficits and behaviors that affect risk of falls  -  Steen fall precautions as indicated by assessment   - Educate patient/family on patient safety including physical limitations  - Instruct patient to call for assistance with activity based on assessment  - Modify environment to reduce risk of injury  - Consider OT/PT consult to assist with strengthening/mobility  Outcome: Progressing     Problem: Prexisting or High Potential for Compromised Skin Integrity  Goal: Skin integrity is maintained or improved  Description  INTERVENTIONS:  - Identify patients at risk for skin breakdown  - Assess and monitor skin integrity  - Assess and monitor nutrition and hydration status  - Monitor labs   - Assess for incontinence   - Turn and reposition patient  - Assist with mobility/ambulation  - Relieve pressure over bony prominences  - Avoid friction and shearing  - Provide appropriate hygiene as needed including keeping skin clean and dry  - Evaluate need for skin moisturizer/barrier cream  - Collaborate with interdisciplinary team   - Patient/family teaching  - Consider wound care consult   Outcome: Progressing     Problem: Nutrition/Hydration-ADULT  Goal: Nutrient/Hydration intake appropriate for improving, restoring or maintaining nutritional needs  Description  Monitor and assess patient's nutrition/hydration status for malnutrition  Collaborate with interdisciplinary team and initiate plan and interventions as ordered  Monitor patient's weight and dietary intake as ordered or per policy  Utilize nutrition screening tool and intervene as necessary  Determine patient's food preferences and provide high-protein, high-caloric foods as appropriate       INTERVENTIONS:  - Monitor oral intake, urinary output, labs, and treatment plans  - Assess nutrition and hydration status and recommend course of action  - Evaluate amount of meals eaten  - Assist patient with eating if necessary   - Allow adequate time for meals  - Recommend/ encourage appropriate diets, oral nutritional supplements, and vitamin/mineral supplements  - Order, calculate, and assess calorie counts as needed  - Recommend, monitor, and adjust tube feedings and TPN/PPN based on assessed needs  - Assess need for intravenous fluids  - Provide specific nutrition/hydration education as appropriate  - Include patient/family/caregiver in decisions related to nutrition  Outcome: Progressing     Problem: PAIN - ADULT  Goal: Verbalizes/displays adequate comfort level or baseline comfort level  Description  Interventions:  - Encourage patient to monitor pain and request assistance  - Assess pain using appropriate pain scale  - Administer analgesics based on type and severity of pain and evaluate response  - Implement non-pharmacological measures as appropriate and evaluate response  - Consider cultural and social influences on pain and pain management  - Notify physician/advanced practitioner if interventions unsuccessful or patient reports new pain  Outcome: Progressing     Problem: INFECTION - ADULT  Goal: Absence or prevention of progression during hospitalization  Description  INTERVENTIONS:  - Assess and monitor for signs and symptoms of infection  - Monitor lab/diagnostic results  - Monitor all insertion sites, i e  indwelling lines, tubes, and drains  - Monitor endotracheal if appropriate and nasal secretions for changes in amount and color  - [place] appropriate cooling/warming therapies per order  - Administer medications as ordered  - Instruct and encourage patient and family to use good hand hygiene technique  - Identify and instruct in appropriate isolation precautions for identified infection/condition  Outcome: Progressing     Problem: DISCHARGE PLANNING  Goal: Discharge to home or other facility with appropriate resources  Description  INTERVENTIONS:  - Identify barriers to discharge w/patient and caregiver  - Arrange for needed discharge resources and transportation as appropriate  - Identify discharge learning needs (meds, wound care, etc )  - Arrange for interpretive services to assist at discharge as needed  - Refer to Case Management Department for coordinating discharge planning if the patient needs post-hospital services based on physician/advanced practitioner order or complex needs related to functional status, cognitive ability, or social support system  Outcome: Progressing     Problem: Knowledge Deficit  Goal: Patient/family/caregiver demonstrates understanding of disease process, treatment plan, medications, and discharge instructions  Description  Complete learning assessment and assess knowledge base    Interventions:  - Provide teaching at level of understanding  - Provide teaching via preferred learning methods  Outcome: Progressing     Problem: RESPIRATORY - ADULT  Goal: Achieves optimal ventilation and oxygenation  Description  INTERVENTIONS:  - Assess for changes in respiratory status  - Assess for changes in mentation and behavior  - Position to facilitate oxygenation and minimize respiratory effort  - Oxygen administered by appropriate delivery if ordered  - Initiate smoking cessation education as indicated  - Encourage broncho-pulmonary hygiene including cough, deep breathe, Incentive Spirometry  - Assess the need for suctioning and aspirate as needed  - Assess and instruct to report SOB or any respiratory difficulty  - Respiratory Therapy support as indicated  Outcome: Progressing     Problem: METABOLIC, FLUID AND ELECTROLYTES - ADULT  Goal: Fluid balance maintained  Description  INTERVENTIONS:  - Monitor labs   - Monitor I/O and WT  - Instruct patient on fluid and nutrition as appropriate  - Assess for signs & symptoms of volume excess or deficit  Outcome: Progressing Problem: SKIN/TISSUE INTEGRITY - ADULT  Goal: Skin integrity remains intact  Description  INTERVENTIONS  - Identify patients at risk for skin breakdown  - Assess and monitor skin integrity  - Assess and monitor nutrition and hydration status  - Monitor labs (i e  albumin)  - Assess for incontinence   - Turn and reposition patient  - Assist with mobility/ambulation  - Relieve pressure over bony prominences  - Avoid friction and shearing  - Provide appropriate hygiene as needed including keeping skin clean and dry  - Evaluate need for skin moisturizer/barrier cream  - Collaborate with interdisciplinary team (i e  Nutrition, Rehabilitation, etc )   - Patient/family teaching  Outcome: Progressing  Goal: Incision(s), wounds(s) or drain site(s) healing without S/S of infection  Description  INTERVENTIONS  - Assess and document risk factors for skin impairment   - Assess and document dressing, incision, wound bed, drain sites and surrounding tissue  - Consider nutrition services referral as needed  - Oral mucous membranes remain intact  - Provide patient/ family education  Outcome: Progressing     Problem: HEMATOLOGIC - ADULT  Goal: Maintains hematologic stability  Description  INTERVENTIONS  - Assess for signs and symptoms of bleeding or hemorrhage  - Monitor labs  - Administer supportive blood products/factors as ordered and appropriate  Outcome: Progressing     Problem: CARDIOVASCULAR - ADULT  Goal: Maintains optimal cardiac output and hemodynamic stability  Description  INTERVENTIONS:  - Monitor I/O, vital signs and rhythm  - Monitor for S/S and trends of decreased cardiac output  - Administer and titrate ordered vasoactive medications to optimize hemodynamic stability  - Assess quality of pulses, skin color and temperature  - Assess for signs of decreased coronary artery perfusion  - Instruct patient to report change in severity of symptoms  Outcome: Progressing  Goal: Absence of cardiac dysrhythmias or at baseline rhythm  Description  INTERVENTIONS:  - Continuous cardiac monitoring, vital signs, obtain 12 lead EKG if ordered  - Administer antiarrhythmic and heart rate control medications as ordered  - Monitor electrolytes and administer replacement therapy as ordered  Outcome: Progressing

## 2019-09-20 NOTE — TELEPHONE ENCOUNTER
I spoke to Latanya Marito (son of the patient), Latanya Devi would like you to speak to Dr Christina Olvera in regards to his fathers condition  Latanya Devi states they are planning to reevaluate his father this weekend and he would like your input on the matter  He mentions that both himself and his brother would like to speak to you along with his dad to discuss his condition

## 2019-09-20 NOTE — PROGRESS NOTES
Procedure Note - Nephrology   Cinthia Stovall 80 y o  male MRN: [id number]  Unit/Bed#: ICU 10 Encounter: [id number]    Procedure note-CRRT(99899)  Assessment / Plan:  1  ZULMA on CKD stage 4 likely d/t hypotension in setting of cirrhosis/ascites/CRS  -patient seen and examined by me on CRRT today at 10:30am   - Patient requires levophed gtt to maintain MAP for fluid removal but on lower dose today  Will plan for -75ml/hr UF today  Back on 4K bath  -continue albumin/midodrine/lasix 60mg IV BID  -off octreotide as of yesterday  -hernandez placed 9/18 for urinary retention  -HD line placed 9/17  -Reason for CRRT initiation was concern for uremia as well as volume overload in setting of hypotension  -f/u am BMP  2  Hypotension: likely d/t cirrhosis with ascites with decreased SVR  On max dose midodrine  Also on albumin and levo gtt at 5mcg/hr  Goal MAP > 65 for renal perfusion and to allow for fluid removal on CRRT  Titration of pressor per ICU team  3  Anemia in setting of CLL: avoid MELVIN  Monitor CBC  Hgb 7  Transfuse prn  4  Left kidney lesion: monitor outpatient  +renal cysts  5  Chronic combined heart failure, EF 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 mitral stenosis, moderate TR and dilated IVC  -UF as tolerated on CRRT  Also on lasix without much response  6  PNA - s/p Abx per primary team   7  Cirrhosis with mild to moderate ascites, likely d/t alcohol and underlying fatty liver - on treatment as above  Consider GI evaluation      Would consider goals of care discussion over the weekend if patient remains on pressor/CRRT  Patient had stated he would not want longterm HD outpatient but this remains to be discussed further with his family  Will continue CRRT for now as tolerated  Subjective:   Patient seen and examined by me on CRRT at approximately 10:30 a m     Blood pressure 101/44, UF goal -50 mL/hr on 4 potassium bath  Nurse states that he has had decreased urine output  Tolerating CRRT  Complains of some shortness of breath  Objective:     Vitals: Blood pressure 113/62, pulse 60, temperature (!) 95 7 °F (35 4 °C), resp  rate 15, height 5' 9" (1 753 m), weight 89 9 kg (198 lb 3 1 oz), SpO2 93 %  ,Body mass index is 29 27 kg/m²  Temp (24hrs), Av 6 °F (35 9 °C), Min:95 7 °F (35 4 °C), Max:97 5 °F (36 4 °C)      Weight (last 2 days)     Date/Time   Weight    19 0542   89 9 (198 19)    19 0549   88 8 (195 77)                Intake/Output Summary (Last 24 hours) at 2019 1507  Last data filed at 2019 1300  Gross per 24 hour   Intake 1611 26 ml   Output 2832 ml   Net -1220 74 ml     I/O last 24 hours: In: 2254 1 [P O :468; I V :986 1; IV Piggyback:800]  Out: 8655 [Urine:132; Other:3710]        Physical Exam:   Physical Exam   Constitutional: He appears well-developed and well-nourished  No distress  Frail appearing   HENT:   Head: Normocephalic and atraumatic  Mouth/Throat: No oropharyngeal exudate  Eyes: Right eye exhibits no discharge  Left eye exhibits no discharge  No scleral icterus  Neck: Neck supple  Cardiovascular: Normal rate, regular rhythm and normal heart sounds  Pulmonary/Chest: Effort normal and breath sounds normal  He has no wheezes  He has no rales  Abdominal: Soft  Bowel sounds are normal  He exhibits no distension  There is no tenderness  Musculoskeletal: He exhibits edema (b/l pitting LE)  Neurological: He is alert  awake   Skin: Skin is warm and dry  No rash noted  He is not diaphoretic  Psychiatric: He has a normal mood and affect  His behavior is normal    Vitals reviewed        Invasive Devices     Peripheral Intravenous Line            Peripheral IV 19 Left;Proximal;Upper;Ventral (anterior) Arm 2 days          Line            Temporary HD Catheter 2 days          Drain            Urethral Catheter Temperature probe 2 days                Medications:    Scheduled Meds:  Current Facility-Administered Medications:  acetaminophen 488 mg Oral Q6H PRN Alessandro Hays MD allopurinol 300 mg Oral Daily Magdi Trujillo MD    amiodarone 200 mg Oral Daily Magdi Trujillo MD    aspirin 81 mg Oral Daily Magdi Trujillo MD    atorvastatin 40 mg Oral After Dakota Hernandez MD    fluticasone 1 spray Each Nare Daily Magdi Trujillo MD    furosemide 60 mg Intravenous BID (diuretic) Magdi Trujillo MD    gabapentin 100 mg Oral HS CT Grubbs    levalbuterol 1 25 mg Nebulization TID Magdi Trujillo MD    levothyroxine 50 mcg Oral Early Morning Magdi Trujillo MD    melatonin 9 mg Oral HS PRN Magdi Trujillo MD    miconazole  Topical BID Magdi Trujillo MD    midodrine 10 mg Oral TID AC Magdi Trujillo MD    norepinephrine 1-30 mcg/min Intravenous Titrated CT Grubbs Last Rate: 1 mcg/min (09/20/19 1331)   NxStage K 4/Ca 3 20,000 mL Dialysis Continuous Dedra K DO Sarah    ondansetron 4 mg Intravenous Q6H PRN Magdi Trujillo MD    QUEtiapine 12 5 mg Oral HS Magdi Trujillo MD    sodium chloride 1 spray Each Nare PRN Magdi Trujillo MD    sodium chloride 3 mL Nebulization Q6H PRN Magdi Trujillo MD    sodium chloride 3 mL Nebulization TID Magdi Trujillo MD    tamsulosin 0 4 mg Oral Daily Magdi Trujillo MD    warfarin 1 25 mg Oral Daily (warfarin) CT Cole        PRN Meds:   acetaminophen    melatonin    ondansetron    sodium chloride    sodium chloride    Continuous Infusions:  norepinephrine 1-30 mcg/min Last Rate: 1 mcg/min (09/20/19 1331)   NxStage K 4/Ca 3 20,000 mL            LAB RESULTS:      Results from last 7 days   Lab Units 09/20/19  1013 09/20/19  0420 09/19/19  2219 09/19/19  1635 09/19/19  0917 09/19/19  0433 09/18/19  2217  09/18/19  0518  09/17/19  0336 09/16/19  0437 09/15/19  0518   WBC Thousand/uL  --  23 93*  --   --   --  37 99*  --   --  37 57*  --  21 37* 21 36* 23 12*   HEMOGLOBIN g/dL  --  7 0*  --   --   --  7 8*  --   --  7 8*  --  7 8* 7 6* 8 6*   HEMATOCRIT %  --  23 7*  --   --   --  26 5*  --   --  25 4*  --  24 6* 25 3* 27 9*   PLATELETS Thousands/uL  --  103*  --   --   --  128*  --   --  119*  --  106* 106* 102*   NEUTROS PCT %  --   --   --   --   --   --   --   --  20*  --   --   --   --    LYMPHS PCT %  --   --   --   --   --   --   --   --  73*  --   --   --   --    LYMPHO PCT %  --   --   --   --   --   --   --   --   --   --  60*  --   --    MONOS PCT %  --   --   --   --   --   --   --   --  6  --   --   --   --    MONO PCT %  --   --   --   --   --   --   --   --   --   --  9  --   --    EOS PCT %  --   --   --   --   --   --   --   --  1  --  0  --   --    POTASSIUM mmol/L 3 8 3 7 4 2 4 1 4 9 4 7 4 6   < > 4 1   < > 4 4 4 8 4 9   CHLORIDE mmol/L 104 104 104 104 104 104 103   < > 104   < > 104 104 104   CO2 mmol/L 26 26 25 27 24 26 24   < > 25   < > 24 22 23   BUN mg/dL 26* 31* 35* 40* 49* 54* 65*   < > 102*   < > 135* 125* 120*   CREATININE mg/dL 1 34* 1 29 1 40* 1 40* 1 53* 1 55* 1 70*   < > 2 40*   < > 2 97* 2 90* 2 65*   CALCIUM mg/dL 9 0 9 1 9 1 9 2 10 0 9 6 9 7   < > 9 5   < > 9 0 8 7 9 3   ALK PHOS U/L  --   --   --   --   --   --   --   --  111  --  109 119*  --    ALT U/L  --   --   --   --   --   --   --   --  27  --  36 39  --    AST U/L  --   --   --   --   --   --   --   --  55*  --  54* 64*  --    MAGNESIUM mg/dL 1 9 2 1 1 8 2 0 2 3 1 9 2 0   < >  --    < >  --   --   --    PHOSPHORUS mg/dL 2 4 2 5 2 7 2 3 2 8 2 8 3 1   < >  --    < >  --   --   --     < > = values in this interval not displayed  CUTURES:  Lab Results   Component Value Date    BLOODCX No Growth After 5 Days  09/07/2019    BLOODCX No Growth After 5 Days  09/07/2019                 Portions of the record may have been created with voice recognition software  Occasional wrong word or "sound a like" substitutions may have occurred due to the inherent limitations of voice recognition software  Read the chart carefully and recognize, using context, where substitutions have occurred  If you have any questions, please contact the dictating provider

## 2019-09-20 NOTE — PHYSICAL THERAPY NOTE
PHYSICAL THERAPY NOTE          Patient Name: Steffany Vasquez  MYJTE'V Date: 9/20/2019     Pt continues to be on CVVH  Per chart, pt transitioning to palliative approach & potentially hospice w/ further deterioration  Pt not medically appropriate for PT tx at this time  Will continue to follow as appropriate   Trude Halsted, PT

## 2019-09-21 LAB
ALBUMIN SERPL BCP-MCNC: 3.4 G/DL (ref 3.5–5)
ALP SERPL-CCNC: 118 U/L (ref 46–116)
ALT SERPL W P-5'-P-CCNC: 40 U/L (ref 12–78)
ANION GAP SERPL CALCULATED.3IONS-SCNC: 6 MMOL/L (ref 4–13)
ANION GAP SERPL CALCULATED.3IONS-SCNC: 8 MMOL/L (ref 4–13)
AST SERPL W P-5'-P-CCNC: 69 U/L (ref 5–45)
BILIRUB DIRECT SERPL-MCNC: 0.7 MG/DL (ref 0–0.2)
BILIRUB SERPL-MCNC: 3.54 MG/DL (ref 0.2–1)
BUN SERPL-MCNC: 16 MG/DL (ref 5–25)
BUN SERPL-MCNC: 16 MG/DL (ref 5–25)
BUN SERPL-MCNC: 19 MG/DL (ref 5–25)
BUN SERPL-MCNC: 20 MG/DL (ref 5–25)
CA-I BLD-SCNC: 1.05 MMOL/L (ref 1.12–1.32)
CA-I BLD-SCNC: 1.13 MMOL/L (ref 1.12–1.32)
CA-I BLD-SCNC: 1.15 MMOL/L (ref 1.12–1.32)
CA-I BLD-SCNC: 1.18 MMOL/L (ref 1.12–1.32)
CALCIUM SERPL-MCNC: 8.9 MG/DL (ref 8.3–10.1)
CALCIUM SERPL-MCNC: 8.9 MG/DL (ref 8.3–10.1)
CALCIUM SERPL-MCNC: 9 MG/DL (ref 8.3–10.1)
CALCIUM SERPL-MCNC: 9.2 MG/DL (ref 8.3–10.1)
CHLORIDE SERPL-SCNC: 102 MMOL/L (ref 100–108)
CHLORIDE SERPL-SCNC: 103 MMOL/L (ref 100–108)
CHLORIDE SERPL-SCNC: 103 MMOL/L (ref 100–108)
CHLORIDE SERPL-SCNC: 105 MMOL/L (ref 100–108)
CO2 SERPL-SCNC: 25 MMOL/L (ref 21–32)
CO2 SERPL-SCNC: 25 MMOL/L (ref 21–32)
CO2 SERPL-SCNC: 26 MMOL/L (ref 21–32)
CO2 SERPL-SCNC: 26 MMOL/L (ref 21–32)
CREAT SERPL-MCNC: 1.26 MG/DL (ref 0.6–1.3)
CREAT SERPL-MCNC: 1.29 MG/DL (ref 0.6–1.3)
CREAT SERPL-MCNC: 1.33 MG/DL (ref 0.6–1.3)
CREAT SERPL-MCNC: 1.38 MG/DL (ref 0.6–1.3)
GFR SERPL CREATININE-BSD FRML MDRD: 46 ML/MIN/1.73SQ M
GFR SERPL CREATININE-BSD FRML MDRD: 48 ML/MIN/1.73SQ M
GFR SERPL CREATININE-BSD FRML MDRD: 50 ML/MIN/1.73SQ M
GFR SERPL CREATININE-BSD FRML MDRD: 51 ML/MIN/1.73SQ M
GLUCOSE SERPL-MCNC: 104 MG/DL (ref 65–140)
GLUCOSE SERPL-MCNC: 136 MG/DL (ref 65–140)
GLUCOSE SERPL-MCNC: 139 MG/DL (ref 65–140)
GLUCOSE SERPL-MCNC: 172 MG/DL (ref 65–140)
INR PPP: 2.26 (ref 0.84–1.19)
MAGNESIUM SERPL-MCNC: 1.8 MG/DL (ref 1.6–2.6)
MAGNESIUM SERPL-MCNC: 1.9 MG/DL (ref 1.6–2.6)
MAGNESIUM SERPL-MCNC: 2 MG/DL (ref 1.6–2.6)
MAGNESIUM SERPL-MCNC: 2 MG/DL (ref 1.6–2.6)
PHOSPHATE SERPL-MCNC: 2.3 MG/DL (ref 2.3–4.1)
PHOSPHATE SERPL-MCNC: 2.4 MG/DL (ref 2.3–4.1)
POTASSIUM SERPL-SCNC: 4.1 MMOL/L (ref 3.5–5.3)
POTASSIUM SERPL-SCNC: 4.2 MMOL/L (ref 3.5–5.3)
POTASSIUM SERPL-SCNC: 4.4 MMOL/L (ref 3.5–5.3)
POTASSIUM SERPL-SCNC: 5.1 MMOL/L (ref 3.5–5.3)
PROT SERPL-MCNC: 5.1 G/DL (ref 6.4–8.2)
PROTHROMBIN TIME: 25.4 SECONDS (ref 11.6–14.5)
SODIUM SERPL-SCNC: 135 MMOL/L (ref 136–145)
SODIUM SERPL-SCNC: 136 MMOL/L (ref 136–145)
SODIUM SERPL-SCNC: 137 MMOL/L (ref 136–145)
SODIUM SERPL-SCNC: 137 MMOL/L (ref 136–145)

## 2019-09-21 PROCEDURE — 99233 SBSQ HOSP IP/OBS HIGH 50: CPT | Performed by: INTERNAL MEDICINE

## 2019-09-21 PROCEDURE — 90945 DIALYSIS ONE EVALUATION: CPT | Performed by: NURSE PRACTITIONER

## 2019-09-21 PROCEDURE — 82330 ASSAY OF CALCIUM: CPT | Performed by: INTERNAL MEDICINE

## 2019-09-21 PROCEDURE — 94640 AIRWAY INHALATION TREATMENT: CPT

## 2019-09-21 PROCEDURE — 84100 ASSAY OF PHOSPHORUS: CPT | Performed by: INTERNAL MEDICINE

## 2019-09-21 PROCEDURE — 80048 BASIC METABOLIC PNL TOTAL CA: CPT | Performed by: INTERNAL MEDICINE

## 2019-09-21 PROCEDURE — 90945 DIALYSIS ONE EVALUATION: CPT

## 2019-09-21 PROCEDURE — 93970 EXTREMITY STUDY: CPT | Performed by: SURGERY

## 2019-09-21 PROCEDURE — 83735 ASSAY OF MAGNESIUM: CPT | Performed by: INTERNAL MEDICINE

## 2019-09-21 PROCEDURE — 90945 DIALYSIS ONE EVALUATION: CPT | Performed by: INTERNAL MEDICINE

## 2019-09-21 PROCEDURE — 85610 PROTHROMBIN TIME: CPT | Performed by: NURSE PRACTITIONER

## 2019-09-21 PROCEDURE — 80076 HEPATIC FUNCTION PANEL: CPT | Performed by: NURSE PRACTITIONER

## 2019-09-21 RX ORDER — MAGNESIUM SULFATE 1 G/100ML
1 INJECTION INTRAVENOUS ONCE
Status: COMPLETED | OUTPATIENT
Start: 2019-09-21 | End: 2019-09-21

## 2019-09-21 RX ADMIN — GABAPENTIN 100 MG: 100 CAPSULE ORAL at 21:46

## 2019-09-21 RX ADMIN — SODIUM PHOSPHATE, MONOBASIC, MONOHYDRATE 6 MMOL: 276; 142 INJECTION, SOLUTION INTRAVENOUS at 23:01

## 2019-09-21 RX ADMIN — ASPIRIN 81 MG: 81 TABLET, COATED ORAL at 08:37

## 2019-09-21 RX ADMIN — MICONAZOLE NITRATE: 2 CREAM TOPICAL at 19:09

## 2019-09-21 RX ADMIN — CALCIUM GLUCONATE 1 G: 98 INJECTION, SOLUTION INTRAVENOUS at 18:02

## 2019-09-21 RX ADMIN — MAGNESIUM SULFATE HEPTAHYDRATE 1 G: 1 INJECTION, SOLUTION INTRAVENOUS at 17:29

## 2019-09-21 RX ADMIN — LEVOTHYROXINE SODIUM 50 MCG: 50 TABLET ORAL at 06:09

## 2019-09-21 RX ADMIN — ALLOPURINOL 300 MG: 100 TABLET ORAL at 08:34

## 2019-09-21 RX ADMIN — SODIUM PHOSPHATE, MONOBASIC, MONOHYDRATE 6 MMOL: 276; 142 INJECTION, SOLUTION INTRAVENOUS at 11:24

## 2019-09-21 RX ADMIN — AMIODARONE HYDROCHLORIDE 200 MG: 200 TABLET ORAL at 08:38

## 2019-09-21 RX ADMIN — WARFARIN SODIUM 1.25 MG: 2.5 TABLET ORAL at 18:32

## 2019-09-21 RX ADMIN — MIDODRINE HYDROCHLORIDE 10 MG: 5 TABLET ORAL at 06:09

## 2019-09-21 RX ADMIN — LEVALBUTEROL HYDROCHLORIDE 1.25 MG: 1.25 SOLUTION, CONCENTRATE RESPIRATORY (INHALATION) at 07:31

## 2019-09-21 RX ADMIN — NOREPINEPHRINE BITARTRATE 6.67 MCG/MIN: 1 INJECTION INTRAVENOUS at 20:04

## 2019-09-21 RX ADMIN — QUETIAPINE FUMARATE 12.5 MG: 25 TABLET ORAL at 21:43

## 2019-09-21 RX ADMIN — ATORVASTATIN CALCIUM 40 MG: 40 TABLET, FILM COATED ORAL at 18:32

## 2019-09-21 RX ADMIN — MICONAZOLE NITRATE 1 APPLICATION: 2 CREAM TOPICAL at 08:39

## 2019-09-21 RX ADMIN — LEVALBUTEROL HYDROCHLORIDE 1.25 MG: 1.25 SOLUTION, CONCENTRATE RESPIRATORY (INHALATION) at 19:22

## 2019-09-21 RX ADMIN — MIDODRINE HYDROCHLORIDE 10 MG: 5 TABLET ORAL at 16:18

## 2019-09-21 RX ADMIN — ISODIUM CHLORIDE 3 ML: 0.03 SOLUTION RESPIRATORY (INHALATION) at 07:31

## 2019-09-21 RX ADMIN — MIDODRINE HYDROCHLORIDE 10 MG: 5 TABLET ORAL at 11:24

## 2019-09-21 RX ADMIN — ISODIUM CHLORIDE 3 ML: 0.03 SOLUTION RESPIRATORY (INHALATION) at 19:22

## 2019-09-21 RX ADMIN — FLUTICASONE PROPIONATE 1 SPRAY: 50 SPRAY, METERED NASAL at 08:33

## 2019-09-21 RX ADMIN — Medication 20000 ML: at 14:50

## 2019-09-21 RX ADMIN — CALCIUM GLUCONATE 2 G: 98 INJECTION, SOLUTION INTRAVENOUS at 23:01

## 2019-09-21 RX ADMIN — SODIUM PHOSPHATE, MONOBASIC, MONOHYDRATE 6 MMOL: 276; 142 INJECTION, SOLUTION INTRAVENOUS at 06:30

## 2019-09-21 RX ADMIN — NOREPINEPHRINE BITARTRATE 1.07 MCG/MIN: 1 INJECTION INTRAVENOUS at 10:18

## 2019-09-21 RX ADMIN — MAGNESIUM SULFATE HEPTAHYDRATE 1 G: 1 INJECTION, SOLUTION INTRAVENOUS at 06:24

## 2019-09-21 RX ADMIN — CALCIUM GLUCONATE 1 G: 98 INJECTION, SOLUTION INTRAVENOUS at 06:28

## 2019-09-21 RX ADMIN — SODIUM PHOSPHATE, MONOBASIC, MONOHYDRATE 6 MMOL: 276; 142 INJECTION, SOLUTION INTRAVENOUS at 18:19

## 2019-09-21 RX ADMIN — TAMSULOSIN HYDROCHLORIDE 0.4 MG: 0.4 CAPSULE ORAL at 08:37

## 2019-09-21 RX ADMIN — CALCIUM GLUCONATE 1 G: 98 INJECTION, SOLUTION INTRAVENOUS at 11:17

## 2019-09-21 NOTE — PROGRESS NOTES
Progress Note - Critical Care   Ruddy Stovall 80 y o  male MRN: [id number]  Unit/Bed#: ICU 10 Encounter: [id number]    Assessment/Plan:  1  Acute hypoxic respiratory failure secondary to CHF with reduced EF  · Aggressive pulmonary toileting, encourage cough and deep breathing  Use IS  · Continue nebulized bronchodilators  2  Acute on chronic CHF with reduced EF  · Continue volume removal with dialysis  · Echo from this admission shows EF of 50-55%  3  ZULMA on CKD stage 4  · Nephrology following  Continue CRRT per their recommendations  Creatinine is improving  Remains anuric  · Per family discussion plan to continue dialysis for the next couple of days with hope for recovery but patient would not want long-term dialysis  4  Afib  · Mantaining paced sinus rhythm  Continue home amiodarone  · Continue coumadin for chronic anticoagulation  INR is 2 26  5  Liver cirrhosis, suspect secondary to alcohol use and fatty liver disease  · GI following  No significant ascites  6  Hypotension  · Hemodynamics continue to improve  On very minimal levophed support  Continue to wean as tolerated ot maintain MAP >65  · Continue midodrine  7  RUE edema  · Upper extremity venous duplex is pending  8  Chronic lymphocytic leukemia type B    _____________________________________________________________________    HPI/24hr events:   Afebrile    No acute events overnight    Medications:    Current Facility-Administered Medications:  acetaminophen 488 mg Oral Q6H PRN Trisha Gutierrez MD    allopurinol 300 mg Oral Daily Trisha Gutierrez MD    amiodarone 200 mg Oral Daily Trisha Gutierrez MD    aspirin 81 mg Oral Daily Trisha Gutierrez MD    atorvastatin 40 mg Oral After Benny Cornea, MD    calcium gluconate 1 G  100 mL IVPB 1 g Intravenous Once Marlen Vargas MD    fluticasone 1 spray Each Nare Daily Trisha Gutierrez MD    furosemide 60 mg Intravenous BID (diuretic) Trisha Gutierrez MD    gabapentin 100 mg Oral HS CT Bundy levalbuterol 1 25 mg Nebulization TID Lolis Cortez MD    levothyroxine 50 mcg Oral Early Morning Lolis Cortez MD    magnesium sulfate 1 g Intravenous Once Rudy aLnda MD    melatonin 9 mg Oral HS PRURIAH Cortez MD    miconazole  Topical BID Lolis Cortez MD    midodrine 10 mg Oral TID AC Lolis Cortez MD    norepinephrine 1-30 mcg/min Intravenous Titrated SHAWN De LeonNP Last Rate: Stopped (09/20/19 2000)   NxStage K 4/Ca 3 20,000 mL Dialysis Continuous Dedra K DO Sarah    ondansetron 4 mg Intravenous Q6H PRURIAH Cortez MD    QUEtiapine 12 5 mg Oral HS Lolis Cortez MD    sodium chloride 1 spray Each Nare PRN Lolis Cortez MD    sodium chloride 3 mL Nebulization Q6H PRN Lolis Cortez MD    sodium chloride 3 mL Nebulization TID Lolis Cortez MD    sodium phosphate 6 mmol Intravenous Once Rudy Landa MD    tamsulosin 0 4 mg Oral Daily Lolis Cortez MD    warfarin 1 25 mg Oral Daily (warfarin) SHAWN LinaresNP          norepinephrine 1-30 mcg/min Last Rate: Stopped (09/20/19 2000)   NxStage K 4/Ca 3 20,000 mL          Physical exam:  Vitals: Body mass index is 29 4 kg/m²  Blood pressure (!) 105/38, pulse 60, temperature (!) 96 8 °F (36 °C), resp   rate 18, height 5' 9" (1 753 m), weight 90 3 kg (199 lb 1 2 oz), SpO2 91 % ,  Temp  Min: 95 7 °F (35 4 °C)  Max: 99 °F (37 2 °C)  IBW: 70 7 kg    SpO2: 91 %  SpO2 Activity: At Rest  O2 Device: None (Room air)      Intake/Output Summary (Last 24 hours) at 9/21/2019 4832  Last data filed at 9/21/2019 0500  Gross per 24 hour   Intake 1655 08 ml   Output 3804 ml   Net -2148 92 ml       Invasive/non-invasive ventilation settings:   Respiratory    Lab Data (Last 4 hours)    None         O2/Vent Data (Last 4 hours)    None              Invasive Devices     Peripheral Intravenous Line            Peripheral IV 09/17/19 Left;Proximal;Upper;Ventral (anterior) Arm 3 days          Line            Temporary HD Catheter 3 days          Drain Urethral Catheter Temperature probe 2 days                  Physical Exam:  Gen: Sleeping but easily arousable, weak appearing, no acute distress  HEENT:  Atraumatic, normocephalic, extraocular movements intact, pupils 3 mm equal and reactive, oropharynx clear  Neck:  Supple, trachea midline, no JVD, no lymphadenopathy  Chest:  Diminished with mild occasional expiratory wheeze, no rhonchi  Cor:  Single S1/S2, no murmurs, rubs, gallops, paced  Abd:  Soft, slightly distended, nontender, bowel sounds normoactive  Ext:  Generalized anasarca extremities, no clubbing or cyanosis  Neuro:  Oriented x3, cranial nerves 2-12 grossly intact, no focal deficits  Skin:  Warm, dry      Diagnostic Data:  Lab: I have personally reviewed pertinent lab results  CBC:   Results from last 7 days   Lab Units 09/20/19  0420 09/19/19  0433 09/18/19  0518   WBC Thousand/uL 23 93* 37 99* 37 57*   HEMOGLOBIN g/dL 7 0* 7 8* 7 8*   HEMATOCRIT % 23 7* 26 5* 25 4*   PLATELETS Thousands/uL 103* 128* 119*       CMP:   Results from last 7 days   Lab Units 09/21/19  0440 09/20/19  2103 09/20/19  1605  09/18/19  0518  09/17/19  0336 09/16/19  0437   SODIUM mmol/L 137 137 138   < > 140   < > 139 137   POTASSIUM mmol/L 4 2 4 8 3 8   < > 4 1   < > 4 4 4 8   CHLORIDE mmol/L 105 103 103   < > 104   < > 104 104   CO2 mmol/L 26 25 27   < > 25   < > 24 22   BUN mg/dL 20 22 24   < > 102*   < > 135* 125*   CREATININE mg/dL 1 29 1 32* 1 32*   < > 2 40*   < > 2 97* 2 90*   CALCIUM mg/dL 9 0 9 2 9 2   < > 9 5   < > 9 0 8 7   ALK PHOS U/L  --   --   --   --  111  --  109 119*   ALT U/L  --   --   --   --  27  --  36 39   AST U/L  --   --   --   --  55*  --  54* 64*    < > = values in this interval not displayed       PT/INR:   Lab Results   Component Value Date    INR 2 26 (H) 09/21/2019   ,   Magnesium:   Results from last 7 days   Lab Units 09/21/19  0440 09/20/19  2103 09/20/19  1605   MAGNESIUM mg/dL 1 9 2 0 2 1     Phosphorous:   Results from last 7 days   Lab Units 09/21/19  0440 09/20/19  2103 09/20/19  1605   PHOSPHORUS mg/dL 2 4 2 8 2 5       Microbiology:        Imaging:  No new imaging    Cardiac lab/EKG/telemetry/ECHO:   Paced rhythm    VTE Prophylaxis: Coumadin, SCDs    Code Status: Level 1 - Full Code    Nilsa Gales Spatzer, CRNP    Portions of the record may have been created with voice recognition software  Occasional wrong word or "sound a like" substitutions may have occurred due to the inherent limitations of voice recognition software  Read the chart carefully and recognize, using context, where substitutions have occurred

## 2019-09-21 NOTE — PROGRESS NOTES
NEPHROLOGY PROGRESS NOTE   Kaity Stovall 80 y o  male MRN: [id number]  Unit/Bed#: ICU 10 Encounter: [id number]  Reason for Consult: ZULMA    ASSESSMENT and PLAN:    1 ) Acute renal failure  -likely multifactorial etiologies in the setting of possible ischemic acute tubular necrosis in the setting of hypotension as well as possible hepatorenal physiology  -currently on CVVH tolerating procedure well, patient seen on 9:08 a m  On CVVH currently running net -75 mL/hour, 4 K/3 calcium, therapy fluid 2 2 L/hr, blood flow rate 250 cc/minute  -essentially no urine output, which is signifying that there has been no evidence of renal recovery at this time  -creatinine trending down is reflection of the CVVH  -given that there is no urine output will discontinue IV Lasix  -maintain mean arterial pressure greater than 65 to maintain renal perfusion  -given his underlying chronic kidney disease stage 4 and advanced age do not anticipate meaningful renal recovery due to poor renal reserve    2 ) Chronic kidney disease stage IV    3 ) Blood pressure/volume status  -currently off norepinephrine  -examines volume overloaded  -blood pressure is currently holding with a map below 65 slightly  -will plan to start norepinephrine to maintain the map greater than 65  -currently not on albumin has been off for the last few days now  -will discontinue Lasix due to poor to no urine output    4 ) Cirrhosis  -off octreotide  -off albumin  -discontinue Lasix  -midodrine 10 mg t i d   -plan to restart a low dose of norepinephrine    5 ) Chronic combined congestive heart  -EF 50%  -dilated IVC  -continue ultrafiltration with CVVH      SUBJECTIVE / INTERVAL HISTORY:    No active shortness of breath or chest pain    Currently on CVVH no issues overnight with machine    OBJECTIVE:  Current Weight: Weight - Scale: 90 3 kg (199 lb 1 2 oz)  Vitals:    09/21/19 0732 09/21/19 0745 09/21/19 0800 09/21/19 0815   BP:   (!) 101/36    Pulse:   62    Resp: (!) 24    Temp:  (!) 97 2 °F (36 2 °C) (!) 97 2 °F (36 2 °C) (!) 97 2 °F (36 2 °C)   TempSrc:       SpO2: 95%  91%    Weight:       Height:           Intake/Output Summary (Last 24 hours) at 9/21/2019 0907  Last data filed at 9/21/2019 0900  Gross per 24 hour   Intake 1840 68 ml   Output 3851 ml   Net -2010 32 ml       Review of Systems:    12 point ROS has been reviewed  Physical Exam   Constitutional: He is oriented to person, place, and time  He appears well-developed and well-nourished  No distress  HENT:   Head: Normocephalic and atraumatic  Eyes: Pupils are equal, round, and reactive to light  No scleral icterus  Neck: Normal range of motion  Neck supple  Cardiovascular: Normal rate, regular rhythm and normal heart sounds  Exam reveals no gallop and no friction rub  No murmur heard  Pulmonary/Chest: Effort normal and breath sounds normal  No respiratory distress  He has no wheezes  He has no rales  He exhibits no tenderness  Abdominal: Soft  Bowel sounds are normal  He exhibits no distension  There is no tenderness  There is no rebound  Musculoskeletal: Normal range of motion  He exhibits edema  Neurological: He is alert and oriented to person, place, and time  Skin: No rash noted  He is not diaphoretic  Psychiatric: He has a normal mood and affect  Nursing note and vitals reviewed        Medications:    Current Facility-Administered Medications:     acetaminophen (TYLENOL) tablet 488 mg, 488 mg, Oral, Q6H PRN, Ashely Byrd MD, 488 mg at 09/17/19 2044    allopurinol (ZYLOPRIM) tablet 300 mg, 300 mg, Oral, Daily, Ashely Byrd MD, 300 mg at 09/21/19 0834    amiodarone tablet 200 mg, 200 mg, Oral, Daily, Ashely Byrd MD, 200 mg at 09/21/19 0838    aspirin (ECOTRIN LOW STRENGTH) EC tablet 81 mg, 81 mg, Oral, Daily, Margo Giles MD, 81 mg at 09/21/19 0837    atorvastatin (LIPITOR) tablet 40 mg, 40 mg, Oral, After Roseline Whitfield MD, 40 mg at 09/20/19 1744    fluticasone (FLONASE) 50 mcg/act nasal spray 1 spray, 1 spray, Each Nare, Daily, Eduar Lion MD, 1 spray at 09/21/19 0833    gabapentin (NEURONTIN) capsule 100 mg, 100 mg, Oral, HS, CT Fernandez, 100 mg at 09/20/19 2140    levalbuterol (Saeed Cheikh) inhalation solution 1 25 mg, 1 25 mg, Nebulization, TID, Eduar Lion MD, 1 25 mg at 09/21/19 0731    levothyroxine tablet 50 mcg, 50 mcg, Oral, Early Morning, Eduar Lion MD, 50 mcg at 09/21/19 0609    melatonin tablet 9 mg, 9 mg, Oral, HS PRN, Eduar Lion MD, 9 mg at 09/19/19 2202    miconazole 2 % cream, , Topical, BID, Eduar Lion MD, 1 application at 06/00/33 0839    midodrine (PROAMATINE) tablet 10 mg, 10 mg, Oral, TID AC, Eduar Lion MD, 10 mg at 09/21/19 0609    norepinephrine (LEVOPHED) 4 mg (STANDARD CONCENTRATION) IV in sodium chloride 0 9% 250 mL, 1-30 mcg/min, Intravenous, Titrated, CT Fernandez, Stopped at 09/20/19 2000    NxStage K 4/Ca 3 dialysis solution (RFP-401) 20,000 mL, 20,000 mL, Dialysis, Continuous, Ddera K Sarah, DO, 20,000 mL at 09/20/19 1042    ondansetron (ZOFRAN) injection 4 mg, 4 mg, Intravenous, Q6H PRN, Eduar Lion MD    QUEtiapine (SEROquel) tablet 12 5 mg, 12 5 mg, Oral, HS, Margo Giles MD, 12 5 mg at 09/20/19 2140    sodium chloride (OCEAN) 0 65 % nasal spray 1 spray, 1 spray, Each Nare, PRN, Eduar Lion MD, 1 spray at 09/18/19 2030    sodium chloride 0 9 % inhalation solution 3 mL, 3 mL, Nebulization, Q6H PRN, Eduar Lion MD, 3 mL at 09/17/19 0844    sodium chloride 0 9 % inhalation solution 3 mL, 3 mL, Nebulization, TID, Eduar Lion MD, 3 mL at 09/21/19 0731    tamsulosin (FLOMAX) capsule 0 4 mg, 0 4 mg, Oral, Daily, Eduar Lion MD, 0 4 mg at 09/21/19 3865    warfarin (COUMADIN) tablet 1 25 mg, 1 25 mg, Oral, Daily (warfarin), CT Eller, 1 25 mg at 09/20/19 1744    Laboratory Results:  Results from last 7 days   Lab Units 09/21/19  0440 09/20/19  2053 09/20/19  4690 09/20/19  1013 09/20/19  0420 09/19/19  2219 09/19/19  1635  09/19/19  0433  09/18/19  0518  09/17/19  0336 09/16/19  0437 09/15/19  0518   WBC Thousand/uL  --   --   --   --  23 93*  --   --   --  37 99*  --  37 57*  --  21 37* 21 36* 23 12*   HEMOGLOBIN g/dL  --   --   --   --  7 0*  --   --   --  7 8*  --  7 8*  --  7 8* 7 6* 8 6*   HEMATOCRIT %  --   --   --   --  23 7*  --   --   --  26 5*  --  25 4*  --  24 6* 25 3* 27 9*   PLATELETS Thousands/uL  --   --   --   --  103*  --   --   --  128*  --  119*  --  106* 106* 102*   POTASSIUM mmol/L 4 2 4 8 3 8 3 8 3 7 4 2 4 1   < > 4 7   < > 4 1   < > 4 4 4 8 4 9   CHLORIDE mmol/L 105 103 103 104 104 104 104   < > 104   < > 104   < > 104 104 104   CO2 mmol/L 26 25 27 26 26 25 27   < > 26   < > 25   < > 24 22 23   BUN mg/dL 20 22 24 26* 31* 35* 40*   < > 54*   < > 102*   < > 135* 125* 120*   CREATININE mg/dL 1 29 1 32* 1 32* 1 34* 1 29 1 40* 1 40*   < > 1 55*   < > 2 40*   < > 2 97* 2 90* 2 65*   CALCIUM mg/dL 9 0 9 2 9 2 9 0 9 1 9 1 9 2   < > 9 6   < > 9 5   < > 9 0 8 7 9 3   MAGNESIUM mg/dL 1 9 2 0 2 1 1 9 2 1 1 8 2 0   < > 1 9   < >  --    < >  --   --   --    PHOSPHORUS mg/dL 2 4 2 8 2 5 2 4 2 5 2 7 2 3   < > 2 8   < >  --    < >  --   --   --     < > = values in this interval not displayed

## 2019-09-22 LAB
ANION GAP SERPL CALCULATED.3IONS-SCNC: 10 MMOL/L (ref 4–13)
ANION GAP SERPL CALCULATED.3IONS-SCNC: 8 MMOL/L (ref 4–13)
ANION GAP SERPL CALCULATED.3IONS-SCNC: 9 MMOL/L (ref 4–13)
BASOPHILS # BLD AUTO: 0.13 THOUSANDS/ΜL (ref 0–0.1)
BASOPHILS NFR BLD AUTO: 0 % (ref 0–1)
BUN SERPL-MCNC: 16 MG/DL (ref 5–25)
BUN SERPL-MCNC: 17 MG/DL (ref 5–25)
BUN SERPL-MCNC: 18 MG/DL (ref 5–25)
CA-I BLD-SCNC: 1.1 MMOL/L (ref 1.12–1.32)
CA-I BLD-SCNC: 1.17 MMOL/L (ref 1.12–1.32)
CA-I BLD-SCNC: 1.17 MMOL/L (ref 1.12–1.32)
CA-I BLD-SCNC: 1.2 MMOL/L (ref 1.12–1.32)
CALCIUM SERPL-MCNC: 8.9 MG/DL (ref 8.3–10.1)
CALCIUM SERPL-MCNC: 9.2 MG/DL (ref 8.3–10.1)
CALCIUM SERPL-MCNC: 9.3 MG/DL (ref 8.3–10.1)
CHLORIDE SERPL-SCNC: 103 MMOL/L (ref 100–108)
CO2 SERPL-SCNC: 26 MMOL/L (ref 21–32)
CREAT SERPL-MCNC: 1.37 MG/DL (ref 0.6–1.3)
CREAT SERPL-MCNC: 1.37 MG/DL (ref 0.6–1.3)
CREAT SERPL-MCNC: 1.43 MG/DL (ref 0.6–1.3)
EOSINOPHIL # BLD AUTO: 0.19 THOUSAND/ΜL (ref 0–0.61)
EOSINOPHIL NFR BLD AUTO: 0 % (ref 0–6)
ERYTHROCYTE [DISTWIDTH] IN BLOOD BY AUTOMATED COUNT: 19.4 % (ref 11.6–15.1)
GFR SERPL CREATININE-BSD FRML MDRD: 44 ML/MIN/1.73SQ M
GFR SERPL CREATININE-BSD FRML MDRD: 46 ML/MIN/1.73SQ M
GFR SERPL CREATININE-BSD FRML MDRD: 46 ML/MIN/1.73SQ M
GLUCOSE SERPL-MCNC: 127 MG/DL (ref 65–140)
GLUCOSE SERPL-MCNC: 150 MG/DL (ref 65–140)
GLUCOSE SERPL-MCNC: 151 MG/DL (ref 65–140)
HCT VFR BLD AUTO: 25.3 % (ref 36.5–49.3)
HGB BLD-MCNC: 7.4 G/DL (ref 12–17)
IMM GRANULOCYTES # BLD AUTO: 0.17 THOUSAND/UL (ref 0–0.2)
IMM GRANULOCYTES NFR BLD AUTO: 0 % (ref 0–2)
LYMPHOCYTES # BLD AUTO: 31.78 THOUSANDS/ΜL (ref 0.6–4.47)
LYMPHOCYTES NFR BLD AUTO: 71 % (ref 14–44)
MAGNESIUM SERPL-MCNC: 1.9 MG/DL (ref 1.6–2.6)
MAGNESIUM SERPL-MCNC: 2 MG/DL (ref 1.6–2.6)
MAGNESIUM SERPL-MCNC: 2.1 MG/DL (ref 1.6–2.6)
MCH RBC QN AUTO: 31.9 PG (ref 26.8–34.3)
MCHC RBC AUTO-ENTMCNC: 29.2 G/DL (ref 31.4–37.4)
MCV RBC AUTO: 109 FL (ref 82–98)
MONOCYTES # BLD AUTO: 3.08 THOUSAND/ΜL (ref 0.17–1.22)
MONOCYTES NFR BLD AUTO: 7 % (ref 4–12)
NEUTROPHILS # BLD AUTO: 10.15 THOUSANDS/ΜL (ref 1.85–7.62)
NEUTS SEG NFR BLD AUTO: 22 % (ref 43–75)
NRBC BLD AUTO-RTO: 0 /100 WBCS
PHOSPHATE SERPL-MCNC: 2.4 MG/DL (ref 2.3–4.1)
PHOSPHATE SERPL-MCNC: 2.6 MG/DL (ref 2.3–4.1)
PHOSPHATE SERPL-MCNC: 2.7 MG/DL (ref 2.3–4.1)
PLATELET # BLD AUTO: 157 THOUSANDS/UL (ref 149–390)
PMV BLD AUTO: 12 FL (ref 8.9–12.7)
POTASSIUM SERPL-SCNC: 4.3 MMOL/L (ref 3.5–5.3)
POTASSIUM SERPL-SCNC: 4.4 MMOL/L (ref 3.5–5.3)
POTASSIUM SERPL-SCNC: 4.8 MMOL/L (ref 3.5–5.3)
RBC # BLD AUTO: 2.32 MILLION/UL (ref 3.88–5.62)
SODIUM SERPL-SCNC: 137 MMOL/L (ref 136–145)
SODIUM SERPL-SCNC: 138 MMOL/L (ref 136–145)
SODIUM SERPL-SCNC: 139 MMOL/L (ref 136–145)
WBC # BLD AUTO: 46.35 THOUSAND/UL (ref 4.31–10.16)

## 2019-09-22 PROCEDURE — 85025 COMPLETE CBC W/AUTO DIFF WBC: CPT | Performed by: NURSE PRACTITIONER

## 2019-09-22 PROCEDURE — 99233 SBSQ HOSP IP/OBS HIGH 50: CPT | Performed by: INTERNAL MEDICINE

## 2019-09-22 PROCEDURE — 84100 ASSAY OF PHOSPHORUS: CPT | Performed by: INTERNAL MEDICINE

## 2019-09-22 PROCEDURE — 85027 COMPLETE CBC AUTOMATED: CPT | Performed by: NURSE PRACTITIONER

## 2019-09-22 PROCEDURE — 90945 DIALYSIS ONE EVALUATION: CPT

## 2019-09-22 PROCEDURE — 82330 ASSAY OF CALCIUM: CPT | Performed by: INTERNAL MEDICINE

## 2019-09-22 PROCEDURE — 94640 AIRWAY INHALATION TREATMENT: CPT

## 2019-09-22 PROCEDURE — 80048 BASIC METABOLIC PNL TOTAL CA: CPT | Performed by: INTERNAL MEDICINE

## 2019-09-22 PROCEDURE — 90945 DIALYSIS ONE EVALUATION: CPT | Performed by: INTERNAL MEDICINE

## 2019-09-22 PROCEDURE — 83735 ASSAY OF MAGNESIUM: CPT | Performed by: INTERNAL MEDICINE

## 2019-09-22 RX ORDER — MAGNESIUM SULFATE 1 G/100ML
1 INJECTION INTRAVENOUS ONCE
Status: COMPLETED | OUTPATIENT
Start: 2019-09-22 | End: 2019-09-22

## 2019-09-22 RX ADMIN — ATORVASTATIN CALCIUM 40 MG: 40 TABLET, FILM COATED ORAL at 18:21

## 2019-09-22 RX ADMIN — CALCIUM GLUCONATE 1 G: 98 INJECTION, SOLUTION INTRAVENOUS at 05:39

## 2019-09-22 RX ADMIN — SODIUM PHOSPHATE, MONOBASIC, MONOHYDRATE 6 MMOL: 276; 142 INJECTION, SOLUTION INTRAVENOUS at 13:25

## 2019-09-22 RX ADMIN — ASPIRIN 81 MG: 81 TABLET, COATED ORAL at 09:19

## 2019-09-22 RX ADMIN — MIDODRINE HYDROCHLORIDE 10 MG: 5 TABLET ORAL at 16:44

## 2019-09-22 RX ADMIN — ISODIUM CHLORIDE 3 ML: 0.03 SOLUTION RESPIRATORY (INHALATION) at 06:05

## 2019-09-22 RX ADMIN — MIDODRINE HYDROCHLORIDE 10 MG: 5 TABLET ORAL at 12:24

## 2019-09-22 RX ADMIN — MELATONIN TAB 3 MG 9 MG: 3 TAB at 20:15

## 2019-09-22 RX ADMIN — MAGNESIUM SULFATE HEPTAHYDRATE 1 G: 1 INJECTION, SOLUTION INTRAVENOUS at 06:13

## 2019-09-22 RX ADMIN — Medication 20000 ML: at 21:50

## 2019-09-22 RX ADMIN — GABAPENTIN 100 MG: 100 CAPSULE ORAL at 21:20

## 2019-09-22 RX ADMIN — CALCIUM GLUCONATE 1 G: 98 INJECTION, SOLUTION INTRAVENOUS at 18:54

## 2019-09-22 RX ADMIN — WARFARIN SODIUM 1.25 MG: 2.5 TABLET ORAL at 18:21

## 2019-09-22 RX ADMIN — MIDODRINE HYDROCHLORIDE 10 MG: 5 TABLET ORAL at 06:17

## 2019-09-22 RX ADMIN — LEVALBUTEROL HYDROCHLORIDE 1.25 MG: 1.25 SOLUTION, CONCENTRATE RESPIRATORY (INHALATION) at 06:04

## 2019-09-22 RX ADMIN — ALLOPURINOL 300 MG: 100 TABLET ORAL at 09:19

## 2019-09-22 RX ADMIN — LEVALBUTEROL HYDROCHLORIDE 1.25 MG: 1.25 SOLUTION, CONCENTRATE RESPIRATORY (INHALATION) at 19:01

## 2019-09-22 RX ADMIN — ISODIUM CHLORIDE 3 ML: 0.03 SOLUTION RESPIRATORY (INHALATION) at 19:01

## 2019-09-22 RX ADMIN — AMIODARONE HYDROCHLORIDE 200 MG: 200 TABLET ORAL at 09:19

## 2019-09-22 RX ADMIN — LEVOTHYROXINE SODIUM 50 MCG: 50 TABLET ORAL at 05:39

## 2019-09-22 RX ADMIN — ISODIUM CHLORIDE 3 ML: 0.03 SOLUTION RESPIRATORY (INHALATION) at 12:42

## 2019-09-22 RX ADMIN — MICONAZOLE NITRATE: 2 CREAM TOPICAL at 09:19

## 2019-09-22 RX ADMIN — QUETIAPINE FUMARATE 12.5 MG: 25 TABLET ORAL at 20:15

## 2019-09-22 RX ADMIN — MICONAZOLE NITRATE: 2 CREAM TOPICAL at 18:21

## 2019-09-22 RX ADMIN — NOREPINEPHRINE BITARTRATE 6.67 MCG/MIN: 1 INJECTION INTRAVENOUS at 05:45

## 2019-09-22 RX ADMIN — FLUTICASONE PROPIONATE 1 SPRAY: 50 SPRAY, METERED NASAL at 09:19

## 2019-09-22 RX ADMIN — LEVALBUTEROL HYDROCHLORIDE 1.25 MG: 1.25 SOLUTION, CONCENTRATE RESPIRATORY (INHALATION) at 12:42

## 2019-09-22 RX ADMIN — NOREPINEPHRINE BITARTRATE 13.33 MCG/MIN: 1 INJECTION INTRAVENOUS at 22:27

## 2019-09-22 RX ADMIN — TAMSULOSIN HYDROCHLORIDE 0.4 MG: 0.4 CAPSULE ORAL at 09:19

## 2019-09-22 RX ADMIN — Medication 20000 ML: at 04:31

## 2019-09-22 NOTE — PLAN OF CARE
Problem: PAIN - ADULT  Goal: Verbalizes/displays adequate comfort level or baseline comfort level  Description  Interventions:  - Encourage patient to monitor pain and request assistance  - Assess pain using appropriate pain scale  - Administer analgesics based on type and severity of pain and evaluate response  - Implement non-pharmacological measures as appropriate and evaluate response  - Consider cultural and social influences on pain and pain management  - Notify physician/advanced practitioner if interventions unsuccessful or patient reports new pain  Outcome: Progressing     Problem: Knowledge Deficit  Goal: Patient/family/caregiver demonstrates understanding of disease process, treatment plan, medications, and discharge instructions  Description  Complete learning assessment and assess knowledge base    Interventions:  - Provide teaching at level of understanding  - Provide teaching via preferred learning methods  Outcome: Progressing     Problem: RESPIRATORY - ADULT  Goal: Achieves optimal ventilation and oxygenation  Description  INTERVENTIONS:  - Assess for changes in respiratory status  - Assess for changes in mentation and behavior  - Position to facilitate oxygenation and minimize respiratory effort  - Oxygen administered by appropriate delivery if ordered  - Initiate smoking cessation education as indicated  - Encourage broncho-pulmonary hygiene including cough, deep breathe, Incentive Spirometry  - Assess the need for suctioning and aspirate as needed  - Assess and instruct to report SOB or any respiratory difficulty  - Respiratory Therapy support as indicated  Outcome: Progressing     Problem: METABOLIC, FLUID AND ELECTROLYTES - ADULT  Goal: Fluid balance maintained  Description  INTERVENTIONS:  - Monitor labs   - Monitor I/O and WT  - Instruct patient on fluid and nutrition as appropriate  - Assess for signs & symptoms of volume excess or deficit  Outcome: Progressing     Problem: HEMATOLOGIC - ADULT  Goal: Maintains hematologic stability  Description  INTERVENTIONS  - Assess for signs and symptoms of bleeding or hemorrhage  - Monitor labs  - Administer supportive blood products/factors as ordered and appropriate  Outcome: Progressing     Problem: CARDIOVASCULAR - ADULT  Goal: Maintains optimal cardiac output and hemodynamic stability  Description  INTERVENTIONS:  - Monitor I/O, vital signs and rhythm  - Monitor for S/S and trends of decreased cardiac output  - Administer and titrate ordered vasoactive medications to optimize hemodynamic stability  - Assess quality of pulses, skin color and temperature  - Assess for signs of decreased coronary artery perfusion  - Instruct patient to report change in severity of symptoms  Outcome: Progressing  Goal: Absence of cardiac dysrhythmias or at baseline rhythm  Description  INTERVENTIONS:  - Continuous cardiac monitoring, vital signs, obtain 12 lead EKG if ordered  - Administer antiarrhythmic and heart rate control medications as ordered  - Monitor electrolytes and administer replacement therapy as ordered  Outcome: Progressing     Problem: Prexisting or High Potential for Compromised Skin Integrity  Goal: Skin integrity is maintained or improved  Description  INTERVENTIONS:  - Identify patients at risk for skin breakdown  - Assess and monitor skin integrity  - Assess and monitor nutrition and hydration status  - Monitor labs   - Assess for incontinence   - Turn and reposition patient  - Assist with mobility/ambulation  - Relieve pressure over bony prominences  - Avoid friction and shearing  - Provide appropriate hygiene as needed including keeping skin clean and dry  - Evaluate need for skin moisturizer/barrier cream  - Collaborate with interdisciplinary team   - Patient/family teaching  - Consider wound care consult   Outcome: Not Progressing     Problem: Nutrition/Hydration-ADULT  Goal: Nutrient/Hydration intake appropriate for improving, restoring or maintaining nutritional needs  Description  Monitor and assess patient's nutrition/hydration status for malnutrition  Collaborate with interdisciplinary team and initiate plan and interventions as ordered  Monitor patient's weight and dietary intake as ordered or per policy  Utilize nutrition screening tool and intervene as necessary  Determine patient's food preferences and provide high-protein, high-caloric foods as appropriate       INTERVENTIONS:  - Monitor oral intake, urinary output, labs, and treatment plans  - Assess nutrition and hydration status and recommend course of action  - Evaluate amount of meals eaten  - Assist patient with eating if necessary   - Allow adequate time for meals  - Recommend/ encourage appropriate diets, oral nutritional supplements, and vitamin/mineral supplements  - Order, calculate, and assess calorie counts as needed  - Recommend, monitor, and adjust tube feedings and TPN/PPN based on assessed needs  - Assess need for intravenous fluids  - Provide specific nutrition/hydration education as appropriate  - Include patient/family/caregiver in decisions related to nutrition  Outcome: Not Progressing     Problem: INFECTION - ADULT  Goal: Absence or prevention of progression during hospitalization  Description  INTERVENTIONS:  - Assess and monitor for signs and symptoms of infection  - Monitor lab/diagnostic results  - Monitor all insertion sites, i e  indwelling lines, tubes, and drains  - Monitor endotracheal if appropriate and nasal secretions for changes in amount and color  - [place] appropriate cooling/warming therapies per order  - Administer medications as ordered  - Instruct and encourage patient and family to use good hand hygiene technique  - Identify and instruct in appropriate isolation precautions for identified infection/condition  Outcome: Not Progressing     Problem: SKIN/TISSUE INTEGRITY - ADULT  Goal: Skin integrity remains intact  Description  INTERVENTIONS  - Identify patients at risk for skin breakdown  - Assess and monitor skin integrity  - Assess and monitor nutrition and hydration status  - Monitor labs (i e  albumin)  - Assess for incontinence   - Turn and reposition patient  - Assist with mobility/ambulation  - Relieve pressure over bony prominences  - Avoid friction and shearing  - Provide appropriate hygiene as needed including keeping skin clean and dry  - Evaluate need for skin moisturizer/barrier cream  - Collaborate with interdisciplinary team (i e  Nutrition, Rehabilitation, etc )   - Patient/family teaching  Outcome: Not Progressing  Goal: Incision(s), wounds(s) or drain site(s) healing without S/S of infection  Description  INTERVENTIONS  - Assess and document risk factors for skin impairment   - Assess and document dressing, incision, wound bed, drain sites and surrounding tissue  - Consider nutrition services referral as needed  - Oral mucous membranes remain intact  - Provide patient/ family education  Outcome: Not Progressing

## 2019-09-22 NOTE — PROGRESS NOTES
Progress Note - Critical Care   Gisell Stovall 80 y o  male MRN: [id number]  Unit/Bed#: ICU 10 Encounter: [id number]    Assessment/Plan:  1  Acute hypoxic respiratory failure secondary to CHF- now improved  · Respiratory status has significantly improved  · Continue to maintain O2 saturation >90%  2  Acute on chronic kidney disease stage IV  · Continue on CRRT  · Urine output still remains poor   · Family discussion planned for after the weekend, they are attempting to see if his urine output improves and would no longer need dialysis  · Patient is capable of making his own decisions and at this time does not want long term hemodialysis  3  Acute on chronic combined systolic and diastolic congestive heart failure  · Continue with volume removal of negative 150 ml/hr  · Nephrology following suspect that his kidney function will not improve  4  Atrial fibrillation  · On amiodarone and coumadin  5  Hypotension  · Has been on/off Levophed to assist with blood pressure  · Currently his on requiring it at low doses  6  Liver cirrhosis due to alcohol use and fatty liver disease  · No current issues  7  Chronic lymphocytic leukemia type B  8  Leukocytosis   · No acute signs of infection  · Could be related to CLL will add a differential     _____________________________________________________________________    HPI/24hr events:   No issues overnight   Urine output remains poor    Medications:    Current Facility-Administered Medications:  acetaminophen 488 mg Oral Q6H PRN Magdi Trujillo MD    allopurinol 300 mg Oral Daily Magdi Trujillo MD    amiodarone 200 mg Oral Daily Magdi Trujillo MD    aspirin 81 mg Oral Daily Magdi Trujillo MD    atorvastatin 40 mg Oral After Dakota Hernandez MD    fluticasone 1 spray Each Nare Daily Magdi Trujillo MD    gabapentin 100 mg Oral HS CT Grubbs    levalbuterol 1 25 mg Nebulization TID Magdi Trujillo MD    levothyroxine 50 mcg Oral Early Morning Magdi Trujillo MD melatonin 9 mg Oral HS PRN Luz Marina Guzman MD    miconazole  Topical BID Luz Marina Guzman MD    midodrine 10 mg Oral TID AC Luz Marina Guzman MD    norepinephrine 1-30 mcg/min Intravenous Titrated CT Nair Last Rate: 25 mcg/min (09/21/19 2004)   NxStage K 4/Ca 3 20,000 mL Dialysis Continuous Nelson Guillen MD    ondansetron 4 mg Intravenous Q6H PRN Luz Marina Guzman MD    QUEtiapine 12 5 mg Oral HS Luz Marina Guzman MD    sodium chloride 1 spray Each Nare PRN Luz Marina Guzman MD    sodium chloride 3 mL Nebulization Q6H PRN Luz Marina Guzman MD    sodium chloride 3 mL Nebulization TID Luz Marina Guzman MD    tamsulosin 0 4 mg Oral Daily Luz Marina Guzman MD    warfarin 1 25 mg Oral Daily (warfarin) CT Alvarez          norepinephrine 1-30 mcg/min Last Rate: 25 mcg/min (09/21/19 2004)   NxStage K 4/Ca 3 20,000 mL          Physical exam:  Vitals: Body mass index is 29 4 kg/m²  Blood pressure (!) 126/40, pulse 60, temperature 99 °F (37 2 °C), resp   rate 18, height 5' 9" (1 753 m), weight 90 3 kg (199 lb 1 2 oz), SpO2 91 % ,  Temp  Min: 95 7 °F (35 4 °C)  Max: 99 °F (37 2 °C)  IBW: 70 7 kg    SpO2: 91 %  SpO2 Activity: At Rest  O2 Device: None (Room air)      Intake/Output Summary (Last 24 hours) at 9/22/2019 0153  Last data filed at 9/22/2019 0000  Gross per 24 hour   Intake 2721 06 ml   Output 1974 ml   Net 747 06 ml       Invasive/non-invasive ventilation settings:   Respiratory    Lab Data (Last 4 hours)    None         O2/Vent Data (Last 4 hours)    None              Invasive Devices     Peripheral Intravenous Line            Peripheral IV 09/21/19 Right Antecubital less than 1 day          Line            Temporary HD Catheter 4 days          Drain            Urethral Catheter Temperature probe 3 days                  Physical Exam:  Gen:  Alert, oriented, no apparent distress  HEENT: Pupils equal and reactive to light, extra ocular movements intact, normocephalic, atraumatic, oropharynx clear  Neck: Supple, no jugular venous distention, no adenopathy  CV: Paced sinus rhythm  PULM: Decreased breath sounds  GI: Soft, non-tender, non-distended, bowel sounds present  MS: Moves all extremities, generalized edema  NEURO: Cranial nerves II-XII grossly intact  SKIN: Warm, dry, intact        Diagnostic Data:  Lab: I have personally reviewed pertinent lab results  CBC:   Results from last 7 days   Lab Units 09/20/19  0420 09/19/19  0433 09/18/19  0518   WBC Thousand/uL 23 93* 37 99* 37 57*   HEMOGLOBIN g/dL 7 0* 7 8* 7 8*   HEMATOCRIT % 23 7* 26 5* 25 4*   PLATELETS Thousands/uL 103* 128* 119*       CMP:   Results from last 7 days   Lab Units 09/21/19 2143 09/21/19  1618 09/21/19  0953  09/18/19  0518  09/17/19  0336   SODIUM mmol/L 135* 136 137   < > 140   < > 139   POTASSIUM mmol/L 5 1 4 4 4 1   < > 4 1   < > 4 4   CHLORIDE mmol/L 102 103 103   < > 104   < > 104   CO2 mmol/L 25 25 26   < > 25   < > 24   BUN mg/dL 16 16 19   < > 102*   < > 135*   CREATININE mg/dL 1 26 1 38* 1 33*   < > 2 40*   < > 2 97*   CALCIUM mg/dL 9 2 8 9 8 9   < > 9 5   < > 9 0   ALK PHOS U/L  --   --  118*  --  111  --  109   ALT U/L  --   --  40  --  27  --  36   AST U/L  --   --  69*  --  55*  --  54*    < > = values in this interval not displayed  PT/INR:   Lab Results   Component Value Date    INR 2 26 (H) 09/21/2019   ,   Magnesium:   Results from last 7 days   Lab Units 09/21/19 2143 09/21/19  1618 09/21/19  0953   MAGNESIUM mg/dL 2 0 1 8 2 0     Phosphorous:   Results from last 7 days   Lab Units 09/21/19 2143 09/21/19  1618 09/21/19  0953   PHOSPHORUS mg/dL 2 4 2 3 2 4       Microbiology:        Imaging:  No new imaging    Cardiac lab/EKG/telemetry/ECHO:   Paced    VTE Prophylaxis: Coumadin    Code Status: Level 1 - Full Code    CT Rayo    Portions of the record may have been created with voice recognition software   Occasional wrong word or "sound a like" substitutions may have occurred due to the inherent limitations of voice recognition software  Read the chart carefully and recognize, using context, where substitutions have occurred

## 2019-09-22 NOTE — PROGRESS NOTES
NEPHROLOGY PROGRESS NOTE   Ronda Bence Askin 80 y o  male MRN: [id number]  Unit/Bed#: ICU 10 Encounter: [id number]  Reason for Consult: ZULMA    ASSESSMENT and PLAN:    1 ) Acute renal failure  -likely multifactorial etiologies in the setting of possible ischemic acute tubular necrosis in the setting of hypotension as well as possible hepatorenal physiology  -currently on CVVH tolerating procedure well, patient seen on 8:11 a m   On CVVH currently running net -75 mL/hour, 4 K/3 calcium, therapy fluid 2 2 L/hr, blood flow rate 250 cc/minute  -essentially no urine output, which is signifying that there has been no evidence of renal recovery at this time  -creatinine trending down is reflection of the CVVH  -given that there is no urine output will discontinue IV Lasix  -maintain mean arterial pressure greater than 65 to maintain renal perfusion  -given his underlying chronic kidney disease stage 4 and advanced age do not anticipate meaningful renal recovery due to poor renal reserve     2 ) Chronic kidney disease stage IV     3 ) Blood pressure/volume status  -currently on Levophed  -examines volume overloaded  -blood pressure is currently holding with a map below 65 slightly  -maintain the map greater than 65   -currently not on albumin has been off for the last few days now  -will discontinue Lasix due to poor to no urine output, will try to maintain the blood pressure higher to allow adequate perfusion     4 ) Cirrhosis  -off octreotide  -off albumin  -discontinue Lasix  -midodrine 10 mg t i d   -plan to restart a low dose of norepinephrine     5 ) Chronic combined congestive heart  -EF 50%  -dilated IVC  -continue ultrafiltration with CVVH      SUBJECTIVE / INTERVAL HISTORY:    Complaining of leg pain and generalized muscle aches but no shortness of breath    OBJECTIVE:  Current Weight: Weight - Scale: 90 3 kg (199 lb 1 2 oz)  Vitals:    09/22/19 0330 09/22/19 0600 09/22/19 0605 09/22/19 0700   BP: (!) 134/43 153/55 154/62   Pulse: 62 60  60   Resp: 21 22 19   Temp: 99 °F (37 2 °C) 98 6 °F (37 °C)  98 2 °F (36 8 °C)   TempSrc:       SpO2: 90% 94% 93% 95%   Weight:       Height:           Intake/Output Summary (Last 24 hours) at 9/22/2019 0807  Last data filed at 9/22/2019 0600  Gross per 24 hour   Intake 2100 87 ml   Output 114 ml   Net 1986 87 ml       Review of Systems:    12 point ROS has been reviewed  Physical Exam   Constitutional: He is oriented to person, place, and time  He appears well-developed and well-nourished  No distress  HENT:   Head: Normocephalic and atraumatic  Eyes: Pupils are equal, round, and reactive to light  No scleral icterus  Neck: Normal range of motion  Neck supple  Cardiovascular: Normal rate, regular rhythm and normal heart sounds  Exam reveals no gallop and no friction rub  No murmur heard  Pulmonary/Chest: Effort normal  No respiratory distress  He has no wheezes  He has rales  He exhibits no tenderness  Abdominal: Soft  Bowel sounds are normal  He exhibits no distension  There is no tenderness  There is no rebound  Musculoskeletal: Normal range of motion  He exhibits edema  Neurological: He is alert and oriented to person, place, and time  Skin: No rash noted  He is not diaphoretic  Psychiatric: He has a normal mood and affect  Nursing note and vitals reviewed        Medications:    Current Facility-Administered Medications:     acetaminophen (TYLENOL) tablet 488 mg, 488 mg, Oral, Q6H PRN, Yusra Molina MD, 488 mg at 09/17/19 2044    allopurinol (ZYLOPRIM) tablet 300 mg, 300 mg, Oral, Daily, Yusra Molina MD, 300 mg at 09/21/19 0834    amiodarone tablet 200 mg, 200 mg, Oral, Daily, Yusra Molina MD, 200 mg at 09/21/19 0838    aspirin (ECOTRIN LOW STRENGTH) EC tablet 81 mg, 81 mg, Oral, Daily, Margo Giles MD, 81 mg at 09/21/19 0837    atorvastatin (LIPITOR) tablet 40 mg, 40 mg, Oral, After Mac Pascal MD, 40 mg at 09/21/19 1832    fluticasone (FLONASE) 50 mcg/act nasal spray 1 spray, 1 spray, Each Nare, Daily, Benigno Terrazas MD, 1 spray at 09/21/19 0833    gabapentin (NEURONTIN) capsule 100 mg, 100 mg, Oral, HS, CT Phipps, 100 mg at 09/21/19 2146    levalbuterol (XOPENEX) inhalation solution 1 25 mg, 1 25 mg, Nebulization, TID, Benigno Terrazas MD, 1 25 mg at 09/22/19 0604    levothyroxine tablet 50 mcg, 50 mcg, Oral, Early Morning, Margo Giles MD, 50 mcg at 09/22/19 0539    melatonin tablet 9 mg, 9 mg, Oral, HS PRN, Benigno Terrazas MD, 9 mg at 09/19/19 2202    miconazole 2 % cream, , Topical, BID, Benigno Terrazas MD    midodrine (PROAMATINE) tablet 10 mg, 10 mg, Oral, TID AC, Margo Giles MD, 10 mg at 09/22/19 0617    norepinephrine (LEVOPHED) 4 mg (STANDARD CONCENTRATION) IV in sodium chloride 0 9% 250 mL, 1-30 mcg/min, Intravenous, Titrated, CT Phipps, Last Rate: 25 mL/hr at 09/22/19 0545, 6 667 mcg/min at 09/22/19 0545    NxStage K 4/Ca 3 dialysis solution (RFP-401) 20,000 mL, 20,000 mL, Dialysis, Continuous, Yoni Mora MD, 20,000 mL at 09/22/19 0431    ondansetron (ZOFRAN) injection 4 mg, 4 mg, Intravenous, Q6H PRN, Benigno Terrazas MD    QUEtiapine (SEROquel) tablet 12 5 mg, 12 5 mg, Oral, HS, Margo Giles MD, 12 5 mg at 09/21/19 2143    sodium chloride (OCEAN) 0 65 % nasal spray 1 spray, 1 spray, Each Nare, PRN, Benigno Terrazas MD, 1 spray at 09/18/19 2030    sodium chloride 0 9 % inhalation solution 3 mL, 3 mL, Nebulization, Q6H PRN, Benigno Terrazas MD, 3 mL at 09/17/19 0844    sodium chloride 0 9 % inhalation solution 3 mL, 3 mL, Nebulization, TID, Benigno Terrazas MD, 3 mL at 09/22/19 0605    tamsulosin (FLOMAX) capsule 0 4 mg, 0 4 mg, Oral, Daily, Benigno Terrazas MD, 0 4 mg at 09/21/19 9102    warfarin (COUMADIN) tablet 1 25 mg, 1 25 mg, Oral, Daily (warfarin), CT Lala, 1 25 mg at 09/21/19 1832    Laboratory Results:  Results from last 7 days   Lab Units 09/22/19  0422 09/21/19  7081 09/21/19  1618 09/21/19  1473 09/21/19  0440 09/20/19  2103 09/20/19  1605  09/20/19  0420  09/19/19  0433  09/18/19  0518  09/17/19  0336 09/16/19  0437   WBC Thousand/uL 46 35*  --   --   --   --   --   --   --  23 93*  --  37 99*  --  37 57*  --  21 37* 21 36*   HEMOGLOBIN g/dL 7 4*  --   --   --   --   --   --   --  7 0*  --  7 8*  --  7 8*  --  7 8* 7 6*   HEMATOCRIT % 25 3*  --   --   --   --   --   --   --  23 7*  --  26 5*  --  25 4*  --  24 6* 25 3*   PLATELETS Thousands/uL 157  --   --   --   --   --   --   --  103*  --  128*  --  119*  --  106* 106*   POTASSIUM mmol/L 4 8 5 1 4 4 4 1 4 2 4 8 3 8   < > 3 7   < > 4 7   < > 4 1   < > 4 4 4 8   CHLORIDE mmol/L 103 102 103 103 105 103 103   < > 104   < > 104   < > 104   < > 104 104   CO2 mmol/L 26 25 25 26 26 25 27   < > 26   < > 26   < > 25   < > 24 22   BUN mg/dL 17 16 16 19 20 22 24   < > 31*   < > 54*   < > 102*   < > 135* 125*   CREATININE mg/dL 1 37* 1 26 1 38* 1 33* 1 29 1 32* 1 32*   < > 1 29   < > 1 55*   < > 2 40*   < > 2 97* 2 90*   CALCIUM mg/dL 9 3 9 2 8 9 8 9 9 0 9 2 9 2   < > 9 1   < > 9 6   < > 9 5   < > 9 0 8 7   MAGNESIUM mg/dL 1 9 2 0 1 8 2 0 1 9 2 0 2 1   < > 2 1   < > 1 9   < >  --    < >  --   --    PHOSPHORUS mg/dL 2 7 2 4 2 3 2 4 2 4 2 8 2 5   < > 2 5   < > 2 8   < >  --    < >  --   --     < > = values in this interval not displayed

## 2019-09-23 PROBLEM — R35.0 URINARY FREQUENCY: Status: RESOLVED | Noted: 2018-11-03 | Resolved: 2019-09-23

## 2019-09-23 PROBLEM — Z85.46 HISTORY OF PROSTATE CANCER: Status: RESOLVED | Noted: 2018-11-03 | Resolved: 2019-09-23

## 2019-09-23 PROBLEM — R26.2 AMBULATORY DYSFUNCTION: Status: RESOLVED | Noted: 2019-09-07 | Resolved: 2019-09-23

## 2019-09-23 PROBLEM — N18.4 CKD (CHRONIC KIDNEY DISEASE) STAGE 4, GFR 15-29 ML/MIN (HCC): Status: RESOLVED | Noted: 2019-09-16 | Resolved: 2019-09-23

## 2019-09-23 PROBLEM — E86.0 DEHYDRATION: Status: RESOLVED | Noted: 2019-09-07 | Resolved: 2019-09-23

## 2019-09-23 PROBLEM — S40.029A HEMATOMA OF ARM: Status: RESOLVED | Noted: 2019-09-13 | Resolved: 2019-09-23

## 2019-09-23 LAB
ANION GAP SERPL CALCULATED.3IONS-SCNC: 6 MMOL/L (ref 4–13)
ANION GAP SERPL CALCULATED.3IONS-SCNC: 7 MMOL/L (ref 4–13)
ANION GAP SERPL CALCULATED.3IONS-SCNC: 7 MMOL/L (ref 4–13)
ANION GAP SERPL CALCULATED.3IONS-SCNC: 9 MMOL/L (ref 4–13)
BUN SERPL-MCNC: 12 MG/DL (ref 5–25)
BUN SERPL-MCNC: 14 MG/DL (ref 5–25)
BUN SERPL-MCNC: 15 MG/DL (ref 5–25)
BUN SERPL-MCNC: 15 MG/DL (ref 5–25)
CA-I BLD-SCNC: 1.16 MMOL/L (ref 1.12–1.32)
CALCIUM SERPL-MCNC: 9 MG/DL (ref 8.3–10.1)
CALCIUM SERPL-MCNC: 9.1 MG/DL (ref 8.3–10.1)
CHLORIDE SERPL-SCNC: 102 MMOL/L (ref 100–108)
CHLORIDE SERPL-SCNC: 103 MMOL/L (ref 100–108)
CHLORIDE SERPL-SCNC: 104 MMOL/L (ref 100–108)
CHLORIDE SERPL-SCNC: 104 MMOL/L (ref 100–108)
CO2 SERPL-SCNC: 25 MMOL/L (ref 21–32)
CO2 SERPL-SCNC: 26 MMOL/L (ref 21–32)
CO2 SERPL-SCNC: 26 MMOL/L (ref 21–32)
CO2 SERPL-SCNC: 28 MMOL/L (ref 21–32)
CREAT SERPL-MCNC: 1.3 MG/DL (ref 0.6–1.3)
CREAT SERPL-MCNC: 1.34 MG/DL (ref 0.6–1.3)
CREAT SERPL-MCNC: 1.39 MG/DL (ref 0.6–1.3)
CREAT SERPL-MCNC: 1.41 MG/DL (ref 0.6–1.3)
GFR SERPL CREATININE-BSD FRML MDRD: 45 ML/MIN/1.73SQ M
GFR SERPL CREATININE-BSD FRML MDRD: 46 ML/MIN/1.73SQ M
GFR SERPL CREATININE-BSD FRML MDRD: 48 ML/MIN/1.73SQ M
GFR SERPL CREATININE-BSD FRML MDRD: 49 ML/MIN/1.73SQ M
GLUCOSE SERPL-MCNC: 143 MG/DL (ref 65–140)
GLUCOSE SERPL-MCNC: 148 MG/DL (ref 65–140)
GLUCOSE SERPL-MCNC: 152 MG/DL (ref 65–140)
GLUCOSE SERPL-MCNC: 188 MG/DL (ref 65–140)
MAGNESIUM SERPL-MCNC: 1.8 MG/DL (ref 1.6–2.6)
MAGNESIUM SERPL-MCNC: 1.8 MG/DL (ref 1.6–2.6)
MAGNESIUM SERPL-MCNC: 2 MG/DL (ref 1.6–2.6)
MAGNESIUM SERPL-MCNC: 2.2 MG/DL (ref 1.6–2.6)
PHOSPHATE SERPL-MCNC: 2.2 MG/DL (ref 2.3–4.1)
PHOSPHATE SERPL-MCNC: 2.3 MG/DL (ref 2.3–4.1)
PHOSPHATE SERPL-MCNC: 2.3 MG/DL (ref 2.3–4.1)
PHOSPHATE SERPL-MCNC: 2.5 MG/DL (ref 2.3–4.1)
POTASSIUM SERPL-SCNC: 4.4 MMOL/L (ref 3.5–5.3)
POTASSIUM SERPL-SCNC: 4.5 MMOL/L (ref 3.5–5.3)
POTASSIUM SERPL-SCNC: 4.5 MMOL/L (ref 3.5–5.3)
POTASSIUM SERPL-SCNC: 4.9 MMOL/L (ref 3.5–5.3)
SODIUM SERPL-SCNC: 135 MMOL/L (ref 136–145)
SODIUM SERPL-SCNC: 136 MMOL/L (ref 136–145)
SODIUM SERPL-SCNC: 138 MMOL/L (ref 136–145)
SODIUM SERPL-SCNC: 138 MMOL/L (ref 136–145)

## 2019-09-23 PROCEDURE — 80048 BASIC METABOLIC PNL TOTAL CA: CPT | Performed by: INTERNAL MEDICINE

## 2019-09-23 PROCEDURE — 84100 ASSAY OF PHOSPHORUS: CPT | Performed by: INTERNAL MEDICINE

## 2019-09-23 PROCEDURE — 82330 ASSAY OF CALCIUM: CPT | Performed by: INTERNAL MEDICINE

## 2019-09-23 PROCEDURE — 90945 DIALYSIS ONE EVALUATION: CPT

## 2019-09-23 PROCEDURE — 90945 DIALYSIS ONE EVALUATION: CPT | Performed by: INTERNAL MEDICINE

## 2019-09-23 PROCEDURE — 99291 CRITICAL CARE FIRST HOUR: CPT | Performed by: INTERNAL MEDICINE

## 2019-09-23 PROCEDURE — 94640 AIRWAY INHALATION TREATMENT: CPT

## 2019-09-23 PROCEDURE — 99223 1ST HOSP IP/OBS HIGH 75: CPT | Performed by: SURGERY

## 2019-09-23 PROCEDURE — 83735 ASSAY OF MAGNESIUM: CPT | Performed by: INTERNAL MEDICINE

## 2019-09-23 RX ORDER — NOREPINEPHRINE BITARTRATE 1 MG/ML
INJECTION, SOLUTION INTRAVENOUS
Status: DISPENSED
Start: 2019-09-23 | End: 2019-09-23

## 2019-09-23 RX ORDER — MAGNESIUM SULFATE 1 G/100ML
1 INJECTION INTRAVENOUS ONCE
Status: COMPLETED | OUTPATIENT
Start: 2019-09-23 | End: 2019-09-23

## 2019-09-23 RX ADMIN — NOREPINEPHRINE BITARTRATE 13.33 MCG/MIN: 1 INJECTION INTRAVENOUS at 12:43

## 2019-09-23 RX ADMIN — SODIUM PHOSPHATE, MONOBASIC, MONOHYDRATE 6 MMOL: 276; 142 INJECTION, SOLUTION INTRAVENOUS at 06:05

## 2019-09-23 RX ADMIN — CALCIUM GLUCONATE 1 G: 98 INJECTION, SOLUTION INTRAVENOUS at 00:36

## 2019-09-23 RX ADMIN — TAMSULOSIN HYDROCHLORIDE 0.4 MG: 0.4 CAPSULE ORAL at 09:16

## 2019-09-23 RX ADMIN — LEVALBUTEROL HYDROCHLORIDE 1.25 MG: 1.25 SOLUTION, CONCENTRATE RESPIRATORY (INHALATION) at 07:45

## 2019-09-23 RX ADMIN — CALCIUM GLUCONATE 1 G: 98 INJECTION, SOLUTION INTRAVENOUS at 05:52

## 2019-09-23 RX ADMIN — SODIUM PHOSPHATE, MONOBASIC, MONOHYDRATE 6 MMOL: 276; 142 INJECTION, SOLUTION INTRAVENOUS at 20:04

## 2019-09-23 RX ADMIN — MAGNESIUM SULFATE HEPTAHYDRATE 1 G: 1 INJECTION, SOLUTION INTRAVENOUS at 16:02

## 2019-09-23 RX ADMIN — FLUTICASONE PROPIONATE 1 SPRAY: 50 SPRAY, METERED NASAL at 09:17

## 2019-09-23 RX ADMIN — AMIODARONE HYDROCHLORIDE 200 MG: 200 TABLET ORAL at 09:15

## 2019-09-23 RX ADMIN — NOREPINEPHRINE BITARTRATE 16 MCG/MIN: 1 INJECTION INTRAVENOUS at 09:17

## 2019-09-23 RX ADMIN — MIDODRINE HYDROCHLORIDE 10 MG: 5 TABLET ORAL at 11:31

## 2019-09-23 RX ADMIN — ISODIUM CHLORIDE 3 ML: 0.03 SOLUTION RESPIRATORY (INHALATION) at 19:03

## 2019-09-23 RX ADMIN — MIDODRINE HYDROCHLORIDE 10 MG: 5 TABLET ORAL at 16:01

## 2019-09-23 RX ADMIN — CALCIUM GLUCONATE 1 G: 98 INJECTION, SOLUTION INTRAVENOUS at 20:01

## 2019-09-23 RX ADMIN — MICONAZOLE NITRATE: 2 CREAM TOPICAL at 09:17

## 2019-09-23 RX ADMIN — ASPIRIN 81 MG: 81 TABLET, COATED ORAL at 09:16

## 2019-09-23 RX ADMIN — WARFARIN SODIUM 1.25 MG: 2.5 TABLET ORAL at 17:05

## 2019-09-23 RX ADMIN — MIDODRINE HYDROCHLORIDE 10 MG: 5 TABLET ORAL at 09:16

## 2019-09-23 RX ADMIN — GABAPENTIN 100 MG: 100 CAPSULE ORAL at 21:56

## 2019-09-23 RX ADMIN — ATORVASTATIN CALCIUM 40 MG: 40 TABLET, FILM COATED ORAL at 17:05

## 2019-09-23 RX ADMIN — CALCIUM GLUCONATE 1 G: 98 INJECTION, SOLUTION INTRAVENOUS at 16:01

## 2019-09-23 RX ADMIN — SODIUM PHOSPHATE, MONOBASIC, MONOHYDRATE 6 MMOL: 276; 142 INJECTION, SOLUTION INTRAVENOUS at 16:01

## 2019-09-23 RX ADMIN — NOREPINEPHRINE BITARTRATE 13.33 MCG/MIN: 1 INJECTION INTRAVENOUS at 02:27

## 2019-09-23 RX ADMIN — MICONAZOLE NITRATE: 2 CREAM TOPICAL at 17:02

## 2019-09-23 RX ADMIN — MAGNESIUM SULFATE HEPTAHYDRATE 1 G: 1 INJECTION, SOLUTION INTRAVENOUS at 01:29

## 2019-09-23 RX ADMIN — NOREPINEPHRINE BITARTRATE 13.3 MCG/MIN: 1 INJECTION INTRAVENOUS at 17:02

## 2019-09-23 RX ADMIN — NOREPINEPHRINE BITARTRATE 16 MCG/MIN: 1 INJECTION INTRAVENOUS at 08:03

## 2019-09-23 RX ADMIN — ALLOPURINOL 300 MG: 100 TABLET ORAL at 09:16

## 2019-09-23 RX ADMIN — NOREPINEPHRINE BITARTRATE 13.33 MCG/MIN: 1 INJECTION INTRAVENOUS at 20:18

## 2019-09-23 RX ADMIN — LEVALBUTEROL HYDROCHLORIDE 1.25 MG: 1.25 SOLUTION, CONCENTRATE RESPIRATORY (INHALATION) at 19:03

## 2019-09-23 RX ADMIN — Medication 20000 ML: at 10:43

## 2019-09-23 RX ADMIN — LEVOTHYROXINE SODIUM 50 MCG: 50 TABLET ORAL at 06:59

## 2019-09-23 RX ADMIN — ISODIUM CHLORIDE 3 ML: 0.03 SOLUTION RESPIRATORY (INHALATION) at 07:45

## 2019-09-23 RX ADMIN — SODIUM PHOSPHATE, MONOBASIC, MONOHYDRATE 6 MMOL: 276; 142 INJECTION, SOLUTION INTRAVENOUS at 01:25

## 2019-09-23 NOTE — PROGRESS NOTES
Progress Note - Critical Care   Roberto Stovall 80 y o  male MRN: [id number]  Unit/Bed#: ICU 10 Encounter: [id number]    Assessment/Plan:  1  Acute hypoxic respiratory failure secondary to congestive heart failure, improved  · We will continue to encourage cough, deep breathing and incentive spirometer  · We will wean his FiO2 as able with a goal to maintain his saturations greater than 90%  2  Acute kidney injury on chronic kidney disease stage 4  · He remains on CVVH  · His urine output has not significantly improved  · Awaiting family and patient decision regarding long-term dialysis treatment  3  Acute on chronic combined systolic and diastolic congestive heart failure  · We will continue with volume removal as able with a goal to maintain his mean arterial pressure greater than 65  · Further management per Nephrology is recommendations  4  Chronic atrial fibrillation-rate controlled  · Will continue him on his outpatient amiodarone and Coumadin  5  Hypotension  · He required placement back on Levophed overnight to maintain systolic blood pressures greater than 90  6  Chronic lymphocytic leukemia  7  Liver cirrhosis secondary to long-term alcohol use and fatty liver disease    Critical Care Time:   Documented critical care time excludes any procedures documented elsewhere  It also excludes any family updates    _____________________________________________________________________    HPI/24hr events:    The patient had some vivid dreams overnight however he feels a little bit better this morning    Medications:    Current Facility-Administered Medications:  acetaminophen 488 mg Oral Q6H PRN Francisco Eduardo MD    allopurinol 300 mg Oral Daily Francisco Eduardo MD    amiodarone 200 mg Oral Daily Francisco Eduardo MD    aspirin 81 mg Oral Daily Francisco Eduardo MD    atorvastatin 40 mg Oral After Braulio Delgado MD    calcium gluconate 1 G  100 mL IVPB 1 g Intravenous Once Juleen Siemens, MD Last Rate: 1 g (09/23/19 0552) fluticasone 1 spray Each Nare Daily Margo Giles MD    gabapentin 100 mg Oral HS Thora Tio, CRNP    levalbuterol 1 25 mg Nebulization TID Lizzy Mancilla MD    levothyroxine 50 mcg Oral Early Morning Lizzy Mancilla MD    melatonin 9 mg Oral HS PRN Lizzy Mancilla MD    miconazole  Topical BID Lizzy Mancilla MD    midodrine 10 mg Oral TID AC Lizzy Mancilla MD    norepinephrine 1-30 mcg/min Intravenous Titrated Thora Tio, CRNP Last Rate: 13 333 mcg/min (09/23/19 0227)   NxStage K 4/Ca 3 20,000 mL Dialysis Continuous Dang Steele MD    ondansetron 4 mg Intravenous Q6H PRN Lizzy Mancilla MD    QUEtiapine 12 5 mg Oral HS Lizzy Mancilla MD    sodium chloride 1 spray Each Nare PRN Lizzy Mancilla MD    sodium chloride 3 mL Nebulization Q6H PRN Margo Giles MD    sodium chloride 3 mL Nebulization TID Lizzy Mancilla MD    sodium phosphate 6 mmol Intravenous Once Belen Ramírez MD Last Rate: 6 mmol (09/23/19 5876)   tamsulosin 0 4 mg Oral Daily Margo Giles MD    warfarin 1 25 mg Oral Daily (warfarin) Wayna Halsted, CRNP          norepinephrine 1-30 mcg/min Last Rate: 13 333 mcg/min (09/23/19 0227)   NxStage K 4/Ca 3 20,000 mL          Physical exam:  Vitals: Body mass index is 29 66 kg/m²  Blood pressure 108/50, pulse 68, temperature 98 6 °F (37 °C), resp  rate (!) 29, height 5' 9" (1 753 m), weight 91 1 kg (200 lb 13 4 oz), SpO2 94 %  ,  Temp  Min: 95 7 °F (35 4 °C)  Max: 99 °F (37 2 °C)  IBW: 70 7 kg    SpO2: 94 %  SpO2 Activity: At Rest  O2 Device: Nasal cannula      Intake/Output Summary (Last 24 hours) at 9/23/2019 0656  Last data filed at 9/23/2019 0953  Gross per 24 hour   Intake 1333 6 ml   Output 3434 ml   Net -2100 4 ml       Invasive/non-invasive ventilation settings:   Respiratory    Lab Data (Last 4 hours)    None         O2/Vent Data (Last 4 hours)    None              Invasive Devices     Peripheral Intravenous Line            Peripheral IV 09/21/19 Right Antecubital 2 days          Line Temporary HD Catheter 5 days          Drain            Urethral Catheter Temperature probe 4 days                  Physical Exam:  Gen:  Awake and alert  HEENT:  Pupils are equal round reactive to light  The oropharynx is without erythema  Neck:  Supple, the right IJ tree Carlton catheter site is clean dry and intact  Chest:  Diminished with some crackles in the bases bilaterally  Cor:  Regular rate and rhythm  Abd:  Mildly distended but soft and nontender  Ext:  There is mild lower extremity edema bilaterally  Neuro:  Awake and alert, able to move his extremities but weak  Skin:  Warm and dry      Diagnostic Data:  Lab: I have personally reviewed pertinent lab results  CBC:   Results from last 7 days   Lab Units 09/22/19  0422 09/20/19  0420 09/19/19  0433   WBC Thousand/uL 46 35* 23 93* 37 99*   HEMOGLOBIN g/dL 7 4* 7 0* 7 8*   HEMATOCRIT % 25 3* 23 7* 26 5*   PLATELETS Thousands/uL 157 103* 128*       CMP:   Results from last 7 days   Lab Units 09/23/19 0450 09/22/19 2309 09/22/19  1653  09/21/19  0953  09/18/19  0518  09/17/19  0336   SODIUM mmol/L 138 135* 139   < > 137   < > 140   < > 139   POTASSIUM mmol/L 4 5 4 9 4 3   < > 4 1   < > 4 1   < > 4 4   CHLORIDE mmol/L 104 102 103   < > 103   < > 104   < > 104   CO2 mmol/L 28 26 26   < > 26   < > 25   < > 24   BUN mg/dL 12 15 16   < > 19   < > 102*   < > 135*   CREATININE mg/dL 1 39* 1 34* 1 37*   < > 1 33*   < > 2 40*   < > 2 97*   CALCIUM mg/dL 9 1 9 1 8 9   < > 8 9   < > 9 5   < > 9 0   ALK PHOS U/L  --   --   --   --  118*  --  111  --  109   ALT U/L  --   --   --   --  40  --  27  --  36   AST U/L  --   --   --   --  69*  --  55*  --  54*    < > = values in this interval not displayed       PT/INR:   No results found for: PT, INR,   Magnesium:   Results from last 7 days   Lab Units 09/23/19 0450 09/22/19 2309 09/22/19  1653   MAGNESIUM mg/dL 2 2 1 8 2 0     Phosphorous:   Results from last 7 days   Lab Units 09/23/19  8840 09/22/19  1014 09/22/19  1653   PHOSPHORUS mg/dL 2 3 2 3 2 6       Microbiology:        Imaging:      Cardiac lab/EKG/telemetry/ECHO:       VTE Prophylaxis:  Coumadin    Code Status: Level 1 - Full Code    CT [facility]aya    Portions of the record may have been created with voice recognition software  Occasional wrong word or "sound a like" substitutions may have occurred due to the inherent limitations of voice recognition software  Read the chart carefully and recognize, using context, where substitutions have occurred

## 2019-09-23 NOTE — CONSULTS
Consultation - Palliative & Supportive Care   Mayuri Stovall 80 y o  male MRN: [id number]  Unit/Bed#: ICU 10 Encounter: [id number]      Physician Requesting Consult: Amanda Thakkar MD  Reason for Consult / Principal Problem: goals of care      Assessment/Plan:  1  Acute hypoxic respiratory failure  2  Combined congestive heart failure - EF 55%  3  ZULMA on CKD stage 4, requiring CVVH  4  Vasopressor support with CVVH  5  Liver cirrhosis  6  Ascites  7  Debility  8  Ambulatory dysfunction with frequent falls  9  CLL  10  Goals of care:  -Met with patient  He seems clear that he is at the end of his life and states that he only has "a week, maybe 10 days or so to live"  Patient clear that he does not want long-term dialysis even if offered  Patient clear that he will be unable to care for himself at home in current condition and says he rather likes it in the hospital    -family meeting with patient, 2 sons, dil, primary ICU team, nephrology, CM  Long discussion regarding medical condition and lack of improvement/renal recovery  Discussion regarding role of cirrhosis playing into ability to tolerate CVVH without vasopressor support  Medical questions answered to patient's and family's satisfaction  Further discussion on end of life symptom management and hospice focus of care  Family wanting to convene with patient further prior to liberating from CVVH machine or stopping vasopressor support  Also discussed simplifying medication regimen if decide to transition to comfort  Shared decision in keeping patient here to monitor closely for symptom needs if transition to comfort approach  If transition to comfort, and patient remains stable but requires aggressive symptom management will consider transfer to hospice IPU  Family wants to discuss further after hearing hospice IPU options nearby  Family will inform ICU team of decisions on how to proceed with care   -discussed code status    Reviewed advance directive  Will change patient to DNAR/DNI- level 3 for now as family discusses possible transition to comfort measures/level 4  -patient/family wish for patient to receive the Sacrament of the [address]; ICU team to contact on-call / services  -place bedside fan in patient's room to help with dyspnea sensation  -if patient transitions to comfort, would start prn comfort meds, titrate depending on patient's response/symptom needs   -dilaudid 0 5-1mg IV q3h prn pain, dyspnea   -lorazepam 0 5-1mg IV q3h prn anxiety, dyspnea, insomnia, agitation, n/v   -haldol 1mg IV q6h prn agitation, terminal dyspnea, nausea  -d/w primary team    Code Status: Level 1 - Full Code, change to Level 3  Advance Directive and Living Will:     Yes, reviewed and copied for scan into chart  Health Care Agents:  Yes/sons - Waldo Ash and Yony DE LA CRUZ:  No  Lives alone in a house, has stairs  Identifies as a recluse for last 20years  Worked as  for 50 years  Enjoys reading, use to enjoy hunting  Spiritual - Jew, has received support from  services        History of Present Illness   HPI: Miguelito Collins is a 80y o  year old male admitted on 9/7/2019 with progressive generalized weakness, decreased po intake, chest congestion, dyspnea and ambulatory dysfunction with frequent falls sustaining recent laceration of RUE and skin tears  Patient with PMH significant for combined systolic and diastolic heart failure, bioprosthetic aortic valve replacement, atrial fibrillation on coumadin, chronic kidney disease 4, chronic lymphocytic leukemia being monitored by observation, liver cirrhosis secondary to long-term alcohol use and fatty liver disease  Patient's hospital course complicated by acute hypoxic respiratory failure secondary to congestive heart failure, acute kidney injury requiring CVVH without evidence of renal recovery, hypotension requiring vasopressor support    Per notes, family meeting on 9/19/2019 with patient's 2 sons, DIL, and patient to discuss current situation and options for ongoing treatment  Patient not interested in long-term dialysis, as this would not be a quality of life that he would like to pursue  Family awaiting input from Dr Russ Aiken family physician  Plan was to continue CVVH to assess for renal/hemodynamic recovery and discussed palliative and potential hospice approach with further deterioration  Review of Systems   Constitutional: Positive for activity change and fatigue  Respiratory: Positive for shortness of breath  Cardiovascular: Positive for leg swelling  Gastrointestinal: Positive for abdominal pain  Musculoskeletal: Positive for gait problem  Skin: Positive for wound  Neurological: Positive for weakness  All other systems reviewed and are negative        Historical Information   Past Medical History:   Diagnosis Date    AAA (abdominal aortic aneurysm) ([facility] )     Abnormal blood chemistry     last assessed 12/12/2013    Allergic rhinitis     last assessed 11/21/2016    Anemia     Ankle edema     Arthritis     Atherosclerotic heart disease of native coronary artery without angina pectoris 10/26/2012    Atherosclerotic peripheral vascular disease with intermittent claudication ([facility] ) 9/10/2015    Overview:  H/O stenting of iliac arteries    Atrophy of prostate     Bilateral leg edema 9/30/2015    Chronic diarrhea     [facility] obstructive pulmonary disease [facility] ) 10/26/2012    CKD (chronic kidney disease) stage 4, GFR 15-29 ml/min ([facility]) 9/16/2019    COPD (chronic obstructive pulmonary disease) ([facility])     Coronary artery disease     stents, pacemaker    Elevated PSA     GERD (gastroesophageal reflux disease)     Gout     H/O aortic valve replacement 2/2/2016    Heart failure ([facility])     Heart murmur     aortic stenosis    Hematoma of arm 9/13/2019    History of prostate cancer 11/3/2018    Hyperlipidemia     Hypertension     Irregular heart beat     a fib    Joint pain     Kidney failure     stage 3    Leukemia (HCC)     Malignant neoplasm prostate ([address] )     Myocardial infarction ([address]     Nocturia     Osteopenia     Pacemaker 9/5/2015    Pleural effusion     Presence of stent in coronary artery in patient with coronary artery disease 8/31/2015    PVD (peripheral vascular disease) (HCC)     Radiation gastroenteritis     Skin cancer     Sore muscles     Type 2 diabetes mellitus ([address] )     Urinary frequency 11/3/2018     Patient Active Problem List   Diagnosis    Non-pressure chronic ulcer of right calf with fat layer exposed ([address] )    Acute on chronic combined systolic and diastolic heart failure ([address] )    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, without long-term current use of insulin (HCC)    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 ([address] )    Atrial fibrillation (HCC)    Pneumonia of right middle lobe due to infectious organism ([address] )    Acute kidney injury superimposed on chronic kidney disease ([address] )    Hypothyroidism    Hypotension (arterial)    Left renal mass    Anemia due to stage 4 chronic kidney disease ([address] )    Other cirrhosis of liver ([address] )    Uremia    Acute respiratory failure with hypoxia ([address] )     Past Surgical History:   Procedure Laterality Date    CARDIAC PACEMAKER PLACEMENT  2014    CARDIAC VALVE REPLACEMENT      CORONARY ANGIOPLASTY      prox left anterior descending artery assessment    OTHER SURGICAL HISTORY      catheter ablation    PROSTATE BIOPSY Bilateral 2003, 2008    VASCULAR SURGERY      iliac stent     Social History     Socioeconomic History    Marital status:       Spouse name: None    Number of children: None    Years of education: None    Highest education level: None   Occupational History    None   Social Needs    Financial resource strain: None    Food insecurity:     Worry: None     Inability: None    Transportation needs:     Medical: None     Non-medical: None   Tobacco Use    Smoking status: Former Smoker    Smokeless tobacco: Never Used   Substance and Sexual Activity    Alcohol use: No     Frequency: Never     Binge frequency: Never    Drug use: No    Sexual activity: Never   Lifestyle    Physical activity:     Days per week: None     Minutes per session: None    Stress: None   Relationships    Social connections:     Talks on phone: None     Gets together: None     Attends Tenriism service: None     Active member of club or organization: None     Attends meetings of clubs or organizations: None     Relationship status: None    Intimate partner violence:     Fear of current or ex partner: None     Emotionally abused: None     Physically abused: None     Forced sexual activity: None   Other Topics Concern    None   Social History Narrative    None     Family History   Problem Relation Age of Onset    Cancer Mother     Cancer Father        Meds/Allergies   all current active meds have been reviewed and current meds:   Current Facility-Administered Medications   Medication Dose Route Frequency    acetaminophen (TYLENOL) tablet 488 mg  488 mg Oral Q6H PRN    allopurinol (ZYLOPRIM) tablet 300 mg  300 mg Oral Daily    amiodarone tablet 200 mg  200 mg Oral Daily    aspirin (ECOTRIN LOW STRENGTH) EC tablet 81 mg  81 mg Oral Daily    atorvastatin (LIPITOR) tablet 40 mg  40 mg Oral After Dinner    fluticasone (FLONASE) 50 mcg/act nasal spray 1 spray  1 spray Each Nare Daily    gabapentin (NEURONTIN) capsule 100 mg  100 mg Oral HS    levalbuterol (XOPENEX) inhalation solution 1 25 mg  1 25 mg Nebulization TID    levothyroxine tablet 50 mcg  50 mcg Oral Early Morning    melatonin tablet 9 mg  9 mg Oral HS PRN    miconazole 2 % cream   Topical BID    midodrine (PROAMATINE) tablet 10 mg  10 mg Oral TID AC    norepinephrine (LEVOPHED) 1 mg/mL injection **ADS Override Pull**        norepinephrine (LEVOPHED) 4 mg (STANDARD CONCENTRATION) IV in sodium chloride 0 9% 250 mL  1-30 mcg/min Intravenous Titrated    NxStage K 4/Ca 3 dialysis solution (RFP-401) 20,000 mL  20,000 mL Dialysis Continuous    ondansetron (ZOFRAN) injection 4 mg  4 mg Intravenous Q6H PRN    QUEtiapine (SEROquel) tablet 12 5 mg  12 5 mg Oral HS    sodium chloride (OCEAN) 0 65 % nasal spray 1 spray  1 spray Each Nare PRN    sodium chloride 0 9 % inhalation solution 3 mL  3 mL Nebulization Q6H PRN    sodium chloride 0 9 % inhalation solution 3 mL  3 mL Nebulization TID    tamsulosin (FLOMAX) capsule 0 4 mg  0 4 mg Oral Daily    warfarin (COUMADIN) tablet 1 25 mg  1 25 mg Oral Daily (warfarin)       Allergies   Allergen Reactions    Indomethacin          Objective   /60   Pulse 68   Temp 97 9 °F (36 6 °C)   Resp (!) 28   Ht 5' 9" (1 753 m)   Wt 91 1 kg (200 lb 13 4 oz)   SpO2 96%   BMI 29 66 kg/m²   Physical Exam   Constitutional: He is oriented to person, place, and time  He appears well-developed and well-nourished  No distress  Chronically ill   HENT:   Head: Normocephalic and atraumatic  Mouth/Throat: Oropharynx is clear and moist  No oropharyngeal exudate  Eyes: Conjunctivae and EOM are normal  No scleral icterus  Neck: Normal range of motion  Neck supple  Cardiovascular: Normal rate and regular rhythm  paced   Pulmonary/Chest: No respiratory distress  He has wheezes  He has rales  Dyspneic with conversation on 2L O2 NC   Abdominal: Soft  He exhibits distension  There is no tenderness  Musculoskeletal: He exhibits edema (pitting to mid thigh)  Neurological: He is alert and oriented to person, place, and time  Skin: Skin is warm and dry  He is not diaphoretic  Oozing from skin tears   Psychiatric: He has a normal mood and affect   His behavior is normal  Judgment and thought content normal    Nursing note and vitals reviewed  Lab Results:   I have personally reviewed pertinent labs  , CBC: No results found for: WBC, HGB, HCT, MCV, PLT, ADJUSTEDWBC, MCH, MCHC, RDW, MPV, NRBC, CMP:   Lab Results   Component Value Date    SODIUM 138 09/23/2019    K 4 5 09/23/2019     09/23/2019    CO2 28 09/23/2019    BUN 12 09/23/2019    CREATININE 1 39 (H) 09/23/2019    CALCIUM 9 1 09/23/2019    EGFR 46 09/23/2019     Imaging Studies: I have personally reviewed pertinent reports  EKG, Pathology, and Other Studies: I have personally reviewed pertinent reports  Counseling / Coordination of Care  Total floor / unit time spent today 90 minutes  Greater than 50% of total time was spent with the patient and / or family counseling and / or coordination of care  A description of the counseling / coordination of care: current condition, hospital course, options moving forward, hospice focus of care, transitioning to comfort measures, symptom management at end of life      Zoran Garcia, DO

## 2019-09-23 NOTE — PROGRESS NOTES
Follow up Consultation    Nephrology   Sherrill Lemus 80 y o  male MRN: [id number]  Unit/Bed#: ICU 10 Encounter: [id number]      Physician Requesting Consult: Corinne Diehl MD  Reason for Consult:  Acute kidney injury       ASSESSMENT/PLAN:    1)Acute kidney injury (POA) on CKD stage 4:  - ZULMA most likely secondary to ATN secondary to hypotension as well as possible hepatorenal syndrome  - After review of records it appears that the patient has a baseline Creatinine of 1 9-2 2mg/dL  - patient's creatinine today is at 1 39 mg/dL  - Avoid nephrotoxins, adjust meds to appropriate GFR   - Acid base and lytes stable while on CVVH  - clinically patient appears to be hypervolemic  - on UF of 75 cc/hour along with Levophed  - will not increase UF at this time  - patient as per family discussion and as per him does not wish to be on long-term dialysis and wishes to go home  - will have meeting with family as well as primary team today to discuss goals of care  - at this time based on his hemodynamics he is intolerant to HD and, in since he does not wish to be on long-term dialysis also since he is not a good dialysis candidate based on his liver cirrhosis and other comorbidities  Efforts will need to be made to transition him off of CVVH to worse hospice  - continue on CVVH with 4 K bath for now until family meeting is completed and further goals are establish later today  - recommend palliative/hospice consult  - Optimize hemodynamic status to avoid delay in renal recovery  - check BMP, magnesium, phosphorus in a m   - Place on a renal diet when allowed diet order    - Strict I/O  2)Blood pressure:  - Optimize hemodynamics   - Maintain MAP > 65mmHg  - Avoid BP fluctuations    - remains on Levophed and midodrine    3)H/H/anemia:  - most recent hemoglobin at 7 4 grams/deciliter  - maintain hemoglobin greater than 8 grams/deciliter  - transfusion per ICU team    4)Volume status:  - Clinically patient appears to be hypervolemic   - continue UF at 75 cc an hour    5)Hyponatremia:  - Likely due to decreased free water clearance  - Likely due to decreased distal sodium delivery  - Continue to monitor for now  - most recent sodium improved at 138 mEq    6) liver cirrhosis/ ascites:  - management as per primary team  - off of albumin and octreotide  - secondary to long-term alcohol use and fatty liver disease  7) CLL:  - elevated WBC  - management per primary team        Thanks for the consult  Will continue to follow  Please call with questions/ concerns  Above-mentioned orders and Plan with regards to acute kidney injury was discussed with the team in [address] MD, [facility], 2019, 10:12 AM              Objective :   Patient seen and examined while on CVVH at 9:10 a m  Today  Blood pressures still remain borderline ,remains afebrile remains on Levophed, urine output close to 55 cc in last 24 hours, fluid removal of 3 3 L  Remains on UF at 75 cc an hour  PHYSICAL EXAM  /60   Pulse 68   Temp 97 9 °F (36 6 °C)   Resp (!) 28   Ht 5' 9" (1 753 m)   Wt 91 1 kg (200 lb 13 4 oz)   SpO2 96%   BMI 29 66 kg/m²   Temp (24hrs), Av 2 °F (36 8 °C), Min:97 5 °F (36 4 °C), Max:99 °F (37 2 °C)        Intake/Output Summary (Last 24 hours) at 2019 1012  Last data filed at 2019 1001  Gross per 24 hour   Intake 1333 6 ml   Output 3412 ml   Net -2078 4 ml       I/O last 24 hours: In: 1433 6 [I V :733 6; IV Piggyback:700]  Out: 4399 [Urine:115; Other:3738]      Current Weight: Weight - Scale: 91 1 kg (200 lb 13 4 oz)  First Weight: Weight - Scale: 82 kg (180 lb 12 4 oz)  Physical Exam   Constitutional: He is oriented to person, place, and time  He appears well-developed and well-nourished  No distress  HENT:   Head: Normocephalic and atraumatic  Mouth/Throat: Oropharynx is clear and moist  No oropharyngeal exudate  Eyes: Conjunctivae are normal  No scleral icterus  Neck: Neck supple     Right IJ temporary catheter   Cardiovascular: Normal heart sounds  Exam reveals no friction rub  Pulmonary/Chest: Effort normal  He has no wheezes  He has no rales  Abdominal: Soft  He exhibits no mass  There is no tenderness  Musculoskeletal: He exhibits edema  Plus one edema bilateral upper lower extremity, left arm in dressing  Neurological: He is alert and oriented to person, place, and time  Skin: Skin is warm  He is not diaphoretic  No pallor  Psychiatric: He has a normal mood and affect  His behavior is normal    Nursing note and vitals reviewed  Review of Systems   Constitutional: Negative for appetite change, fatigue and fever  HENT: Negative for congestion and sore throat  Respiratory: Negative for cough, shortness of breath and wheezing  Cardiovascular: Positive for leg swelling  Negative for chest pain and palpitations  Gastrointestinal: Negative for abdominal pain, diarrhea, nausea and vomiting  Genitourinary: Negative for flank pain  Musculoskeletal: Negative for back pain  Skin: Negative for rash  Neurological: Negative for dizziness and headaches  Psychiatric/Behavioral: Negative for confusion  All other systems reviewed and are negative        Scheduled Meds:  Current Facility-Administered Medications:  acetaminophen 488 mg Oral Q6H PRN Lucy Sheppard MD    allopurinol 300 mg Oral Daily Lucy Sheppard MD    amiodarone 200 mg Oral Daily Lucy Sheppard MD    aspirin 81 mg Oral Daily Lucy Sheppard MD    atorvastatin 40 mg Oral After Jatinder Call MD    fluticasone 1 spray Each Nare Daily Lucy Sheppard MD    gabapentin 100 mg Oral HS CT Shell    levalbuterol 1 25 mg Nebulization TID Lucy Sheppard MD    levothyroxine 50 mcg Oral Early Morning Lucy Sheppard MD    melatonin 9 mg Oral HS PRN Lucy Sheppard MD    miconazole  Topical BID Lucy Sheppard MD    midodrine 10 mg Oral TID AC Lucy Sheppard MD    norepinephrine        norepinephrine 1-30 mcg/min Intravenous Titrated CT Ortega Last Rate: 16 mcg/min (09/23/19 0917)   NxStage K 4/Ca 3 20,000 mL Dialysis Continuous Dax Fernandez MD    ondansetron 4 mg Intravenous Q6H PRN Rosalino Ceja MD    QUEtiapine 12 5 mg Oral HS Rosalino Ceja MD    sodium chloride 1 spray Each Nare PRN Rosalino Ceja MD    sodium chloride 3 mL Nebulization Q6H PRN Rosalino Ceja MD    sodium chloride 3 mL Nebulization TID Rosalino Ceja MD    tamsulosin 0 4 mg Oral Daily Rosalino Ceja MD    warfarin 1 25 mg Oral Daily (warfarin) CT Gustafson        PRN Meds:   acetaminophen    melatonin    ondansetron    sodium chloride    sodium chloride    Continuous Infusions:  norepinephrine 1-30 mcg/min Last Rate: 16 mcg/min (09/23/19 0917)   NxStage K 4/Ca 3 20,000 mL          Invasive Devices:      Invasive Devices     Peripheral Intravenous Line            Peripheral IV 09/21/19 Right Antecubital 2 days          Line            Temporary HD Catheter 5 days          Drain            Urethral Catheter Temperature probe 4 days                  LABORATORY:    Results from last 7 days   Lab Units 09/23/19  0450 09/22/19  2309 09/22/19  1653 09/22/19  1015 09/22/19  0422 09/21/19  2143 09/21/19  1618  09/20/19  0420  09/19/19  0433  09/18/19  0518  09/17/19  0336   WBC Thousand/uL  --   --   --   --  46 35*  --   --   --  23 93*  --  37 99*  --  37 57*  --  21 37*   HEMOGLOBIN g/dL  --   --   --   --  7 4*  --   --   --  7 0*  --  7 8*  --  7 8*  --  7 8*   HEMATOCRIT %  --   --   --   --  25 3*  --   --   --  23 7*  --  26 5*  --  25 4*  --  24 6*   PLATELETS Thousands/uL  --   --   --   --  157  --   --   --  103*  --  128*  --  119*  --  106*   POTASSIUM mmol/L 4 5 4 9 4 3 4 4 4 8 5 1 4 4   < > 3 7   < > 4 7   < > 4 1   < > 4 4   CHLORIDE mmol/L 104 102 103 103 103 102 103   < > 104   < > 104   < > 104   < > 104   CO2 mmol/L 28 26 26 26 26 25 25   < > 26   < > 26   < > 25   < > 24   BUN mg/dL 12 15 16 18 17 16 16   < > 31*   < > 54*   < > 102*   < > 135*   CREATININE mg/dL 1 39* 1 34* 1 37* 1 43* 1 37* 1 26 1 38*   < > 1 29   < > 1 55*   < > 2 40*   < > 2 97*   CALCIUM mg/dL 9 1 9 1 8 9 9 2 9 3 9 2 8 9   < > 9 1   < > 9 6   < > 9 5   < > 9 0   MAGNESIUM mg/dL 2 2 1 8 2 0 2 1 1 9 2 0 1 8   < > 2 1   < > 1 9   < >  --    < >  --    PHOSPHORUS mg/dL 2 3 2 3 2 6 2 4 2 7 2 4 2 3   < > 2 5   < > 2 8   < >  --    < >  --     < > = values in this interval not displayed  rest all reviewed    RADIOLOGY:  VAS upper limb venous duplex scan, complete, bilateral   Final Result by Chintan Rodrigues DO (09/21 1011)      VAS carotid complete study   Final Result by Nereida Quinones MD (09/19 1723)      XR chest portable ICU   Final Result by Yamila Cohen MD (09/18 0606)      No pneumothorax  Vascular congestion with small bilateral pleural effusions  Workstation performed: [id number]         XR chest pa & lateral   Final Result by Guilherme Muñoz DO (09/11 1521)      Small bilateral pleural effusions with bibasilar opacity which may represent atelectasis  Correlate clinically for infection  Workstation performed: [id number]         US kidney and bladder   Final Result by Virgilio Ramachandran MD (15/91 2556)      Bilateral renal cysts including 2 adjacent left lower pole cysts which were hyperdense on the CT  No hydronephrosis or perinephric collections  Unremarkable bladder  Workstation performed: [id number]         CT head without contrast   Final Result by Any Mayfield MD (09/07 1614)      No acute intracranial abnormality  Workstation performed: [id number]         CT chest abdomen pelvis wo contrast   Final Result by Brandie Velásquez MD (09/07 1888)   Exam is limited without IV and oral contrast particularly for soft tissue evaluation  1   Scattered debris in the trachea and right mainstem bronchus   Focal consolidation in the right middle lobe centrally may be related to atelectasis or pneumonia  Recommend follow-up chest CT in 3-4 weeks to ensure resolution  2  Diffuse interlobular septal thickening  Small bilateral pleural effusions  Findings suggest mild CHF  3  Cirrhotic appearance to the liver  Mild-to-moderate ascites  Splenomegaly  4   Indeterminate left lower pole renal lesion measuring up to 2 7 cm in size  Recommend follow-up with nonemergent renal ultrasound  Reminder placed into [facility] for follow up examinations  Workstation performed: [id number]           Rest all reviewed    Portions of the record may have been created with voice recognition software  Occasional wrong word or "sound a like" substitutions may have occurred due to the inherent limitations of voice recognition software  Read the chart carefully and recognize, using context, where substitutions have occurred  If you have any questions, please contact the dictating provider

## 2019-09-23 NOTE — PHYSICAL THERAPY NOTE
PHYSICAL THERAPY NOTE          Patient Name: April Abel COOKOHMIRIAN Date: 9/23/2019     Pt still on CVVHD  Will continue to follow as medically appropriate   Sudarshan Everett PT

## 2019-09-24 VITALS
SYSTOLIC BLOOD PRESSURE: 72 MMHG | OXYGEN SATURATION: 94 % | HEIGHT: 69 IN | BODY MASS INDEX: 29.75 KG/M2 | RESPIRATION RATE: 17 BRPM | DIASTOLIC BLOOD PRESSURE: 38 MMHG | WEIGHT: 200.84 LBS | HEART RATE: 62 BPM | TEMPERATURE: 99.3 F

## 2019-09-24 LAB
ANION GAP SERPL CALCULATED.3IONS-SCNC: 7 MMOL/L (ref 4–13)
ANION GAP SERPL CALCULATED.3IONS-SCNC: 9 MMOL/L (ref 4–13)
BUN SERPL-MCNC: 13 MG/DL (ref 5–25)
BUN SERPL-MCNC: 14 MG/DL (ref 5–25)
CA-I BLD-SCNC: 1.12 MMOL/L (ref 1.12–1.32)
CA-I BLD-SCNC: 1.18 MMOL/L (ref 1.12–1.32)
CALCIUM SERPL-MCNC: 9.1 MG/DL (ref 8.3–10.1)
CALCIUM SERPL-MCNC: 9.4 MG/DL (ref 8.3–10.1)
CHLORIDE SERPL-SCNC: 104 MMOL/L (ref 100–108)
CHLORIDE SERPL-SCNC: 104 MMOL/L (ref 100–108)
CO2 SERPL-SCNC: 24 MMOL/L (ref 21–32)
CO2 SERPL-SCNC: 26 MMOL/L (ref 21–32)
CREAT SERPL-MCNC: 1.2 MG/DL (ref 0.6–1.3)
CREAT SERPL-MCNC: 1.3 MG/DL (ref 0.6–1.3)
GFR SERPL CREATININE-BSD FRML MDRD: 49 ML/MIN/1.73SQ M
GFR SERPL CREATININE-BSD FRML MDRD: 54 ML/MIN/1.73SQ M
GLUCOSE SERPL-MCNC: 118 MG/DL (ref 65–140)
GLUCOSE SERPL-MCNC: 133 MG/DL (ref 65–140)
MAGNESIUM SERPL-MCNC: 1.8 MG/DL (ref 1.6–2.6)
MAGNESIUM SERPL-MCNC: 2 MG/DL (ref 1.6–2.6)
PHOSPHATE SERPL-MCNC: 2.5 MG/DL (ref 2.3–4.1)
PHOSPHATE SERPL-MCNC: 2.7 MG/DL (ref 2.3–4.1)
POTASSIUM SERPL-SCNC: 4.9 MMOL/L (ref 3.5–5.3)
POTASSIUM SERPL-SCNC: 4.9 MMOL/L (ref 3.5–5.3)
SODIUM SERPL-SCNC: 137 MMOL/L (ref 136–145)
SODIUM SERPL-SCNC: 137 MMOL/L (ref 136–145)

## 2019-09-24 PROCEDURE — 99238 HOSP IP/OBS DSCHRG MGMT 30/<: CPT | Performed by: INTERNAL MEDICINE

## 2019-09-24 PROCEDURE — 83735 ASSAY OF MAGNESIUM: CPT | Performed by: INTERNAL MEDICINE

## 2019-09-24 PROCEDURE — 90945 DIALYSIS ONE EVALUATION: CPT

## 2019-09-24 PROCEDURE — 82330 ASSAY OF CALCIUM: CPT | Performed by: INTERNAL MEDICINE

## 2019-09-24 PROCEDURE — 80048 BASIC METABOLIC PNL TOTAL CA: CPT | Performed by: INTERNAL MEDICINE

## 2019-09-24 PROCEDURE — 94640 AIRWAY INHALATION TREATMENT: CPT

## 2019-09-24 PROCEDURE — NC001 PR NO CHARGE: Performed by: INTERNAL MEDICINE

## 2019-09-24 PROCEDURE — 84100 ASSAY OF PHOSPHORUS: CPT | Performed by: INTERNAL MEDICINE

## 2019-09-24 PROCEDURE — 99223 1ST HOSP IP/OBS HIGH 75: CPT | Performed by: SURGERY

## 2019-09-24 PROCEDURE — 90945 DIALYSIS ONE EVALUATION: CPT | Performed by: INTERNAL MEDICINE

## 2019-09-24 RX ORDER — LORAZEPAM 2 MG/ML
0.5 INJECTION INTRAMUSCULAR EVERY 4 HOURS PRN
Status: CANCELLED | OUTPATIENT
Start: 2019-09-24

## 2019-09-24 RX ORDER — MIDODRINE HYDROCHLORIDE 5 MG/1
15 TABLET ORAL
Status: DISCONTINUED | OUTPATIENT
Start: 2019-09-24 | End: 2019-09-24 | Stop reason: HOSPADM

## 2019-09-24 RX ORDER — LORAZEPAM 2 MG/ML
0.5 INJECTION INTRAMUSCULAR EVERY 4 HOURS PRN
Status: DISCONTINUED | OUTPATIENT
Start: 2019-09-24 | End: 2019-09-24 | Stop reason: HOSPADM

## 2019-09-24 RX ORDER — GABAPENTIN 100 MG/1
100 CAPSULE ORAL 3 TIMES DAILY
Status: DISCONTINUED | OUTPATIENT
Start: 2019-09-24 | End: 2019-09-24 | Stop reason: HOSPADM

## 2019-09-24 RX ORDER — MIDODRINE HYDROCHLORIDE 5 MG/1
15 TABLET ORAL
Status: CANCELLED | OUTPATIENT
Start: 2019-09-24

## 2019-09-24 RX ORDER — HALOPERIDOL 5 MG/ML
1 INJECTION INTRAMUSCULAR EVERY 6 HOURS PRN
Status: CANCELLED | OUTPATIENT
Start: 2019-09-24

## 2019-09-24 RX ORDER — HALOPERIDOL 5 MG/ML
1 INJECTION INTRAMUSCULAR EVERY 6 HOURS PRN
Status: DISCONTINUED | OUTPATIENT
Start: 2019-09-24 | End: 2019-09-24 | Stop reason: HOSPADM

## 2019-09-24 RX ORDER — HYDROMORPHONE HCL/PF 1 MG/ML
0.5 SYRINGE (ML) INJECTION
Status: DISCONTINUED | OUTPATIENT
Start: 2019-09-24 | End: 2019-09-24 | Stop reason: HOSPADM

## 2019-09-24 RX ORDER — HYDROMORPHONE HCL/PF 1 MG/ML
0.5 SYRINGE (ML) INJECTION
Status: CANCELLED | OUTPATIENT
Start: 2019-09-24

## 2019-09-24 RX ADMIN — CALCIUM GLUCONATE 1 G: 98 INJECTION, SOLUTION INTRAVENOUS at 07:50

## 2019-09-24 RX ADMIN — NOREPINEPHRINE BITARTRATE 13.33 MCG/MIN: 1 INJECTION INTRAVENOUS at 00:37

## 2019-09-24 RX ADMIN — MICONAZOLE NITRATE: 2 CREAM TOPICAL at 08:28

## 2019-09-24 RX ADMIN — LEVOTHYROXINE SODIUM 50 MCG: 50 TABLET ORAL at 05:47

## 2019-09-24 RX ADMIN — ALLOPURINOL 300 MG: 100 TABLET ORAL at 08:30

## 2019-09-24 RX ADMIN — LEVALBUTEROL HYDROCHLORIDE 1.25 MG: 1.25 SOLUTION, CONCENTRATE RESPIRATORY (INHALATION) at 08:14

## 2019-09-24 RX ADMIN — AMIODARONE HYDROCHLORIDE 200 MG: 200 TABLET ORAL at 08:29

## 2019-09-24 RX ADMIN — GABAPENTIN 100 MG: 100 CAPSULE ORAL at 08:30

## 2019-09-24 RX ADMIN — ASPIRIN 81 MG: 81 TABLET, COATED ORAL at 08:30

## 2019-09-24 RX ADMIN — ISODIUM CHLORIDE 3 ML: 0.03 SOLUTION RESPIRATORY (INHALATION) at 08:14

## 2019-09-24 RX ADMIN — Medication 20000 ML: at 06:06

## 2019-09-24 RX ADMIN — MIDODRINE HYDROCHLORIDE 15 MG: 5 TABLET ORAL at 10:50

## 2019-09-24 RX ADMIN — MIDODRINE HYDROCHLORIDE 10 MG: 5 TABLET ORAL at 08:29

## 2019-09-24 RX ADMIN — HYDROMORPHONE HYDROCHLORIDE 0.5 MG: 1 INJECTION, SOLUTION INTRAMUSCULAR; INTRAVENOUS; SUBCUTANEOUS at 13:31

## 2019-09-24 RX ADMIN — TAMSULOSIN HYDROCHLORIDE 0.4 MG: 0.4 CAPSULE ORAL at 08:29

## 2019-09-24 RX ADMIN — FLUTICASONE PROPIONATE 1 SPRAY: 50 SPRAY, METERED NASAL at 08:29

## 2019-09-24 RX ADMIN — SODIUM PHOSPHATE, MONOBASIC, MONOHYDRATE 6 MMOL: 276; 142 INJECTION, SOLUTION INTRAVENOUS at 03:20

## 2019-09-24 RX ADMIN — CALCIUM GLUCONATE 1 G: 98 INJECTION, SOLUTION INTRAVENOUS at 03:21

## 2019-09-24 RX ADMIN — NOREPINEPHRINE BITARTRATE 13.3 MCG/MIN: 1 INJECTION INTRAVENOUS at 07:50

## 2019-09-24 NOTE — PROGRESS NOTES
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%  3  ZULMA on CKD stage 4, requiring CVVH  4  Vasopressor support with CVVH  5  Liver cirrhosis  6  Ascites  7  Debility  8  Ambulatory dysfunction with frequent falls  9  CLL  10  Goals of Care:   -plan to stop vasopressor support today and monitor symptoms closely  -transition to comfort measures, simplify medications  -change code status to level 4  -consult hospice, re IPU transfer if patient stable off vasopressor  -will order dilaudid 0 5mg IV q3h prn pain, dyspnea  -will order lorazepam 0 5mg IV q4h prn anxiety, seizures, agitation, insomnia, n/v  -will order haldol 1mg IV q6h prn agitation, n/v  -discussed discontinuing coumadin with simplifying medications, patient/family agree  -discussed pacemaker with patient/family  Patient/sons state patient was never upgraded to defibrillator  Discussed role of magnet as condition deteriorates  -emotional support provided    Code status: Level 3    Subjective:  Patient seen and examined  Sleepy today  More alert with arrival of sons and dil  C/o pain LE, states feels heavy and can no longer lift legs        Medications    Current Facility-Administered Medications:     acetaminophen (TYLENOL) tablet 488 mg, 488 mg, Oral, Q6H PRN, Giovanni Brito MD, 488 mg at 09/17/19 2044    allopurinol (ZYLOPRIM) tablet 300 mg, 300 mg, Oral, Daily, Margo Giles MD, 300 mg at 09/24/19 0830    amiodarone tablet 200 mg, 200 mg, Oral, Daily, Giovanni Brito MD, 200 mg at 09/24/19 0829    aspirin (ECOTRIN LOW STRENGTH) EC tablet 81 mg, 81 mg, Oral, Daily, Margo Giles MD, 81 mg at 09/24/19 0830    atorvastatin (LIPITOR) tablet 40 mg, 40 mg, Oral, After Luann Evans MD, 40 mg at 09/23/19 1705    calcium gluconate 1 g in sodium chloride 0 9 % 100 mL IVPB, 1 g, Intravenous, Once, Kalyan Richmond MD, Last Rate: 50 mL/hr at 09/24/19 0750, 1 g at 09/24/19 0750    fluticasone (FLONASE) 50 mcg/act nasal spray 1 spray, 1 spray, Each Nare, Daily, Ranjit Gutierres MD, 1 spray at 09/24/19 0829    gabapentin (NEURONTIN) capsule 100 mg, 100 mg, Oral, TID, CT Her, 100 mg at 09/24/19 0830    levalbuterol (Leanord Chill) inhalation solution 1 25 mg, 1 25 mg, Nebulization, TID, Ranjit Gutierres MD, 1 25 mg at 09/24/19 0814    levothyroxine tablet 50 mcg, 50 mcg, Oral, Early Morning, Margo Giles MD, 50 mcg at 09/24/19 0547    melatonin tablet 9 mg, 9 mg, Oral, HS PRN, Ranjit Gutierres MD, 9 mg at 09/22/19 2015    miconazole 2 % cream, , Topical, BID, Ranjit Gutierres MD    midodrine (PROAMATINE) tablet 10 mg, 10 mg, Oral, TID AC, Margo Giles MD, 10 mg at 09/24/19 0829    norepinephrine (LEVOPHED) 4 mg (STANDARD CONCENTRATION) IV in sodium chloride 0 9% 250 mL, 1-30 mcg/min, Intravenous, Titrated, CT Her, Last Rate: 49 9 mL/hr at 09/24/19 0750, 13 3 mcg/min at 09/24/19 0750    NxStage K 4/Ca 3 dialysis solution (RFP-401) 20,000 mL, 20,000 mL, Dialysis, Continuous, Kalyan Richmond MD, 20,000 mL at 09/24/19 0606    ondansetron (ZOFRAN) injection 4 mg, 4 mg, Intravenous, Q6H PRN, Ranjit Gutierres MD    QUEtiapine (SEROquel) tablet 12 5 mg, 12 5 mg, Oral, HS, Margo Giles MD, 12 5 mg at 09/22/19 2015    sodium chloride (OCEAN) 0 65 % nasal spray 1 spray, 1 spray, Each Nare, PRN, Ranjit Gutierres MD, 1 spray at 09/18/19 2030    sodium chloride 0 9 % inhalation solution 3 mL, 3 mL, Nebulization, Q6H PRN, Ranjit Gutierres MD, 3 mL at 09/17/19 0844    sodium chloride 0 9 % inhalation solution 3 mL, 3 mL, Nebulization, TID, Ranjit Gutierres MD, 3 mL at 09/24/19 0814    tamsulosin (FLOMAX) capsule 0 4 mg, 0 4 mg, Oral, Daily, Ranjit Gutierres MD, 0 4 mg at 09/24/19 8398    warfarin (COUMADIN) tablet 1 25 mg, 1 25 mg, Oral, Daily (warfarin), Lavon Bernstein CT Corrigan, 1 25 mg at 09/23/19 1705    Objective:  BP (!) 100/31   Pulse 64   Temp 98 6 °F (37 °C)   Resp (!) 24   Ht 5' 9" (1 753 m)   Wt 91 1 kg (200 lb 13 4 oz)   SpO2 95%   BMI 29 66 kg/m²   Physical Exam:  General: somnolent, frail, chronically ill  Neurological: awakens to voice  Cardiovascular: reg  Respiratory: decreased BS bases, scattered wheezes  Gastrointestinal: soft, nt  Musculoskeletal: +pitting edema  Skin: warm, dry    Lab Results:   I have personally reviewed pertinent labs  , CBC: No results found for: WBC, HGB, HCT, MCV, PLT, ADJUSTEDWBC, MCH, MCHC, RDW, MPV, NRBC, CMP:   Lab Results   Component Value Date    SODIUM 137 09/24/2019    K 4 9 09/24/2019     09/24/2019    CO2 24 09/24/2019    BUN 13 09/24/2019    CREATININE 1 30 09/24/2019    CALCIUM 9 1 09/24/2019    EGFR 49 09/24/2019       Counseling / Coordination of Care  Total floor / unit time spent today 55 minutes  Greater than 50% of total time was spent with the patient and / or family counseling and / or coordinating of care  A description of the counseling / coordination of care: symptom management, hospice discussion, coordination of care      Taurus Adrian,   Palliative & Supportive Care

## 2019-09-24 NOTE — SOCIAL WORK
2pm transport with [facility] set up for today  Notified the C C  Team and [facility] of transport time

## 2019-09-24 NOTE — PROGRESS NOTES
Progress Note - Critical Care   Edgar Stovall 80 y o  male MRN: [id number]  Unit/Bed#: ICU 10 Encounter: [id number]    Assessment/Plan:  1  Acute hypoxic respiratory failure likely related to congestive heart failure  · Encourage cough, deep breathing and incentive spirometer use  · Continue oxygen therapy for now with a goal to maintain his saturations greater than 90%  2  Acute kidney injury on chronic kidney disease stage 4  · The patient and his family decline long-term dialysis  · Awaiting the family meeting tomorrow for withdrawal of supportive care  3  Acute on chronic combined systolic and diastolic congestive heart failure  · We are continuing care for now until the family is assembled tomorrow morning in which time supportive care including vasopressors therapy and dialysis will be stopped  4  Disposition: It would not be unreasonable to consult hospice for possible placement        Critical Care Time:   Documented critical care time excludes any procedures documented elsewhere   It also excludes any family updates    _____________________________________________________________________    HPI/24hr events:   Complaints of pain in his lower extremities    Medications:    Current Facility-Administered Medications:  acetaminophen 488 mg Oral Q6H PRN Maria Victoria Coburn MD    allopurinol 300 mg Oral Daily Maria Victoria Coburn MD    amiodarone 200 mg Oral Daily Maria Victoria Coburn MD    aspirin 81 mg Oral Daily Maria Victoria Coburn MD    atorvastatin 40 mg Oral After Ashley Bliss MD    calcium gluconate 1 G  100 mL IVPB 1 g Intravenous Once Yoana Hi MD    fluticasone 1 spray Each Nare Daily Maria Victoria Coburn MD    gabapentin 100 mg Oral HS CT Osorio    levalbuterol 1 25 mg Nebulization TID Maria Victoria Coburn MD    levothyroxine 50 mcg Oral Early Morning Maria Victoria Coburn MD    melatonin 9 mg Oral HS PRN Maria Victoria Coburn MD    miconazole  Topical BID Maria Victoria Coburn MD    midodrine 10 mg Oral TID Mushtaq Huertas MD norepinephrine 1-30 mcg/min Intravenous Titrated Geoffrey Boop, CRNP Last Rate: 13 333 mcg/min (09/24/19 0037)   NxStage K 4/Ca 3 20,000 mL Dialysis Continuous Lucy Trammell MD    ondansetron 4 mg Intravenous Q6H PRN Tarik Norman MD    QUEtiapine 12 5 mg Oral HS Tarik Norman MD    sodium chloride 1 spray Each Nare PRN Tarik Norman MD    sodium chloride 3 mL Nebulization Q6H PRN Tarik Norman MD    sodium chloride 3 mL Nebulization TID Tarik Norman MD    tamsulosin 0 4 mg Oral Daily Tarik Norman MD    warfarin 1 25 mg Oral Daily (warfarin) Delmy Garzon, CRNP          norepinephrine 1-30 mcg/min Last Rate: 13 333 mcg/min (09/24/19 0037)   NxStage K 4/Ca 3 20,000 mL          Physical exam:  Vitals: Body mass index is 29 66 kg/m²  Blood pressure (!) 111/43, pulse 66, temperature 98 6 °F (37 °C), resp  rate 19, height 5' 9" (1 753 m), weight 91 1 kg (200 lb 13 4 oz), SpO2 97 %  ,  Temp  Min: 95 7 °F (35 4 °C)  Max: 99 °F (37 2 °C)  IBW: 70 7 kg    SpO2: 97 %  SpO2 Activity: At Rest  O2 Device: Nasal cannula      Intake/Output Summary (Last 24 hours) at 9/24/2019 0647  Last data filed at 9/24/2019 0600  Gross per 24 hour   Intake 2049 67 ml   Output 3653 ml   Net -1603 33 ml       Invasive/non-invasive ventilation settings:   Respiratory    Lab Data (Last 4 hours)    None         O2/Vent Data (Last 4 hours)    None              Invasive Devices     Peripheral Intravenous Line            Peripheral IV 09/21/19 Right Antecubital 3 days          Line            Temporary HD Catheter 6 days          Drain            Urethral Catheter Temperature probe 5 days                  Physical Exam:  Gen:  Awake and alert  HEENT:  Pupils are equal round reactive to light    The oropharynx is without erythema  Neck:  Supple negative for lymphadenopathy  Chest:  Diminished in the bases with some crackles  Cor:  Paced rhythm, regular  Abd:  Soft and nontender  Ext:  There is no significant edema clubbing or cyanosis  Neuro:  Awake and alert, able to move her extremities but weak  Skin:  Warm and dry      Diagnostic Data:  Lab: I have personally reviewed pertinent lab results  CBC:   Results from last 7 days   Lab Units 09/22/19  0422 09/20/19  0420 09/19/19  0433   WBC Thousand/uL 46 35* 23 93* 37 99*   HEMOGLOBIN g/dL 7 4* 7 0* 7 8*   HEMATOCRIT % 25 3* 23 7* 26 5*   PLATELETS Thousands/uL 157 103* 128*       CMP:   Results from last 7 days   Lab Units 09/24/19  0115 09/23/19  1856 09/23/19  1129  09/21/19  0953  09/18/19  0518   SODIUM mmol/L 137 136 138   < > 137   < > 140   POTASSIUM mmol/L 4 9 4 5 4 4   < > 4 1   < > 4 1   CHLORIDE mmol/L 104 103 104   < > 103   < > 104   CO2 mmol/L 26 26 25   < > 26   < > 25   BUN mg/dL 14 14 15   < > 19   < > 102*   CREATININE mg/dL 1 20 1 30 1 41*   < > 1 33*   < > 2 40*   CALCIUM mg/dL 9 4 9 1 9 0   < > 8 9   < > 9 5   ALK PHOS U/L  --   --   --   --  118*  --  111   ALT U/L  --   --   --   --  40  --  27   AST U/L  --   --   --   --  69*  --  55*    < > = values in this interval not displayed  PT/INR:   No results found for: PT, INR,   Magnesium:   Results from last 7 days   Lab Units 09/24/19  0115 09/23/19 1856 09/23/19  1129   MAGNESIUM mg/dL 2 0 2 0 1 8     Phosphorous:   Results from last 7 days   Lab Units 09/24/19  0115 09/23/19  1856 09/23/19  1129   PHOSPHORUS mg/dL 2 5 2 5 2 2*       Microbiology:        Imaging:      Cardiac lab/EKG/telemetry/ECHO:       VTE Prophylaxis:  Coumadin    Code Status: Level 3 - DNAR and DNI    CT Guerra    Portions of the record may have been created with voice recognition software  Occasional wrong word or "sound a like" substitutions may have occurred due to the inherent limitations of voice recognition software  Read the chart carefully and recognize, using context, where substitutions have occurred

## 2019-09-24 NOTE — SOCIAL WORK
Referral sent via [facility] for a hospice eval to be completed for the infpatient unit with [facility]  Cm following

## 2019-09-24 NOTE — TRANSPORTATION MEDICAL NECESSITY
Section I - General Information    Name of Patient: Naty Mercado                 : 1933    Medicare #: [id number]  Transport Date: 19 (PCS is valid for round trips on this date and for all repetitive trips in the 60-day range as noted below )  Origin: [address] [place] [address] Avenue: [address] [place] [address]  Is the pt's stay covered under Medicare Part A (PPS/DRG)   [x]     Closest appropriate facility? If no, why is transport to more distant facility required? Yes  If hospice pt, is this transport related to pt's terminal illness? Yes       Section II - Medical Necessity Questionnaire  Ambulance transportation is medically necessary only if other means of transport are contraindicated or would be potentially harmful to the patient  To meet this requirement, the patient must either be "bed confined" or suffer from a condition such that transport by means other than ambulance is contraindicated by the patient's condition  The following questions must be answered by the medical professional signing below for this form to be valid:    1)  Describe the MEDICAL CONDITION (physical and/or mental) of this patient AT [address] [place] [address] that requires the patient to be transported in an ambulance and why transport by other means is contraindicated by the patient's condition: 2L NC can not self administer, transfer for end of life care, bed bound, unable to ambulate, unstable blood pressures  2) Is the patient "bed confined" as defined below? Yes  To be "be confined" the patient must satisfy all three of the following conditions: (1) unable to get up from bed without Assistance; AND (2) unable to ambulate; AND (3) unable to sit in a chair or wheelchair  3) Can this patient safely be transported by car or wheelchair van (i e , seated during transport without a medical attendant or monitoring)?    No    4) In addition to completing questions 1-3 above, please check any of the following conditions that apply*:   *Note: supporting documentation for any boxes checked must be maintained in the patient's medical records  If hosp-hosp transfer, describe services needed at 2nd facility not available at 1st facility? Moderate/severe pain on movement   Medical attendant required   Requires oxygen-unable to self administer  Unable to tolerate seated position for time needed to transport   Other(specify) end of life care  Section III - Signature of Physician or Healthcare Professional  I certify that the above information is true and correct based on my evaluation of this patient, and represent that the patient requires transport by ambulance and that other forms of transport are contraindicated  I understand that this information will be used by the Centers for Medicare and Medicaid Services (CMS) to support the determination of medical necessity for ambulance services, and I represent that I have personal knowledge of the patient's condition at time of transport  [x]  If this box is checked, I also certify that the patient is physically or mentally incapable of signing the ambulance service's claim and that the institution with which I am affiliated has furnished care, services, or assistance to the patient  My signature below is made on behalf of the patient pursuant to 42 CFR §424 36(b)(4)   In accordance with 42 CFR §424 37, the specific reason(s) that the patient is physically or mentally incapable of signing the claim form is as follows:     Signature of Physician* or RYLAN Miner    Signature Date 09/24/19 (For scheduled repetitive transports, this form is not valid for transports performed more than 60 days after this date)      Printed Name & Credentials of Physician or Healthcare Professional (MD, , RN, etc ) RYLAN Middleton  *Form must be signed by patient's attending physician for scheduled, repetitive transports   For non-repetitive, unscheduled ambulance transports, if unable to obtain the signature of the attending physician, any of the following may sign (choose appropriate option below)  [] Physician Assistant []  Clinical Nurse Specialist []  Registered Nurse  []  Nurse Practitioner  [x] Discharge Planner

## 2019-09-24 NOTE — HOSPICE NOTE
Referral received  Spoke with Dr Susy Dahl and [facility]  Will meet with family shortly  Thank you  Update    Met with family and patient; in agreement for transport to hospice house  Patient meets criteria for GIP level of care; approved by Dr Ulysses Punter  Consents signed and faxed to hospice house and copies given to family  Per EDVIN Polanco, patient to be transported at 2pm  Requested RN call to give report prior to patient leaving ([phone])  Thank you

## 2019-09-24 NOTE — DISCHARGE SUMMARY
Discharge Summary - Yue Stovall 80 y o  male MRN: [id number]    Unit/Bed#: ICU 10 Encounter: [id number]    Admission Date:   Admission Orders (From admission, onward)     Ordered        09/07/19 1649  Inpatient Admission  Once                     Admitting Diagnosis: Weakness [R53 1]  Anemia [D64 9]  Abnormal CT scan [R93 89]  Elevated troponin [R74 8]  Abnormal TSH [R79 89]  Generalized weakness [R53 1]  Renal structural abnormality [Q63 9]    HPI: Mr Cleo Field was initially admitted s/p fall on 9/7 with acute on chronic kidney disease and newly diagnosed liver cirrhosis  He was also initially treated for pneumonia  He was originally admitted to the internal medicine service, however, his renal function worsened, likely secondary to hepatorenal syndrome, and he became hypotensive requiring vasopressors  He was transferred to ICU, started on norepinephrine and CVVH  Procedures Performed:   9/14 R arm debridement s/p fall from prior to admission  9/17 HD catheter placed  9/17-9/24 CVVH  Orders Placed This Encounter   Procedures    ED ECG Documentation Only    Incision and Drainage    Temporary HD Catheter       Summary of Hospital Course:  Despite [facility] and hemodynamic support, the patients urine output and renal function did not improve  On 9/23, a family meeting took place with the ICU team, nephrology, and palliative care  The patient's hospital course and long term care needs were discussed including the need for hemodialysis  The patient and his family ultimately felt that the patient would not want long term dialysis, and the decision was made to transition him to hospice  CVVH and vasopressors were stopped this morning       Significant Findings, Care, Treatment and Services Provided:   9/7 - CT CAP showed cirrhotic appearing liver - new diagnosis of liver cirrhosis   9/12 BC No Growth x 5 days  9/17 patient transferred to ICU for management of hypotension and renal failure requiring CVVH    9/23 Family meeting with palliative care for discussion on goals of care  Decision made to transition to DNR/DNI  9/24 CVVH/vasopressors stopped after patient and family decision to transition to Level 4 comfort care     VAS upper limb venous duplex scan, complete, bilateral   Final Result      VAS carotid complete study   Final Result      XR chest portable ICU   Final Result      No pneumothorax  Vascular congestion with small bilateral pleural effusions  Workstation performed: [id number]         XR chest pa & lateral   Final Result      Small bilateral pleural effusions with bibasilar opacity which may represent atelectasis  Correlate clinically for infection  Workstation performed: [id number]         US kidney and bladder   Final Result      Bilateral renal cysts including 2 adjacent left lower pole cysts which were hyperdense on the CT  No hydronephrosis or perinephric collections  Unremarkable bladder  Workstation performed: [id number]         CT head without contrast   Final Result      No acute intracranial abnormality  Workstation performed: [id number]         CT chest abdomen pelvis wo contrast   Final Result   Exam is limited without IV and oral contrast particularly for soft tissue evaluation  1   Scattered debris in the trachea and right mainstem bronchus  Focal consolidation in the right middle lobe centrally may be related to atelectasis or pneumonia  Recommend follow-up chest CT in 3-4 weeks to ensure resolution  2  Diffuse interlobular septal thickening  Small bilateral pleural effusions  Findings suggest mild CHF  3  Cirrhotic appearance to the liver  Mild-to-moderate ascites  Splenomegaly  4   Indeterminate left lower pole renal lesion measuring up to 2 7 cm in size  Recommend follow-up with nonemergent renal ultrasound  Reminder placed into EPIC for follow up examinations                     Workstation performed: [id number] Complications: none    Discharge Diagnosis:   Acute on Chronic Respiratory Failure  ZULMA on CKD   Liver Cirrhosis  Hypotension likely 2/2 liver failure, hypotension    Resolved Problems  Date Reviewed: 9/24/2019          Resolved    Chronic [facility] pulmonary disease ([facility] ) 9/23/2019     Resolved by  CT Nair    Gout 9/23/2019     Resolved by  CT Nair    H/O aortic valve replacement 9/23/2019     Resolved by  CT Nair    Ambulatory dysfunction 9/23/2019     Resolved by  CT Nair    Hematoma of arm 9/23/2019     Resolved by  CT Nair    CKD (chronic kidney disease) stage 4, GFR 15-29 ml/min ([facility] ) 9/23/2019     Resolved by  CT Nair          Condition at Discharge: poor         Discharge instructions/Information to patient and family:   See after visit summary for information provided to patient and family  Provisions for Follow-Up Care:  See after visit summary for information related to follow-up care and any pertinent home health orders  PCP: Bradly Hernandez MD    Disposition: [address]    Planned Readmission: No      Discharge Statement   I spent 20 minutes discharging the patient  This time was spent on the day of discharge  I had direct contact with the patient on the day of discharge  Additional documentation is required if more than 30 minutes were spent on discharge  Discharge Medications:  See after visit summary for reconciled discharge medications provided to patient and family

## 2019-09-24 NOTE — PROGRESS NOTES
Follow up Consultation    Nephrology   Benjamín Saleh 80 y o  male MRN: [id number]  Unit/Bed#: ICU 10 Encounter: [id number]      Physician Requesting Consult: Refugio Turcios MD  Reason for Consult:  Acute kidney injury       ASSESSMENT/PLAN:    1)Acute kidney injury (POA) on CKD stage 4:  - ZULMA most likely secondary to ATN secondary to hypotension as well as possible hepatorenal syndrome  - After review of records it appears that the patient has a baseline Creatinine of 1 9-2 2mg/dL  - patient's creatinine today is at 1 30 mg/dL  - Avoid nephrotoxins, adjust meds to appropriate GFR   - Acid base and lytes stable while on CVVH  - clinically patient appears to be hypervolemic  - on UF of 75 cc/hour along with Levophed  - will not increase UF at this time  - patient as per family discussion and as per him does not wish to be on long-term dialysis and wishes to go home  - met with the family and the full team yesterday and discussed patient's goals as well as options  - plan is to stop CVVH later today  - at this time based on his hemodynamics he is intolerant to HD and, in since he does not wish to be on long-term dialysis also since he is not a good dialysis candidate based on his liver cirrhosis and other comorbidities  Efforts will need to be made to transition him off of CVVH towards hospice  - continue on CVVH with 4 K bath for now until family meeting is completed and further goals are establish later today  - follow-up palliative/hospice consult  - Optimize hemodynamic status to avoid delay in renal recovery  - check BMP, magnesium, phosphorus in a m   - Place on a renal diet when allowed diet order    - Strict I/O  2)Blood pressure:  - Optimize hemodynamics   - Maintain MAP > 65mmHg  - Avoid BP fluctuations  - remains on Levophed and midodrine  - recommend increasing midodrine to 50 mg p o  T i d  And then weaning off the Levophed      3)H/H/anemia:  - most recent hemoglobin at 7 4 grams/deciliter, no labs from today  - maintain hemoglobin greater than 8 grams/deciliter  - transfusion per ICU team    4)Volume status:  - Clinically patient appears to be hypervolemic   - continue UF at 75 cc an hour    5)Hyponatremia:  - Likely due to decreased free water clearance  - Likely due to decreased distal sodium delivery  - Continue to monitor for now  - most recent sodium stable at 137 mEq    6) liver cirrhosis/ ascites:  - management as per primary team  - off of albumin and octreotide  - secondary to long-term alcohol use and fatty liver disease  7) CLL:  - elevated WBC  - management per primary team        Thanks for the consult  Will continue to follow  Please call with questions/ concerns  Above-mentioned orders and Plan with regards to acute kidney injury was discussed with the team as well as palliative care in [address] MD, [facility], 2019, 10:43 AM              Objective :   Seen and examined in the ICU while on CVVH at 9:25 a m  Son at bedside no overnight events complaining of mild bilateral lower extremity pain  Remains on UF of net -75 an hour as well as Levophed drip  Family meeting to happen later today again  PHYSICAL EXAM  BP (!) 100/31   Pulse 64   Temp 98 6 °F (37 °C)   Resp (!) 24   Ht 5' 9" (1 753 m)   Wt 91 1 kg (200 lb 13 4 oz)   SpO2 95%   BMI 29 66 kg/m²   Temp (24hrs), Av 1 °F (36 7 °C), Min:97 9 °F (36 6 °C), Max:98 6 °F (37 °C)        Intake/Output Summary (Last 24 hours) at 2019 1043  Last data filed at 2019 0600  Gross per 24 hour   Intake 1609 67 ml   Output 2989 ml   Net -1379 33 ml       I/O last 24 hours: In:  7 [P O :240; I V :1109 7; IV Piggyback:700]  Out: 09 [Urine:60; Other:3382]      Current Weight: Weight - Scale: 91 1 kg (200 lb 13 4 oz)  First Weight: Weight - Scale: 82 kg (180 lb 12 4 oz)  Physical Exam   Constitutional: He is oriented to person, place, and time   He appears well-developed and well-nourished  No distress  HENT:   Head: Normocephalic and atraumatic  Mouth/Throat: Oropharynx is clear and moist    Right IJ temporary dialysis catheter   Eyes: Conjunctivae are normal  No scleral icterus  Neck: Neck supple  Cardiovascular: Normal heart sounds  Exam reveals no friction rub  Pulmonary/Chest:   Course breath sounds no wheezing   Abdominal: Soft  He exhibits no mass  There is no tenderness  Musculoskeletal: He exhibits edema  Plus one edema bilateral upper lower extremities   Neurological: He is alert and oriented to person, place, and time  Skin: Skin is warm  He is not diaphoretic  No pallor  Psychiatric: He has a normal mood and affect  His behavior is normal    Nursing note and vitals reviewed  Review of Systems   Constitutional: Positive for fatigue  Negative for appetite change and fever  HENT: Negative for congestion and sore throat  Respiratory: Negative for cough, shortness of breath and wheezing  Cardiovascular: Positive for leg swelling  Negative for chest pain and palpitations  Gastrointestinal: Negative for abdominal pain, constipation, diarrhea, nausea and vomiting  Genitourinary: Negative for flank pain  Musculoskeletal: Negative for back pain  Skin: Negative for rash  Neurological: Negative for dizziness and headaches  Psychiatric/Behavioral: Negative for confusion  All other systems reviewed and are negative        Scheduled Meds:    Current Facility-Administered Medications:  acetaminophen 488 mg Oral Q6H PRN Radha Alejandre MD    allopurinol 300 mg Oral Daily Radha Alejandre MD    amiodarone 200 mg Oral Daily Radha Alejandre MD    aspirin 81 mg Oral Daily Radha Alejandre MD    atorvastatin 40 mg Oral After Angie Francois MD    fluticasone 1 spray Each Nare Daily Radha Alejandre MD    gabapentin 100 mg Oral TID CT Pastor    levalbuterol 1 25 mg Nebulization TID Radha Alejandre MD    levothyroxine 50 mcg Oral Early Morning Eduar Lion MD    melatonin 9 mg Oral HS PRN Eduar Lion MD    miconazole  Topical BID Eduar Lion MD    midodrine 15 mg Oral TID AC CT Butcher    norepinephrine 1-30 mcg/min Intravenous Titrated CT Fernandez Last Rate: 7 5 mcg/min (09/24/19 1035)   NxStage K 4/Ca 3 20,000 mL Dialysis Continuous Gamal Clarke MD    ondansetron 4 mg Intravenous Q6H PRN Eduar Lion MD    QUEtiapine 12 5 mg Oral HS Eduar Lion MD    sodium chloride 1 spray Each Nare PRN Eduar Lion MD    sodium chloride 3 mL Nebulization Q6H PRN Eduar Lion MD    sodium chloride 3 mL Nebulization TID Eduar Lion MD    tamsulosin 0 4 mg Oral Daily Eduar Lion MD    warfarin 1 25 mg Oral Daily (warfarin) CT Eller        PRN Meds:   acetaminophen    melatonin    ondansetron    sodium chloride    sodium chloride    Continuous Infusions:    norepinephrine 1-30 mcg/min Last Rate: 7 5 mcg/min (09/24/19 1035)   NxStage K 4/Ca 3 20,000 mL          Invasive Devices:      Invasive Devices     Peripheral Intravenous Line            Peripheral IV 09/21/19 Right Antecubital 3 days          Line            Temporary HD Catheter 6 days          Drain            Urethral Catheter Temperature probe 5 days                  LABORATORY:    Results from last 7 days   Lab Units 09/24/19  0616 09/24/19  0115 09/23/19  1856 09/23/19  1129 09/23/19  0450 09/22/19  2309 09/22/19  1653  09/22/19  0422  09/20/19  0420  09/19/19  0433  09/18/19  0518   WBC Thousand/uL  --   --   --   --   --   --   --   --  46 35*  --  23 93*  --  37 99*  --  37 57*   HEMOGLOBIN g/dL  --   --   --   --   --   --   --   --  7 4*  --  7 0*  --  7 8*  --  7 8*   HEMATOCRIT %  --   --   --   --   --   --   --   --  25 3*  --  23 7*  --  26 5*  --  25 4*   PLATELETS Thousands/uL  --   --   --   --   --   --   --   --  157  --  103*  --  128*  --  119*   POTASSIUM mmol/L 4 9 4 9 4 5 4 4 4 5 4 9 4 3   < > 4 8   < > 3 7   < > 4 7   < > 4 1 CHLORIDE mmol/L 104 104 103 104 104 102 103   < > 103   < > 104   < > 104   < > 104   CO2 mmol/L 24 26 26 25 28 26 26   < > 26   < > 26   < > 26   < > 25   BUN mg/dL 13 14 14 15 12 15 16   < > 17   < > 31*   < > 54*   < > 102*   CREATININE mg/dL 1 30 1 20 1 30 1 41* 1 39* 1 34* 1 37*   < > 1 37*   < > 1 29   < > 1 55*   < > 2 40*   CALCIUM mg/dL 9 1 9 4 9 1 9 0 9 1 9 1 8 9   < > 9 3   < > 9 1   < > 9 6   < > 9 5   MAGNESIUM mg/dL 1 8 2 0 2 0 1 8 2 2 1 8 2 0   < > 1 9   < > 2 1   < > 1 9   < >  --    PHOSPHORUS mg/dL 2 7 2 5 2 5 2 2* 2 3 2 3 2 6   < > 2 7   < > 2 5   < > 2 8   < >  --     < > = values in this interval not displayed  rest all reviewed    RADIOLOGY:  VAS upper limb venous duplex scan, complete, bilateral   Final Result by DO Belinda (09/21 1011)      VAS carotid complete study   Final Result by Tiffanie Wills MD (09/19 1723)      XR chest portable ICU   Final Result by Mago Interiano MD (09/18 5389)      No pneumothorax  Vascular congestion with small bilateral pleural effusions  Workstation performed: [id number]         XR chest pa & lateral   Final Result by Steve Draper DO (09/11 1521)      Small bilateral pleural effusions with bibasilar opacity which may represent atelectasis  Correlate clinically for infection  Workstation performed: [id number]         US kidney and bladder   Final Result by Meghana Falk MD (29/54 6394)      Bilateral renal cysts including 2 adjacent left lower pole cysts which were hyperdense on the CT  No hydronephrosis or perinephric collections  Unremarkable bladder  Workstation performed: [id number]         CT head without contrast   Final Result by Maria Esther Rogers MD (09/07 9684)      No acute intracranial abnormality                    Workstation performed: [id number]         CT chest abdomen pelvis wo contrast   Final Result by Arturo Solorzano MD (09/07 8588)   Exam is limited without IV and oral contrast particularly for soft tissue evaluation  1   Scattered debris in the trachea and right mainstem bronchus  Focal consolidation in the right middle lobe centrally may be related to atelectasis or pneumonia  Recommend follow-up chest CT in 3-4 weeks to ensure resolution  2  Diffuse interlobular septal thickening  Small bilateral pleural effusions  Findings suggest mild CHF  3  Cirrhotic appearance to the liver  Mild-to-moderate ascites  Splenomegaly  4   Indeterminate left lower pole renal lesion measuring up to 2 7 cm in size  Recommend follow-up with nonemergent renal ultrasound  Reminder placed into [employer] for follow up examinations  Workstation performed: [id number]           Rest all reviewed    Portions of the record may have been created with voice recognition software  Occasional wrong word or "sound a like" substitutions may have occurred due to the inherent limitations of voice recognition software  Read the chart carefully and recognize, using context, where substitutions have occurred  If you have any questions, please contact the dictating provider

## 2021-05-22 NOTE — ASSESSMENT & PLAN NOTE
History of bioprosthetic AVR  [NS_[email or web address]_ALL_OB_FT:[id number]=],[NS_DeliveryAssist1_OBGYN_ALL_OB_FT:[id number]],[NS_DeliveryRN_OBGYN_ALL_OB_FT:[id number]]

## 2023-05-04 NOTE — ASSESSMENT & PLAN NOTE
Wt Readings from Last 3 Encounters:   09/15/19 88 5 kg (195 lb)   09/04/19 81 6 kg (180 lb)   08/07/19 79 6 kg (175 lb 6 4 oz)      Chronic combined systolic and diastolic CHF  No significant changes on echocardiogram performed 9/9/2019  Initially holding furosemide and metoprolol given hypotension and kidney injury    Restarted furosemide which has been changed to IV by nephrology No